# Patient Record
Sex: FEMALE | Race: BLACK OR AFRICAN AMERICAN | Employment: OTHER | URBAN - METROPOLITAN AREA
[De-identification: names, ages, dates, MRNs, and addresses within clinical notes are randomized per-mention and may not be internally consistent; named-entity substitution may affect disease eponyms.]

---

## 2017-01-17 ENCOUNTER — OFFICE VISIT (OUTPATIENT)
Dept: ORTHOPEDIC SURGERY | Age: 72
End: 2017-01-17

## 2017-01-17 VITALS
SYSTOLIC BLOOD PRESSURE: 149 MMHG | WEIGHT: 144.6 LBS | TEMPERATURE: 98.3 F | OXYGEN SATURATION: 99 % | HEART RATE: 75 BPM | BODY MASS INDEX: 26.61 KG/M2 | RESPIRATION RATE: 16 BRPM | DIASTOLIC BLOOD PRESSURE: 65 MMHG | HEIGHT: 62 IN

## 2017-01-17 DIAGNOSIS — M54.41 RIGHT-SIDED LOW BACK PAIN WITH RIGHT-SIDED SCIATICA, UNSPECIFIED CHRONICITY: ICD-10-CM

## 2017-01-17 DIAGNOSIS — M96.1 LUMBAR POST-LAMINECTOMY SYNDROME: ICD-10-CM

## 2017-01-17 DIAGNOSIS — Z98.1 S/P LAMINECTOMY WITH SPINAL FUSION: Primary | ICD-10-CM

## 2017-01-17 DIAGNOSIS — Z98.890 S/P LUMBAR LAMINECTOMY: ICD-10-CM

## 2017-01-17 RX ORDER — MELOXICAM 15 MG/1
TABLET ORAL
Refills: 1 | COMMUNITY
Start: 2016-10-25 | End: 2017-05-26 | Stop reason: ALTCHOICE

## 2017-01-17 RX ORDER — TRAMADOL HYDROCHLORIDE 50 MG/1
50 TABLET ORAL
Qty: 120 TAB | Refills: 3 | Status: SHIPPED | OUTPATIENT
Start: 2017-01-17 | End: 2017-07-24 | Stop reason: SDUPTHER

## 2017-01-17 RX ORDER — GABAPENTIN 300 MG/1
CAPSULE ORAL
Qty: 180 CAP | Refills: 3 | Status: SHIPPED | OUTPATIENT
Start: 2017-01-17 | End: 2017-05-26

## 2017-01-17 NOTE — PROGRESS NOTES
[address]  [address], [address]  [place]  Phone: [phone]  Fax: [phone]  PROGRESS NOTE  Patient: Aubrey Osborn                MRN: [id number]       SSN: [id number]  YOB: 1945        AGE: 70 y.o. SEX: female  Body mass index is 26.45 kg/(m^2). PCP: Graham Toribio MD  01/17/17    Chief Complaint   Patient presents with    Back Pain     block follow up       HISTORY OF PRESENT ILLNESS, RADIOGRAPHS, and PLAN:     HISTORY:  Ms. Odilia Alvarez returns today. She is continuing to have severe pain in her back and leg. Previous injections only had moderate effectiveness. She would like to have them repeated. She is continuing to have pain in her back radiating down primarily her right leg. We discussed the nature of her pain and possible surgical intervention. Injections at this point, we will try a right L4 facet, as well as a right L4 selective. We discussed junctional decompression and fusion of a segment below a previous surgery. At this point, she would like to avoid surgery. We will repeat her injection. I will see her back after her trial of new injection. cc: Alexia Edwards M.D. Past Medical History   Diagnosis Date    [facility]     Cancer ([facility]     Foraminal stenosis of lumbar region     Hematuria     Hypercholesterolemia     Low back pain        Family History   Problem Relation Age of Onset    No Known Problems Mother     No Known Problems Father        Current Outpatient Prescriptions   Medication Sig Dispense Refill    traMADol (ULTRAM) 50 mg tablet Take 1 Tab by mouth every six (6) hours as needed for Pain. Max Daily Amount: 200 mg. 120 Tab 0    MULTIVIT-MINERALS/FOLIC ACID (THERALOGIX  MULTI PO) Take  by mouth daily.       traZODone (DESYREL) 50 mg tablet TK 1 T PO QHS  0    gabapentin (NEURONTIN) 300 mg capsule TAKE 2 CAPSULES BY MOUTH NIGHTLY 180 Cap 3    aspirin delayed-release 81 mg tablet Take  by mouth daily.  multivitamin (ONE A DAY) tablet Take 1 Tab by mouth daily.  pravastatin (PRAVACHOL) 20 mg tablet Take 20 mg by mouth nightly. 5    meloxicam (MOBIC) 15 mg tablet TK 1 T PO D  1       No Known Allergies    Past Surgical History   Procedure Laterality Date    Hx colonoscopy      Hx bunionectomy      Hx orthopaedic  Bunionionectomy R    Hx lumbar laminectomy  6/2/15     with fusion    Hx tubal ligation         Past Medical History   Diagnosis Date    [facility]     [facility]     Foraminal stenosis of lumbar region     Hematuria     Hypercholesterolemia     Low back pain        Social History     Social History    Marital status:      Spouse name: N/A    Number of children: N/A    Years of education: N/A     Occupational History    nurse      Social History Main Topics    Smoking status: Former Smoker     Quit date: 1/1/1980    Smokeless tobacco: Never Used    Alcohol use 0.0 oz/week     0 Standard drinks or equivalent per week      Comment: 1 glass 2x per week    Drug use: No    Sexual activity: Not on file     Other Topics Concern    Not on file     Social History Narrative       REVIEW OF SYSTEMS:   CONSTITUTIONAL SYMPTOMS:  Negative. EYES:  Negative. EARS, NOSE, THROAT AND MOUTH:  Negative. CARDIOVASCULAR:  Negative. RESPIRATORY:  Negative. GENITOURINARY: Negative. GASTROINTESTINAL:  Negative. INTEGUMENTARY (SKIN AND/OR BREAST):  Negative. MUSCULOSKELETAL: Per HPI.   ENDOCRINE/RHEUMATOLOGIC:  Negative. NEUROLOGICAL:  Per HPI. HEMATOLOGIC/LYMPHATIC:  Negative. ALLERGIC/IMMUNOLOGIC:  Negative. PSYCHIATRIC:  Negative.     PHYSICAL EXAMINATION:   Visit Vitals    /65    Pulse 75    Temp 98.3 °F (36.8 °C) (Oral)    Resp 16    Ht 5' 2\" (1.575 m)    Wt 144 lb 9.6 oz (65.6 kg)    SpO2 99%    BMI 26.45 kg/m2    PAIN SCALE: 5/10    CONSTITUTIONAL: The patient is in no apparent distress and is alert and oriented x 3. HEENT: Normocephalic. Hearing grossly intact. NECK: Supple and symmetric. no tenderness, or masses were felt. RESPIRATORY: No labored breathing. CARDIOVASCULAR: The carotid pulses were normal. Peripheral pulses were 2+. CHEST: Normal AP diameter and normal contour without any kyphoscoliosis. LYMPHATIC: No lymphadenopathy was appreciated in the neck, axillae or groin. SKIN: Negative for scars, rashes, lesions, or ulcers on the right upper, right lower, left upper, left lower and trunk. NEUROLOGICAL: Alert and oriented x 3. Ambulation without assistive device. FWB. EXTREMITIES: See musculoskeletal.  MUSCULOSKELETAL:   Head and Neck:  Negative for misalignment, asymmetry, crepitation, defects, tenderness masses or effusions.  Left Upper Extremity: Inspection, percussion and palpation preformed. Yings sign is negative.  Right Upper Extremity: Inspection, percussion and palpation preformed. Yings sign is negative.  Spine, Ribs and Pelvis: Back pain that radiates into RLE. Inspection, percussion and palpation preformed. Negative for misalignment, asymmetry, crepitation, defects, tenderness masses or effusions.  Right Lower Extremity: Pain. Inspection, percussion and palpation preformed. Negative straight leg raise.  Left Lower Extremity: Inspection, percussion and palpation preformed. Negative straight leg raise. SPINE EXAM:     Lumbar spine: No rash, ecchymosis, or gross obliquity. No focal atrophy is noted. ASSESSMENT    ICD-10-CM ICD-9-CM    1. S/P laminectomy with spinal fusion Z98.1 V45.4 SCHEDULE SURGERY      REFERRAL TO PHYSICAL THERAPY   2. Lumbar post-laminectomy syndrome M96.1 722.83 SCHEDULE SURGERY      traMADol (ULTRAM) 50 mg tablet      gabapentin (NEURONTIN) 300 mg capsule      REFERRAL TO PHYSICAL THERAPY   3.  S/P lumbar laminectomy Z98.890 V45.89 SCHEDULE SURGERY      traMADol (ULTRAM) 50 mg tablet      gabapentin (NEURONTIN) 300 mg capsule      REFERRAL TO PHYSICAL THERAPY   4. Right-sided low back pain with right-sided sciatica, unspecified chronicity M54.41 724.3 SCHEDULE SURGERY      REFERRAL TO PHYSICAL THERAPY       Written by Anna Mendez, as dictated by Taiwo Lentz MD.

## 2017-01-17 NOTE — PATIENT INSTRUCTIONS
[facility] Activation    Thank you for requesting access to [facility]. Please follow the instructions below to securely access and download your online medical record. [facility] allows you to send messages to your doctor, view your test results, renew your prescriptions, schedule appointments, and more. How Do I Sign Up? 1. In your internet browser, go to [email or web address]  2. Click on the First Time User? Click Here link in the Sign In box. You will be redirect to the New Member Sign Up page. 3. Enter your [facility] Access Code exactly as it appears below. You will not need to use this code after youve completed the sign-up process. If you do not sign up before the expiration date, you must request a new code. [facility] Access Code: [email or web address]  Expires: 2017 10:05 AM (This is the date your [facility] access code will )    4. Enter the last four digits of your Social Security Number (xxxx) and Date of Birth (mm/dd/yyyy) as indicated and click Submit. You will be taken to the next sign-up page. 5. Create a [facility] ID. This will be your [facility] login ID and cannot be changed, so think of one that is secure and easy to remember. 6. Create a [facility] password. You can change your password at any time. 7. Enter your Password Reset Question and Answer. This can be used at a later time if you forget your password. 8. Enter your e-mail address. You will receive e-mail notification when new information is available in [address] 9. Click Sign Up. You can now view and download portions of your medical record. 10. Click the Download Summary menu link to download a portable copy of your medical information. Additional Information    If you have questions, please visit the Frequently Asked Questions section of the [facility] website at [email or web address] Remember, [facility] is NOT to be used for urgent needs. For medical emergencies, dial 911.

## 2017-02-08 ENCOUNTER — APPOINTMENT (OUTPATIENT)
Dept: PHYSICAL THERAPY | Age: 72
End: 2017-02-08
Payer: MEDICARE

## 2017-02-17 ENCOUNTER — HOSPITAL ENCOUNTER (OUTPATIENT)
Dept: PHYSICAL THERAPY | Age: 72
Discharge: HOME | End: 2017-02-17
Payer: MEDICARE

## 2017-02-17 PROCEDURE — 97110 THERAPEUTIC EXERCISES: CPT

## 2017-02-17 PROCEDURE — 97162 PT EVAL MOD COMPLEX 30 MIN: CPT

## 2017-02-17 PROCEDURE — G8979 MOBILITY GOAL STATUS: HCPCS

## 2017-02-17 PROCEDURE — 97140 MANUAL THERAPY 1/> REGIONS: CPT

## 2017-02-17 PROCEDURE — G8978 MOBILITY CURRENT STATUS: HCPCS

## 2017-02-17 NOTE — PROGRESS NOTES
[facility]              [address]        [place], [address] [place]   [phone] fax    Plan of Care/ Statement of Necessity for Physical Therapy Services    Patient name: Sydni Boss Start of Care: 2017   Referral source: Rosa Soto MD : 1945    Medical Diagnosis: Postlaminectomy syndrome, not elsewhere classified [M96.1]  Lumbago with sciatica, right side [M54.41]   Onset Date:2015    Treatment Diagnosis: Lumbar Radiculopathy    Prior Hospitalization: see medical history Provider#: [id number]   Medications: Verified on Patient summary List    Comorbidities: Arthritis, Back pain, BMI over 30, CA, Osteoporosis    Prior Level of Function: Pt is employed as Clinic Nurse at [employer]; (I) with all ADL's and enjoys gardening. The Plan of Care and following information is based on the information from the initial evaluation. Assessment/ key information: Pt is a 69 y/o female who c/o LBP that radiates down into R buttock along with some numbness in L lateral thigh. Pt reports pain increases when she lays flat or sits too long. Pt reports she has had back issues for last 2 years and had a lumbar fusion in 2015. Pt reports symptoms prior to sx included R side low back pain with radiating pain and numbness/tingling down R LE. Pt reports after sx she felt good for 3-6 months then this new pain started to creep up on her. Pt reports she has had injections which do not help and that the meds she takes helps take the edge off and she has some relief with heat. Pt ambulates with normal gait pattern. Pt is TTP R L/S paraspinals and R piriformis. Pt's L/S AROM: flex WNL slight increase in pain at end range, ext WNL no increase in pain, R SB WNL no increase in pain, L SB limited 10% with increase in pain, B rot WFL no increase in pain.  Pt's B LE MMT: hip flex R 4-/5 L 4/5, hip abd R 3+/5 L 4-/5 both with TFL over activation, hip ext B 4-/5, knee ext/flex B 4+/5, ankle DF B 4+/5. Pt presents with (-) Slump and SLR B, (-) 90/90 B, increase in R buttock symptoms but tolerable with piriformis S. Pt demos bridge through 40% range indicating weak core/glutes. Patient will benefit from skilled PT services to modify and progress therapeutic interventions, address functional mobility deficits, address ROM deficits, address strength deficits, analyze and address soft tissue restrictions, analyze and cue movement patterns, assess and modify postural abnormalities and instruct in home and community integration to attain remaining goals. Evaluation Complexity History MEDIUM  Complexity : 1-2 comorbidities / personal factors will impact the outcome/ POC ; Examination MEDIUM Complexity : 3 Standardized tests and measures addressing body structure, function, activity limitation and / or participation in recreation  ;Presentation MEDIUM Complexity : Evolving with changing characteristics  ; Clinical Decision Making MEDIUM Complexity : FOTO score of 26-74  Overall Complexity Rating: MEDIUM  Problem List: pain affecting function, decrease ROM, decrease strength, impaired gait/ balance, decrease ADL/ functional abilitiies, decrease activity tolerance and decrease flexibility/ joint mobility   Treatment Plan may include any combination of the following: Therapeutic exercise, Therapeutic activities, Neuromuscular re-education, Physical agent/modality, Gait/balance training, Manual therapy and Patient education  Patient / Family readiness to learn indicated by: asking questions, trying to perform skills and interest  Persons(s) to be included in education: patient (P)  Barriers to Learning/Limitations: None  Patient Goal (s): Less pain when lying flat  Patient Self Reported Health Status: good  Rehabilitation Potential: good    Short Term Goals:  To be accomplished in 2 weeks:  1) Pt will report (I) and compliance with HEP for home management of symptoms. Long Term Goals: To be accomplished in 6 weeks:  1) Pt's FOTO score will improve > or = 68 indicating improvements in function. 2) Pt will improve L/S AROM flex and L SB WNL w/o an increase in pain in order to perform ADL's.  3) Pt will improve B hip MMT > or = 4-4+/5 for functional strength during ADL's.  4) Pt will demo bridge through full range w/o an increase in pain indicating improved core/glute strength. 5) Pt will report > or = 60% improvement in symptoms in order to progress rehab. Frequency / Duration: Patient to be seen 2 times per week for 6 weeks. Patient/ Caregiver education and instruction: Diagnosis, prognosis, exercises   [x]  Plan of care has been reviewed with PTA    G-Codes (GP)  Mobility   Current  CJ= 20-39%   Goal  CJ= 20-39%    The severity rating is based on clinical judgment and the FOTO score. Certification Period: 2/17/2017-5/15/2017  Aravind Hook, PT 2/17/2017 10:46 AM  ________________________________________________________________________    I certify that the above Therapy Services are being furnished while the patient is under my care. I agree with the treatment plan and certify that this therapy is necessary.     [de-identified] Signature:____________________  Date:____________Time: _________    Please sign and return to [facility]              [address]        [place], [address] [place]   [phone] fax

## 2017-02-17 NOTE — PROGRESS NOTES
PT DAILY TREATMENT NOTE - [facility]     Patient Name: Joanna Blind  Date:2017  : 1945  [x]  Patient  Verified  Payor: Candy Dear / Plan: [place] & B / Product Type: Medicare /    In time:10:00  Out time:10:42  Total Treatment Time (min): 42  Total Timed Codes (min): 23  1:1 Treatment Time ([facility] only): 42   Visit #: 1 of 12    Treatment Area: Postlaminectomy syndrome, not elsewhere classified [M96.1]  Lumbago with sciatica, right side [M54.41]    SUBJECTIVE  Pain Level (0-10 scale): 2  Any medication changes, allergies to medications, adverse drug reactions, diagnosis change, or new procedure performed?: [x] No    [] Yes (see summary sheet for update)  Subjective functional status/changes:   [] No changes reported  Pt is a 69 y/o female who c/o LBP that radiates down into R buttock along with some numbness in L lateral thigh. Pt reports pain increases when she lays flat or sits too long. Pt reports she has had back issues for last 2 years and had a lumbar fusion in 2015. Pt reports symptoms prior to sx included R side low back pain with radiating pain and numbness/tingling down R LE. Pt reports after sx she felt good for 3-6 months then this new pain started to creep up on her. Pt reports she has had injections which do not help and that the meds she takes helps take the edge off and she has some relief with heat.      OBJECTIVE    Modality rationale:  to improve the patients ability to    Min Type Additional Details    [] Estim:  []Unatt       []IFC  []Premod                        []Other:  []w/ice   []w/heat  Position:  Location:    [] Estim: []Att    []TENS instruct  []NMES                    []Other:  []w/US   []w/ice   []w/heat  Position:  Location:    []  Traction: [] Cervical       []Lumbar                       [] Prone          []Supine                       []Intermittent   []Continuous Lbs:  [] before manual  [] after manual    []  Ultrasound: []Continuous   [] Pulsed []1MHz   []3MHz W/cm2:  Location:    []  Iontophoresis with dexamethasone         Location: [] Take home patch   [] In clinic    []  Ice     []  heat  []  Ice massage  []  Laser   []  Anodyne Position:  Location:    []  Laser with stim  []  Other:  Position:  Location:    []  Vasopneumatic Device Pressure:       [] lo [] med [] hi   Temperature: [] lo [] med [] hi   [] Skin assessment post-treatment:  []intact []redness- no adverse reaction    []redness  adverse reaction:     19 min [x]Eval                  []Re-Eval       13 min Therapeutic Exercise:  [x] See flow sheet : Instructed, demonstrated, and performed HEP   Rationale: increase ROM, increase strength and improve coordination to improve the patients ability to decrease pain and perform ADL's     min Therapeutic Activity:  []  See flow sheet :   Rationale:   to improve the patients ability to       min Neuromuscular Re-education:  []  See flow sheet :   Rationale:   to improve the patients ability to     10 min Manual Therapy:  STM/DTM B L/S paraspinals and QL, L/S unilateral mobs. TPR B piriformis and glutes R>L. Rationale: decrease pain, increase ROM, increase tissue extensibility and decrease trigger points to increase ease with ADL's     min Gait Training:  ___ feet with ___ device on level surfaces with ___ level of assist   Rationale: With   [] TE   [] TA   [] neuro   [] other: Patient Education: [x] Review HEP    [] Progressed/Changed HEP based on:   [] positioning   [] body mechanics   [] transfers   [] heat/ice application    [] other:      Other Objective/Functional Measures: Pt ambulates with normal gait pattern. Pt is TTP R L/S paraspinals and R piriformis. Pt's L/S AROM: flex WNL slight increase in pain at end range, ext WNL no increase in pain, R SB WNL no increase in pain, L SB limited 10% with increase in pain, B rot WFL no increase in pain.  Pt's B LE MMT: hip flex R 4-/5 L 4/5, hip abd R 3+/5 L 4-/5 both with TFL over activation, hip ext B 4-/5, knee ext/flex B 4+/5, ankle DF B 4+/5. Pt presents with (-) Slump and SLR B, (-) 90/90 B, increase in R buttock symptoms but tolerable with piriformis S. Pt demos bridge through 40% range indicating weak core/glutes. Pain Level (0-10 scale) post treatment: 2    ASSESSMENT/Changes in Function: see POC    Patient will continue to benefit from skilled PT services to modify and progress therapeutic interventions, address functional mobility deficits, address ROM deficits, address strength deficits, analyze and address soft tissue restrictions, analyze and cue movement patterns, assess and modify postural abnormalities and instruct in home and community integration to attain remaining goals. [x]  See Plan of Care  []  See progress note/recertification  []  See Discharge Summary         Progress towards goals / Updated goals:  Short term goals: to be accomplished in 2 weeks  1) Pt will report (I) and compliance with HEP for home management of symptoms. At eval: Instructed, demonstrated, and performed HEP  Long term goals: to be accomplished in 6 weeks  1) Pt's FOTO score will improve > or = 68 indicating improvements in function. At eval: FOTO = 63  2) Pt will improve L/S AROM flex and L SB WNL w/o an increase in pain in order to perform ADL's. At eval: flex WNL but slight pain at end range, L SB limited 10% with increase in pain  3) Pt will improve B hip MMT > or = 4-4+/5 for functional strength during ADL's. At eval: hip flex R 4-/5 L 4/5, hip abd R 3+/5 L 4-/5 both with TFL over activation, hip ext B 4-/5  4) Pt will demo bridge through full range w/o an increase in pain indicating improved core/glute strength. At eval: demos bridge through 40% range indicating weak core/glutes  5) Pt will report > or = 60% improvement in symptoms in order to progress rehab.   At eval: 0%    PLAN  []  Upgrade activities as tolerated     []  Continue plan of care  []  Update interventions per flow sheet       []  Discharge due to:_  [x]  Other: 2x/week for 6 weeks      Lisa Hernandez, PT 2/17/2017  10:46 AM    Future Appointments  Date Time Provider Onesimo Wandy   2/27/2017 9:25 AM Negar Rodriguez MD [facility]   2/27/2017 12:00 PM Shanda Brittle, PTA [facility]   3/1/2017 5:00 PM Shanda Brittle, PTA [facility]   3/7/2017 5:30 PM Lisa Hernandez, PT [facility]   3/8/2017 5:30 PM Shanda Brittle, PTA [facility]   3/10/2017 4:00 PM Chelsie Manriquez MD [address] [facility]   3/13/2017 5:30 PM Shanda Brittle, PTA [facility]   3/15/2017 5:30 PM Shanda Brittle, PTA [facility]   3/17/2017 2:30 PM Cuco Bender  [address]   3/21/2017 5:30 PM Lisa Hernandez, PT [facility]   3/22/2017 5:30 PM Lisa Hernandez, PT [facility]   3/27/2017 5:30 PM Shanda Brittle, PTA [facility]   3/29/2017 5:30 PM Lisa Hernandez, PT [facility]

## 2017-02-27 ENCOUNTER — HOSPITAL ENCOUNTER (OUTPATIENT)
Dept: PHYSICAL THERAPY | Age: 72
Discharge: HOME | End: 2017-02-27
Payer: MEDICARE

## 2017-02-27 ENCOUNTER — OFFICE VISIT (OUTPATIENT)
Dept: ORTHOPEDIC SURGERY | Age: 72
End: 2017-02-27

## 2017-02-27 VITALS
HEIGHT: 62 IN | SYSTOLIC BLOOD PRESSURE: 136 MMHG | WEIGHT: 146 LBS | BODY MASS INDEX: 26.87 KG/M2 | HEART RATE: 58 BPM | DIASTOLIC BLOOD PRESSURE: 61 MMHG

## 2017-02-27 DIAGNOSIS — M96.1 POSTLAMINECTOMY SYNDROME, LUMBAR: ICD-10-CM

## 2017-02-27 DIAGNOSIS — M54.2 NECK PAIN: Primary | ICD-10-CM

## 2017-02-27 DIAGNOSIS — M75.41 IMPINGEMENT SYNDROME OF RIGHT SHOULDER: ICD-10-CM

## 2017-02-27 DIAGNOSIS — M50.30 DDD (DEGENERATIVE DISC DISEASE), CERVICAL: ICD-10-CM

## 2017-02-27 DIAGNOSIS — M47.812 CERVICAL SPONDYLOSIS WITHOUT MYELOPATHY: ICD-10-CM

## 2017-02-27 PROCEDURE — 97110 THERAPEUTIC EXERCISES: CPT

## 2017-02-27 PROCEDURE — 97112 NEUROMUSCULAR REEDUCATION: CPT

## 2017-02-27 PROCEDURE — 97140 MANUAL THERAPY 1/> REGIONS: CPT

## 2017-02-27 RX ORDER — METAXALONE 400 MG/1
TABLET ORAL
Refills: 0 | COMMUNITY
Start: 2017-02-13 | End: 2017-09-08

## 2017-02-27 NOTE — PROGRESS NOTES
[facility]  [address], [address]  Phone: [phone]  Fax: [phone]        INITIAL CONSULTATION      HISTORY OF PRESENT ILLNESS:  Haydee Garnett is a 70 y.o. female whom is referred from Dr. Thao Mittal secondary to neck and right shoulder pain extending to the elbow since 12/2016. No specific injury/trauma. She rates pain 4-8/10. Pain is exacerbated with reaching behind and overhead reaching. Note from Dr. Thao Mittal dated 2/3/17 indicating patient was seen with c/o bilateral shoulder pain as well as upper thigh pain. Of note, current episodes began 1 month ago and has been occurring daily. Specifically, patient has been referred to me for right shoulder pain. Pt denies hx of shoulder or cervical spinal surgery. She has been treated with Ultram and OTC medications. Pt takes Aleve with moderate relief. Pt has hx of lumbar spinal surgery performed by Dr. Mallorie Lombardo. Note from Dr. Win Feng dated 1/17/17 low back and leg pain with previous injections offering moderate effectiveness. She has hx of lumbar blocks with Dr. Onel Loving in the past. Therapy notes reviewed. She is currently enrolled in physical therapy or her low back. Right shoulder XR dated 2/3/17 reviewed. Per report, high riding humeral head, finding is associated with ligamentous disruption of the rotator cuff tendons. Mild DJD of the glenohumeral joint and acromioclavicular joint. A nonemergent outpatient MRI of the shoulder is recommended for further characterization of rotator cuff integrity.  reviewed.        Past Medical History:   Diagnosis Date    [facility] [address]     Cancer ([facility] [address]     Foraminal stenosis of lumbar region     Hematuria     Hypercholesterolemia     Low back pain    HX T  Past Surgical History:   Procedure Laterality Date    HX BUNIONECTOMY      HX COLONOSCOPY      HX LUMBAR LAMINECTOMY  6/2/15    with fusion    HX ORTHOPAEDIC  Bunionionectomy R    HX TUBAL LIGATION     UBAL LIGATION        Substance Use Topics    Smoking status: Former Smoker     Quit date: 1/1/1980    Smokeless tobacco: Never Used    Alcohol use 0.0 oz/week     0 Standard drinks or equivalent per week      Comment: 1 glass 2x per week     Work status: The patient is employed. Marital status: The patient is . Current Outpatient Prescriptions   Medication Sig Dispense Refill    traMADol (ULTRAM) 50 mg tablet Take 1 Tab by mouth every six (6) hours as needed for Pain. Max Daily Amount: 200 mg. 120 Tab 3    gabapentin (NEURONTIN) 300 mg capsule TAKE 2 CAPSULES BY MOUTH NIGHTLY 180 Cap 3    MULTIVIT-MINERALS/FOLIC ACID (THERALOGIX  MULTI PO) Take  by mouth daily.  traZODone (DESYREL) 50 mg tablet TK 1 T PO QHS  0    aspirin delayed-release 81 mg tablet Take  by mouth daily.  multivitamin (ONE A DAY) tablet Take 1 Tab by mouth daily.  pravastatin (PRAVACHOL) 20 mg tablet Take 20 mg by mouth nightly. 5    metaxalone (SKELAXIN) 400 mg tablet   0    meloxicam (MOBIC) 15 mg tablet TK 1 T PO D  1       No Known Allergies         Family History   Problem Relation Age of Onset    No Known Problems Mother     No Known Problems Father          REVIEW OF SYSTEMS  Constitutional symptoms: Negative  Eyes: Negative  Ears, Nose, Throat, and Mouth: Negative  Cardiovascular: Negative  Respiratory: Negative  Genitourinary: Negative  Integumentary (Skin and/or breast): Negative  Musculoskeletal: Positive for neck pain, right shoulder pain, chronic low back and LLE pain. Extremities: Negative for edema.   Endocrine/Rheumatologic: Negative  Hematologic/Lymphatic: Negative  Allergic/Immunologic: Negative  Psychiatric: Negative       PHYSICAL EXAMINATION    Visit Vitals    /61    Pulse (!) 58    Ht 5' 2\" (1.575 m)    Wt 146 lb (66.2 kg)    BMI 26.7 kg/m2       CONSTITUTIONAL: NAD, A&O x 3  HEART: Regular rate and rhythm  ABDOMEN: Positive bowel sounds, soft, nontender, and nondistended  LUNGS: Clear to auscultation bilaterally. RANGE OF MOTION: The patient has full passive range of motion in all four extremities. SENSATION: Sensation is intact to light touch throughout. MOTOR:   Straight Leg Raise: Negative, bilateral  Ying: Negative, bilateral  MUSCULOSKELETAL: Positive impingement, right shoulder. Increased tenderness to palpation of right bicipital tendon. Deep tendon reflexes are 0 at the brachioradialis, 2+ at the biceps, and 1+ a t the triceps. Deep tendon reflexes are 2+ at the knees and 0 at the ankles bilaterally. Shoulder AB/Flex Elbow Flex Wrist Ext Elbow Ext Wrist Flex Hand Intrin Tone   Right +4/5 +4/5 +4/5 +4/5 +4/5 +4/5 +4/5   Left +4/5 +4/5 +4/5 +4/5 +4/5 +4/5 +4/5              Hip Flex Knee Ext Knee Flex Ankle DF GTE Ankle PF Tone   Right +4/5 +4/5 +4/5 +4/5 +4/5 +4/5 +4/5   Left +4/5 +4/5 +4/5 +4/5 +4/5 +4/5 +4/5     RADIOGRAPHS  Preliminary reading of cervical plain films:  Disc space narrowing at C3-4, C4-5 and C5-6. Anterior osteophytes noted. No acute pathology identified. These are being sent out for official reading by Dr. Yaneth Lizarraga. ASSESSMENT   Viet Landin was seen today for shoulder pain. Diagnoses and all orders for this visit:    Neck pain  -     [38325] C Spine 2-3V    Impingement syndrome of right shoulder    DDD (degenerative disc disease), cervical    Postlaminectomy syndrome, lumbar    Cervical spondylosis without myelopathy         IMPRESSIONS/RECOMMENDATIONS:  While there were degenerative noted on her cervical spinal radiographs, her symptoms appear to be related mostly to shoulder pathology versus cervical spinal pathology. I suspect a rotator cuff tear and will order a right shoulder MRI. I will refer her to Dr. Pratibha Rodriguez for further evaluation/treatment of her right shoulder pain. I will see the patient back on an as-needed basis.        Written by Naomi Ordonez, as dictated by Amanda Silver MD  I examined the patient, reviewed and agree with the note.

## 2017-03-01 ENCOUNTER — HOSPITAL ENCOUNTER (OUTPATIENT)
Dept: PHYSICAL THERAPY | Age: 72
Discharge: HOME | End: 2017-03-01
Payer: MEDICARE

## 2017-03-01 PROCEDURE — 97110 THERAPEUTIC EXERCISES: CPT

## 2017-03-01 PROCEDURE — 97112 NEUROMUSCULAR REEDUCATION: CPT

## 2017-03-01 PROCEDURE — 97140 MANUAL THERAPY 1/> REGIONS: CPT

## 2017-03-01 NOTE — PROGRESS NOTES
PT DAILY TREATMENT NOTE - [facility]     Patient Name: Leeanna Navarro  Date:3/1/2017  : 1945  [x]  Patient  Verified  Payor: [place] MEDICARE / Plan: [place] MEDICARE PART A & B / Product Type: Medicare /    In time:5:00  Out time:5:47  Total Treatment Time (min): 47  Total Timed Codes (min): 47  1:1 Treatment Time ([facility] only): 45   Visit #: 3 of 12    Treatment Area: Postlaminectomy syndrome, not elsewhere classified [M96.1]  Lumbago with sciatica, right side [M54.41]    SUBJECTIVE  Pain Level (0-10 scale): 3  Any medication changes, allergies to medications, adverse drug reactions, diagnosis change, or new procedure performed?: [x] No    [] Yes (see summary sheet for update)  Subjective functional status/changes:   [] No changes reported  Pt reports after she left on Monday she was feeling well, but that night she woke up with pain and had to do stretches to help with her back pain.      OBJECTIVE        Min Type Additional Details    [] Estim:  []Unatt       []IFC  []Premod                        []Other:  []w/ice   []w/heat  Position:  Location:    [] Estim: []Att    []TENS instruct  []NMES                    []Other:  []w/US   []w/ice   []w/heat  Position:  Location:    []  Traction: [] Cervical       []Lumbar                       [] Prone          []Supine                       []Intermittent   []Continuous Lbs:  [] before manual  [] after manual    []  Ultrasound: []Continuous   [] Pulsed                           []1MHz   []3MHz W/cm2:  Location:    []  Iontophoresis with dexamethasone         Location: [] Take home patch   [] In clinic    []  Ice     []  heat  []  Ice massage  []  Laser   []  Anodyne Position:  Location:    []  Laser with stim  []  Other:  Position:  Location:    []  Vasopneumatic Device Pressure:       [] lo [] med [] hi   Temperature: [] lo [] med [] hi   [] Skin assessment post-treatment:  []intact []redness- no adverse reaction    []redness  adverse reaction:       27 min Therapeutic Exercise:  [x] See flow sheet :   Rationale: increase ROM and increase strength to improve the patients ability to increase standing tolerance. 10  min Neuromuscular Re-education:  [x]  See flow sheet :core act ex's   Rationale: increase ROM and increase strength  to improve the patients ability to increase standing tolerance. 10 min Manual Therapy:  Per flow sheet   Rationale: decrease pain, increase ROM, increase tissue extensibility and decrease trigger points to increase ease with ADLs. With   [] TE   [] TA   [] neuro   [] other: Patient Education: [x] Review HEP    [] Progressed/Changed HEP based on:   [] positioning   [] body mechanics   [] transfers   [] heat/ice application    [] other:      Other Objective/Functional Measures: Pt TTP along R QL. Pain Level (0-10 scale) post treatment: 0    ASSESSMENT/Changes in Function: Continue per POC. Patient will continue to benefit from skilled PT services to modify and progress therapeutic interventions, address functional mobility deficits, address ROM deficits, address strength deficits and analyze and address soft tissue restrictions to attain remaining goals. []  See Plan of Care  []  See progress note/recertification  []  See Discharge Summary         Progress towards goals / Updated goals:  Short term goals: to be accomplished in 2 weeks  1) Pt will report (I) and compliance with HEP for home management of symptoms. At eval: Instructed, demonstrated, and performed HEP  Current: Goal met: Pt reports performing tasks. 2/27/17  Long term goals: to be accomplished in 6 weeks  1) Pt's FOTO score will improve > or = 68 indicating improvements in function. At eval: FOTO = 63  2) Pt will improve L/S AROM flex and L SB WNL w/o an increase in pain in order to perform ADL's.   At eval: flex WNL but slight pain at end range, L SB limited 10% with increase in pain  3) Pt will improve B hip MMT > or = 4-4+/5 for functional strength during ADL's. At eval: hip flex R 4-/5 L 4/5, hip abd R 3+/5 L 4-/5 both with TFL over activation, hip ext B 4-/5  4) Pt will demo bridge through full range w/o an increase in pain indicating improved core/glute strength. At eval: demos bridge through 40% range indicating weak core/glutes  5) Pt will report > or = 60% improvement in symptoms in order to progress rehab.   At eval: 0%    PLAN  []  Upgrade activities as tolerated     [x]  Continue plan of care  []  Update interventions per flow sheet       []  Discharge due to:_  []  Other:_      Norville Cowden, PTA 3/1/2017  5:04 PM    Future Appointments  Date Time Provider Onesimo Saba   3/2/2017 5:30 PM [facility] MRI RM 1 [facility]   3/7/2017 2:00 PM Johnie Andrea MD [facility] [address]   3/7/2017 5:30 PM Madelia Hilt, PT [facility]   3/8/2017 5:30 PM Norville Cowden, PTA [facility]   3/10/2017 4:00 PM Marina Lubin  [address]   3/13/2017 5:30 PM Norville Cowden, PTA [facility]   3/15/2017 5:30 PM Norville Cowden, PTA [facility]   3/17/2017 2:30 PM Latina Essex,  [address]   3/21/2017 5:30 PM Madelia Hilt, PT [facility]   3/22/2017 5:30 PM Madelia Hilt, PT [facility]   3/27/2017 5:30 PM Norville Cowden, PTA [facility]   3/29/2017 5:30 PM Madelia Hilt, PT [facility]

## 2017-03-02 ENCOUNTER — HOSPITAL ENCOUNTER (OUTPATIENT)
Age: 72
Discharge: HOME | End: 2017-03-02
Attending: PHYSICAL MEDICINE & REHABILITATION
Payer: MEDICARE

## 2017-03-02 DIAGNOSIS — M50.30 DDD (DEGENERATIVE DISC DISEASE), CERVICAL: ICD-10-CM

## 2017-03-02 DIAGNOSIS — M96.1 POSTLAMINECTOMY SYNDROME, LUMBAR: ICD-10-CM

## 2017-03-02 DIAGNOSIS — M47.812 CERVICAL SPONDYLOSIS WITHOUT MYELOPATHY: ICD-10-CM

## 2017-03-02 DIAGNOSIS — M54.2 NECK PAIN: ICD-10-CM

## 2017-03-02 DIAGNOSIS — M75.41 IMPINGEMENT SYNDROME OF RIGHT SHOULDER: ICD-10-CM

## 2017-03-02 PROCEDURE — 73221 MRI JOINT UPR EXTREM W/O DYE: CPT

## 2017-03-07 ENCOUNTER — OFFICE VISIT (OUTPATIENT)
Dept: ORTHOPEDIC SURGERY | Age: 72
End: 2017-03-07

## 2017-03-07 ENCOUNTER — APPOINTMENT (OUTPATIENT)
Dept: PHYSICAL THERAPY | Age: 72
End: 2017-03-07
Payer: MEDICARE

## 2017-03-07 VITALS
DIASTOLIC BLOOD PRESSURE: 72 MMHG | TEMPERATURE: 97.8 F | BODY MASS INDEX: 26.31 KG/M2 | WEIGHT: 143 LBS | HEIGHT: 62 IN | SYSTOLIC BLOOD PRESSURE: 140 MMHG | HEART RATE: 82 BPM

## 2017-03-07 DIAGNOSIS — M12.811 ROTATOR CUFF TEAR ARTHROPATHY, RIGHT: Primary | ICD-10-CM

## 2017-03-07 DIAGNOSIS — M75.101 ROTATOR CUFF TEAR ARTHROPATHY, RIGHT: Primary | ICD-10-CM

## 2017-03-07 RX ORDER — TRIAMCINOLONE ACETONIDE 40 MG/ML
40 INJECTION, SUSPENSION INTRA-ARTICULAR; INTRAMUSCULAR ONCE
Qty: 1 ML | Refills: 0
Start: 2017-03-07 | End: 2017-03-07

## 2017-03-07 NOTE — PROGRESS NOTES
Roxana Dan  [birth year]   Chief Complaint   Patient presents with    Shoulder Pain     Right, MRI results        [address] [facility]  Roxana Dan is a 70 y.o. female who presents today for evaluation of right shoulder pain. She rates her pain 6/10 today. Patient has completed an MRI of the right shoulder. Patient was previously seen by Dr. Saskia Agosto who referred her to me for further evaluation of her shoulder pain. She has trouble doing certain daily activities like curling her hair. She states the pain began about 2 months ago and has been gradually increasing. She denies any trauma or injury. No Known Allergies     Past Medical History:   Diagnosis Date    [facility] [address]     Cancer ([facility] [address]     Foraminal stenosis of lumbar region     Hematuria     Hypercholesterolemia     Low back pain       Social History     Social History    Marital status:      Spouse name: N/A    Number of children: N/A    Years of education: N/A     Occupational History    nurse      Social History Main Topics    Smoking status: Former Smoker     Quit date: 1/1/1980    Smokeless tobacco: Never Used    Alcohol use 0.0 oz/week     0 Standard drinks or equivalent per week      Comment: 1 glass 2x per week    Drug use: No    Sexual activity: Not on file     Other Topics Concern    Not on file     Social History Narrative      Past Surgical History:   Procedure Laterality Date    HX BUNIONECTOMY      HX COLONOSCOPY      HX LUMBAR LAMINECTOMY  6/2/15    with fusion    HX ORTHOPAEDIC  Bunionionectomy R    HX TUBAL LIGATION        Family History   Problem Relation Age of Onset    No Known Problems Mother     No Known Problems Father       Current Outpatient Prescriptions   Medication Sig    triamcinolone acetonide (KENALOG) 40 mg/mL injection 1 mL by IntraMUSCular route once for 1 dose.     metaxalone (SKELAXIN) 400 mg tablet     meloxicam (MOBIC) 15 mg tablet TK 1 T PO D    traMADol (ULTRAM) 50 mg tablet Take 1 Tab by mouth every six (6) hours as needed for Pain. Max Daily Amount: 200 mg.    gabapentin (NEURONTIN) 300 mg capsule TAKE 2 CAPSULES BY MOUTH NIGHTLY    MULTIVIT-MINERALS/FOLIC ACID (THERALOGIX  MULTI PO) Take  by mouth daily.  traZODone (DESYREL) 50 mg tablet TK 1 T PO QHS    aspirin delayed-release 81 mg tablet Take  by mouth daily.  multivitamin (ONE A DAY) tablet Take 1 Tab by mouth daily.  pravastatin (PRAVACHOL) 20 mg tablet Take 20 mg by mouth nightly. No current facility-administered medications for this visit. REVIEW OF SYSTEM   Patient denies: Weight loss, Fever/Chills, HA, Visual changes, Fatigue, Chest pain, SOB, Abdominal pain, N/V/D/C, Blood in stool or urine, Edema. Pertinent positive as above in HPI. All others were negative    PHYSICAL EXAM:   Visit Vitals    /72    Pulse 82    Temp 97.8 °F (36.6 °C) (Oral)    Ht 5' 2\" (1.575 m)    Wt 143 lb (64.9 kg)    BMI 26.16 kg/m2     The patient is a well-developed, well-nourished female   in no acute distress. The patient is alert and oriented times three. The patient is alert and oriented times three. Mood and affect are normal.  LYMPHATIC: lymph nodes are not enlarged and are within normal limits  SKIN: normal in color and non tender to palpation. There are no bruises or abrasions noted. NEUROLOGICAL: Motor sensory exam is within normal limits. Reflexes are equal bilaterally.  There is normal sensation to pinprick and light touch  MUSCULOSKELETAL:  Examination Right shoulder   Skin Intact   AC joint tenderness -   Biceps tenderness -   Forward flexion/Elevation    Active abduction    Glenohumeral abduction 90   External rotation ROM 90   Internal rotation ROM 70   Apprehension -   Sarithas Relocation -   Jerk -   Load and Shift -   Obriens -   Speeds -   Impingement sign -   Supraspinatus/Empty Can -, 5/5   External Rotation Strength -, 5/5   Lift Off/Belly Press -, 5/5   Neurovascular Intact          PROCEDURE: After sterile prep, 6 cc of Xylocaine and 1 cc of Kenalog were injected into the right shoulder. [facility]  OFFICE PROCEDURE PROGRESS NOTE        Chart reviewed for the following:  Brian Flores MD, have reviewed the History, Physical and updated the Allergic reactions for [address] performed immediately prior to start of procedure:  Brian Flores MD, have performed the following reviews on [place] Wihte prior to the start of the procedure:            * Patient was identified by name and date of birth   * Agreement on procedure being performed was verified  * Risks and Benefits explained to the patient  * Procedure site verified and marked as necessary  * Patient was positioned for comfort  * Consent was signed and verified     Time: 2:09 PM    Date of procedure: 3/7/2017    Procedure performed by:  Jessica Garrett MD    Provider assisted by: (see medication administration)    How tolerated by patient: tolerated the procedure well with no complications    Comments: none      IMAGING: MRI of the right shoulder dated 3/2/17 was reviewed and read:   IMPRESSION:  1. Chronic full-thickness rupture of supraspinatus and infraspinatus tendons,  with high riding humeral head, severe tendon retraction and muscular atrophy. Subacromial fluid communicating with the [facility] joint effusion. Type II acromion. AC  joint hypertrophy present. 2. Suspect degenerative labral tear in the superior labrum. Biceps tendinosis. 3. Joint effusion containing debris. IMPRESSION:      ICD-10-CM ICD-9-CM    1. Rotator cuff tear arthropathy, right M12.811 716.81 TRIAMCINOLONE ACETONIDE INJ      triamcinolone acetonide (KENALOG) 40 mg/mL injection      DRAIN/INJECT LARGE JOINT/BURSA        PLAN: I discussed the results of the MRI and the treatment options with the patient.  The patient has been hurting for the last 2 months. Due to the patient's age, I do not feel she would receiving lasting relief from surgery. We will start with some conservative treatment. I injected her right shoulder with cortisone today. I will see her back in 3 weeks if the pain continues. Office note will be sent to the referring provider.   Follow-up Disposition: Not on File    Scribed by Crystal Price [facility]) as dictated by MD Stevie Michele M.D.   [facility]

## 2017-03-08 ENCOUNTER — HOSPITAL ENCOUNTER (OUTPATIENT)
Dept: PHYSICAL THERAPY | Age: 72
Discharge: HOME | End: 2017-03-08
Payer: MEDICARE

## 2017-03-08 PROCEDURE — 97140 MANUAL THERAPY 1/> REGIONS: CPT

## 2017-03-08 PROCEDURE — G8980 MOBILITY D/C STATUS: HCPCS

## 2017-03-08 PROCEDURE — 97112 NEUROMUSCULAR REEDUCATION: CPT

## 2017-03-08 PROCEDURE — G8979 MOBILITY GOAL STATUS: HCPCS

## 2017-03-08 NOTE — PROGRESS NOTES
PT DAILY TREATMENT NOTE - [facility]     Patient Name: Qi Lisa  Date:3/8/2017  : 1945  [x]  Patient  Verified  Payor: [place] MEDICARE / Plan: [place] MEDICARE PART A & B / Product Type: Medicare /    In time:5:28  Out time:6:20  Total Treatment Time (min): 52  Total Timed Codes (min): 52  1:1 Treatment Time ([facility] only): 30   Visit #: 4 of 12    Treatment Area: Postlaminectomy syndrome, not elsewhere classified [M96.1]  Lumbago with sciatica, right side [M54.41]    SUBJECTIVE  Pain Level (0-10 scale): 0  Any medication changes, allergies to medications, adverse drug reactions, diagnosis change, or new procedure performed?: [x] No    [] Yes (see summary sheet for update)  Subjective functional status/changes:   [] No changes reported  Pt reports that her shoulder is bothering her today, but her back is doing well.      OBJECTIVE        Min Type Additional Details    [] Estim:  []Unatt       []IFC  []Premod                        []Other:  []w/ice   []w/heat  Position:  Location:    [] Estim: []Att    []TENS instruct  []NMES                    []Other:  []w/US   []w/ice   []w/heat  Position:  Location:    []  Traction: [] Cervical       []Lumbar                       [] Prone          []Supine                       []Intermittent   []Continuous Lbs:  [] before manual  [] after manual    []  Ultrasound: []Continuous   [] Pulsed                           []1MHz   []3MHz W/cm2:  Location:    []  Iontophoresis with dexamethasone         Location: [] Take home patch   [] In clinic    []  Ice     []  heat  []  Ice massage  []  Laser   []  Anodyne Position:  Location:    []  Laser with stim  []  Other:  Position:  Location:    []  Vasopneumatic Device Pressure:       [] lo [] med [] hi   Temperature: [] lo [] med [] hi   [] Skin assessment post-treatment:  []intact []redness- no adverse reaction    []redness  adverse reaction:       32 min Therapeutic Exercise:  [x] See flow sheet :   Rationale: increase ROM and increase strength to improve the patients ability to increase tolerance to activities. 10 min Neuromuscular Re-education:  [x]  See flow sheet :core act ex's   Rationale: increase ROM and increase strength  to improve the patients ability to increase standing tolerance. 10 min Manual Therapy:  Per flow sheet   Rationale: decrease pain, increase ROM, increase tissue extensibility and decrease trigger points to increase ease with ADLs. With   [] TE   [] TA   [] neuro   [] other: Patient Education: [x] Review HEP    [] Progressed/Changed HEP based on:   [] positioning   [] body mechanics   [] transfers   [] heat/ice application    [] other:      Other Objective/Functional Measures: Pt was able to perform 75% AROM through bridges. Pain Level (0-10 scale) post treatment: 3    ASSESSMENT/Changes in Function: Continue per POC. Patient will continue to benefit from skilled PT services to modify and progress therapeutic interventions, address functional mobility deficits, address ROM deficits, address strength deficits and analyze and address soft tissue restrictions to attain remaining goals. []  See Plan of Care  []  See progress note/recertification  []  See Discharge Summary         Progress towards goals / Updated goals:  Short term goals: to be accomplished in 2 weeks  1) Pt will report (I) and compliance with HEP for home management of symptoms. At eval: Instructed, demonstrated, and performed HEP  Current: Goal met: Pt reports performing tasks. 2/27/17  Long term goals: to be accomplished in 6 weeks  1) Pt's FOTO score will improve > or = 68 indicating improvements in function. At eval: FOTO = 63  2) Pt will improve L/S AROM flex and L SB WNL w/o an increase in pain in order to perform ADL's. At eval: flex WNL but slight pain at end range, L SB limited 10% with increase in pain  3) Pt will improve B hip MMT > or = 4-4+/5 for functional strength during ADL's.   At eval: hip flex R 4-/5 L 4/5, hip abd R 3+/5 L 4-/5 both with TFL over activation, hip ext B 4-/5  4) Pt will demo bridge through full range w/o an increase in pain indicating improved core/glute strength. At eval: demos bridge through 40% range indicating weak core/glutes  Current: Progressing: Pt was able to perform 75% AROM through bridges. 3/8/17  5) Pt will report > or = 60% improvement in symptoms in order to progress rehab.   At eval: 0%       PLAN  []  Upgrade activities as tolerated     [x]  Continue plan of care  []  Update interventions per flow sheet       []  Discharge due to:_  []  Other:_      Norville Cowden, PTA 3/8/2017  5:31 PM    Future Appointments  Date Time Provider Onesimo Saba   3/10/2017 4:00 PM Marina Lubin MD [id number] [facility]   3/13/2017 5:30 PM Norville Cowden, PTA [facility]   3/15/2017 5:30 PM Norville Cowden, PTA [facility]   3/17/2017 2:30 PM Latina Essex,  [address]   3/21/2017 5:30 PM Yojana Thomason PT [facility]   3/22/2017 5:30 PM Norville Cowden, PTA [facility]   3/27/2017 5:30 PM Norville Cowden, PTA [facility]   3/29/2017 2:10 PM Johnie Andrea MD [place] [address]   3/29/2017 5:30 PM Yojana Thomason PT [facility]

## 2017-03-13 ENCOUNTER — APPOINTMENT (OUTPATIENT)
Dept: PHYSICAL THERAPY | Age: 72
End: 2017-03-13
Payer: MEDICARE

## 2017-03-15 ENCOUNTER — APPOINTMENT (OUTPATIENT)
Dept: PHYSICAL THERAPY | Age: 72
End: 2017-03-15
Payer: MEDICARE

## 2017-03-17 ENCOUNTER — APPOINTMENT (OUTPATIENT)
Dept: PHYSICAL THERAPY | Age: 72
End: 2017-03-17
Payer: MEDICARE

## 2017-03-21 ENCOUNTER — APPOINTMENT (OUTPATIENT)
Dept: PHYSICAL THERAPY | Age: 72
End: 2017-03-21
Payer: MEDICARE

## 2017-03-22 ENCOUNTER — APPOINTMENT (OUTPATIENT)
Dept: PHYSICAL THERAPY | Age: 72
End: 2017-03-22
Payer: MEDICARE

## 2017-03-27 ENCOUNTER — APPOINTMENT (OUTPATIENT)
Dept: PHYSICAL THERAPY | Age: 72
End: 2017-03-27
Payer: MEDICARE

## 2017-03-29 ENCOUNTER — OFFICE VISIT (OUTPATIENT)
Dept: ORTHOPEDIC SURGERY | Age: 72
End: 2017-03-29

## 2017-03-29 ENCOUNTER — APPOINTMENT (OUTPATIENT)
Dept: PHYSICAL THERAPY | Age: 72
End: 2017-03-29
Payer: MEDICARE

## 2017-03-29 VITALS
DIASTOLIC BLOOD PRESSURE: 64 MMHG | HEART RATE: 70 BPM | BODY MASS INDEX: 26.7 KG/M2 | SYSTOLIC BLOOD PRESSURE: 145 MMHG | WEIGHT: 146 LBS | TEMPERATURE: 98 F

## 2017-03-29 DIAGNOSIS — M50.90 CERVICAL DISC DISEASE: ICD-10-CM

## 2017-03-29 DIAGNOSIS — M12.811 ROTATOR CUFF TEAR ARTHROPATHY, RIGHT: Primary | ICD-10-CM

## 2017-03-29 DIAGNOSIS — M75.101 ROTATOR CUFF TEAR ARTHROPATHY, RIGHT: Primary | ICD-10-CM

## 2017-03-29 RX ORDER — MECLIZINE HCL 12.5 MG 12.5 MG/1
TABLET ORAL
COMMUNITY
End: 2018-07-09

## 2017-03-29 NOTE — PROGRESS NOTES
Ha Jackman  [birth year]   Chief Complaint   Patient presents with    Shoulder Pain     Right        HISTORY OF PRESENT ILLNESS  Ha Jackman is a 70 y.o. female who presents today for reevaluation of right shoulder pain. She rates her pain 3/10 today. Patient reports receiving good relief from the cortisone injection she received at her previous office visit. She is only complaining of some tightness in her neck now. Patient has completed an MRI of the right shoulder. She has trouble doing certain daily activities like curling her hair. She states the pain began about 2 months ago and has been gradually increasing. She denies any trauma or injury. She states she only has trouble sleeping at night sometimes. She does take Tramadol and Aleve as needed. Patient denies any fever, chills, chest pain, shortness of breath or calf pain. There are no new illness or injuries to report since last seen in the office. There are no changes to medications, allergies, family or social history. PHYSICAL EXAM:   Visit Vitals    /64 (BP 1 Location: Left arm, BP Patient Position: Sitting)    Pulse 70    Temp 98 °F (36.7 °C) (Oral)    Wt 146 lb (66.2 kg)    BMI 26.7 kg/m2     The patient is a well-developed, well-nourished female   in no acute distress. The patient is alert and oriented times three. The patient is alert and oriented times three. Mood and affect are normal.  LYMPHATIC: lymph nodes are not enlarged and are within normal limits  SKIN: normal in color and non tender to palpation. There are no bruises or abrasions noted. NEUROLOGICAL: Motor sensory exam is within normal limits. Reflexes are equal bilaterally.  There is normal sensation to pinprick and light touch  MUSCULOSKELETAL:  Examination Right shoulder   Skin Intact   AC joint tenderness -   Biceps tenderness -   Forward flexion/Elevation    Active abduction    Glenohumeral abduction 90   External rotation ROM 90 Internal rotation ROM 70   Apprehension -   Sarithas Relocation -   Jerk -   Load and Shift -   Obriens -   Speeds -   Impingement sign -   Supraspinatus/Empty Can -, 5/5   External Rotation Strength -, 5/5   Lift Off/Belly Press -, 5/5   Neurovascular Intact       IMAGING: Previous MRI of the right shoulder dated 3/2/17 was reviewed and read:   IMPRESSION:  1. Chronic full-thickness rupture of supraspinatus and infraspinatus tendons,  with high riding humeral head, severe tendon retraction and muscular atrophy. Subacromial fluid communicating with the [facility] joint effusion. Type II acromion. AC  joint hypertrophy present. 2. Suspect degenerative labral tear in the superior labrum. Biceps tendinosis. 3. Joint effusion containing debris. IMPRESSION:      ICD-10-CM ICD-9-CM    1. Rotator cuff tear arthropathy, right M12.811 716.81 REFERRAL TO PHYSICAL THERAPY   2. Cervical disc disease M50.90 722.91         PLAN: Patient received some relief from the cortisone injection he received at his last office visit. I feel the tightness in her neck that she is still experiencing can be resolved through some therapy. She will attend a short course of PT. I will see her back in 4 weeks for reevaluation. Follow-up Disposition: Not on File    Scribed by Lamberto Borrego [address]) as dictated by Kaye Greer MD    I, Dr. Kaye Greer, confirm that all documentation is accurate.     Kaye Greer M.D.   [facility]

## 2017-04-04 NOTE — PROGRESS NOTES
[facility]              [address]        [place], [address]  [phone] fax      Discharge Summary  Patient name: Leeanna Navarro Start of Care: 2017   Referral source: Kimber Garcia MD : 1945   Medical/Treatment Diagnosis: Postlaminectomy syndrome, not elsewhere classified [M96.1]  Lumbago with sciatica, right side [M54.41] Onset Date:2015     Prior Hospitalization: see medical history Provider#: [id number]   Medications: Verified on Patient Summary List    Comorbidities: Arthritis, Back pain, BMI over 30, CA, Osteoporosis   Prior Level of Function: 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 Visits from Start of Care: 4    Missed Visits: 4  Reporting Period : 2017 to 3/8/2017      Summary of Care:  Progress towards goals / Updated goals:  Short term goals: to be accomplished in 2 weeks  1) Pt will report (I) and compliance with HEP for home management of symptoms. At eval: Instructed, demonstrated, and performed HEP  Current: Goal met: Pt reports performing tasks. Long term goals: to be accomplished in 6 weeks  1) Pt's FOTO score will improve > or = 68 indicating improvements in function. At eval: FOTO = 63  2) Pt will improve L/S AROM flex and L SB WNL w/o an increase in pain in order to perform ADL's. At eval: flex WNL but slight pain at end range, L SB limited 10% with increase in pain  3) Pt will improve B hip MMT > or = 4-4+/5 for functional strength during ADL's. At eval: hip flex R 4-/5 L 4/5, hip abd R 3+/5 L 4-/5 both with TFL over activation, hip ext B 4-/5  4) Pt will demo bridge through full range w/o an increase in pain indicating improved core/glute strength. At eval: demos bridge through 40% range indicating weak core/glutes  Current: Progressing: Pt was able to perform 75% AROM through bridges.    5) Pt will report > or = 60% improvement in symptoms in order to progress rehab. At eval: 0%    Not all LTG's were reassessed due to pt only being seen 3 visits after I.E. Pt self D/C due to other health issues at this time. G-Codes (GP)  Mobility   [id number] Goal  CJ= 20-39%  D/C  CJ= 20-39%    The severity rating is based on clinical judgment and the FOTO Score score.     ASSESSMENT/RECOMMENDATIONS:  [x]Discontinue therapy: []Patient has reached or is progressing toward set goals      [x]Patient is non-compliant or has abdicated: self D/C due to other health issues      []Due to lack of appreciable progress towards set 1324 Jenaro Ramirez, PT 4/4/2017 2:56 PM

## 2017-04-10 ENCOUNTER — APPOINTMENT (OUTPATIENT)
Dept: PHYSICAL THERAPY | Age: 72
End: 2017-04-10

## 2017-05-02 ENCOUNTER — HOSPITAL ENCOUNTER (OUTPATIENT)
Dept: PHYSICAL THERAPY | Age: 72
Discharge: HOME | End: 2017-05-02
Payer: MEDICARE

## 2017-05-02 PROCEDURE — 97110 THERAPEUTIC EXERCISES: CPT | Performed by: PHYSICAL THERAPIST

## 2017-05-02 PROCEDURE — 97162 PT EVAL MOD COMPLEX 30 MIN: CPT | Performed by: PHYSICAL THERAPIST

## 2017-05-02 PROCEDURE — G8985 CARRY GOAL STATUS: HCPCS | Performed by: PHYSICAL THERAPIST

## 2017-05-02 PROCEDURE — 97140 MANUAL THERAPY 1/> REGIONS: CPT | Performed by: PHYSICAL THERAPIST

## 2017-05-02 PROCEDURE — G8984 CARRY CURRENT STATUS: HCPCS | Performed by: PHYSICAL THERAPIST

## 2017-05-02 NOTE — PROGRESS NOTES
PT DAILY TREATMENT NOTE - [facility]     Patient Name: Marcelino Garcia  Date:2017  : 1945  [x]  Patient  Verified  Payor: Tarsha Alba / Plan: [place] & B / Product Type: Medicare /    In time:400  Out time:440  Total Treatment Time (min): 40   Total Timed Codes (min): 24  1:1 Treatment Time ( [address] only): 40   Visit #: 1 of 18    Treatment Area: Other specific arthropathies, not elsewhere classified, right shoulder [M12.811]    SUBJECTIVE  Pain Level (0-10 scale): 4/10  Any medication changes, allergies to medications, adverse drug reactions, diagnosis change, or new procedure performed?: [x] No    [] Yes (see summary sheet for update)  Subjective functional status/changes:   [] No changes reported  HPI: Pt c/o increased pain in the R shoulder that began about 8 weeks ago w/o known AMOS. Reports increased achyness at night and a discomfort around her neck when she lays down. Reports she has increased pain w/ reaching up or behind her back and that pain decreases w/ sitting still and taking Aleve. Reports no known AMOS for her pain. States she has had an injection ~ 1 month ago w/improvement in motion and an MRI showed that she has a RTC tear. Reports she wants to avoid surgery if at all possible. Denies any previous injury to the R shoulder. 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 Hx of back surgery.      OBJECTIVE    Modality rationale:  to improve the patients ability to    Min Type Additional Details    [] Estim:  []Unatt       []IFC  []Premod                        []Other:  []w/ice   []w/heat  Position:  Location:    [] Estim: []Att    []TENS instruct  []NMES                    []Other:  []w/US   []w/ice   []w/heat  Position:  Location:    []  Traction: [] Cervical       []Lumbar                       [] Prone          []Supine                       []Intermittent   []Continuous Lbs:  [] before manual  [] after manual    []  Ultrasound: []Continuous   [] Pulsed                           []1MHz   []3MHz W/cm2:  Location:    []  Iontophoresis with dexamethasone         Location: [] Take home patch   [] In clinic    []  Ice     []  heat  []  Ice massage  []  Laser   []  Anodyne Position:  Location:    []  Laser with stim  []  Other:  Position:  Location:    []  Vasopneumatic Device Pressure:       [] lo [] med [] hi   Temperature: [] lo [] med [] hi   [] Skin assessment post-treatment:  []intact []redness- no adverse reaction    []redness  adverse reaction:     16 min [x]Eval                  []Re-Eval       14 min Therapeutic Exercise:  [] See flow sheet : instructed in and demo'd HEP, educated on condition, plan for PT, and goals. Rationale: increase ROM, increase strength, improve coordination and increase proprioception to improve the patients ability to decrease pain and improve reaching     min Therapeutic Activity:  []  See flow sheet :   Rationale:   to improve the patients ability to       min Neuromuscular Re-education:  []  See flow sheet :   Rationale:   to improve the patients ability to     10 min Manual Therapy:  STJ mobs, gh jt mobs inf and post grade 1, manual str into all planes R shoulder, MFR/TPR to R UT, LS, scalenes, SCM, pec release, STM to parascapular mm   Rationale: decrease pain, increase ROM, increase tissue extensibility, decrease trigger points and increase postural awareness to improve activity tolerance and mobility     min Gait Training:  ___ feet with ___ device on level surfaces with ___ level of assist   Rationale: With   [] TE   [] TA   [] neuro   [] other: Patient Education: [x] Review HEP    [] Progressed/Changed HEP based on:   [] positioning   [] body mechanics   [] transfers   [] heat/ice application    [] other:      Other Objective/Functional Measures: Pt presents w/ fair sitting posture.  R shoulder flex 124 w/ pain, scap 104 w/ pain, ER to ear lobe w/o substitutions, IR to L5 w/ pain coming out of the position. C/S AROM limited to 55 deg R rot w/ pain, L rot 45 deg w/ pain in the R side, B SB limited to 13 deg w/ pain in the R side. MMT NT 2' pain and limited AROM. PROM limited mainly due to pt having a difficult time relaxing and not resisting. Increased mm tightness and myofascial restrictions noted in the R side of the neck and in the parascapular mm. Pain Level (0-10 scale) post treatment: 0/10    ASSESSMENT/Changes in Function: Focus on improving cervical AROM, decreasing UT compensations, and improving AROM in the R shoulder for ease w/ dressing and to prevent surgery. Patient will continue to benefit from skilled PT services to modify and progress therapeutic interventions, address functional mobility deficits, address ROM deficits, address strength deficits, analyze and address soft tissue restrictions, analyze and cue movement patterns, analyze and modify body mechanics/ergonomics, assess and modify postural abnormalities and instruct in home and community integration to attain remaining goals. [x]  See Plan of Care  []  See progress note/recertification  []  See Discharge Summary         Progress towards goals / Updated goals:  Short Term Goals: To be accomplished in 2 weeks:  1. Pt will be independent and compliant w/ HEP to progress gains in PT. At eval: initiated HEP  2. Pt will improve PROM R shoulder flex and scap to > or = to 140 w/o resistance or increased pain for ease w/ progressing AROM. At eval: PROM limited to <90 deg flex and scap 2' pt resistance  Long Term Goals: To be accomplished in 6 weeks:  1. Pt will improve FOTO to > or = to 62 to demo improved function. At eval: FOTO = 43  2. 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 At eval:  R shoulder flex 124 w/ pain, scap 104 w/ pain  3.  Pt will increase MMT R shoulder to > or = to -4-4/5 grossly for ease w/ dressing. At eval: MMT NT 2' pain and limited AROM  4. Pt will improve cervical AROM into B SB by > or = to 8-10 deg for ease w/ washing her hair.    At eval: cervical AROM B SB limited to 13 deg w/ pain in the R side    PLAN  []  Upgrade activities as tolerated     []  Continue plan of care  []  Update interventions per flow sheet       []  Discharge due to:_  [x]  Other:3x/6 weeks      Bharti Harrison, PT 5/2/2017  4:47 PM    Future Appointments  Date Time Provider Onesimo Saba   5/5/2017 8:30 AM Rocco Callejas  [address]   5/12/2017 4:30 PM [facility], PT [facility]   5/16/2017 4:30 PM Adrinane E Laws, PTA [facility]   5/17/2017 4:30 PM Adrianne E Laws, PTA [facility]   5/19/2017 4:30 PM Adrianne E Laws, PTA [facility]   5/22/2017 6:00 PM Eros Pilar, PTA [facility]   5/24/2017 5:00 PM Eros Pilar, PTA [facility]   5/26/2017 4:30 PM Eros Pilar, PTA [facility]   5/31/2017 5:30 PM Eros Pilar, PTA [facility]   6/1/2017 4:30 PM Eros Pilar, PTA [facility]   6/5/2017 6:00 PM Bharti Harrison, PT [facility]   6/7/2017 5:30 PM Eros Pilar, PTA [facility]   6/8/2017 5:30 PM Eros Pilar, PTA [facility]   6/12/2017 5:30 PM Eros Pilar, PTA [facility]   6/14/2017 5:30 PM Eros Pilar, PTA [facility]   6/15/2017 5:30 PM Eros Pilar, PTA [facility]   7/12/2017 2:45 PM Charity Patel MD [address] [place] [address]   7/19/2017 2:15 PM Charity Patel MD [facility]   7/26/2017 2:45 PM Charity Patel MD [facility]   8/25/2017 2:45 PM Charity Patel MD [address] [place] [address]

## 2017-05-02 NOTE — PROGRESS NOTES
[facility]              [address]        [place], [address]   [phone] fax    Plan of Care/ Statement of Necessity for Physical Therapy Services    Patient name: Iris Ruggiero Start of Care: 2017   Referral source: Марина Moise : 1945    Medical Diagnosis: Other specific arthropathies, not elsewhere classified, right shoulder [M12.811]   Onset Date:8 weeks ago    Treatment Diagnosis: R shoulder pain   Prior Hospitalization: see medical history Provider#: [id number]   Medications: Verified on Patient summary List    Comorbidities: arthritis, back pain, cancer, prior surgery   Prior Level of Function: Independent w/ ADLs and reaching w/ her R UE, no increased pain in the R shoulder, R hand dominant      The Plan of Care and following information is based on the information from the initial evaluation. Assessment/ key information: Pt is a 71 y/o female w/ c/o increased pain in the R shoulder that began about 8 weeks ago w/o known AMOS. Reports increased achyness at night and a discomfort around her neck when she lays down. Reports she has increased pain w/ reaching up or behind her back and that pain decreases w/ sitting still and taking Aleve. Reports no known AMOS for her pain. States she has had an injection ~ 1 month ago w/improvement in motion and an MRI showed that she has a RTC tear. Reports she wants to avoid surgery if at all possible. Denies any previous injury to the R shoulder. 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 Hx of back surgery. Pt presents w/ fair sitting posture. R shoulder flex 124 w/ pain, scap 104 w/ pain, ER to ear lobe w/o substitutions, IR to L5 w/ pain coming out of the position. C/S AROM limited to 55 deg R rot w/ pain, L rot 45 deg w/ pain in the R side, B SB limited to 13 deg w/ pain in the R side. MMT NT 2' pain and limited AROM. PROM limited mainly due to pt having a difficult time relaxing and not resisting. Increased mm tightness and myofascial restrictions noted in the R side of the neck and in the parascapular mm. Pt will benefit from skilled PT to address the deficits and progress with pt goals. Evaluation Complexity History MEDIUM  Complexity : 1-2 comorbidities / personal factors will impact the outcome/ POC ; Examination MEDIUM Complexity : 3 Standardized tests and measures addressing body structure, function, activity limitation and / or participation in recreation  ;Presentation MEDIUM Complexity : Evolving with changing characteristics  ; Clinical Decision Making MEDIUM Complexity : FOTO score of 26-74  Overall Complexity Rating: MEDIUM  Problem List: pain affecting function, decrease ROM, decrease strength, decrease ADL/ functional abilitiies, decrease activity tolerance and decrease flexibility/ joint mobility   Treatment Plan may include any combination of the following: Therapeutic exercise, Therapeutic activities, Neuromuscular re-education, Physical agent/modality, Manual therapy, Patient education and Functional mobility training  Patient / Family readiness to learn indicated by: asking questions, trying to perform skills and interest  Persons(s) to be included in education: patient (P)  Barriers to Learning/Limitations: None  Patient Goal (s): no need for surgery  Patient Self Reported Health Status: good  Rehabilitation Potential: fair    Short Term Goals: To be accomplished in 2 weeks:  1. Pt will be independent and compliant w/ HEP to progress gains in PT.   2. Pt will improve PROM R shoulder flex and scap to > or = to 140 w/o resistance or increased pain for ease w/ progressing AROM. Long Term Goals: To be accomplished in 6 weeks:  1. Pt will improve FOTO to > or = to 62 to demo improved function.    2. 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 3. Pt will increase MMT R shoulder to > or = to -4-4/5 grossly for ease w/ dressing. 4. Pt will improve cervical AROM into B SB by > or = to 8-10 deg for ease w/ washing her hair. Frequency / Duration: Patient to be seen 3 times per week for 6 weeks. Patient/ Caregiver education and instruction: Diagnosis, prognosis, activity modification and exercises   [x]  Plan of care has been reviewed with PTA    G-Codes (GP)  Carry   Current  CK= 40-59%    Goal  CJ= 20-39%    The severity rating is based on clinical judgment and the FOTO score. Certification Period: 5/2/17 - 7/31/17  Samir Valdivia, PT 5/2/2017 4:59 PM  ________________________________________________________________________    I certify that the above Therapy Services are being furnished while the patient is under my care. I agree with the treatment plan and certify that this therapy is necessary.     [de-identified] Signature:____________________  Date:____________Time: _________    Please sign and return to [facility] [address], [facility] [place]   [phone] fax

## 2017-05-05 ENCOUNTER — OFFICE VISIT (OUTPATIENT)
Dept: ORTHOPEDIC SURGERY | Age: 72
End: 2017-05-05

## 2017-05-05 VITALS
OXYGEN SATURATION: 98 % | SYSTOLIC BLOOD PRESSURE: 122 MMHG | DIASTOLIC BLOOD PRESSURE: 46 MMHG | TEMPERATURE: 98.2 F | RESPIRATION RATE: 18 BRPM | BODY MASS INDEX: 26.87 KG/M2 | HEART RATE: 63 BPM | HEIGHT: 62 IN | WEIGHT: 146 LBS

## 2017-05-05 DIAGNOSIS — M96.1 LUMBAR POST-LAMINECTOMY SYNDROME: Primary | ICD-10-CM

## 2017-05-05 DIAGNOSIS — M54.41 RIGHT-SIDED LOW BACK PAIN WITH RIGHT-SIDED SCIATICA, UNSPECIFIED CHRONICITY: ICD-10-CM

## 2017-05-05 DIAGNOSIS — M62.830 MUSCLE SPASM OF BACK: ICD-10-CM

## 2017-05-05 DIAGNOSIS — Z98.890 S/P LUMBAR LAMINECTOMY: ICD-10-CM

## 2017-05-05 RX ORDER — ONDANSETRON 4 MG/1
TABLET, ORALLY DISINTEGRATING ORAL
Refills: 0 | COMMUNITY
Start: 2017-03-17 | End: 2018-07-09

## 2017-05-05 RX ORDER — MECLIZINE HYDROCHLORIDE 25 MG/1
TABLET ORAL
Refills: 0 | COMMUNITY
Start: 2017-03-17 | End: 2017-11-17

## 2017-05-05 NOTE — PATIENT INSTRUCTIONS
[employer] Activation    Thank you for requesting access to [employer]. Please follow the instructions below to securely access and download your online medical record. [employer] allows you to send messages to your doctor, view your test results, renew your prescriptions, schedule appointments, and more. How Do I Sign Up? 1. In your internet browser, go to [email or web address]  2. Click on the First Time User? Click Here link in the Sign In box. You will be redirect to the New Member Sign Up page. 3. Enter your [employer] Access Code exactly as it appears below. You will not need to use this code after youve completed the sign-up process. If you do not sign up before the expiration date, you must request a new code. [employer] Access Code: [email or web address]  Expires: 2017 10:03 AM (This is the date your [employer] access code will )    4. Enter the last four digits of your Social Security Number (xxxx) and Date of Birth (mm/dd/yyyy) as indicated and click Submit. You will be taken to the next sign-up page. 5. Create a [employer] ID. This will be your [employer] login ID and cannot be changed, so think of one that is secure and easy to remember. 6. Create a [employer] password. You can change your password at any time. 7. Enter your Password Reset Question and Answer. This can be used at a later time if you forget your password. 8. Enter your e-mail address. You will receive e-mail notification when new information is available in [address] 9. Click Sign Up. You can now view and download portions of your medical record. 10. Click the Download Summary menu link to download a portable copy of your medical information. Additional Information    If you have questions, please visit the Frequently Asked Questions section of the [employer] website at [email or web address] Digg/[email or web address] Remember, [employer] is NOT to be used for urgent needs. For medical emergencies, dial 911.

## 2017-05-05 NOTE — PROGRESS NOTES
Elissa Corcoranula [facility] [address]  [address], [address]  [facility], [address]  Phone: [phone]  Fax: [phone]  PROGRESS NOTE  Patient: Shant Villarreal                MRN: [id number]       SSN: [id number]  YOB: 1945        AGE: 70 y.o. SEX: female  Body mass index is 26.7 kg/(m^2). PCP: Lois Cobian MD  05/05/17    Chief Complaint   Patient presents with    Back Pain     follow up         [facility], RADIOGRAPHS, and PLAN:         HISTORY:  Ms. Halie Hicks returns today. She is continuing to have back and leg pain. We had her scheduled for a right L4 facet and right L4 selective injection at her last visit in January. When she missed that visit, she had come down with vertigo and had not been able to reschedule her blocks since then. Her pain is about a 3-4/10. It is quite bothersome to her. She otherwise is neurologically intact and functional.  She is being treated by ENT for vertigo and silent migraines. ASSESSMENT/PLAN: At this time, she is otherwise stable. She is managing with her pain. Her back pain is with activity and radiates towards the anterior thigh and intermittently into the foot. She is also being seen by Dr. Seven Bernard for treatment of her shoulder pain where she has a labral tear. I am going to reschedule her for her facet and selective block. She takes her Ultram and Gabapentin. I will see her back again in three months or as needed. cc: Larry Cid. Martin Brumfield M.D.           Past Medical History:   Diagnosis Date    [facility]     Cancer ([facility]     Foraminal stenosis of lumbar region     Hematuria     Hypercholesterolemia     Low back pain        Family History   Problem Relation Age of Onset    No Known Problems Mother     No Known Problems Father        Current Outpatient Prescriptions   Medication Sig Dispense Refill    meclizine (ANTIVERT) 12.5 mg tablet Take  by mouth three (3) times daily as needed.  traMADol (ULTRAM) 50 mg tablet Take 1 Tab by mouth every six (6) hours as needed for Pain. Max Daily Amount: 200 mg. 120 Tab 3    gabapentin (NEURONTIN) 300 mg capsule TAKE 2 CAPSULES BY MOUTH NIGHTLY 180 Cap 3    MULTIVIT-MINERALS/FOLIC ACID (THERALOGIX  MULTI PO) Take  by mouth daily.  traZODone (DESYREL) 50 mg tablet TK 1 T PO QHS  0    aspirin delayed-release 81 mg tablet Take  by mouth daily.  multivitamin (ONE A DAY) tablet Take 1 Tab by mouth daily.  pravastatin (PRAVACHOL) 20 mg tablet Take 20 mg by mouth nightly. 5    meclizine (ANTIVERT) 25 mg tablet TK 1 T PO TID PRN  0    ondansetron (ZOFRAN ODT) 4 mg disintegrating tablet DISSOLVE 1 T ON TONGUE Q 8 H PRN NV  0    metaxalone (SKELAXIN) 400 mg tablet   0    meloxicam (MOBIC) 15 mg tablet TK 1 T PO D  1       No Known Allergies    Past Surgical History:   Procedure Laterality Date    HX BUNIONECTOMY      HX COLONOSCOPY      HX LUMBAR LAMINECTOMY  6/2/15    with fusion    HX ORTHOPAEDIC  Bunionionectomy R    HX TUBAL LIGATION         Past Medical History:   Diagnosis Date    [facility] ([facility]     Cancer ([facility]     Foraminal stenosis of lumbar region     Hematuria     Hypercholesterolemia     Low back pain        Social History     Social History    Marital status:      Spouse name: N/A    Number of children: N/A    Years of education: N/A     Occupational History    nurse      Social History Main Topics    Smoking status: Former Smoker     Quit date: 1/1/1980    Smokeless tobacco: Never Used    Alcohol use 0.0 oz/week     0 Standard drinks or equivalent per week      Comment: 1 glass 2x per week    Drug use: No    Sexual activity: Not on file     Other Topics Concern    Not on file     Social History Narrative       REVIEW OF SYSTEMS:   CONSTITUTIONAL SYMPTOMS:  Negative. EYES:  Negative. EARS, NOSE, THROAT AND MOUTH:  Negative.    CARDIOVASCULAR: Negative. RESPIRATORY:  Negative. GENITOURINARY: Negative. GASTROINTESTINAL:  Negative. INTEGUMENTARY (SKIN AND/OR BREAST):  Negative. MUSCULOSKELETAL: Per HPI.   ENDOCRINE/RHEUMATOLOGIC:  Negative. NEUROLOGICAL:  Per HPI. HEMATOLOGIC/LYMPHATIC:  Negative. ALLERGIC/IMMUNOLOGIC:  Negative. PSYCHIATRIC:  Negative. PHYSICAL EXAMINATION:   Visit Vitals    /46    Pulse 63    Temp 98.2 °F (36.8 °C) (Oral)    Resp 18    Ht 5' 2\" (1.575 m)    Wt 146 lb (66.2 kg)    SpO2 98%    BMI 26.7 kg/m2    PAIN SCALE: 5/10    CONSTITUTIONAL: The patient is in no apparent distress and is alert and oriented x 3. NEUROLOGICAL: Alert and oriented x 3. Ambulation without assistive device. FWB. EXTREMITIES:  See musculoskeletal.  MUSCULOSKELETAL:   Head and Neck:  Negative for misalignment, asymmetry, crepitation, defects, tenderness masses or effusions.  Left Upper Extremity: Inspection, percussion and palpation preformed. Yings sign is negative.  Right Upper Extremity: Inspection, percussion and palpation preformed. Yings sign is negative.  Spine, Ribs and Pelvis: back pain with radiating RLE pain. Inspection, percussion and palpation preformed. Negative for misalignment, asymmetry, crepitation, defects, tenderness masses or effusions.  Left Lower Extremity: Inspection, percussion and palpation preformed. Negative straight leg raise.  Right Lower Extremity: Inspection, percussion and palpation preformed. Negative straight leg raise. SPINE EXAM:     Lumbar spine: No rash, ecchymosis, or gross obliquity. No focal atrophy is noted. ASSESSMENT    ICD-10-CM ICD-9-CM    1. Lumbar post-laminectomy syndrome M96.1 722.83 SCHEDULE SURGERY   2.  S/P lumbar laminectomy Z98.890 V45.89 SCHEDULE SURGERY   3. Right-sided low back pain with right-sided sciatica, unspecified chronicity M54.41 724.3 SCHEDULE SURGERY       Written by Lore Campbell, as dictated by Nick Vargas Cyril Coleman MD.    I, Dr. Elsy Del Angel MD, confirm that all documentation is accurate.

## 2017-05-10 ENCOUNTER — HOSPITAL ENCOUNTER (OUTPATIENT)
Age: 72
Setting detail: OUTPATIENT SURGERY
Discharge: HOME | End: 2017-05-10
Attending: PHYSICAL MEDICINE & REHABILITATION | Admitting: PHYSICAL MEDICINE & REHABILITATION
Payer: MEDICARE

## 2017-05-10 ENCOUNTER — APPOINTMENT (OUTPATIENT)
Dept: GENERAL RADIOLOGY | Age: 72
End: 2017-05-10
Attending: PHYSICAL MEDICINE & REHABILITATION
Payer: MEDICARE

## 2017-05-10 VITALS
RESPIRATION RATE: 18 BRPM | WEIGHT: 146 LBS | HEART RATE: 73 BPM | BODY MASS INDEX: 26.87 KG/M2 | OXYGEN SATURATION: 98 % | TEMPERATURE: 98.2 F | HEIGHT: 62 IN | SYSTOLIC BLOOD PRESSURE: 160 MMHG | DIASTOLIC BLOOD PRESSURE: 80 MMHG

## 2017-05-10 PROCEDURE — 74011000250 HC RX REV CODE- 250

## 2017-05-10 PROCEDURE — 77030003669 HC NDL SPN COOK -B: Performed by: PHYSICAL MEDICINE & REHABILITATION

## 2017-05-10 PROCEDURE — 77030003676 HC NDL SPN MPRI -A: Performed by: PHYSICAL MEDICINE & REHABILITATION

## 2017-05-10 PROCEDURE — 74011636320 HC RX REV CODE- 636/320

## 2017-05-10 PROCEDURE — 76010000009 HC PAIN MGT 0 TO 30 MIN PROC: Performed by: PHYSICAL MEDICINE & REHABILITATION

## 2017-05-10 PROCEDURE — 74011250636 HC RX REV CODE- 250/636

## 2017-05-10 RX ORDER — SODIUM CHLORIDE 0.9 % (FLUSH) 0.9 %
5-10 SYRINGE (ML) INJECTION AS NEEDED
Status: DISCONTINUED | OUTPATIENT
Start: 2017-05-10 | End: 2017-05-10 | Stop reason: HOSPADM

## 2017-05-10 RX ORDER — BETAMETHASONE SODIUM PHOSPHATE AND BETAMETHASONE ACETATE 3; 3 MG/ML; MG/ML
INJECTION, SUSPENSION INTRA-ARTICULAR; INTRALESIONAL; INTRAMUSCULAR; SOFT TISSUE AS NEEDED
Status: DISCONTINUED | OUTPATIENT
Start: 2017-05-10 | End: 2017-05-10 | Stop reason: HOSPADM

## 2017-05-10 RX ORDER — LIDOCAINE HYDROCHLORIDE 10 MG/ML
INJECTION, SOLUTION EPIDURAL; INFILTRATION; INTRACAUDAL; PERINEURAL AS NEEDED
Status: DISCONTINUED | OUTPATIENT
Start: 2017-05-10 | End: 2017-05-10 | Stop reason: HOSPADM

## 2017-05-10 RX ORDER — DIAZEPAM 5 MG/1
5-20 TABLET ORAL ONCE
Status: DISCONTINUED | OUTPATIENT
Start: 2017-05-10 | End: 2017-05-10 | Stop reason: HOSPADM

## 2017-05-10 NOTE — PROCEDURES
Facet Joint Block Procedure Note    Patient Name: Cadence Block    Date of Procedure: May 10, 2017    Preoperative Diagnosis: S/P laminectomy with spinal fusion [Z98.1]  Lumbar post-laminectomy syndrome [M96.1]  S/P lumbar laminectomy [Z98.890]  Right-sided low back pain with right-sided sciatica, unspecified chronicity [M54.41]    Post Operative Diagnosis: Procedure:S/P laminectomy with spinal fusion [Z98.1]  Lumbar post-laminectomy syndrome [M96.1]  S/P lumbar laminectomy [Z98.890]  Right-sided low back pain with right-sided sciatica, unspecified chronicity [M54.41]    right L4-L5 Facet Joint Block      Consent: Informed consent was obtained prior to the procedure. The patient was given the opportunity to ask questions regarding the procedure and its associated risks. In addition to the potential risks associated with the procedure itself, the patient was informed both verbally and in writing of potential side effects of the use of glucocorticoids. The patient appeared to comprehend the informed consent and desired to have the procedure perfored. Procedure: The patient was placed in the prone position on the flouroscopy table and the back was prepped and draped in the usual sterile manner. At each blocked level, the exact location of the facet joint was identified with flouroscopy, and after local Lidocaine 1% injection, a #22 gauge spinal needle was then advanced toward the joint. A total of 15 mg of preservative free Dexamethasone and 2.5 cc of Lidocaine was injected around and equally divided among all of the sites. The patient tolerated the procedure well. The injection area was cleaned and bandaids applied. No excessive bleeding was noted. Patient dressed and was discharged to home with instructions.     Discussion: tolerated the procedure well with no complications    Bernard Griffith MD  May 10, 2017   PROCEDURE NOTE      Patient Name: Cadence Block    Date of Procedure: May 10, 2017    Preoperative Diagnosis:  S/P laminectomy with spinal fusion [Z98.1]  Lumbar post-laminectomy syndrome [M96.1]  S/P lumbar laminectomy [Z98.890]  Right-sided low back pain with right-sided sciatica, unspecified chronicity [M54.41]    Post Operative Diagnosis: S/P laminectomy with spinal fusion [Z98.1]  Lumbar post-laminectomy syndrome [M96.1]  S/P lumbar laminectomy [Z98.890]  Right-sided low back pain with right-sided sciatica, unspecified chronicity [M54.41]   Procedure :    right  L4 Selective Nerve Root Block  right  L5 Selective Nerve Root Block    Consent:  Informed consent was obtained prior to the procedure. The patient was given the opportunity to ask questions regarding the procedure and its associated risks. In addition to the potential risks associated with the procedure itself, the patient was informed both verbally and in writing of the potential side effects of the use of glucocorticoid. The patient appeared to comprehend the informed consent and desired to have the procedure performed. Procedure: The patient was placed in the prone position on the fluoroscopy table and the back was prepped and draped in the usual sterile manner. The exact spinal level was  identified using fluoroscopy, and Lidocaine 1 % was injected locally, a # 22 gauge spinal needle was passed to the transverse process. The depth was noted and the needle redirected to pass inferior and approximately one cm anterior to the transverse process. YES    1 cc of Isovue M-200 was used to verify positioning in the epidural and paravertebral space and outlined the course of the spinal nerve into the epidural space. The same procedure was repeated at each spinal level indicated above. A total of 15 mg of preservative free Dexamethasone and 2.5 cc of Lidocaine was slowly injected. The patient tolerated the procedure well. The injection area was cleaned and bandaids applied. Not excessive bleeding was noted. Patient dressed and discharged to home with instructions. Discussion: The patient tolerated the procedure well.                                               Italo Douglass MD  May 10, 2017

## 2017-05-10 NOTE — H&P
Date of Surgery Update:  Aubrey Carlisle was seen and examined. History and physical has been reviewed. The patient has been examined. There have been no significant clinical changes since the completion of the last office visit.       Signed By: Myra Restrepo MD     May 10, 2017 1:04 PM

## 2017-05-10 NOTE — DISCHARGE INSTRUCTIONS
[facility]   ([place])  Dr. Kassi Rojo, Dr. Betsy Collazo, Dr. Dejan Fernando not drive a car, operate heavy machinery or dangerous equipment for 24 hours. * Activity as tolerated; rest for the remainder of the day. * Resume pre-block medications including those for your family doctor. * Do not drink alcoholic beverages for 24 hours. Alcohol and the medications you have received may interact and cause an adverse reaction. * You may feel better this evening and worse tomorrow, as the numbing medications wears off and the steroid has yet to begin to work. After 48 hrs the steroid should begin to release bringing you relief. * You may shower this evening and remove any bandages. * Avoid hot tubs and heating pads for 24 hours. You may use cold packs on the procedure site as tolerated for the first 24 hours. * If a headache develops, drink plenty of fluids and rest.  Take over the counter medications for headache if needed. If the headache continues longer than 24 hours, call MD at the [address] [place] [address]. [phone]    * Continue taking pain medications as needed. * You may resume your regular diet if tolerated. Otherwise, start with sips of water and advance slowly. * If Diabetic: check your blood sugar three times a day for the next 3 days. If your sugar is greater than 300 call your family doctor. If your sugar is greater than 400, have someone transport you to the nearest Emergency Room. * If you experience any of the following problems, Please Call the [address] [place] [address] at [phone].         * Shortness of Breath    * Fever of 101 or higher    * Nausea / Vomiting    * Severe Headache    * Weakness or numbness in arms or legs that is not      resolving    * Prolonged increase in pain greater than 4 days      DISCHARGE SUMMARY from Nurse      PATIENT INSTRUCTIONS:    After oral sedation, for 24 hours or while taking prescription Narcotics:  · Limit your activities  · Do not drive and operate hazardous machinery  · Do not make important personal or business decisions  · Do  not drink alcoholic beverages  · If you have not urinated within 8 hours after discharge, please contact your surgeon on call. Report the following to your surgeon:  · Excessive pain, swelling, redness or odor of or around the surgical area  · Temperature over 101  · Nausea and vomiting lasting longer than 4 hours or if unable to take medications  · Any signs of decreased circulation or nerve impairment to extremity: change in color, persistent  numbness, tingling, coldness or increase pain  · Any questions            What to do at Home:  Recommended activity: Activity as tolerated, NO DRIVING FOR 12 Hours post injection          *  Please give a list of your current medications to your Primary Care Provider. *  Please update this list whenever your medications are discontinued, doses are      changed, or new medications (including over-the-counter products) are added. *  Please carry medication information at all times in case of emergency situations. These are general instructions for a healthy lifestyle:    No smoking/ No tobacco products/ Avoid exposure to second hand smoke    Surgeon General's Warning:  Quitting smoking now greatly reduces serious risk to your health. Obesity, smoking, and sedentary lifestyle greatly increases your risk for illness    A healthy diet, regular physical exercise & weight monitoring are important for maintaining a healthy lifestyle    You may be retaining fluid if you have a history of heart failure or if you experience any of the following symptoms:  Weight gain of 3 pounds or more overnight or 5 pounds in a week, increased swelling in our hands or feet or shortness of breath while lying flat in bed.   Please call your doctor as soon as you notice any of these symptoms; do not wait until your next office visit. Recognize signs and symptoms of STROKE:    F-face looks uneven    A-arms unable to move or move unevenly    S-speech slurred or non-existent    T-time-call 911 as soon as signs and symptoms begin-DO NOT go       Back to bed or wait to see if you get better-TIME IS BRAIN. MyChart Activation    Thank you for requesting access to [employer]. Please follow the instructions below to securely access and download your online medical record. [employer] allows you to send messages to your doctor, view your test results, renew your prescriptions, schedule appointments, and more. How Do I Sign Up? 1. In your internet browser, go to [email or web address]  2. Click on the First Time User? Click Here link in the Sign In box. You will be redirect to the New Member Sign Up page. 3. Enter your [employer] Access Code exactly as it appears below. You will not need to use this code after youve completed the sign-up process. If you do not sign up before the expiration date, you must request a new code. [employer] Access Code: [email or web address]  Expires: 2017 10:03 AM (This is the date your [employer] access code will )    4. Enter the last four digits of your Social Security Number (xxxx) and Date of Birth (mm/dd/yyyy) as indicated and click Submit. You will be taken to the next sign-up page. 5. Create a [employer] ID. This will be your [employer] login ID and cannot be changed, so think of one that is secure and easy to remember. 6. Create a [employer] password. You can change your password at any time. 7. Enter your Password Reset Question and Answer. This can be used at a later time if you forget your password. 8. Enter your e-mail address. You will receive e-mail notification when new information is available in [address] 9. Click Sign Up. You can now view and download portions of your medical record.   10. Click the Download Summary menu link to download a portable copy of your medical information. Additional Information    If you have questions, please visit the Frequently Asked Questions section of the [employer] website at [email or web address] Remember, [employer] is NOT to be used for urgent needs. For medical emergencies, dial 911.

## 2017-05-12 ENCOUNTER — HOSPITAL ENCOUNTER (OUTPATIENT)
Dept: PHYSICAL THERAPY | Age: 72
Discharge: HOME | End: 2017-05-12
Payer: MEDICARE

## 2017-05-12 PROCEDURE — 97110 THERAPEUTIC EXERCISES: CPT

## 2017-05-12 PROCEDURE — 97140 MANUAL THERAPY 1/> REGIONS: CPT

## 2017-05-12 NOTE — PROGRESS NOTES
PT DAILY TREATMENT NOTE - [facility]     Patient Name: Luci Quiver  Date:2017  : 1945  [x]  Patient  Verified  Payor: Romana Estimable / Plan: [place] & B / Product Type: Medicare /    In time:4:37  Out time:5:15  Total Treatment Time (min): 38  Total Timed Codes (min): 38  1:1 Treatment Time ( [address] only): 38   Visit #: 2 of 18    Treatment Area:  Other specific arthropathies, not elsewhere classified, right shoulder [M12.811]    SUBJECTIVE  Pain Level (0-10 scale): 5  Any medication changes, allergies to medications, adverse drug reactions, diagnosis change, or new procedure performed?: [x] No    [] Yes (see summary sheet for update)  Subjective functional status/changes:   [] No changes reported  Pt reports compliance with HEP    OBJECTIVE    Modality rationale:  to improve the patients ability to    Min Type Additional Details    [] Estim:  []Unatt       []IFC  []Premod                        []Other:  []w/ice   []w/heat  Position:  Location:    [] Estim: []Att    []TENS instruct  []NMES                    []Other:  []w/US   []w/ice   []w/heat  Position:  Location:    []  Traction: [] Cervical       []Lumbar                       [] Prone          []Supine                       []Intermittent   []Continuous Lbs:  [] before manual  [] after manual    []  Ultrasound: []Continuous   [] Pulsed                           []1MHz   []3MHz W/cm2:  Location:    []  Iontophoresis with dexamethasone         Location: [] Take home patch   [] In clinic    []  Ice     []  heat  []  Ice massage  []  Laser   []  Anodyne Position:  Location:    []  Laser with stim  []  Other:  Position:  Location:    []  Vasopneumatic Device Pressure:       [] lo [] med [] hi   Temperature: [] lo [] med [] hi   [] Skin assessment post-treatment:  []intact []redness- no adverse reaction    []redness  adverse reaction:      min []Eval                  []Re-Eval       28 min Therapeutic Exercise:  [x] See flow sheet : Rationale: increase ROM, increase strength and improve coordination to improve the patients ability to decrease pain and perform ADL's     min Therapeutic Activity:  []  See flow sheet :   Rationale:   to improve the patients ability to       min Neuromuscular Re-education:  []  See flow sheet :   Rationale:   to improve the patients ability to     10 min Manual Therapy:  Per flow sheet   Rationale: decrease pain, increase ROM, increase tissue extensibility and decrease trigger points to increase ease with ADL's     min Gait Training:  ___ feet with ___ device on level surfaces with ___ level of assist   Rationale: With   [] TE   [] TA   [] neuro   [] other: Patient Education: [x] Review HEP    [] Progressed/Changed HEP based on:   [] positioning   [] body mechanics   [] transfers   [] heat/ice application    [] other:      Other Objective/Functional Measures:      Pain Level (0-10 scale) post treatment: 5    ASSESSMENT/Changes in Function: cont per POC    Patient will continue to benefit from skilled PT services to modify and progress therapeutic interventions, address functional mobility deficits, address ROM deficits, address strength deficits, analyze and address soft tissue restrictions, analyze and cue movement patterns, assess and modify postural abnormalities and instruct in home and community integration to attain remaining goals. []  See Plan of Care  []  See progress note/recertification  []  See Discharge Summary         Progress towards goals / Updated goals:  Short Term Goals: To be accomplished in 2 weeks:  1. Pt will be independent and compliant w/ HEP to progress gains in PT. At eval: initiated HEP  Current: Goal met per pt report (5/12/17)  2. Pt will improve PROM R shoulder flex and scap to > or = to 140 w/o resistance or increased pain for ease w/ progressing AROM. At eval: PROM limited to <90 deg flex and scap 2' pt resistance  Long Term Goals:  To be accomplished in 6 weeks: 1. Pt will improve FOTO to > or = to 62 to demo improved function. At eval: FOTO = 43  2. 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 At eval:  R shoulder flex 124 w/ pain, scap 104 w/ pain  3. Pt will increase MMT R shoulder to > or = to -4-4/5 grossly for ease w/ dressing. At eval: MMT NT 2' pain and limited AROM  4. Pt will improve cervical AROM into B SB by > or = to 8-10 deg for ease w/ washing her hair.    At eval: cervical AROM B SB limited to 13 deg w/ pain in the R side    PLAN  [x]  Upgrade activities as tolerated     [x]  Continue plan of care  []  Update interventions per flow sheet       []  Discharge due to:_  []  Other:_      Aspen Andre, PT 5/12/2017  4:42 PM    Future Appointments  Date Time Provider Onesimo Saba   5/16/2017 4:30 PM Adrianne Stevenson, PTA [facility]   5/17/2017 4:30 PM Adriannegrace Stevenson, PTA [facility]   5/19/2017 4:30 PM Adrianne Stevenson, PTA [facility]   5/22/2017 6:00 PM Chilango Beavers, PTA [facility]   5/24/2017 5:00 PM Chilango Beavers, PTA [facility]   5/26/2017 4:30 PM Chilango Beavers, PTA [facility]   5/31/2017 5:30 PM Chilango Beavers, PTA [facility]   6/1/2017 4:30 PM Chilango Beavers, PTA [facility]   6/5/2017 6:00 PM Hortensia Kanner, PT [facility]   6/7/2017 5:30 PM Chilango Beavers, PTA [facility]   6/8/2017 5:30 PM Chilango Beavers, PTA [facility]   6/12/2017 5:30 PM Chilango Beavers, PTA [facility]   6/14/2017 5:30 PM Chilango Beavers, PTA [facility]   6/15/2017 5:30 PM Chilango Beavers, PTA [facility]   7/12/2017 2:45 PM Margarita Headley MD [id number]   7/19/2017 2:15 PM Margarita Headley MD [address]   7/26/2017 2:45 PM Margarita Headley MD [address]   8/16/2017 8:30 AM Zuri Moreno  [address] 8/25/2017 2:45 PM Margarita Headley MD [address]

## 2017-05-16 ENCOUNTER — HOSPITAL ENCOUNTER (OUTPATIENT)
Dept: PHYSICAL THERAPY | Age: 72
Discharge: HOME | End: 2017-05-16
Payer: MEDICARE

## 2017-05-16 PROCEDURE — 97110 THERAPEUTIC EXERCISES: CPT

## 2017-05-16 PROCEDURE — 97140 MANUAL THERAPY 1/> REGIONS: CPT

## 2017-05-16 NOTE — PROGRESS NOTES
PT DAILY TREATMENT NOTE - [facility]     Patient Name: Terri Reddy  Date:2017  : 1945  [x]  Patient  Verified  Payor: Titi Love / Plan: [place] MEDICARE PART A & B / Product Type: Medicare /    In time: 4:30  Out time: 5:23  Total Treatment Time (min): 53  Total Timed Codes (min): 53  1:1 Treatment Time ( [address] only): 53   Visit #: 3 of 18    Treatment Area: Other specific arthropathies, not elsewhere classified, right shoulder [M12.811]    SUBJECTIVE  Pain Level (0-10 scale): 3/10  Any medication changes, allergies to medications, adverse drug reactions, diagnosis change, or new procedure performed?: [x] No    [] Yes (see summary sheet for update)  Subjective functional status/changes:   [] No changes reported  Pt reports her biggest issue is sleeping at night.      OBJECTIVE    Modality rationale:    Min Type Additional Details    [] Estim:  []Unatt       []IFC  []Premod                        []Other:  []w/ice   []w/heat  Position:  Location:    [] Estim: []Att    []TENS instruct  []NMES                    []Other:  []w/US   []w/ice   []w/heat  Position:  Location:    []  Traction: [] Cervical       []Lumbar                       [] Prone          []Supine                       []Intermittent   []Continuous Lbs:  [] before manual  [] after manual    []  Ultrasound: []Continuous   [] Pulsed                           []1MHz   []3MHz W/cm2:  Location:    []  Iontophoresis with dexamethasone         Location: [] Take home patch   [] In clinic    []  Ice     []  heat  []  Ice massage  []  Laser   []  Anodyne Position:  Location:    []  Laser with stim  []  Other:  Position:  Location:    []  Vasopneumatic Device Pressure:       [] lo [] med [] hi   Temperature: [] lo [] med [] hi   [] Skin assessment post-treatment:  []intact []redness- no adverse reaction    []redness  adverse reaction:     38 min Therapeutic Exercise:  [x] See flow sheet :   Rationale: increase ROM and increase strength to improve the patients ability to perform ADLs. 15 min Manual Therapy:  Per flow sheet   Rationale: decrease pain, increase ROM and increase tissue extensibility to increase ease of ADLs. With   [] TE   [] TA   [] neuro   [] other: Patient Education: [x] Review HEP    [] Progressed/Changed HEP based on:   [] positioning   [] body mechanics   [] transfers   [] heat/ice application    [] other:      Other Objective/Functional Measures: PROM flex and scap WNL. Pain Level (0-10 scale) post treatment: 4/10    ASSESSMENT/Changes in Function: Cont per POC. Patient will continue to benefit from skilled PT services to modify and progress therapeutic interventions, address functional mobility deficits, address ROM deficits, address strength deficits, analyze and address soft tissue restrictions and analyze and cue movement patterns to attain remaining goals. []  See Plan of Care  []  See progress note/recertification  []  See Discharge Summary         Progress towards goals / Updated goals:  Short Term Goals: To be accomplished in 2 weeks:  1. Pt will be independent and compliant w/ HEP to progress gains in PT. At eval: initiated HEP  Current: Goal met per pt report (5/12/17)  2. Pt will improve PROM R shoulder flex and scap to > or = to 140 w/o resistance or increased pain for ease w/ progressing AROM. At eval: PROM limited to <90 deg flex and scap 2' pt resistance  Current: Progressing, PROM WNL with slight increase in pain. 5/16/17  Long Term Goals: To be accomplished in 6 weeks:  1. Pt will improve FOTO to > or = to 62 to demo improved function. At eval: FOTO = 43  2. 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 At eval: R shoulder flex 124 w/ pain, scap 104 w/ pain  3. Pt will increase MMT R shoulder to > or = to -4-4/5 grossly for ease w/ dressing. At eval: MMT NT 2' pain and limited AROM  4.  Pt will improve cervical AROM into B SB by > or = to 8-10 deg for ease w/ washing her hair.    At eval: cervical AROM B SB limited to 13 deg w/ pain in the R side    PLAN  []  Upgrade activities as tolerated     [x]  Continue plan of care  []  Update interventions per flow sheet       []  Discharge due to:_  []  Other:_      Adrianne BEVERLY Laws, PTA 5/16/2017  4:30 PM    Future Appointments  Date Time Provider Onesimo Saba   5/17/2017 4:30 PM Adrianne BEVERLY Laws, PTA [facility]   5/19/2017 4:30 PM Adrianne BEVERLY Laws, PTA [facility]   5/22/2017 6:00 PM Meka Neve, PTA [facility]   5/24/2017 5:00 PM Meka Neve, PTA [facility]   5/26/2017 4:30 PM Meka Neve, PTA [facility]   5/31/2017 5:30 PM Meka Neve, PTA [facility]   6/1/2017 4:30 PM Meka Neve, PTA [facility]   6/5/2017 6:00 PM Rose Mora, PT [facility]   6/7/2017 5:30 PM Meka Neve, PTA [facility]   6/8/2017 5:30 PM Meka Neve, PTA [facility]   6/12/2017 5:30 PM Meka Neve, PTA [facility]   6/14/2017 5:30 PM Meka Neve, PTA [facility]   6/15/2017 5:30 PM Meka Neve, PTA [facility]   7/12/2017 2:45 PM Myesha Ventura MD [facility]   7/19/2017 2:15 PM Myesha Ventura MD [facility]   7/26/2017 2:45 PM Myesha Ventura MD [facility]   8/16/2017 8:30 AM Liliana Fisher  [address]    8/25/2017 2:45 PM Myesha Ventura MD [facility]

## 2017-05-17 ENCOUNTER — HOSPITAL ENCOUNTER (OUTPATIENT)
Dept: PHYSICAL THERAPY | Age: 72
Discharge: HOME | End: 2017-05-17
Payer: MEDICARE

## 2017-05-17 PROCEDURE — 97140 MANUAL THERAPY 1/> REGIONS: CPT

## 2017-05-17 PROCEDURE — 97110 THERAPEUTIC EXERCISES: CPT

## 2017-05-17 NOTE — PROGRESS NOTES
PT DAILY TREATMENT NOTE - [facility] 3-16    Patient Name: Renata Oseguera  Date:2017  : 1945  [x]  Patient  Verified  Payor: Jere Funes / Plan: [place] MEDICARE PART A & B / Product Type: Medicare /    In time: 4:36  Out time:5:29  Total Treatment Time (min): 53  Total Timed Codes (min): 53  1:1 Treatment Time ( [address] only): 53   Visit #: 4 of 18    Treatment Area: Other specific arthropathies, not elsewhere classified, right shoulder [M12.811]    SUBJECTIVE  Pain Level (0-10 scale): 2.5/10  Any medication changes, allergies to medications, adverse drug reactions, diagnosis change, or new procedure performed?: [x] No    [] Yes (see summary sheet for update)  Subjective functional status/changes:   [] No changes reported  Pt states since the cortisone shot her shoulder has been much better, it is her neck that bothers her the most now.      OBJECTIVE    Modality rationale:    Min Type Additional Details    [] Estim:  []Unatt       []IFC  []Premod                        []Other:  []w/ice   []w/heat  Position:  Location:    [] Estim: []Att    []TENS instruct  []NMES                    []Other:  []w/US   []w/ice   []w/heat  Position:  Location:    []  Traction: [] Cervical       []Lumbar                       [] Prone          []Supine                       []Intermittent   []Continuous Lbs:  [] before manual  [] after manual    []  Ultrasound: []Continuous   [] Pulsed                           []1MHz   []3MHz Location:  W/cm2:    []  Iontophoresis with dexamethasone         Location: [] Take home patch   [] In clinic    []  Ice     []  heat  []  Ice massage  []  Laser   []  Anodyne Position:  Location:    []  Laser with stim  []  Other: Position:  Location:    []  Vasopneumatic Device Pressure:       [] lo [] med [] hi   Temperature: [] lo [] med [] hi   [] Skin assessment post-treatment:  []intact []redness- no adverse reaction    []redness  adverse reaction:     38 min Therapeutic Exercise:  [x] See flow sheet : Rationale: increase ROM and increase strength to improve the patients ability to perform ADLs. 15 min Manual Therapy:  Per flow sheet   Rationale: decrease pain, increase ROM and increase tissue extensibility to increase ease of ADLs. With   [] TE   [] TA   [] neuro   [] other: Patient Education: [x] Review HEP    [] Progressed/Changed HEP based on:   [] positioning   [] body mechanics   [] transfers   [] heat/ice application    [] other:      Other Objective/Functional Measures: Pt very TTP of R UT and scalenes. Pain Level (0-10 scale) post treatment:  4.5/10    ASSESSMENT/Changes in Function:  Cont per POC. Patient will continue to benefit from skilled PT services to modify and progress therapeutic interventions, address functional mobility deficits, address ROM deficits and address strength deficits to attain remaining goals. []  See Plan of Care  []  See progress note/recertification  []  See Discharge Summary         Progress towards goals / Updated goals:  Short Term Goals: To be accomplished in 2 weeks:  1. Pt will be independent and compliant w/ HEP to progress gains in PT. At eval: initiated HEP  Current: Goal met per pt report (5/12/17)  2. Pt will improve PROM R shoulder flex and scap to > or = to 140 w/o resistance or increased pain for ease w/ progressing AROM. At eval: PROM limited to <90 deg flex and scap 2' pt resistance  Current: Progressing, PROM WNL with slight increase in pain. 5/16/17  Long Term Goals: To be accomplished in 6 weeks:  1. Pt will improve FOTO to > or = to 62 to demo improved function. At eval: FOTO = 43  2. 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 At eval: R shoulder flex 124 w/ pain, scap 104 w/ pain  3. Pt will increase MMT R shoulder to > or = to -4-4/5 grossly for ease w/ dressing. At eval: MMT NT 2' pain and limited AROM  4.  Pt will improve cervical AROM into B SB by > or = to 8-10 deg for ease w/ washing her hair.    At eval: cervical AROM B SB limited to 13 deg w/ pain in the R side    PLAN  []  Upgrade activities as tolerated     [x]  Continue plan of care  []  Update interventions per flow sheet       []  Discharge due to:_  []  Other:_      Adrianne Stevenson, PTA 5/17/2017  4:34 PM

## 2017-05-22 ENCOUNTER — HOSPITAL ENCOUNTER (OUTPATIENT)
Dept: PHYSICAL THERAPY | Age: 72
Discharge: HOME | End: 2017-05-22
Payer: MEDICARE

## 2017-05-22 PROCEDURE — 97140 MANUAL THERAPY 1/> REGIONS: CPT

## 2017-05-22 PROCEDURE — 97110 THERAPEUTIC EXERCISES: CPT

## 2017-05-22 NOTE — PROGRESS NOTES
PT DAILY TREATMENT NOTE - [facility]     Patient Name: Lily Hoover  Date:2017  : 1945  [x]  Patient  Verified  Payor: Tc Carrasco / Plan: [place] MEDICARE PART A & B / Product Type: Medicare /    In time:5:42  Out time:6:27  Total Treatment Time (min): 45  Total Timed Codes (min): 45  1:1 Treatment Time ( [address] only): 40   Visit #: 5 of 18    Treatment Area: Other specific arthropathies, not elsewhere classified, right shoulder [M12.811]    SUBJECTIVE  Pain Level (0-10 scale): 2.5  Any medication changes, allergies to medications, adverse drug reactions, diagnosis change, or new procedure performed?: [x] No    [] Yes (see summary sheet for update)  Subjective functional status/changes:   [] No changes reported  Pt reports she is having a lot of pain at night when she is trying to sleep.      OBJECTIVE        Min Type Additional Details    [] Estim:  []Unatt       []IFC  []Premod                        []Other:  []w/ice   []w/heat  Position:  Location:    [] Estim: []Att    []TENS instruct  []NMES                    []Other:  []w/US   []w/ice   []w/heat  Position:  Location:    []  Traction: [] Cervical       []Lumbar                       [] Prone          []Supine                       []Intermittent   []Continuous Lbs:  [] before manual  [] after manual    []  Ultrasound: []Continuous   [] Pulsed                           []1MHz   []3MHz W/cm2:  Location:    []  Iontophoresis with dexamethasone         Location: [] Take home patch   [] In clinic    []  Ice     []  heat  []  Ice massage  []  Laser   []  Anodyne Position:  Location:    []  Laser with stim  []  Other:  Position:  Location:    []  Vasopneumatic Device Pressure:       [] lo [] med [] hi   Temperature: [] lo [] med [] hi   [] Skin assessment post-treatment:  []intact []redness- no adverse reaction    []redness  adverse reaction:     35 min Therapeutic Exercise:  [x] See flow sheet :   Rationale: increase ROM and increase strength to improve the patients ability to increase tolerance to activities. 10 min Manual Therapy:  Per flow sheet   Rationale: decrease pain, increase ROM, increase tissue extensibility and decrease trigger points to increase ease with ADLs. With   [] TE   [] TA   [] neuro   [] other: Patient Education: [x] Review HEP    [] Progressed/Changed HEP based on:   [] positioning   [] body mechanics   [] transfers   [] heat/ice application    [] other:      Other Objective/Functional Measures: R shoulder flex 142 deg, scap 135 deg. PROM WNL with slight increase in pain with scap       Pain Level (0-10 scale) post treatment: 4    ASSESSMENT/Changes in Function: Pt was educated on proper sleeping positions and pillows for sleeping in supine and in sidelying to help decrease neck pain. Patient will continue to benefit from skilled PT services to modify and progress therapeutic interventions, address functional mobility deficits, address ROM deficits, address strength deficits and analyze and address soft tissue restrictions to attain remaining goals. []  See Plan of Care  []  See progress note/recertification  []  See Discharge Summary         Progress towards goals / Updated goals:  Short Term Goals: To be accomplished in 2 weeks:  1. Pt will be independent and compliant w/ HEP to progress gains in PT. At eval: initiated HEP  Current: Goal met per pt report (5/12/17)  2. Pt will improve PROM R shoulder flex and scap to > or = to 140 w/o resistance or increased pain for ease w/ progressing AROM. At eval: PROM limited to <90 deg flex and scap 2' pt resistance  Current: Progressing, PROM WNL with slight increase in pain with scap . 5/22/17  Long Term Goals: To be accomplished in 6 weeks:  1. Pt will improve FOTO to > or = to 62 to demo improved function. At eval: FOTO = 43  2. 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    At eval: R shoulder flex 124 w/ pain, scap 104 w/ pain  Current: Progressing:  R shoulder flex 142 deg, scap 135 deg 5/22/17  3. Pt will increase MMT R shoulder to > or = to -4-4/5 grossly for ease w/ dressing. At eval: MMT NT 2' pain and limited AROM  4. Pt will improve cervical AROM into B SB by > or = to 8-10 deg for ease w/ washing her hair.    At eval: cervical AROM B SB limited to 13 deg w/ pain in the R side    PLAN  []  Upgrade activities as tolerated     [x]  Continue plan of care  []  Update interventions per flow sheet       []  Discharge due to:_  []  Other:_      Chilango Beavers [facility] 5/22/2017  5:39 PM    Future Appointments  Date Time Provider Onesimo Saba   5/22/2017 6:00 PM Chilango Beavers [place] [facility]   7/12/2017 2:45 PM Margarita Headley MD [address]   7/19/2017 2:15 PM Margarita Headley MD [address]   7/26/2017 2:45 PM Margarita Headley MD [address]   8/16/2017 8:30 AM Zuri Moreno  [address]   8/25/2017 2:45 PM aMrgarita Headlye MD [address]

## 2017-05-24 ENCOUNTER — OFFICE VISIT (OUTPATIENT)
Dept: ORTHOPEDIC SURGERY | Age: 72
End: 2017-05-24

## 2017-05-24 ENCOUNTER — APPOINTMENT (OUTPATIENT)
Dept: PHYSICAL THERAPY | Age: 72
End: 2017-05-24
Payer: MEDICARE

## 2017-05-26 ENCOUNTER — OFFICE VISIT (OUTPATIENT)
Dept: ORTHOPEDIC SURGERY | Age: 72
End: 2017-05-26

## 2017-05-26 ENCOUNTER — APPOINTMENT (OUTPATIENT)
Dept: PHYSICAL THERAPY | Age: 72
End: 2017-05-26
Payer: MEDICARE

## 2017-05-26 ENCOUNTER — HOSPITAL ENCOUNTER (OUTPATIENT)
Dept: PHYSICAL THERAPY | Age: 72
Discharge: HOME | End: 2017-05-26
Payer: MEDICARE

## 2017-05-26 VITALS
WEIGHT: 146 LBS | BODY MASS INDEX: 26.87 KG/M2 | HEIGHT: 62 IN | DIASTOLIC BLOOD PRESSURE: 58 MMHG | RESPIRATION RATE: 16 BRPM | HEART RATE: 67 BPM | TEMPERATURE: 97.8 F | SYSTOLIC BLOOD PRESSURE: 135 MMHG

## 2017-05-26 DIAGNOSIS — M54.41 RIGHT-SIDED LOW BACK PAIN WITH RIGHT-SIDED SCIATICA, UNSPECIFIED CHRONICITY: ICD-10-CM

## 2017-05-26 DIAGNOSIS — Z98.1 S/P LAMINECTOMY WITH SPINAL FUSION: Primary | ICD-10-CM

## 2017-05-26 PROCEDURE — 97140 MANUAL THERAPY 1/> REGIONS: CPT

## 2017-05-26 PROCEDURE — 97110 THERAPEUTIC EXERCISES: CPT

## 2017-05-26 RX ORDER — MELOXICAM 15 MG/1
15 TABLET ORAL DAILY
Qty: 90 TAB | Refills: 0 | Status: SHIPPED | OUTPATIENT
Start: 2017-05-26 | End: 2017-08-16 | Stop reason: SDUPTHER

## 2017-05-26 RX ORDER — DULOXETIN HYDROCHLORIDE 30 MG/1
CAPSULE, DELAYED RELEASE ORAL
Qty: 30 CAP | Refills: 2 | Status: SHIPPED | OUTPATIENT
Start: 2017-05-26 | End: 2017-09-08

## 2017-05-26 NOTE — PROGRESS NOTES
Elissa Vasquez [facility] 2.  [address], [address]  [place], [address] [place] [address] Street  Phone: [phone]  Fax: [phone]  PROGRESS NOTE  Patient: Víctor Brumfield                MRN: [id number]       SSN: [id number]  YOB: 1945        AGE: 67 y.o. SEX: female  Body mass index is 26.7 kg/(m^2). PCP: Oksana Encarnacoin MD  05/26/17    Chief Complaint   Patient presents with    Back Pain     follow up       HISTORY OF PRESENT ILLNESS:  Víctor Brumfield is a 67 y.o.  female with history of low back, BLE pain, and previous lumbar fusion. Pain is worse with sitting and at night-time. Her RLE pain is greater than her LLE. Her pain has an L4/5 radiculopathy distribution with numbness/throbbing/shooting pain from her Right buttock, to anterior and posterior thigh and all the way to the toes. Her LLE goes from her buttock to her thigh. Pain is better with laying down and rest.  Pain has flared up for the last 4 months. Denies any injury or falls. Denies bladder/bowel dysfunction, saddle paresthesia, weakness, gait disturbance, or other neurological deficit. States she has been using Neurontin for neuropathic pain w/out benefit and Tramadol very PRN w/ benefit. She had a Right L4/5 SNRB on 5/10/17 w/out benefit. She has had PT in the past. She states that she has failed Topamax in the past, we will try her on Cymbalta (low dose) due to her using Trazadone PRN for sleep and serotonin interaction. . Pt at this time desires to continue with current care/proceed with medication evaluation. She would like to remain w/ conservative treatment for now and try to avoid another surgery. Work Pre-op Nurse    Smoking Status Non-smoker  For now  Shazia Bunch 55 was seen today for back pain.     Diagnoses and all orders for this visit:    S/P laminectomy with spinal fusion    Right-sided low back pain with right-sided sciatica, unspecified chronicity    Other orders  -     meloxicam (MOBIC) 15 mg tablet; Take 1 Tab by mouth daily. Indications: OSTEOARTHRITIS  -     DULoxetine (CYMBALTA) 30 mg capsule; tAKE 1 CAPSULE qhs  Indications: NEUROPATHIC PAIN         IMPRESSION AND PLAN:.     1) Pt was given information on back exercises. 2) Taper off Neurontin over next 1-2 weeks. 3) Trial of Cymbalta 30mg  4) Mobic 15mg Daily for arthritic pain. 5) Ms. Jonh Maher has a reminder for a \"due or due soon\" health maintenance. I have asked that she contact her primary care provider, Obie Garcia MD, for follow-up on this health maintenance. 6)  demonstrated consistency with prescribing. 7) Pt will follow-up on her next scheduled appointment 8/16 or sooner if needed. SUBJECTIVE  Pain Scale: 5/10    Pain Assessment  5/26/2017   Location of Pain Back   Location Modifiers -   Severity of Pain 5   Quality of Pain Aching   Quality of Pain Comment numbness and tingling in charlene thighs to toes > on right toes   Duration of Pain -   Frequency of Pain Constant   Aggravating Factors -   Aggravating Factors Comment -   Limiting Behavior -   Relieving Factors -   Relieving Factors Comment -           REVIEW OF SYSTEMS  Constitutional: Negative for fever, chills, or weight change. Respiratory: Negative for cough or shortness of breath. Cardiovascular: Negative for chest pain or palpitations. Gastrointestinal: Negative for incontinence, acid reflux, change in bowel habits, or constipation. Genitourinary: Negative for incontinence, dysuria and flank pain. Musculoskeletal: Positive for low back pain. See HPI. Skin: Negative for rash. Neurological:BLE neuropathy . See HPI. Endo/Heme/Allergies: Negative. Psychiatric/Behavioral: Negative. PHYSICAL EXAMINATION  Visit Vitals    /58    Pulse 67    Temp 97.8 °F (36.6 °C) (Oral)    Resp 16    Ht 5' 2\" (1.575 m)    Wt 146 lb (66.2 kg)    BMI 26.7 kg/m2         Accompanied by self.       Constitutional:  Well developed, well nourished, in no acute distress. Psychiatric: Affect and mood are appropriate. Integumentary: No rashes or abrasions noted on exposed areas. Cardiovascular/Peripheral Vascular: +2 radial & pedal pulses. No peripheral edema is noted. Lymphatic:  No evidence of lymphedema. No cervical lymphadenopathy. SPINE/MUSCULOSKELETAL EXAM    Lumbar spine:  No rash, ecchymosis, or gross obliquity. No fasciculations. No focal atrophy is noted. Range of motion is decreased w/ extension. Tenderness to palpation mid-low back. Tenderness to palpation at bilateral sciatic notch. SI joints non-tender. Trochanters non tender. Sensation grossly intact to light touch. MOTOR:     Hip Flex Quads Hamstrings Ankle DF EHL Ankle PF   Right 5/5 5/5 5/5 5/5 5/5 5/5   Left 5/5 5/5 5/5 5/5 5/5 5/5         Straight Leg raise positive on Right, Negative on Left. No difficulty with tandem gait. Ambulation without assistive device. FWB. PAST MEDICAL HISTORY   Past Medical History:   Diagnosis Date    Bladder cancer [facility] [address]     Cancer ([facility] [address]     Foraminal stenosis of lumbar region     Hematuria     Hypercholesterolemia     Low back pain        Past Surgical History:   Procedure Laterality Date    HX BUNIONECTOMY      HX COLONOSCOPY      HX LUMBAR LAMINECTOMY  6/2/15    with fusion    HX ORTHOPAEDIC  Bunionionectomy R    HX TUBAL LIGATION     . MEDICATIONS    Current Outpatient Prescriptions   Medication Sig Dispense Refill    meloxicam (MOBIC) 15 mg tablet Take 1 Tab by mouth daily. Indications: OSTEOARTHRITIS 90 Tab 0    DULoxetine (CYMBALTA) 30 mg capsule tAKE 1 CAPSULE qhs  Indications: NEUROPATHIC PAIN 30 Cap 2    ondansetron (ZOFRAN ODT) 4 mg disintegrating tablet DISSOLVE 1 T ON TONGUE Q 8 H PRN NV  0    meclizine (ANTIVERT) 12.5 mg tablet Take  by mouth three (3) times daily as needed.  traMADol (ULTRAM) 50 mg tablet Take 1 Tab by mouth every six (6) hours as needed for Pain.  Max Daily Amount: 200 mg. 120 Tab 3    MULTIVIT-MINERALS/FOLIC ACID (THERALOGIX  MULTI PO) Take  by mouth daily.  traZODone (DESYREL) 50 mg tablet TK 1 T PO QHS  0    aspirin delayed-release 81 mg tablet Take  by mouth daily.  multivitamin (ONE A DAY) tablet Take 1 Tab by mouth daily.  pravastatin (PRAVACHOL) 20 mg tablet Take 20 mg by mouth nightly.   5    meclizine (ANTIVERT) 25 mg tablet TK 1 T PO TID PRN  0    metaxalone (SKELAXIN) 400 mg tablet   0        ALLERGIES  No Known Allergies       SOCIAL HISTORY    Social History     Social History    Marital status:      Spouse name: N/A    Number of children: N/A    Years of education: N/A     Occupational History    nurse      Social History Main Topics    Smoking status: Former Smoker     Quit date: 1/1/1980    Smokeless tobacco: Never Used    Alcohol use 0.0 oz/week     0 Standard drinks or equivalent per week      Comment: 1 glass 2x per week    Drug use: No    Sexual activity: Not on file     Other Topics Concern    Not on file     Social History Narrative       FAMILY HISTORY  Family History   Problem Relation Age of Onset    No Known Problems Mother     No Known Problems Father            Elbert Kumar, CHERIE

## 2017-05-26 NOTE — PATIENT INSTRUCTIONS
[facility] Activation    Thank you for requesting access to [facility]. Please follow the instructions below to securely access and download your online medical record. [facility] allows you to send messages to your doctor, view your test results, renew your prescriptions, schedule appointments, and more. How Do I Sign Up? 1. In your internet browser, go to [email or web address]  2. Click on the First Time User? Click Here link in the Sign In box. You will be redirect to the New Member Sign Up page. 3. Enter your [facility] [employer] Access Code exactly as it appears below. You will not need to use this code after youve completed the sign-up process. If you do not sign up before the expiration date, you must request a new code. [facility] Access Code: [email or web address]  Expires: 2017 10:03 AM (This is the date your [facility] access code will )    4. Enter the last four digits of your Social Security Number (xxxx) and Date of Birth (mm/dd/yyyy) as indicated and click Submit. You will be taken to the next sign-up page. 5. Create a [facility] ID. This will be your [facility] login ID and cannot be changed, so think of one that is secure and easy to remember. 6. Create a [facility] password. You can change your password at any time. 7. Enter your Password Reset Question and Answer. This can be used at a later time if you forget your password. 8. Enter your e-mail address. You will receive e-mail notification when new information is available in [address]. 9. Click Sign Up. You can now view and download portions of your medical record. 10. Click the Download Summary menu link to download a portable copy of your medical information. Additional Information    If you have questions, please visit the Frequently Asked Questions section of the [employer] website at [email or web address] "[employer]/Chloe [employer]/. Remember, [employer] is NOT to be used for urgent needs. For medical emergencies, dial 911.     Meloxicam (By mouth)   Meloxicam (ref-DR-w-lois)  Treats symptoms of osteoarthritis (OA) and rheumatoid arthritis (RA). This medicine is an NSAID. Brand Name(s): Comfort Pac with Meloxicam, Mobic, Vivlodex   There may be other brand names for this medicine. When This Medicine Should Not Be Used: This medicine is not right for everyone. Do not use it if you had an allergic reaction to meloxicam or another NSAID drug, including aspirin. How to Use This Medicine:   Capsule, Liquid, Tablet  · Take your medicine as directed. Your dose may need to be changed several times to find what works best for you. · You may take this medicine with or without food. If this medicine upsets your stomach, take it with food or milk. · Capsule or tablet: Swallow whole. Do not crush, break, or chew it. · Oral liquid: Shake the bottle gently before use. Measure the oral liquid medicine with a marked measuring spoon, oral syringe, or medicine cup. · This medicine should come with a Medication Guide. Ask your pharmacist for a copy if you do not have one. · Missed dose: Take a dose as soon as you remember. If it is almost time for your next dose, wait until then and take a regular dose. Do not take extra medicine to make up for a missed dose. · Store the medicine in a closed container at room temperature, away from heat, moisture, and direct light. Drugs and Foods to Avoid:   Ask your doctor or pharmacist before using any other medicine, including over-the-counter medicines, vitamins, and herbal products. · Do not use aspirin or any other NSAID medicine (including diflunisal, salsalate) unless your doctor says it is okay. · Do not take the oral liquid together with sodium polystyrene sulfonate. Meloxicam oral liquid contains sorbitol, which may cause a serious bowel problem if taken with sodium polystyrene sulfonate. · Some medicines can affect how meloxicam works.  Tell your doctor if you are using any of the following:  ¨ Cholestyramine, cyclosporine, digoxin, lithium, methotrexate, pemetrexed  ¨ Blood pressure medicine  ¨ Blood thinner (including warfarin)  ¨ Diuretic (water pill)  ¨ Medicine to treat depression  ¨ Steroid medicine (including hydrocortisone, methylprednisolone, prednisolone, prednisone)  Warnings While Using This Medicine:   · Tell your doctor if you are pregnant or breastfeeding. Do not use this medicine during the later part of a pregnancy unless your doctor tells you to. · Tell your doctor if you have kidney disease, liver disease, asthma, blood circulation problems, heart disease, high blood pressure, heart failure, or a history of ulcers or other stomach problems. Tell your doctor if you smoke or drink alcohol regularly. · This medicine may cause the following problems:  ¨ High risk of blood clots, heart attack, stroke, or heart failure  ¨ High risk of stomach or bowel bleeding  ¨ High blood pressure  ¨ Liver or kidney problems  ¨ High potassium levels in the blood  ¨ Serious skin reactions  · Tell your doctor if you are trying to get pregnant. Some women who take this medicine have trouble getting pregnant. · Your doctor will do lab tests at regular visits to check on the effects of this medicine. Keep all appointments. · Keep all medicine out of the reach of children. Never share your medicine with anyone.   Possible Side Effects While Using This Medicine:   Call your doctor right away if you notice any of these side effects:  · Allergic reaction: Itching or hives, swelling in your face or hands, swelling or tingling in your mouth or throat, chest tightness, trouble breathing  · Blistering, peeling, red skin rash  · Change in how much or how often you urinate, painful urination  · Chest pain, trouble breathing, coughing up blood  · Dark urine or pale stools, nausea, vomiting, loss of appetite, stomach pain, yellow skin or eyes  · Numbness or weakness on one side of your body, sudden or severe headache, problems with vision, speech, or walking  · Rapid weight gain, swelling in your hands, ankles, or feet  · Severe stomach pain, vomiting blood or material that looks like coffee grounds, bloody or black, tarry stools  · Unusual bleeding, bruising, or weakness  If you notice these less serious side effects, talk with your doctor:   · Mild nausea, diarrhea, heartburn  If you notice other side effects that you think are caused by this medicine, tell your doctor. Call your doctor for medical advice about side effects. You may report side effects to FDA at 5-604-FDA-7183  © 2017 [address] Information is for End User's use only and may not be sold, redistributed or otherwise used for commercial purposes. The above information is an  only. It is not intended as medical advice for individual conditions or treatments. Talk to your doctor, nurse or pharmacist before following any medical regimen to see if it is safe and effective for you. Duloxetine (By mouth)   Duloxetine (doo-LOX-e-teen)  Treats depression, anxiety, diabetic peripheral neuropathy, fibromyalgia, and chronic muscle or bone pain. This medicine is an SSNRI. Brand Name(s): Cymbalta, DermacinRx DPN Andrew, Irenka   There may be other brand names for this medicine. When This Medicine Should Not Be Used: This medicine is not right for everyone. Do not use it if you had an allergic reaction to duloxetine. How to Use This Medicine:   Capsule, Delayed Release Capsule  · Take your medicine as directed. Your dose may need to be changed several times to find what works best for you. · Delayed-release capsule: Swallow the capsule whole. Do not crush, chew, break, or open it. · This medicine should come with a Medication Guide. Ask your pharmacist for a copy if you do not have one. · Missed dose: Take a dose as soon as you remember. If it is almost time for your next dose, wait until then and take a regular dose.  Do not take extra medicine to make up for a missed dose.  · Store the medicine in a closed container at room temperature, away from heat, moisture, and direct light. Drugs and Foods to Avoid:   Ask your doctor or pharmacist before using any other medicine, including over-the-counter medicines, vitamins, and herbal products. · Do not take duloxetine if you have used an MAO inhibitor (MAOI) within the past 14 days. Do not start taking an MAO inhibitor within 5 days of stopping duloxetine. · Some medicines can affect how duloxetine works. Tell your doctor if you are using any of the following:  ¨ Buspirone, cimetidine, ciprofloxacin, enoxacin, fentanyl, lithium, Ambar's wort, theophylline, tramadol, tryptophan, or warfarin  ¨ Amphetamines  ¨ Blood pressure medicine  ¨ Diuretic (water pill)  ¨ Medicine for heart rhythm problems (including flecainide, propafenone, quinidine)  ¨ Medicine to treat migraine headaches (including triptans)  ¨ NSAID pain or arthritis medicine (including aspirin, celecoxib, diclofenac, ibuprofen, naproxen)  ¨ Other medicine to treat depression or mood disorders (including amitriptyline, desipramine, fluoxetine, imipramine, nortriptyline, paroxetine)  ¨ Phenothiazine medicine (including thioridazine)  · Tell your doctor if you use anything else that makes you sleepy. Some examples are allergy medicine, narcotic pain medicine, and alcohol. · Do not drink alcohol while you are using this medicine. Warnings While Using This Medicine:   · Tell your doctor if you are pregnant or breastfeeding, or if you have kidney disease, liver disease, diabetes, digestion problems, glaucoma, heart disease, high or low blood pressure, or problems with urination. Tell your doctor if you smoke or you have a history of seizures, or drug or alcohol addiction.   · This medicine may cause the following problems:   ¨ Serious liver problems  ¨ Serotonin syndrome (more likely when used with certain other medicines)  ¨ Increased risk of bleeding problems  ¨ Serious skin reactions  ¨ Low sodium levels in the blood  · This medicine can increase thoughts of suicide. Tell your doctor right away if you start to feel depressed and have thoughts about hurting yourself. · This medicine can cause changes in your blood pressure. This may make you dizzy or drowsy. Do not drive or do anything that could be dangerous until you know how this medicine affects you. Stand up slowly to avoid falls. · Do not stop using this medicine suddenly. Your doctor will need to slowly decrease your dose before you stop it completely. · Your doctor will check your progress and the effects of this medicine at regular visits. Keep all appointments. · Keep all medicine out of the reach of children. Never share your medicine with anyone. Possible Side Effects While Using This Medicine:   Call your doctor right away if you notice any of these side effects:  · Allergic reaction: Itching or hives, swelling in your face or hands, swelling or tingling in your mouth or throat, chest tightness, trouble breathing  · Anxiety, restlessness, fever, fast heartbeat, sweating, muscle spasms, diarrhea, seeing or hearing things that are not there  · Blistering, peeling, red skin rash  · Confusion, weakness, muscle twitching  · Dark urine or pale stools, nausea, vomiting, loss of appetite, stomach pain, yellow skin or eyes  · Decrease in how much or how often you urinate  · Eye pain, vision changes, seeing halos around lights  · Feeling more energetic than usual  · Lightheadedness, dizziness, or fainting  · Unusual moods or behaviors, worsening depression, thoughts about hurting yourself, trouble sleeping  · Unusual bleeding or bruising  If you notice these less serious side effects, talk with your doctor:   · Decrease in appetite or weight  · Dry mouth, constipation, mild nausea  · Unusual drowsiness, sleepiness, or tiredness  If you notice other side effects that you think are caused by this medicine, tell your doctor. Call your doctor for medical advice about side effects. You may report side effects to FDA at 2-121-FDA-6461  © 2017 [address] Information is for End User's use only and may not be sold, redistributed or otherwise used for commercial purposes. The above information is an  only. It is not intended as medical advice for individual conditions or treatments. Talk to your doctor, nurse or pharmacist before following any medical regimen to see if it is safe and effective for you. Low Back Pain: Exercises  Your Care Instructions  Here are some examples of typical rehabilitation exercises for your condition. Start each exercise slowly. Ease off the exercise if you start to have pain. Your doctor or physical therapist will tell you when you can start these exercises and which ones will work best for you. How to do the exercises  Press-up    1. Lie on your stomach, supporting your body with your forearms. 2. Press your elbows down into the floor to raise your upper back. As you do this, relax your stomach muscles and allow your back to arch without using your back muscles. As your press up, do not let your hips or pelvis come off the floor. 3. Hold for 15 to 30 seconds, then relax. 4. Repeat 2 to 4 times. Alternate arm and leg (bird dog) exercise    Note: Do this exercise slowly. Try to keep your body straight at all times, and do not let one hip drop lower than the other. 1. Start on the floor, on your hands and knees. 2. Tighten your belly muscles. 3. Raise one leg off the floor, and hold it straight out behind you. Be careful not to let your hip drop down, because that will twist your trunk. 4. Hold for about 6 seconds, then lower your leg and switch to the other leg. 5. Repeat 8 to 12 times on each leg. 6. Over time, work up to holding for 10 to 30 seconds each time.   7. If you feel stable and secure with your leg raised, try raising the opposite arm straight out in front of you at the same time. Knee-to-chest exercise    1. Lie on your back with your knees bent and your feet flat on the floor. 2. Bring one knee to your chest, keeping the other foot flat on the floor (or keeping the other leg straight, whichever feels better on your lower back). 3. Keep your lower back pressed to the floor. Hold for at least 15 to 30 seconds. 4. Relax, and lower the knee to the starting position. 5. Repeat with the other leg. Repeat 2 to 4 times with each leg. 6. To get more stretch, put your other leg flat on the floor while pulling your knee to your chest.  Curl-ups    1. Lie on the floor on your back with your knees bent at a 90-degree angle. Your feet should be flat on the floor, about 12 inches from your buttocks. 2. Cross your arms over your chest. If this bothers your neck, try putting your hands behind your neck (not your head), with your elbows spread apart. 3. Slowly tighten your belly muscles and raise your shoulder blades off the floor. 4. Keep your head in line with your body, and do not press your chin to your chest.  5. Hold this position for 1 or 2 seconds, then slowly lower yourself back down to the floor. 6. Repeat 8 to 12 times. Pelvic tilt exercise    1. Lie on your back with your knees bent. 2. \"Brace\" your stomach. This means to tighten your muscles by pulling in and imagining your belly button moving toward your spine. You should feel like your back is pressing to the floor and your hips and pelvis are rocking back. 3. Hold for about 6 seconds while you breathe smoothly. 4. Repeat 8 to 12 times. Heel dig bridging    1. Lie on your back with both knees bent and your ankles bent so that only your heels are digging into the floor. Your knees should be bent about 90 degrees. 2. Then push your heels into the floor, squeeze your buttocks, and lift your hips off the floor until your shoulders, hips, and knees are all in a straight line.   3. Hold for about 6 seconds as you continue to breathe normally, and then slowly lower your hips back down to the floor and rest for up to 10 seconds. 4. Do 8 to 12 repetitions. Hamstring stretch in doorway    1. Lie on your back in a doorway, with one leg through the open door. 2. Slide your leg up the wall to straighten your knee. You should feel a gentle stretch down the back of your leg. 3. Hold the stretch for at least 15 to 30 seconds. Do not arch your back, point your toes, or bend either knee. Keep one heel touching the floor and the other heel touching the wall. 4. Repeat with your other leg. 5. Do 2 to 4 times for each leg. Hip flexor stretch    1. Kneel on the floor with one knee bent and one leg behind you. Place your forward knee over your foot. Keep your other knee touching the floor. 2. Slowly push your hips forward until you feel a stretch in the upper thigh of your rear leg. 3. Hold the stretch for at least 15 to 30 seconds. Repeat with your other leg. 4. Do 2 to 4 times on each side. Wall sit    1. Stand with your back 10 to 12 inches away from a wall. 2. Lean into the wall until your back is flat against it. 3. Slowly slide down until your knees are slightly bent, pressing your lower back into the wall. 4. Hold for about 6 seconds, then slide back up the wall. 5. Repeat 8 to 12 times. Follow-up care is a key part of your treatment and safety. Be sure to make and go to all appointments, and call your doctor if you are having problems. It's also a good idea to know your test results and keep a list of the medicines you take. Where can you learn more? Go to http://horace-marco.info/. Enter H338 in the search box to learn more about \"Low Back Pain: Exercises. \"  Current as of: May 23, 2016  Content Version: 11.2  © 1531-2405 [employer]. Care instructions adapted under license by [employer] (which disclaims liability or warranty for this information).  If you have questions about a medical condition or this instruction, always ask your healthcare professional. [place] [address] any warranty or liability for your use of this information.

## 2017-05-26 NOTE — PROGRESS NOTES
PT DAILY TREATMENT NOTE - [facility]     Patient Name: Wesley Limon  Date:2017  : 1945  [x]  Patient  Verified  [place]or: [place] MEDIC[place] / Plan: [place] MEDICARE PART A & B / Product Type: Medicare /    In time:4:30  Out time:5:34  Total Treatment Time (min): 64  Total Timed Codes (min): 64  1:1 Treatment Time ( [address] only): 35   Visit #: 6 of 18    Treatment Area: Right shoulder pain [M25.511]    SUBJECTIVE  Pain Level (0-10 scale): 3.5  Any medication changes, allergies to medications, adverse drug reactions, diagnosis change, or new procedure performed?: [x] No    [] Yes (see summary sheet for update)  Subjective functional status/changes:   [] No changes reported  \"I can use my arm fine, its the scalenes\" pt reports neck pain more problematic than shoulder    OBJECTIVE    54 min Therapeutic Exercise:  [] See flow sheet :   Rationale: increase ROM and increase strength to improve the patients ability to perform daily tasks and ADLs with increased ease    10 min Manual Therapy:  SOR, 1st rib depression mobs grade I-II, t/s PAs, c/s mobs   Rationale: decrease pain, increase ROM and increase tissue extensibility to improve functional mobility and ADL ease          With   [] TE   [] TA   [] neuro   [] other: Patient Education: [x] Review HEP    [] Progressed/Changed HEP based on:   [] positioning   [] body mechanics   [] transfers   [] heat/ice application    [] other:      Other Objective/Functional Measures: pt very TTP R medial clavicle into scalenes, added some 1st rib depressions to attempt improvement in pain free mobility     Pain Level (0-10 scale) post treatment: 3    ASSESSMENT/Changes in Function: [facility] she reports good mobility of shoulder at this time but limited by neck pain. Unsure whether soft tissue inflamed from compensatory shoulder movements or possible 1st rib elevation. She reports some mild pain improvement post session.  Continue to progress mobility as able. Patient will continue to benefit from skilled PT services to modify and progress therapeutic interventions, address functional mobility deficits, address ROM deficits, address strength deficits, analyze and address soft tissue restrictions, analyze and cue movement patterns, analyze and modify body mechanics/ergonomics and assess and modify postural abnormalities to attain remaining goals. []  See Plan of Care  []  See progress note/recertification  []  See Discharge Summary         Progress towards goals / Updated goals:  Short Term Goals: To be accomplished in 2 weeks:  1. Pt will be independent and compliant w/ HEP to progress gains in PT. At eval: initiated HEP  Current: Goal met per pt report (5/12/17)  2. Pt will improve PROM R shoulder flex and scap to > or = to 140 w/o resistance or increased pain for ease w/ progressing AROM. At eval: PROM limited to <90 deg flex and scap 2' pt resistance  Current: Progressing, PROM WNL with slight increase in pain with scap . 5/22/17  Long Term Goals: To be accomplished in 6 weeks:  1. Pt will improve FOTO to > or = to 62 to demo improved function. At eval: FOTO = 43  2. 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 At eval: R shoulder flex 124 w/ pain, scap 104 w/ pain  Current: Progressing: R shoulder flex 142 deg, scap 135 deg 5/22/17  3. Pt will increase MMT R shoulder to > or = to -4-4/5 grossly for ease w/ dressing. At eval: MMT NT 2' pain and limited AROM  4. Pt will improve cervical AROM into B SB by > or = to 8-10 deg for ease w/ washing her hair.    At eval: cervical AROM B SB limited to 13 deg w/ pain in the R side    PLAN  []  Upgrade activities as tolerated     []  Continue plan of care  []  Update interventions per flow sheet       []  Discharge due to:_  []  Other:_      Abrahan Gunn DPT 5/26/2017  4:36 PM    Future Appointments  Date Time Provider Department Center   7/12/2017 2:45 PM Sarai Magana  [address]   7/19/2017 2:15 PM Sarai Magana  [address]   7/26/2017 2:45 PM Sarai Magana  [address]   8/16/2017 8:30 AM Alyce Wray  [address]   8/25/2017 2:45 PM Sarai Magana  [address]

## 2017-05-31 ENCOUNTER — APPOINTMENT (OUTPATIENT)
Dept: PHYSICAL THERAPY | Age: 72
End: 2017-05-31
Payer: MEDICARE

## 2017-06-01 ENCOUNTER — APPOINTMENT (OUTPATIENT)
Dept: PHYSICAL THERAPY | Age: 72
End: 2017-06-01
Payer: MEDICARE

## 2017-06-05 ENCOUNTER — APPOINTMENT (OUTPATIENT)
Dept: PHYSICAL THERAPY | Age: 72
End: 2017-06-05
Payer: MEDICARE

## 2017-06-06 ENCOUNTER — HOSPITAL ENCOUNTER (OUTPATIENT)
Dept: PHYSICAL THERAPY | Age: 72
Discharge: HOME | End: 2017-06-06
Payer: MEDICARE

## 2017-06-06 PROCEDURE — G8985 CARRY GOAL STATUS: HCPCS

## 2017-06-06 PROCEDURE — G8984 CARRY CURRENT STATUS: HCPCS

## 2017-06-06 PROCEDURE — 97112 NEUROMUSCULAR REEDUCATION: CPT

## 2017-06-06 PROCEDURE — 97140 MANUAL THERAPY 1/> REGIONS: CPT

## 2017-06-06 NOTE — PROGRESS NOTES
PT DAILY TREATMENT NOTE - [facility] 3-16    Patient Name: Eden Martini  Date:2017  : 1945  [x]  Patient  Verified  Payor: [place] MEDICARE / Plan: [place] MEDICARE PART A & B / Product Type: Medicare /    In time:  Out time:170  Total Treatment Time (min): 35  Total Timed Codes (min): 35  1:1 Treatment Time ([facility] only): 23   Visit #: 7 of 18    Treatment Area: Right shoulder pain [M25.511]    SUBJECTIVE  Pain Level (0-10 scale): 2  Any medication changes, allergies to medications, adverse drug reactions, diagnosis change, or new procedure performed?: [x] No    [] Yes (see summary sheet for update)  Subjective functional status/changes:   [] No changes reported  Pt reports it is more her neck that is hurting more than the shld. She does believe the neck has improved. OBJECTIVE     min []Eval                  []Re-Eval     8 min Therapeutic Exercise:  [x] See flow sheet :   Rationale: increase ROM and increase strength to improve the patients ability to perform ADLs    19 min Neuromuscular Re-education:  [x]  See flow sheet :   Rationale: increase strength, improve coordination and increase proprioception  to improve the patients ability to improve stability     8 min Manual Therapy:  SOR, O/A MET, gentle MFR along R SCM   Rationale: decrease pain, increase ROM and increase tissue extensibility to improve ADLs          With   [] TE   [] TA   [] neuro   [] other: Patient Education: [x] Review HEP    [] Progressed/Changed HEP based on:   [] positioning   [] body mechanics   [] transfers   [] heat/ice application    [] other:      Other Objective/Functional Measures: very tender along R SCM     Pain Level (0-10 scale) post treatment: 3.5    ASSESSMENT/Changes in Function:   Pt reports her R shld is not as bad, however her neck is still bothering her. She was very tender along her R SCM to gentle touch, though improved with gentle MFR pressures, and added SCM stretch to HEP.   Will continue with PT to address remaining limitations, to include the cervical spine/SCM and improve functional task completion. Patient will continue to benefit from skilled PT services to modify and progress therapeutic interventions, address functional mobility deficits, address ROM deficits, address strength deficits, analyze and address soft tissue restrictions, analyze and cue movement patterns, analyze and modify body mechanics/ergonomics, assess and modify postural abnormalities and instruct in home and community integration to attain remaining goals. []  See Plan of Care  [x]  See progress note/recertification  []  See Discharge Summary         Progress towards goals / Updated goals:  Short Term Goals: To be accomplished in 2 weeks:  1. Pt will be independent and compliant w/ HEP to progress gains in PT. At eval: initiated HEP  Current: Goal met per pt report (5/12/17)  2. Pt will improve PROM R shoulder flex and scap to > or = to 140 w/o resistance or increased pain for ease w/ progressing AROM. At eval: PROM limited to <90 deg flex and scap 2' pt resistance  Current: Progressing, PROM WNL with slight increase in pain with scap . 5/22/17  Long Term Goals: To be accomplished in 6 weeks:  1. Pt will improve FOTO to > or = to 62 to demo improved function. At eval: FOTO = 43  2. 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 At eval: R shoulder flex 124 w/ pain, scap 104 w/ pain  Current: Progressing: R shoulder flex 142 deg, scap 135 deg 5/22/17  3. Pt will increase MMT R shoulder to > or = to -4-4/5 grossly for ease w/ dressing. At eval: MMT NT 2' pain and limited AROM  4. Pt will improve cervical AROM into B SB by > or = to 8-10 deg for ease w/ washing her hair.    At eval: cervical AROM B SB limited to 13 deg w/ pain in the R side    PLAN  [x]  Upgrade activities as tolerated     [x]  Continue plan of care  [x]  Update interventions per flow sheet []  Discharge due to:_  []  Other:_      Kelli Vasiliy, PT 6/6/2017  4:51 PM    Future Appointments  Date Time Provider Onesimo Saba   6/8/2017 4:30 PM [id number] [facility]   6/12/2017 4:30 PM Roldan Lara, PTA [facility]   6/14/2017 4:30 PM Eli Nelson PTA [facility]   6/20/2017 4:30 PM Eli Nelson PTA [facility]   6/22/2017 4:30 PM Sukhi Bowers [facility]   7/12/2017 2:45 PM Alfonso Loera  [address]   7/19/2017 2:15 PM Alfonso Loera  [address]   7/26/2017 2:45 PM Alfonso Loera  [address]   8/16/2017 8:30 AM Foster García  [address]   8/25/2017 2:45 PM Alfonso Loera  [address]

## 2017-06-06 NOTE — PROGRESS NOTES
[facility]  [address]  [facility], [address]  [phone] fax    Medicare Progress Report    Patient name: Cadence Block Start of Care: 2017   Referral source: Denita Sandoval : 1945    Medical Diagnosis: Other specific arthropathies, not elsewhere classified, right shoulder [M12.811] Onset Date:8 weeks ago    Treatment Diagnosis: R shoulder pain   Prior Hospitalization: see medical history Provider#: [id number]   Medications: Verified on Patient summary List   Comorbidities: arthritis, back pain, cancer, prior surgery  Prior Level of Function: Independent w/ ADLs and reaching w/ her R UE, no increased pain in the R shoulder, R hand dominant    Visits from Start of Care: 7    Missed Visits: 1    Reporting Period: 2017 to 2017    Subjective Reports: Pt reports her neck bothers her more than her shld, but she does feel it is getting better. Goals:  Short Term Goals: To be accomplished in 2 weeks:  1. Pt will be independent and compliant w/ HEP to progress gains in PT. At eval: initiated HEP  Current: Goal met per pt report (17)  2. Pt will improve PROM R shoulder flex and scap to > or = to 140 w/o resistance or increased pain for ease w/ progressing AROM. At eval: PROM limited to <90 deg flex and scap 2' pt resistance  Current: Progressing, PROM WNL with slight increase in pain with scap . 17  Long Term Goals: To be accomplished in 6 weeks:  1. Pt will improve FOTO to > or = to 62 to demo improved function. At eval: FOTO = 43  2. 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 At eval: R shoulder flex 124 w/ pain, scap 104 w/ pain  Current: Progressing: R shoulder flex 142 deg, scap 135 deg 17  3. Pt will increase MMT R shoulder to > or = to -4-4/5 grossly for ease w/ dressing. At eval: MMT NT 2' pain and limited AROM  4.  Pt will improve cervical AROM into B SB by > or = to 8-10 deg for ease w/ washing her hair. At eval: cervical AROM B SB limited to 13 deg w/ pain in the R side    Key functional changes:   HEP compliant  R shld PROM WNL with slight increase in pain with scaption  FOTO 43  R shld flex AROM 142 degrees  R shld scap 135 degrees       Problems/ barriers to goal attainment: None     Assessment / Recommendations:Pt reports her R shld is not as bad, however her neck is still bothering her. She was very tender along her R SCM to gentle touch, though improved with gentle MFR pressures, and added SCM stretch to HEP. Will continue with PT to address remaining limitations, to include the cervical spine/SCM and improve functional task completion. Problem List: pain affecting function, decrease ROM, decrease strength, decrease ADL/ functional abilitiies, decrease activity tolerance, decrease flexibility/ joint mobility and decrease transfer abilities   Treatment Plan: Therapeutic exercise, Therapeutic activities, Neuromuscular re-education, Physical agent/modality, Manual therapy, Patient education, Self Care training and Functional mobility training    Patient Goal (s) has been updated and includes: Pt wants to address neck pain. Updated Goals to be accomplished in 3 weeks:  1. Pt will demonstrate R shld AROM flex and scaption to 150 degrees or greater to improve ease of ADLs. 2.  Pt will demonstrate R shld MMT to 4/5 or greater to improve lifting objects. 3.  Pt will demonstrate B cervical SB to 15 degrees or greater to improve turning for driving. 4.  Pt will improved FOTO to 62 or greater to indicate improved functional task completion. Frequency / Duration: Patient to be seen 2 times per week for 3 weeks:    G-Codes (GP)  Carry   Current  CK= 40-59%    Goal  CJ= 20-39%      The severity rating is based on clinical judgment and the FOTO score.       Papo Jalloh, PT 6/6/2017 4:53 PM

## 2017-06-07 ENCOUNTER — APPOINTMENT (OUTPATIENT)
Dept: PHYSICAL THERAPY | Age: 72
End: 2017-06-07
Payer: MEDICARE

## 2017-06-08 ENCOUNTER — APPOINTMENT (OUTPATIENT)
Dept: PHYSICAL THERAPY | Age: 72
End: 2017-06-08
Payer: MEDICARE

## 2017-06-08 ENCOUNTER — HOSPITAL ENCOUNTER (OUTPATIENT)
Dept: PHYSICAL THERAPY | Age: 72
Discharge: HOME | End: 2017-06-08
Payer: MEDICARE

## 2017-06-08 PROCEDURE — 97140 MANUAL THERAPY 1/> REGIONS: CPT

## 2017-06-08 PROCEDURE — 97110 THERAPEUTIC EXERCISES: CPT

## 2017-06-08 NOTE — PROGRESS NOTES
PT DAILY TREATMENT NOTE - [facility]     Patient Name: Matilda Torrez  Date:2017  : 1945  [x]  Patient  Verified  Payor: VA MEDICARE / Plan: VA MEDICARE PART A & B / Product Type: Medicare /    In time:4:30  Out time:5:08  Total Treatment Time (min): 38  Total Timed Codes (min): 38  1:1 Treatment Time ( [address] only): 32   Visit #: 1 of 6    Treatment Area: Right shoulder pain [M25.511]    SUBJECTIVE  Pain Level (0-10 scale): 0  Any medication changes, allergies to medications, adverse drug reactions, diagnosis change, or new procedure performed?: [x] No    [] Yes (see summary sheet for update)  Subjective functional status/changes:   [] No changes reported  \"Its just that neck\"    OBJECTIVE      30 min Therapeutic Exercise:  [] See flow sheet :   Rationale: increase ROM, increase strength and improve coordination to improve the patients ability to perform daily tasks with improved mechanics and efficiency    8 min Manual Therapy:  STM/MFR R SCM, con-relax SB, PROM rotation, SOR   Rationale: decrease pain, increase ROM and increase tissue extensibility to improve functional mobility and ADL ease          With   [] TE   [] TA   [] neuro   [] other: Patient Education: [x] Review HEP    [] Progressed/Changed HEP based on:   [] positioning   [] body mechanics   [] transfers   [] heat/ice application    [] other:      Other Objective/Functional Measures: pain reported at R clavicle with wall walks  deg decreased pain after this point     Pain Level (0-10 scale) post treatment: 4    ASSESSMENT/Changes in Function: Pt continues to be very TTP along R SCM and clavicle. Today reporting difficulty elevating R shoulder between ~ deg due to pain, once clearing this range she reports reduction in pain. Pt reports increased pain post session following STM. Continue to progress functional mobility.     Patient will continue to benefit from skilled PT services to modify and progress therapeutic interventions, address functional mobility deficits, address ROM deficits, address strength deficits, analyze and address soft tissue restrictions, analyze and cue movement patterns, analyze and modify body mechanics/ergonomics and assess and modify postural abnormalities to attain remaining goals. []  See Plan of Care  []  See progress note/recertification  []  See Discharge Summary         Progress towards goals / Updated goals:  Updated Goals to be accomplished in 3 weeks:  1. Pt will demonstrate R shld AROM flex and scaption to 150 degrees or greater to improve ease of ADLs. 2. Pt will demonstrate R shld MMT to 4/5 or greater to improve lifting objects. 3. Pt will demonstrate B cervical SB to 15 degrees or greater to improve turning for driving. 4. Pt will improved FOTO to 62 or greater to indicate improved functional task completion.     PLAN  []  Upgrade activities as tolerated     [x]  Continue plan of care  []  Update interventions per flow sheet       []  Discharge due to:_  []  Other:_      Braden Diaz DPT 6/8/2017  4:37 PM    Future Appointments  Date Time Provider Onesimo Dashi   6/13/2017 4:00 PM Lisa Renee PTA [facility]   6/15/2017 5:00 PM [id number] [facility]   6/20/2017 4:30 PM [facility]   6/22/2017 4:30 PM Braden Diaz [facility]   7/12/2017 2:45 PM Dudley Lea MD [address] [place] [address]   7/19/2017 2:15 PM Dudley Lea MD [address] [place] [address]   7/26/2017 2:45 PM Dudley Lea MD [address] [place] [address]   8/16/2017 8:30 AM CHERIE Zurita   8/25/2017 2:45 PM Dudley Lea MD [address] [place] [address]

## 2017-06-12 ENCOUNTER — APPOINTMENT (OUTPATIENT)
Dept: PHYSICAL THERAPY | Age: 72
End: 2017-06-12
Payer: MEDICARE

## 2017-06-13 ENCOUNTER — APPOINTMENT (OUTPATIENT)
Dept: PHYSICAL THERAPY | Age: 72
End: 2017-06-13
Payer: MEDICARE

## 2017-06-14 ENCOUNTER — APPOINTMENT (OUTPATIENT)
Dept: PHYSICAL THERAPY | Age: 72
End: 2017-06-14
Payer: MEDICARE

## 2017-06-15 ENCOUNTER — HOSPITAL ENCOUNTER (OUTPATIENT)
Dept: PHYSICAL THERAPY | Age: 72
Discharge: HOME | End: 2017-06-15
Payer: MEDICARE

## 2017-06-15 ENCOUNTER — APPOINTMENT (OUTPATIENT)
Dept: PHYSICAL THERAPY | Age: 72
End: 2017-06-15
Payer: MEDICARE

## 2017-06-15 PROCEDURE — 97110 THERAPEUTIC EXERCISES: CPT

## 2017-06-15 PROCEDURE — 97140 MANUAL THERAPY 1/> REGIONS: CPT

## 2017-06-15 NOTE — PROGRESS NOTES
PT DAILY TREATMENT NOTE - [facility]     Patient Name: Lily Hoover  Date:6/15/2017  : 1945  [x]  Patient  Verified  Payor: [place] MEDICARE / Plan: VA MEDICARE PART A & B / Product Type: Medicare /    In time:5:00  Out time:5:40  Total Treatment Time (min): 40  Total Timed Codes (min): 40  1:1 Treatment Time ( [address] only): 35   Visit #: 2 of 6    Treatment Area: Right shoulder pain [M25.511]    SUBJECTIVE  Pain Level (0-10 scale): 3  Any medication changes, allergies to medications, adverse drug reactions, diagnosis change, or new procedure performed?: [x] No    [] Yes (see summary sheet for update)  Subjective functional status/changes:   [] No changes reported  \"Its doing better\" pt reports still feeling some discomfort with elevation just past 90 deg    OBJECTIVE    30 min Therapeutic Exercise:  [] See flow sheet :   Rationale: increase ROM, increase strength and improve coordination to improve the patients ability to maintain adequate posture with daily tasks and normalize cervical and GH mechanics    10 min Manual Therapy:  L transverse glides C4-5, gentle MFR R SCM/scalenes   Rationale: decrease pain, increase ROM and increase tissue extensibility to improve functional mobility and ADL ease          With   [] TE   [] TA   [] neuro   [] other: Patient Education: [x] Review HEP    [] Progressed/Changed HEP based on:   [] positioning   [] body mechanics   [] transfers   [] heat/ice application    [] other:      Other Objective/Functional Measures:   C/s SB AROM  R= 12 deg  L= 10 deg     Pain Level (0-10 scale) post treatment: 5     ASSESSMENT/Changes in Function: Pt reports feeling that her neck is improving, although it is sore and uncomfortable during sessions. She reports having some posterior HA following sessions, this seems appropriate due to her posterior musculature restrictions. Added some gentle transverse glides during manual today as pt very TTP C4-5 region with slight R rotation.  Shoulder mobility doing well at this time aside from some pain at 100-120 deg arc    Patient will continue to benefit from skilled PT services to modify and progress therapeutic interventions, address functional mobility deficits, address ROM deficits, address strength deficits, analyze and address soft tissue restrictions, analyze and cue movement patterns, analyze and modify body mechanics/ergonomics and assess and modify postural abnormalities to attain remaining goals. []  See Plan of Care  []  See progress note/recertification  []  See Discharge Summary         Progress towards goals / Updated goals:  Updated Goals to be accomplished in 3 weeks:  1. Pt will demonstrate R shld AROM flex and scaption to 150 degrees or greater to improve ease of ADLs. 2. Pt will demonstrate R shld MMT to 4/5 or greater to improve lifting objects. 3. Pt will demonstrate B cervical SB to 15 degrees or greater to improve turning for driving. Not met- 10 deg L 6/15/17  4. Pt will improved FOTO to 62 or greater to indicate improved functional task completion.     PLAN  [x]  Upgrade activities as tolerated     []  Continue plan of care  []  Update interventions per flow sheet       []  Discharge due to:_  []  Other:_      Zuri Box DPT 6/15/2017  5:06 PM    Future Appointments  Date Time Provider Onesimo Saba   6[facility]16[facility]2017 4:30 [facility]   6/20/2017 4:30 PM Nohemy Antoine PTA [facility]   6/22/2017 4:30 PM Zuri Box [facility]   7/12/2017 2:45 PM Funmi Garnica  [address]   7/19/2017 2:15 PM Funmi Garnica  [address]   7/26/2017 2:45 PM Funmi Garnica  [address]   8/16/2017 8:30 AM Chavez Gonzales, CHERIE [address]   8/25/2017 2:45 PM Funmi Garnica  [address]

## 2017-06-16 ENCOUNTER — HOSPITAL ENCOUNTER (OUTPATIENT)
Dept: PHYSICAL THERAPY | Age: 72
Discharge: HOME | End: 2017-06-16
Payer: MEDICARE

## 2017-06-16 PROCEDURE — 97110 THERAPEUTIC EXERCISES: CPT

## 2017-06-16 PROCEDURE — 97140 MANUAL THERAPY 1/> REGIONS: CPT

## 2017-06-16 NOTE — PROGRESS NOTES
PT DAILY TREATMENT NOTE - [facility] 3-16    Patient Name: Rigoberto Lunch  Date:2017  : 1945  [x]  Patient  Verified  Payor: [address] [place] [address] / Plan: [place] MEDICARE PART A & B / Product Type: Medicare /    In time:4:31  Out time:5:15  Total Treatment Time (min): 44  Total Timed Codes (min): 44  1:1 Treatment Time ( [address] only): 45   Visit #: 3 of 6    Treatment Area: Right shoulder pain [M25.511]    SUBJECTIVE  Pain Level (0-10 scale): 3/10  Any medication changes, allergies to medications, adverse drug reactions, diagnosis change, or new procedure performed?: [x] No    [] Yes (see summary sheet for update)  Subjective functional status/changes:   [] No changes reported  The patient states that she feels the shoulder is feeling a little better overall. OBJECTIVE  36 min Therapeutic Exercise:  [x] See flow sheet :   Rationale: increase ROM, increase strength and improve coordination to improve the patients ability to improve ADL. 8 min Manual Therapy: R  Levator scapulae/UT TPR, 1st rib mobs, scalene TPR   Rationale: decrease pain, increase ROM and increase tissue extensibility to improve ADL ease. With   [] TE   [] TA   [] neuro   [] other: Patient Education: [x] Review HEP    [] Progressed/Changed HEP based on:   [] positioning   [] body mechanics   [] transfers   [] heat/ice application    [] other:      Other Objective/Functional Measures: The patient experienced increased pain with elevation. However, she did have improvement following cues for scap retraction. Pain Level (0-10 scale) post treatment: 4/10    ASSESSMENT/Changes in Function: Fair progressing with R shoulder elevation noted. She continues to exhibit tone through her right scalene with point tenderness into her SCM as well potentially elevating her 1st rib.      Patient will continue to benefit from skilled PT services to modify and progress therapeutic interventions, address functional mobility deficits, address ROM deficits, address strength deficits, analyze and address soft tissue restrictions, analyze and cue movement patterns, analyze and modify body mechanics/ergonomics, assess and modify postural abnormalities and instruct in home and community integration to attain remaining goals. []  See Plan of Care  []  See progress note/recertification  []  See Discharge Summary         Progress towards goals / Updated goals:  Updated Goals to be accomplished in 3 weeks:  1. Pt will demonstrate R shld AROM flex and scaption to 150 degrees or greater to improve ease of ADLs. 2. Pt will demonstrate R shld MMT to 4/5 or greater to improve lifting objects. 3. Pt will demonstrate B cervical SB to 15 degrees or greater to improve turning for driving. Not met- 10 deg L 6/15/17  4.  Pt will improved FOTO to 62 or greater to indicate improved functional task completion.     PLAN  []  Upgrade activities as tolerated     [x]  Continue plan of care  []  Update interventions per flow sheet       []  Discharge due to:_  []  Other:_      Arnold Dela Cruz, PT 6/16/2017  5:25 PM

## 2017-06-20 ENCOUNTER — APPOINTMENT (OUTPATIENT)
Dept: PHYSICAL THERAPY | Age: 72
End: 2017-06-20
Payer: MEDICARE

## 2017-06-22 ENCOUNTER — HOSPITAL ENCOUNTER (OUTPATIENT)
Dept: PHYSICAL THERAPY | Age: 72
Discharge: HOME | End: 2017-06-22
Payer: MEDICARE

## 2017-06-22 PROCEDURE — 97110 THERAPEUTIC EXERCISES: CPT

## 2017-06-22 PROCEDURE — 97140 MANUAL THERAPY 1/> REGIONS: CPT

## 2017-06-22 NOTE — PROGRESS NOTES
PT DAILY TREATMENT NOTE - [facility]     Patient Name: James Salazar  Date:2017  : 1945  [x]  Patient  Verified  Payor: VA MEDICARE / Plan: VA MEDICARE PART A & B / Product Type: Medicare /    In time:4:31  Out time:5:18  Total Treatment Time (min): 47  Total Timed Codes (min): 47  1:1 Treatment Time ( [address] only): 35   Visit #: 4 of 6    Treatment Area: Right shoulder pain [M25.511]    SUBJECTIVE  Pain Level (0-10 scale): 3  Any medication changes, allergies to medications, adverse drug reactions, diagnosis change, or new procedure performed?: [x] No    [] Yes (see summary sheet for update)  Subjective functional status/changes:   [] No changes reported  Pt reports some mild progress with neck pain, \"not like I want though\"    OBJECTIVE    35 min Therapeutic Exercise:  [] See flow sheet :   Rationale: increase ROM, increase strength and improve coordination to improve the patients ability to perform daily tasks and ADLs with increased efficiency      8 min Manual Therapy:  1st rib mobs, STM R scalenes & SCM, c/s PROM SB+rot, grade II-III L C4 transverse glides   Rationale: increase ROM and increase tissue extensibility to improve functional mobility with decreased cervical pain         With   [] TE   [] TA   [] neuro   [] other: Patient Education: [x] Review HEP    [] Progressed/Changed HEP based on:   [] positioning   [] body mechanics   [] transfers   [] heat/ice application    [] other:      Other Objective/Functional Measures:   115 deg AROM R shoulder elevation in standing before pain     Pain Level (0-10 scale) post treatment: 4    ASSESSMENT/Changes in Function: Pt reports feeling some slow improvement in neck pain but not where she would like it to be. Still very TTP along R SCM and scalenes limiting tolerance to manual therapy. Pt feels that her cortisone injection is wearing off and activities involving the R shoulder are starting to become uncomfortable again.  Plan to D/C in upcoming visits with HEP if progress remains as is. Patient will continue to benefit from skilled PT services to modify and progress therapeutic interventions, address functional mobility deficits, address ROM deficits, address strength deficits, analyze and address soft tissue restrictions, analyze and cue movement patterns, analyze and modify body mechanics/ergonomics and assess and modify postural abnormalities to attain remaining goals. []  See Plan of Care  []  See progress note/recertification  []  See Discharge Summary         Progress towards goals / Updated goals:  Updated Goals to be accomplished in 3 weeks:  1. Pt will demonstrate R shld AROM flex and scaption to 150 degrees or greater to improve ease of ADLs. Not met- 115 deg in standing 6/22/17  2. Pt will demonstrate R shld MMT to 4/5 or greater to improve lifting objects. 3. Pt will demonstrate B cervical SB to 15 degrees or greater to improve turning for driving. Not met- 10 deg L 6/15/17  4. Pt will improved FOTO to 62 or greater to indicate improved functional task completion.  Progressed to 48 6/22/17    PLAN  []  Upgrade activities as tolerated     [x]  Continue plan of care  []  Update interventions per flow sheet       []  Discharge due to:_  []  Other:_      Henrik Kelsey DPT 6/22/2017  4:33 PM    Future Appointments  Date Time Provider Onesimo Dashi   6/23/2017 8:00 AM Rebecca Bills PTA [facility]   7/12/2017 8:45 AM Moni Hammond  [address]   7/19/2017 9:00 AM Moni Hammond  [address]   7/26/2017 9:00 AM Moni Hammond  [address]   8/16/2017 8:30 AM Chris Olson  [address]   8/25/2017 2:45 PM Moni Hammond  [address]

## 2017-06-23 ENCOUNTER — HOSPITAL ENCOUNTER (OUTPATIENT)
Dept: PHYSICAL THERAPY | Age: 72
Discharge: HOME | End: 2017-06-23
Payer: MEDICARE

## 2017-06-23 PROCEDURE — 97110 THERAPEUTIC EXERCISES: CPT

## 2017-06-23 PROCEDURE — 97140 MANUAL THERAPY 1/> REGIONS: CPT

## 2017-06-23 NOTE — PROGRESS NOTES
PT DAILY TREATMENT NOTE - [facility]     Patient Name: Luci Quiver  Date:2017  : 1945  [x]  Patient  Verified   Payor: [place] MED[place]ARE / Plan: [place] MEDICARE PART A & B / Product Type: Medicare /    In time:8:02  Out time:8:52  Total Treatment Time (min): 50  Total Timed Codes (min): 50  1:1 Treatment Time (Omer Pluck only): 50   Visit #: 5 of 6    Treatment Area: Right shoulder pain [M25.511]    SUBJECTIVE  Pain Level (0-10 scale): 3/10  Any medication changes, allergies to medications, adverse drug reactions, diagnosis change, or new procedure performed?: [x] No    [] Yes (see summary sheet for update)  Subjective functional status/changes:   [] No changes reported  \"Sore from being here yesterday. \"    OBJECTIVE    42 min Therapeutic Exercise:  [x] See flow sheet :   Rationale: increase ROM, increase strength and increase proprioception to improve the patients ability to perform functional activities. 8 min Manual Therapy:  Graston technique, light pressure to (R) SCM and scalenes, moderate pressure to (R) UT, lev scap and C/S paraspinals. Rationale: decrease pain, increase ROM, increase tissue extensibility and decrease trigger points to improve ease of ADL's and improve activity tolerance. With   [x] TE   [] TA   [] neuro   [] other: Patient Education: [x] Review HEP    [] Progressed/Changed HEP based on:   [] positioning   [] body mechanics   [] transfers   [] heat/ice application    [] other:      Other Objective/Functional Measures: Hypersensitive (R) neck musculature. Trial Graston technique to decrease mm tone and tension. Pain Level (0-10 scale) post treatment: 2/10    ASSESSMENT/Changes in Function: Pt noted a decrease in tension and pain after receiving Graston.     Patient will continue to benefit from skilled PT services to modify and progress therapeutic interventions, address functional mobility deficits, address ROM deficits, address strength deficits, analyze and address soft tissue restrictions and analyze and modify body mechanics/ergonomics to attain remaining goals. [x]  See Plan of Care  []  See progress note/recertification  []  See Discharge Summary         Progress towards goals / Updated goals:  Updated Goals to be accomplished in 3 weeks:  1. Pt will demonstrate R shld AROM flex and scaption to 150 degrees or greater to improve ease of ADLs. Not met- 115 deg in standing 6/22/17  2. Pt will demonstrate R shld MMT to 4/5 or greater to improve lifting objects. 3. Pt will demonstrate B cervical SB to 15 degrees or greater to improve turning for driving. Not met- 10 deg L 6/15/17  4. Pt will improved FOTO to 62 or greater to indicate improved functional task completion.  Progressed to 48 6/22/17    PLAN  []  Upgrade activities as tolerated     [x]  Continue plan of care  []  Update interventions per flow sheet       []  Discharge due to:_  []  Other:_      Abhinav Done, PTA 6/23/2017  8:10 AM    Future Appointments  Date Time Provider Onesimo Saba   7/12/2017 8:45 AM Jason Levy  [address]   7/19/2017 9:00 AM Jason Levy  [address]   7/26/2017 9:00 AM Jason Levy  [address]   8/16/2017 8:30 AM Willow Saab  [address]   8/25/2017 2:45 PM Jason Levy  [address]

## 2017-07-19 ENCOUNTER — APPOINTMENT (OUTPATIENT)
Dept: PHYSICAL THERAPY | Age: 72
End: 2017-07-19
Payer: MEDICARE

## 2017-07-20 ENCOUNTER — HOSPITAL ENCOUNTER (OUTPATIENT)
Dept: PHYSICAL THERAPY | Age: 72
Discharge: HOME | End: 2017-07-20
Payer: MEDICARE

## 2017-07-20 PROCEDURE — 97112 NEUROMUSCULAR REEDUCATION: CPT

## 2017-07-20 PROCEDURE — 97110 THERAPEUTIC EXERCISES: CPT

## 2017-07-20 PROCEDURE — G8986 CARRY D/C STATUS: HCPCS

## 2017-07-20 PROCEDURE — G8985 CARRY GOAL STATUS: HCPCS

## 2017-07-20 NOTE — PROGRESS NOTES
PT DAILY TREATMENT NOTE - [facility]     Patient Name: Heriberto Larry  Date:2017  : 1945  [x]  Patient  Verified  Payor: [place] MEDICARE / Plan: [place] MEDICARE PART A & B / Product Type: Medicare /    In time:5:30  Out time:6:30  Total Treatment Time (min): 60  Total Timed Codes (min): 60  1:1 Treatment Time ([facility] only): 48   Visit #: 6 of 6    Treatment Area: Right shoulder pain [M25.511]    SUBJECTIVE  Pain Level (0-10 scale): 7/10  Any medication changes, allergies to medications, adverse drug reactions, diagnosis change, or new procedure performed?: [x] No    [] Yes (see summary sheet for update)  Subjective functional status/changes:   [] No changes reported  Pt reports she continues to have increased (R) shoulder and neck pain. OBJECTIVE    50 min Therapeutic Exercise:  [x] See flow sheet :   Rationale: increase ROM and increase strength to improve the patients ability to tolerate ADLs. 10 min Neuromuscular Re-education:  [x]  See flow sheet :   Rationale: increase strength, improve coordination and increase proprioception  to improve the patients ability to perform functional activities. With   [] TE   [] TA   [] neuro   [] other: Patient Education: [x] Review HEP    [] Progressed/Changed HEP based on:   [] positioning   [] body mechanics   [] transfers   [] heat/ice application    [] other:      Other Objective/Functional Measures: Manually assessed by PT (R) shoulder MMT flexion 3+/5; abd 3/5; ER 3+/5; IR 4-/5. (R) shoulder flexion AROM 88 degrees     Pain Level (0-10 scale) post treatment: 7/10    ASSESSMENT/Changes in Function: Pt demonstrates little to no progress toward functional goals. Patient continues to have significant pain in (R) shoulder and (R) SCM. Issued and reviewed DC instructions and HEP.      []  See Plan of Care  []  See progress note/recertification  [x]  See Discharge Summary         Progress towards goals / Updated goals:  Updated Goals to be accomplished in 3 weeks: 1. Pt will demonstrate R shld AROM flex and scaption to 150 degrees or greater to improve ease of ADLs. Not met- 115 deg in standing 6/22/17; regressed to 88 degrees. 07/20/17  2. Pt will demonstrate R shld MMT to 4/5 or greater to improve lifting objects. - not met:  flexion 3+/5; abd 3/5; ER 3+/5; IR 4-/5. 07/20/17  3. Pt will demonstrate B cervical SB to 15 degrees or greater to improve turning for driving. Not met- 10 deg L 6/15/17 ; no change C/S SB (L) 10 degrees (R) 13 degrees. 07/20/17  4. Pt will improved FOTO to 62 or greater to indicate improved functional task completion. Progressed to 48 6/22/17; regressed to 43. 07/20/17    PLAN  []  Upgrade activities as tolerated     [x]  Continue plan of care  []  Update interventions per flow sheet       [x]  Discharge due to:lack of progress and continued pain.   []  Other:_      Karla Newman PTA 7/20/2017  6:46 PM    Future Appointments  Date Time Provider Onesimo Saba   7/21/2017 4:00 PM [facility] WB NURSE [facility] CRIS SCHED   7/28/2017 3:20 PM [facility] WB NURSE [facility] CRIS SCHED   7/31/2017 4:40 PM Sharon Jimenez MD [facility] 69   8/16/2017 8:30 AM Jennifer Nunez  [address]   8/25/2017 2:45 PM Valorie Martin MD [address]

## 2017-07-21 NOTE — PROGRESS NOTES
[facility]  [address]  [place], [address]  [phone] fax    Physical Therapy Discharge Summary    Patient name: Dena Dockery Start of Care: 2017   Referral source: Pankaj Parsons : 1945    Medical Diagnosis: Other specific arthropathies, not elsewhere classified, right shoulder [M12.811] Onset Date:8 weeks ago    Treatment Diagnosis: R shoulder pain   Prior Hospitalization: see medical history Provider#: [id number]   Medications: Verified on Patient summary List   Comorbidities: arthritis, back pain, cancer, prior surgery  Prior Level of Function: Independent w/ ADLs and reaching w/ her R UE, no increased pain in the R shoulder, R hand dominant  Visits from Start of Care: 13    Missed Visits: 3  Reporting Period : 2017 to 2017    Summary of Care:  Updated Goals to be accomplished in 3 weeks:  1. Pt will demonstrate R shld AROM flex and scaption to 150 degrees or greater to improve ease of ADLs. Not met- 115 deg in standing 17; regressed to 88 degrees. 17  2. Pt will demonstrate R shld MMT to 4/5 or greater to improve lifting objects. - not met:  flexion 3+/5; abd 3/5; ER 3+/5; IR 4-/5. 17  3. Pt will demonstrate B cervical SB to 15 degrees or greater to improve turning for driving. Not met- 10 deg L 6/15/17 ; no change C/S SB (L) 10 degrees (R) 13 degrees. 17  4. Pt will improved FOTO to 62 or greater to indicate improved functional task completion. Progressed to 48 17; regressed to 43. 17    G-Codes (GP)  Carry    Goal  CK= 40-59%   D/C  CK= 40-59%    The severity rating is based on clinical judgment and the FOTO score. ASSESSMENT/RECOMMENDATIONS:  Pt demonstrates little to no progress toward functional goals. Patient continues to have significant pain in (R) shoulder and (R) SCM. Issued and reviewed DC instructions and HEP.      [x]Discontinue therapy: []Patient has reached or is progressing toward set goals      []Patient is non-compliant or has abdicated      [x]Due to lack of appreciable progress towards set [address], PT 7/21/2017 7:23 AM

## 2017-07-24 DIAGNOSIS — M96.1 LUMBAR POST-LAMINECTOMY SYNDROME: ICD-10-CM

## 2017-07-24 DIAGNOSIS — Z98.890 S/P LUMBAR LAMINECTOMY: ICD-10-CM

## 2017-07-25 ENCOUNTER — TELEPHONE (OUTPATIENT)
Dept: ORTHOPEDIC SURGERY | Age: 72
End: 2017-07-25

## 2017-07-25 RX ORDER — TRAMADOL HYDROCHLORIDE 50 MG/1
TABLET ORAL
Qty: 120 TAB | Refills: 0 | Status: SHIPPED | OUTPATIENT
Start: 2017-07-25 | End: 2017-11-20 | Stop reason: SDUPTHER

## 2017-07-25 NOTE — TELEPHONE ENCOUNTER
Called [facility] and pharmacy associate states Cymbalta is ready for  and the patient received a 90 day supply of Mobic on May 26th. This should be enough medication to get the patient through the next office visit. Called and spoke with patient and advised her of this information. No further action required.

## 2017-07-25 NOTE — TELEPHONE ENCOUNTER
Pt was retuning call to the office. Pt stated that she did not need a refill on Tramadol but did need a refill on the Cymbalta and Mobic. Pt can be reached at [phone].

## 2017-07-25 NOTE — TELEPHONE ENCOUNTER
Called pt. No answer. Left message to return call to office. Pt to be informed Tramadol RX was phoned to local pharmacy on [facility] on [address]

## 2017-07-31 ENCOUNTER — OFFICE VISIT (OUTPATIENT)
Dept: ORTHOPEDIC SURGERY | Age: 72
End: 2017-07-31

## 2017-07-31 VITALS
HEIGHT: 62 IN | TEMPERATURE: 98.1 F | WEIGHT: 142 LBS | BODY MASS INDEX: 26.13 KG/M2 | OXYGEN SATURATION: 99 % | RESPIRATION RATE: 18 BRPM | DIASTOLIC BLOOD PRESSURE: 58 MMHG | SYSTOLIC BLOOD PRESSURE: 127 MMHG | HEART RATE: 67 BPM

## 2017-07-31 DIAGNOSIS — M12.811 ROTATOR CUFF TEAR ARTHROPATHY, RIGHT: Primary | ICD-10-CM

## 2017-07-31 DIAGNOSIS — M75.101 ROTATOR CUFF TEAR ARTHROPATHY, RIGHT: Primary | ICD-10-CM

## 2017-07-31 DIAGNOSIS — M50.90 CERVICAL DISC DISEASE: ICD-10-CM

## 2017-07-31 RX ORDER — TRIAMCINOLONE ACETONIDE 40 MG/ML
40 INJECTION, SUSPENSION INTRA-ARTICULAR; INTRAMUSCULAR ONCE
Qty: 1 ML | Refills: 0
Start: 2017-07-31 | End: 2017-07-31

## 2017-07-31 RX ORDER — CHOLECALCIFEROL (VITAMIN D3) 125 MCG
CAPSULE ORAL AS NEEDED
COMMUNITY
End: 2018-04-27 | Stop reason: CLARIF

## 2017-07-31 NOTE — PROGRESS NOTES
Dorinda Jolly  [birth year]   Chief Complaint   Patient presents with    Shoulder Pain     right        HISTORY OF PRESENT ILLNESS  Dorinda Jolly is a 67 y.o. female who presents today for reevaluation of right shoulder pain. At last office visit in March, patient was referred for course of PT. She rates her pain 7/10 today. She reports having little improvement through PT. Patient is still complaining of a constant tightness in the neck and right shoulder. She reports receiving good relief from previous cortisone injection. She states taking Aleve helps make the pain more tolerable. Patient has completed an MRI of the right shoulder. She has trouble doing certain daily activities like curling her hair. She states the pain began about 7 months ago and has been gradually increasing. She denies any trauma or injury. She states she only has trouble sleeping at night sometimes. Patient denies any fever, chills, chest pain, shortness of breath or calf pain. There are no new illness or injuries to report since last seen in the office. There are no changes to medications, allergies, family or social history. PHYSICAL EXAM:   Visit Vitals    /58    Pulse 67    Temp 98.1 °F (36.7 °C) (Oral)    Resp 18    Ht 5' 2\" (1.575 m)    Wt 142 lb (64.4 kg)    SpO2 99%    BMI 25.97 kg/m2     The patient is a well-developed, well-nourished female   in no acute distress. The patient is alert and oriented times three. The patient is alert and oriented times three. Mood and affect are normal.  LYMPHATIC: lymph nodes are not enlarged and are within normal limits  SKIN: normal in color and non tender to palpation. There are no bruises or abrasions noted. NEUROLOGICAL: Motor sensory exam is within normal limits. Reflexes are equal bilaterally.  There is normal sensation to pinprick and light touch  MUSCULOSKELETAL:  Examination Right shoulder   Skin Intact   AC joint tenderness -   Biceps tenderness - Forward flexion/Elevation    Active abduction    Glenohumeral abduction 70   External rotation ROM 30   Internal rotation ROM 0   Apprehension -   Sarithas Relocation -   Jerk -   Load and Shift -   Obriens -   Speeds -   Impingement sign +   Supraspinatus/Empty Can +   External Rotation Strength -, 5/5   Lift Off/Belly Press -, 5/5   Neurovascular Intact     PROCEDURE: After sterile prep, 6 cc of Xylocaine and 1 cc of Kenalog were injected into the right shoulder. [facility]  OFFICE PROCEDURE PROGRESS NOTE        Chart reviewed for the following:  Fremont Schwab, MD, have reviewed the History, Physical and updated the Allergic reactions for [address] performed immediately prior to start of procedure:  Fremont Schwab, MD, have performed the following reviews on Dorinda Jolly prior to the start of the procedure:            * Patient was identified by name and date of birth   * Agreement on procedure being performed was verified  * Risks and Benefits explained to the patient  * Procedure site verified and marked as necessary  * Patient was positioned for comfort  * Consent was signed and verified     Time: 5:02 PM    Date of procedure: 8/1/2017    Procedure performed by:  Janusz Almonte MD    Provider assisted by: (see medication administration)    How tolerated by patient: tolerated the procedure well with no complications    Comments: none      IMAGING: Previous MRI of the right shoulder dated 3/2/17 was reviewed and read:   IMPRESSION:  1. Chronic full-thickness rupture of supraspinatus and infraspinatus tendons,  with high riding humeral head, severe tendon retraction and muscular atrophy. Subacromial fluid communicating with the [facility] joint effusion. Type II acromion. AC  joint hypertrophy present. 2. Suspect degenerative labral tear in the superior labrum. Biceps tendinosis.   3. Joint effusion containing debris. IMPRESSION:      ICD-10-CM ICD-9-CM    1. Rotator cuff tear arthropathy, right M12.811 716.81 TRIAMCINOLONE ACETONIDE INJ      triamcinolone acetonide (KENALOG) 40 mg/mL injection      DRAIN/INJECT LARGE JOINT/BURSA   2. Cervical disc disease M50.90 722.91         PLAN:   1. Patient's right shoulder pain worsening. I discussed with her that if the pain continues, she may want to consider a shoulder replacement in the future. I instructed her to follow up with the spine center regarding her neck pain. Risk factors include: none  2. Yes cortisone injection indicated today: RIGHT SHOULDER  3. No Physical/Occupational Therapy indicated today  4. No diagnostic test indicated today  5. No durable medical equipment indicated today  6. No referral indicated today  7. No medications indicated today  8. No Narcotic indicated today. RTC - 3 weeks if pain continues    Follow-up Disposition: Not on File    Scribed by Joaquin Zamora [facility] [address]) as dictated by Cyndie Adler MD    I, Dr. Cyndie Adler, confirm that all documentation is accurate.     Cyndie Adler M.D.   [facility]

## 2017-07-31 NOTE — PATIENT INSTRUCTIONS
Rotator Cuff Injury: Care Instructions  Your Care Instructions  The rotator cuff is a group of tendons and muscles around the shoulder that keeps the upper arm bone in place. It keeps the shoulder joint stable and allows you to raise and rotate your arm. Damage to the rotator cuff can be caused by overuse, a fall, or a direct blow to the shoulder area, which can tear the tendons. Over time, everyday wear can damage the tendons and make injury more likely. Treatment can depend upon the amount of damage to the tendons. In a younger person, surgery may be the first choice. But if you are older than 25, you likely have some wear on your rotator cuff. Surgery may not be the most effective treatment. Your doctor may have you try physical therapy and exercise first.  Follow-up care is a key part of your treatment and safety. Be sure to make and go to all appointments, and call your doctor if you are having problems. It's also a good idea to know your test results and keep a list of the medicines you take. How can you care for yourself at home? · Rest your shoulder as much as you can. If your doctor put your arm in a sling or shoulder immobilizer, wear it as directed. Do not take it off before your doctor tells you to. If it is too tight, loosen it. · Be safe with medicines. Read and follow all instructions on the label. ¨ If the doctor gave you a prescription medicine for pain, take it as prescribed. ¨ If you are not taking a prescription pain medicine, ask your doctor if you can take an over-the-counter medicine. · Put ice or a cold pack on your shoulder for 10 to 20 minutes at a time. Try to do this every 1 to 2 hours for the next 3 days (when you are awake). Put a thin cloth between the ice pack and your skin. · After 3 days, put a warm, wet towel on your shoulder. This is to relax the muscles and help blood flow.   · While holding a warm, wet towel on your shoulder, lean forward so your arm hangs freely, and gently swing your arm back and forth like a pendulum. You also can do this standing under a warm shower. · Do not do anything that makes your pain worse. · Follow your doctor's advice about whether you need physical therapy. When should you call for help? Call your doctor now or seek immediate medical care if:  · You have severe pain. · You cannot move your shoulder or arm. · You have tingling or numbness in your arm or hand. · Your arm or hand is cool or pale. Watch closely for changes in your health, and be sure to contact your doctor if:  · Your pain gets worse. · You have new or worse swelling in your arm or hand. · You do not get better as expected. Where can you learn more? Go to http://horace-macro.info/. Enter [phone] in the search box to learn more about \"Rotator Cuff Injury: Care Instructions. \"  Current as of: March 21, 2017  Content Version: 11.3  © 7615-6520 [employer]. Care instructions adapted under license by [employer] (which disclaims liability or warranty for this information). If you have questions about a medical condition or this instruction, always ask your healthcare professional. [place] [address] any warranty or liability for your use of this information.

## 2017-08-16 ENCOUNTER — OFFICE VISIT (OUTPATIENT)
Dept: ORTHOPEDIC SURGERY | Age: 72
End: 2017-08-16

## 2017-08-16 VITALS
DIASTOLIC BLOOD PRESSURE: 66 MMHG | RESPIRATION RATE: 16 BRPM | BODY MASS INDEX: 26.3 KG/M2 | HEART RATE: 66 BPM | WEIGHT: 143.8 LBS | SYSTOLIC BLOOD PRESSURE: 137 MMHG | TEMPERATURE: 97.8 F

## 2017-08-16 DIAGNOSIS — M43.16 SPONDYLOLISTHESIS OF LUMBAR REGION: ICD-10-CM

## 2017-08-16 DIAGNOSIS — M96.1 LUMBAR POST-LAMINECTOMY SYNDROME: Primary | ICD-10-CM

## 2017-08-16 RX ORDER — MELOXICAM 15 MG/1
15 TABLET ORAL DAILY
Qty: 90 TAB | Refills: 1 | Status: SHIPPED | OUTPATIENT
Start: 2017-08-16 | End: 2017-09-08

## 2017-08-16 NOTE — PATIENT INSTRUCTIONS
Yogi[facility] Activation    Thank you for requesting access to [employer]. Please follow the instructions below to securely access and download your online medical record. [employer] allows you to send messages to your doctor, view your test results, renew your prescriptions, schedule appointments, and more. How Do I Sign Up? 1. In your internet browser, go to [email or web address] website  2. Click on the First Time User? Click Here link in the Sign In box. You will be redirect to the New Member Sign Up page. 3. Enter your YogiPlay Access Code exactly as it appears below. You will not need to use this code after youve completed the sign-up process. If you do not sign up before the expiration date, you must request a new code. YogiPlay Access Code: [email or web address]  Expires: 9/3/2017  2:30 PM (This is the date your YogiPlay access code will )    4. Enter the last four digits of your Social Security Number (xxxx) and Date of Birth (mm/dd/yyyy) as indicated and click Submit. You will be taken to the next sign-up page. 5. Create a YogiPlay ID. This will be your YogiPlay login ID and cannot be changed, so think of one that is secure and easy to remember. 6. Create a YogiPlay password. You can change your password at any time. 7. Enter your Password Reset Question and Answer. This can be used at a later time if you forget your password. 8. Enter your e-mail address. You will receive e-mail notification when new information is available in [address]. 9. Click Sign Up. You can now view and download portions of your medical record. 10. Click the Download Summary menu link to download a portable copy of your medical information. Additional Information    If you have questions, please visit the Frequently Asked Questions section of the YogiPlay website at [email or web address] Remember, YogiPlay is NOT to be used for urgent needs. For medical emergencies, dial 911.          Learning About How to Have a Healthy Back  What causes back pain? Back pain is often caused by overuse, strain, or injury. For example, people often hurt their backs playing sports or working in the yard, being jolted in a car accident, or lifting something too heavy. Aging plays a part too. Your bones and muscles tend to lose strength as you age, which makes injury more likely. The spongy discs between the bones of the spine (vertebrae) may suffer from wear and tear and no longer provide enough cushion between the bones. A disc that bulges or breaks open (herniated disc) can press on nerves, causing back pain. In some people, back pain is the result of arthritis, broken vertebrae caused by bone loss (osteoporosis), illness, or a spine problem. Although most people have back pain at one time or another, there are steps you can take to make it less likely. How can you have a healthy back? Reduce stress on your back through good posture  Slumping or slouching alone may not cause low back pain. But after the back has been strained or injured, bad posture can make pain worse. · Sleep in a position that maintains your back's normal curves and on a mattress that feels comfortable. Sleep on your side with a pillow between your knees, or sleep on your back with a pillow under your knees. These positions can reduce strain on your back. · Stand and sit up straight. \"Good posture\" generally means your ears, shoulders, and hips are in a straight line. · If you must stand for a long time, put one foot on a stool, ledge, or box. Switch feet every now and then. · Sit in a chair that is low enough to let you place both feet flat on the floor with both knees nearly level with your hips. If your chair or desk is too high, use a footrest to raise your knees. Place a small pillow, a rolled-up towel, or a lumbar roll in the curve of your back if you need extra support. · Try a kneeling chair, which helps tilt your hips forward.  This takes pressure off your lower back. · Try sitting on an exercise ball. It can rock from side to side, which helps keep your back loose. · When driving, keep your knees nearly level with your hips. Sit straight, and drive with both hands on the steering wheel. Your arms should be in a slightly bent position. Reduce stress on your back through careful lifting  · Squat down, bending at the hips and knees only. If you need to, put one knee to the floor and extend your other knee in front of you, bent at a right angle (half kneeling). · Press your chest straight forward. This helps keep your upper back straight while keeping a slight arch in your low back. · Hold the load as close to your body as possible, at the level of your belly button (navel). · Use your feet to change direction, taking small steps. · Lead with your hips as you change direction. Keep your shoulders in line with your hips as you move. · Set down your load carefully, squatting with your knees and hips only. Exercise and stretch your back  · Do some exercise on most days of the week, if your doctor says it is okay. You can walk, run, swim, or cycle. · Stretch your back muscles. Here are a few exercises to try:  P[place] on your back, and gently pull one bent knee to your chest. Put that foot back on the floor, and then pull the other knee to your chest.  ¨ Do pelvic tilts. Lie on your back with your knees bent. Tighten your stomach muscles. Pull your belly button (navel) in and up toward your ribs. You should feel like your back is pressing to the floor and your hips and pelvis are slightly lifting off the floor. Hold for 6 seconds while breathing smoothly. ¨ Sit with your back flat against a wall. · Keep your core muscles strong. The muscles of your back, belly (abdomen), and buttocks support your spine. ¨ Pull in your belly and imagine pulling your navel toward your spine. Hold this for 6 seconds, then relax.  Remember to keep breathing normally as you tense your muscles. ¨ Do curl-ups. Always do them with your knees bent. Keep your low back on the floor, and curl your shoulders toward your knees using a smooth, slow motion. Keep your arms folded across your chest. If this bothers your neck, try putting your hands behind your neck (not your head), with your elbows spread apart. ¨ Lie on your back with your knees bent and your feet flat on the floor. Tighten your belly muscles, and then push with your feet and raise your buttocks up a few inches. Hold this position 6 seconds as you continue to breathe normally, then lower yourself slowly to the floor. Repeat 8 to 12 times. ¨ If you like group exercise, try Pilates or yoga. These classes have poses that strengthen the core muscles. Lead a healthy lifestyle  · Stay at a healthy weight to avoid strain on your back. · Do not smoke. Smoking increases the risk of osteoporosis, which weakens the spine. If you need help quitting, talk to your doctor about stop-smoking programs and medicines. These can increase your chances of quitting for good. Where can you learn more? Go to http://horace-marco.info/. Enter L315 in the search box to learn more about \"Learning About How to Have a Healthy Back. \"  Current as of: March 21, 2017  Content Version: 11.3  © 1144-8843 [employer]. Care instructions adapted under license by [employer] (which disclaims liability or warranty for this information). If you have questions about a medical condition or this instruction, always ask your healthcare professional. [place] [address] any warranty or liability for your use of this information.

## 2017-08-16 NOTE — PROGRESS NOTES
Chief complaint/History of Present Illness:  Chief Complaint   Patient presents with    Back Pain     HPI  Jd Ragland is a  67 y.o.  female      HISTORY OF PRESENT ILLNESS:  The patient comes in today for followup of her chronic low back pain. She has had a previous fusion in the past at L3-4 by Dr. Neda Charles years ago. She has a known spondylolisthesis at L4-5 that gives her chronic back pain. She states she is only getting pain into her right lower extremity about down to her knee. It encompasses the whole leg, but overall, she is rating her pain level as a 2.3/10. She was on Neurontin 300 mg two capsules at nighttime. She was changed to Cymbalta 30 mg last visit, but she states she is weaning herself off of that because since she has been on it, she has felt fatigued and constipated, so she is taking herself off. She would like to go back on the Neurontin that she was on previously. It seems to do okay with her. She is also on Mobic 15 mg daily, which does help with her back pain. She takes occasional Tramadol, she states, one to three times a week if her back pain increases. She denies fever or bowel or bladder dysfunction. 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 She is a nonsmoker. PHYSICAL EXAM:  Ms. Gerhardt Delaware is a 70-year-old female. She is alert and oriented. She has a full weight bearing, slow but non-antalgic gait. She has a little bit of pain with hyperextension of the lumbar spine, 4/5 strength of the bilateral lower extremities, and negative straight leg raise. ASSESSENT/PLAN:  This is a patient with post lumbar laminectomy syndrome, spondylolisthesis with chronic back and leg pain. She is going to taper off her Cymbalta. She wishes to go back on the Neurontin. She has plenty at home, so she will taper herself up on that. I did refill her Mobic. She knows to take this with food. It does not give her any side effects or GI symptoms.   She does not need any Tramadol. We will see her back in six months with Dr. Vidal May for M.D. followup. Review of systems:    Past Medical History:   Diagnosis Date    [facility] ([facility] [address]     Cancer ([facility] [address]     Foraminal stenosis of lumbar region     Hematuria     Hypercholesterolemia     Low back pain      Past Surgical History:   Procedure Laterality Date    HX BUNIONECTOMY      HX COLONOSCOPY      HX LUMBAR LAMINECTOMY  6/2/15    with fusion    HX ORTHOPAEDIC  Bunionionectomy R    HX TUBAL LIGATION       Social History     Social History    Marital status:      Spouse name: N/A    Number of children: N/A    Years of education: N/A     Occupational History    nurse      Social History Main Topics    Smoking status: Former Smoker     Quit date: 1/1/1980    Smokeless tobacco: Never Used    Alcohol use 0.0 oz/week     0 Standard drinks or equivalent per week      Comment: 1 glass 2x per week    Drug use: No    Sexual activity: Not on file     Other Topics Concern    Not on file     Social History Narrative     Family History   Problem Relation Age of Onset    No Known Problems Mother     No Known Problems Father        Physical Exam:  Visit Vitals    /66 (BP 1 Location: Left arm, BP Patient Position: Sitting)    Pulse 66    Temp 97.8 °F (36.6 °C) (Oral)    Resp 16    Wt 143 lb 12.8 oz (65.2 kg)    BMI 26.3 kg/m2     Pain Scale: 4/10     has been . reviewed and is appropriate        Diagnoses and all orders for this visit:    1. Lumbar post-laminectomy syndrome    2. Spondylolisthesis of lumbar region    Other orders  -     meloxicam (MOBIC) 15 mg tablet; Take 1 Tab by mouth daily. Indications: OSTEOARTHRITIS            Follow-up Disposition:  Return in about 6 months (around 2/16/2018) for with Dr. Vidal May.         We have informed Phong Eduardo to notify us for immediate appointment if she has any worsening neurogical symptoms or if an emergency situation presents, then call 911

## 2017-08-16 NOTE — MR AVS SNAPSHOT
Visit Information Date & Time Provider Department Dept. Phone Encounter #  
 8/16/2017  8:30 AM Ivana Martinez NP [facility] [phone] [id number] Follow-up Instructions Return in about 6 months (around 2/16/2018) for with Dr. Obadiah Severe. Follow-up and Disposition History Your Appointments 8/21/2017  4:40 PM  
Follow Up with Arvis Spatz, MD  
[address] [facility], [address] and [facility] - \[facility] [place] [facility]\"" ([facility]) Appt Note: rt shoulder 3wk fu  
 [address] [facility]  
[phone] [address] [facility], [facility] [address]  
  
    
 9/8/2017  2:30 PM  
PROCEDURE with Naomi Frederick MD  
[facility] ([facility]) Appt Note: Cysto 4 wks after last BCG; Mailed new appt; Pt aware of new appt Darlene Garcia [address] [place] [address]  
[address] [facility] [address] [place] [address] Upcoming Health Maintenance Date Due Hepatitis C Screening 1945 DTaP/Tdap/Td series (1 - Tdap) 5/16/1966 BREAST CANCER SCRN MAMMOGRAM 5/16/1995 FOBT Q 1 YEAR AGE 50-75 5/16/1995 ZOSTER VACCINE AGE 60> 3/16/2005 GLAUCOMA SCREENING Q2Y 5/16/2010 OSTEOPOROSIS SCREENING (DEXA) 5/16/2010 MEDICARE YEARLY EXAM 5/16/2010 INFLUENZA AGE 9 TO ADULT 8/1/2017 Allergies as of 8/16/2017  Review Complete On: 8/16/2017 By: Conrad Lundy Severity Noted Reaction Type Reactions Simvastatin Medium 12/30/2013    Unknown (comments) Current Immunizations  Never Reviewed No immunizations on file. Not reviewed this visit You Were Diagnosed With   
  
 Codes Comments Lumbar post-laminectomy syndrome    -  Primary ICD-10-CM: M96.1 ICD-9-CM: 722.83 Spondylolisthesis of lumbar region     ICD-10-CM: M43.16 
ICD-9-CM: 738.4 Vitals BP Pulse Temp Resp Weight(growth percentile) BMI 137/66 (BP 1 Location: Left arm, BP Patient Position: Sitting) 66 97.8 °F (36.6 °C) (Oral) 16 143 lb 12.8 oz (65.2 kg) 26.3 kg/m2 OB Status Smoking Status Postmenopausal Former Smoker BMI and BSA Data Body Mass Index Body Surface Area  
 26.3 kg/m 2 1.69 m 2 Preferred Pharmacy Pharmacy Name Phone [facility] [address] [place] [address] AT [place] [phone] Your Updated Medication List  
  
   
This list is accurate as of: 8/16/17  9:10 AM.  Always use your most recent med list.  
  
  
  
  
 ALEVE 220 mg Cap Generic drug:  naproxen sodium Take  by mouth as needed. aspirin delayed-release 81 mg tablet Take  by mouth daily. DULoxetine 30 mg capsule Commonly known as:  CYMBALTA  
tAKE 1 CAPSULE qhs  Indications: NEUROPATHIC PAIN  
  
 * meclizine 12.5 mg tablet Commonly known as:  ANTIVERT Take  by mouth three (3) times daily as needed. * meclizine 25 mg tablet Commonly known as:  ANTIVERT TK 1 T PO TID PRN  
  
 meloxicam 15 mg tablet Commonly known as:  MOBIC Take 1 Tab by mouth daily. Indications: OSTEOARTHRITIS  
  
 metaxalone 400 mg tablet Commonly known as:  SKELAXIN  
  
 multivitamin tablet Commonly known as:  ONE A DAY Take 1 Tab by mouth daily. ondansetron 4 mg disintegrating tablet Commonly known as:  ZOFRAN ODT  
DISSOLVE 1 T ON TONGUE Q 8 H PRN NV  
  
 pravastatin 20 mg tablet Commonly known as:  PRAVACHOL Take 20 mg by mouth nightly. THERALOGIX  MULTI PO Take  by mouth daily. traMADol 50 mg tablet Commonly known as:  ULTRAM  
TAKE 1 TABLET BY MOUTH EVERY 6 HOURS AS NEEDED FOR PAIN. MAX DAILY AMOUNT: 200MG  
  
 traZODone 50 mg tablet Commonly known as:  DESYREL  
TK 1 T PO QHS * Notice:   This list has 2 medication(s) that are the same as other medications prescribed for you. Read the directions carefully, and ask your doctor or other care provider to review them with you. Prescriptions Sent to Pharmacy Refills  
 meloxicam (MOBIC) 15 mg tablet 1 Sig: Take 1 Tab by mouth daily. Indications: OSTEOARTHRITIS Class: Normal  
 Pharmacy: [facility] [address] [place] [address] [address] Ph #: [phone] Route: Oral  
  
Follow-up Instructions Return in about 6 months (around 2018) for with Dr. Cindy Nix. Patient Instructions MyChart Activation Thank you for requesting access to [employer]. Please follow the instructions below to securely access and download your online medical record. [employer] allows you to send messages to your doctor, view your test results, renew your prescriptions, schedule appointments, and more. How Do I Sign Up? 1. In your internet browser, go to [email or web address] 
2. Click on the First Time User? Click Here link in the Sign In box. You will be redirect to the New Member Sign Up page. 3. Enter your [employer] Access Code exactly as it appears below. You will not need to use this code after youve completed the sign-up process. If you do not sign up before the expiration date, you must request a new code. [employer] Access Code: [email or web address] Expires: 9/3/2017  2:30 PM (This is the date your [employer] access code will ) 4. Enter the last four digits of your Social Security Number (xxxx) and Date of Birth (mm/dd/yyyy) as indicated and click Submit. You will be taken to the next sign-up page. 5. Create a [employer] ID. This will be your [employer] login ID and cannot be changed, so think of one that is secure and easy to remember. 6. Create a [employer] password. You can change your password at any time. 7. Enter your Password Reset Question and Answer. This can be used at a later time if you forget your password. 8. Enter your e-mail address. You will receive e-mail notification when new information is available in [address] 9. Click Sign Up. You can now view and download portions of your medical record. 10. Click the Download Summary menu link to download a portable copy of your medical information. Additional Information If you have questions, please visit the Frequently Asked Questions section of the [employer] website at [email or web address] Remember, [employer] is NOT to be used for urgent needs. For medical emergencies, dial 911. Learning About How to Have a Healthy Back What causes back pain? Back pain is often caused by overuse, strain, or injury. For example, people often hurt their backs playing sports or working in the yard, being jolted in a car accident, or lifting something too heavy. Aging plays a part too. Your bones and muscles tend to lose strength as you age, which makes injury more likely. The spongy discs between the bones of the spine (vertebrae) may suffer from wear and tear and no longer provide enough cushion between the bones. A disc that bulges or breaks open (herniated disc) can press on nerves, causing back pain. In some people, back pain is the result of arthritis, broken vertebrae caused by bone loss (osteoporosis), illness, or a spine problem. Although most people have back pain at one time or another, there are steps you can take to make it less likely. How can you have a healthy back? Reduce stress on your back through good posture Slumping or slouching alone may not cause low back pain. But after the back has been strained or injured, bad posture can make pain worse. · Sleep in a position that maintains your back's normal curves and on a mattress that feels comfortable. Sleep on your side with a pillow between your knees, or sleep on your back with a pillow under your knees. These positions can reduce strain on your back. · Stand and sit up straight. \"Good posture\" generally means your ears, shoulders, and hips are in a straight line. · If you must stand for a long time, put one foot on a stool, ledge, or box. Switch feet every now and then. · Sit in a chair that is low enough to let you place both feet flat on the floor with both knees nearly level with your hips. If your chair or desk is too high, use a footrest to raise your knees. Place a small pillow, a rolled-up towel, or a lumbar roll in the curve of your back if you need extra support. · Try a kneeling chair, which helps tilt your hips forward. This takes pressure off your lower back. · Try sitting on an exercise ball. It can rock from side to side, which helps keep your back loose. · When driving, keep your knees nearly level with your hips. Sit straight, and drive with both hands on the steering wheel. Your arms should be in a slightly bent position. Reduce stress on your back through careful lifting · Squat down, bending at the hips and knees only. If you need to, put one knee to the floor and extend your other knee in front of you, bent at a right angle (half kneeling). · Press your chest straight forward. This helps keep your upper back straight while keeping a slight arch in your low back. · Hold the load as close to your body as possible, at the level of your belly button (navel). · Use your feet to change direction, taking small steps. · Lead with your hips as you change direction. Keep your shoulders in line with your hips as you move. · Set down your load carefully, squatting with your knees and hips only. Exercise and stretch your back · Do some exercise on most days of the week, if your doctor says it is okay. You can walk, run, swim, or cycle. · Stretch your back muscles. Here are a few exercises to try: ¨ Lie on your back, and gently pull one bent knee to your chest. Put that foot back on the floor, and then pull the other knee to your chest. 
 ¨ Do pelvic tilts. Lie on your back with your knees bent. Tighten your stomach muscles. Pull your belly button (navel) in and up toward your ribs. You should feel like your back is pressing to the floor and your hips and pelvis are slightly lifting off the floor. Hold for 6 seconds while breathing smoothly. ¨ Sit with your back flat against a wall. · Keep your core muscles strong. The muscles of your back, belly (abdomen), and buttocks support your spine. ¨ Pull in your belly and imagine pulling your navel toward your spine. Hold this for 6 seconds, then relax. Remember to keep breathing normally as you tense your muscles. ¨ Do curl-ups. Always do them with your knees bent. Keep your low back on the floor, and curl your shoulders toward your knees using a smooth, slow motion. Keep your arms folded across your chest. If this bothers your neck, try putting your hands behind your neck (not your head), with your elbows spread apart. ¨ Lie on your back with your knees bent and your feet flat on the floor. Tighten your belly muscles, and then push with your feet and raise your buttocks up a few inches. Hold this position 6 seconds as you continue to breathe normally, then lower yourself slowly to the floor. Repeat 8 to 12 times. ¨ If you like group exercise, try Pilates or yoga. These classes have poses that strengthen the core muscles. Lead a healthy lifestyle · Stay at a healthy weight to avoid strain on your back. · Do not smoke. Smoking increases the risk of osteoporosis, which weakens the spine. If you need help quitting, talk to your doctor about stop-smoking programs and medicines. These can increase your chances of quitting for good. Where can you learn more? Go to http://horace-marco.info/. Enter L315 in the search box to learn more about \"Learning About How to Have a Healthy Back. \" Current as of: March 21, 2017 Content Version: 11.3 © 8886-5669 Healthwise, Incorporated. Care instructions adapted under license by [employer] (which disclaims liability or warranty for this information). If you have questions about a medical condition or this instruction, always ask your healthcare professional. [address]  any warranty or liability for your use of this information. Patient Instructions History Introducing [facility]! [facility] introduces [employer] patient portal. Now you can access parts of your medical record, email your doctor's office, and request medication refills online. 1. In your internet browser, go to [email or web address] 2. Click on the First Time User? Click Here link in the Sign In box. You will see the New Member Sign Up page. 3. Enter your [employer] Access Code exactly as it appears below. You will not need to use this code after youve completed the sign-up process. If you do not sign up before the expiration date, you must request a new code. · [employer] Access Code: [email or web address] Expires: 9/3/2017  2:30 PM 
 
4. Enter the last four digits of your Social Security Number (xxxx) and Date of Birth (mm/dd/yyyy) as indicated and click Submit. You will be taken to the next sign-up page. 5. Create a [employer] ID. This will be your [employer] login ID and cannot be changed, so think of one that is secure and easy to remember. 6. Create a [employer] password. You can change your password at any time. 7. Enter your Password Reset Question and Answer. This can be used at a later time if you forget your password. 8. Enter your e-mail address. You will receive e-mail notification when new information is available in [address] 9. Click Sign Up. You can now view and download portions of your medical record. 10. Click the Download Summary menu link to download a portable copy of your medical information. If you have questions, please visit the Frequently Asked Questions section of the [employer] website. Remember, [employer] is NOT to be used for urgent needs. For medical emergencies, dial 911. Now available from your iPhone and Android! Please provide this summary of care documentation to your next provider. Your primary care clinician is listed as Kelli Pedraza. If you have any questions after today's visit, please call [phone].

## 2017-08-21 ENCOUNTER — OFFICE VISIT (OUTPATIENT)
Dept: ORTHOPEDIC SURGERY | Age: 72
End: 2017-08-21

## 2017-08-21 VITALS
TEMPERATURE: 98.1 F | OXYGEN SATURATION: 99 % | DIASTOLIC BLOOD PRESSURE: 67 MMHG | HEIGHT: 62 IN | BODY MASS INDEX: 25.95 KG/M2 | SYSTOLIC BLOOD PRESSURE: 144 MMHG | HEART RATE: 68 BPM | WEIGHT: 141 LBS

## 2017-08-21 DIAGNOSIS — M12.811 ROTATOR CUFF TEAR ARTHROPATHY, RIGHT: Primary | ICD-10-CM

## 2017-08-21 DIAGNOSIS — M75.101 ROTATOR CUFF TEAR ARTHROPATHY, RIGHT: Primary | ICD-10-CM

## 2017-08-21 NOTE — PROGRESS NOTES
Rajeev Box  [birth year]   Chief Complaint   Patient presents with    Shoulder Pain     right        HISTORY OF PRESENT ILLNESS  Rajeev Box is a 67 y.o. female who presents today for reevaluation of right shoulder pain. At last office visit, patient received a cortisone injection in the right shoulder. She rates her pain 4/10 today. Patient did not receive relief form previous cortisone injection. She is ready to discuss surgical intervention today. Patient has completed an MRI of the right shoulder. She has trouble doing certain daily activities like curling her hair. She states the pain began about 8 months ago and has been gradually increasing. She denies any trauma or injury. She states she only has trouble sleeping at night sometimes. Patient denies any fever, chills, chest pain, shortness of breath or calf pain. There are no new illness or injuries to report since last seen in the office. There are no changes to medications, allergies, family or social history. PHYSICAL EXAM:   Visit Vitals    /67    Pulse 68    Temp 98.1 °F (36.7 °C) (Oral)    Ht 5' 2\" (1.575 m)    Wt 141 lb (64 kg)    SpO2 99%    BMI 25.79 kg/m2     The patient is a well-developed, well-nourished female   in no acute distress. The patient is alert and oriented times three. The patient is alert and oriented times three. Mood and affect are normal.  LYMPHATIC: lymph nodes are not enlarged and are within normal limits  SKIN: normal in color and non tender to palpation. There are no bruises or abrasions noted. NEUROLOGICAL: Motor sensory exam is within normal limits. Reflexes are equal bilaterally.  There is normal sensation to pinprick and light touch  MUSCULOSKELETAL:  Examination Right shoulder   Skin Intact   AC joint tenderness -   Biceps tenderness -   Forward flexion/Elevation    Active abduction    Glenohumeral abduction 70   External rotation ROM 30   Internal rotation ROM 0 Apprehension -   Sarithas Relocation -   Jerk -   Load and Shift -   Obriens -   Speeds -   Impingement sign +   Supraspinatus/Empty Can +   External Rotation Strength -, 5/5   Lift Off/Belly Press -, 5/5   Neurovascular Intact       IMAGING: Previous MRI of the right shoulder dated 3/2/17 was reviewed and read:   IMPRESSION:  1. Chronic full-thickness rupture of supraspinatus and infraspinatus tendons,  with high riding humeral head, severe tendon retraction and muscular atrophy. Subacromial fluid communicating with the [facility] joint effusion. Type II acromion. AC  joint hypertrophy present. 2. Suspect degenerative labral tear in the superior labrum. Biceps tendinosis. 3. Joint effusion containing debris. IMPRESSION:      ICD-10-CM ICD-9-CM    1. Rotator cuff tear arthropathy, right M12.811 716.81         PLAN:   1. Patient did not respond well to the cortisone injection in the right shoulder last OV. I discussed the risks and benefits and potential adverse outcomes of both operative vs non operative treatment of right shoulder rotator cuff arthropathy with the patient. Patient wishes to proceed with right reverse total shoulder replacment. Risks of operative intervention include but not limited to bleeding, infection, deep vein thrombosis, pulmonary embolism, death, limb length discrepancy, reflexive sympathetic dystrophy, fat embolism syndrome,damage to blood vessels and nerves, malunion, non-union, delayed union, failure of hardware, post traumatic arthritis, stroke, heart attack, and death. Patient understands that infection may arise and may require numerous surgeries. History and physical exam scheduled for a later date. Risk factors include: none  2. No cortisone injection indicated today   3. No Physical/Occupational Therapy indicated today  4. No diagnostic test indicated today  5. No durable medical equipment indicated today  6. No referral indicated today   7.  No medications indicated today  8. No Narcotic indicated today. RTC H&P    Follow-up Disposition: Not on File    Scribed by Demetria Hernandez [address]) as dictated by Aleksandr Rich MD    I, Dr. Aleksandr Rich, confirm that all documentation is accurate.     Aleksandr Rich M.D.   [facility]

## 2017-09-12 ENCOUNTER — TELEPHONE (OUTPATIENT)
Dept: ORTHOPEDIC SURGERY | Age: 72
End: 2017-09-12

## 2017-09-12 NOTE — TELEPHONE ENCOUNTER
Patient can  her apt up earlier with Dr. Warner Garcia or NP. She would need updated imaging prior to seeing Dr. Lacey January.

## 2017-09-12 NOTE — TELEPHONE ENCOUNTER
Patient returned call, informed patient of below, Patient verbalized agreement/understanding. Patient transferred to [facility] in [facility] to schedule a follow up. No further action required at this time.

## 2017-09-12 NOTE — TELEPHONE ENCOUNTER
Patient would like to be referred back to Dr. Connor Vilchis as the block has not helped with any pain relief. Also pain meds are not helping either. Would like to discuss surgery.  Please call patient back before 4 pm daily at [phone] or after 5 pm [phone]

## 2017-09-14 ENCOUNTER — HOSPITAL ENCOUNTER (OUTPATIENT)
Age: 72
Discharge: HOME | End: 2017-09-14
Attending: NURSE PRACTITIONER
Payer: MEDICARE

## 2017-09-14 ENCOUNTER — OFFICE VISIT (OUTPATIENT)
Dept: ORTHOPEDIC SURGERY | Age: 72
End: 2017-09-14

## 2017-09-14 VITALS
OXYGEN SATURATION: 99 % | SYSTOLIC BLOOD PRESSURE: 144 MMHG | HEIGHT: 62 IN | TEMPERATURE: 97.9 F | RESPIRATION RATE: 18 BRPM | BODY MASS INDEX: 25.95 KG/M2 | WEIGHT: 141 LBS | DIASTOLIC BLOOD PRESSURE: 60 MMHG | HEART RATE: 84 BPM

## 2017-09-14 DIAGNOSIS — Z79.891 ENCOUNTER FOR LONG-TERM OPIATE ANALGESIC USE: Primary | ICD-10-CM

## 2017-09-14 DIAGNOSIS — M96.1 POSTLAMINECTOMY SYNDROME, LUMBAR: ICD-10-CM

## 2017-09-14 DIAGNOSIS — M43.16 SPONDYLOLISTHESIS OF LUMBAR REGION: ICD-10-CM

## 2017-09-14 DIAGNOSIS — Z79.891 ENCOUNTER FOR LONG-TERM OPIATE ANALGESIC USE: ICD-10-CM

## 2017-09-14 DIAGNOSIS — M54.16 LUMBAR RADICULOPATHY, CHRONIC: ICD-10-CM

## 2017-09-14 LAB — CREAT UR-MCNC: 0.6 MG/DL (ref 0.6–1.3)

## 2017-09-14 PROCEDURE — A9585 GADOBUTROL INJECTION: HCPCS | Performed by: NURSE PRACTITIONER

## 2017-09-14 PROCEDURE — 82565 ASSAY OF CREATININE: CPT

## 2017-09-14 PROCEDURE — 74011250636 HC RX REV CODE- 250/636: Performed by: NURSE PRACTITIONER

## 2017-09-14 PROCEDURE — 72158 MRI LUMBAR SPINE W/O & W/DYE: CPT

## 2017-09-14 RX ADMIN — GADOBUTROL 6.5 ML: 604.72 INJECTION INTRAVENOUS at 18:00

## 2017-09-14 NOTE — PROGRESS NOTES
[address]  [address], [address]  [place]  Phone: [phone]  Fax: [phone]  PROGRESS NOTE  Patient: Omer Officer                MRN: [id number]       SSN: [id number]  YOB: 1945        AGE: 67 y.o. SEX: female  Body mass index is 25.79 kg/(m^2). PCP: Aster Harris MD  09/14/17    Chief Complaint   Patient presents with    Back Pain     follow up       HISTORY OF PRESENT ILLNESS:  Omer Officer is a 67 y.o.  female with history of chronic low-back pain for several years and radiation of pain to RLE in the lateral L4/5 distribution from buttock to calf. This is chronic sciatica, however; it has increased and become unbearable for the last month. It has been unresponsive to medications. She has had PT and injections in the past.  Prior history of back problems: recurrent self limited episodes of low back pain in the past, previous osteoarthritis of lumbar spine, previous herniated disc and previous spinal surgery - Fusion at L3/4 by Dr. Christine Lo. She has a known Grade 1 anterior listhesis and diffuse disc bulge at L4-L5. Pain is aching, numbing, tingling and throbbing. Pain is worse at night and affects sleep and recreational activities. Pain is better with pain medication. She does not want to try anything else, she wants to see Dr. Winston David for surgical evaluation. States she has been using Gabapentin 600mg QHS, Tramadol 50mg 2-3Tabs daily PRN and Aleve OTC PRN with minimal to moderate, relief. She tried and failed Cymbalta. Reports that pain would be a 6/10 w/out medication and that it goes down to a 3/10 after Tramadol medication. This enables the pt to be functional, achieve ADLs and engage in social activities. Denies bladder/bowel dysfunction, saddle paresthesia, weakness, gait disturbance, or other neurological deficits.  Denies chills, fever,night sweats, unexplained weight loss/weight gain, chest pain, sob or anxiety. Reports no new medical issues or hospitalizations since the last visit. Pt at this time desires to  continue with current care/proceed with medication evaluation/proceed with evaluation of low-back and RLE pain. Opioid Assessment    Least pain over the last week has been 3/10. Worst pain over the last week has been 5/10. Opioid Risk Tool Reviewed: YES  Family Hx: Alcohol abuse YES                    Illegal Drugs NO                    Rx Drugs  NO    Personal Hx: Alcohol abuse NO                       Illegal Drugs  NO                       Rx Drugs NO    Age between 17-45 yrs: NO    Hx of preadolescent sexual abuse: NO    Psychological Hx. ADD, OCD, Bipolar, Schizophrenia: NO           Depression   NO    Total: 1, low-risk      Aberrant behaviors: None. Urine Drug Screen: due - order placed. Controlled substance agreement on file: YES.  reviewed:yes  Pill count is consistent with her prescription: n/a  Concomitant use of a benzodiazepine: no  MME 30-40  Narcan not warranted   Also,  abstinence syndrome was reviewed and discussed with her today N/A    Risks and benefits of ongoing opiate therapy have been reviewed with the patient. No pain behaviors. Denies thoughts of harming self or others. Pt has a good risk to benefit ratio which allows the pt to function in a home environment without side effects      ASSESSMENT   Diagnoses and all orders for this visit:    1. Encounter for long-term opiate analgesic use    2. Postlaminectomy syndrome, lumbar         IMPRESSION AND PLAN:  This is a chronic problem that is worsened. Per review of available records and patients , there are not sign of overuse, misuse, diversion, or concerning side effects.  Today we reviewed: the risk of overdose, addiction, and dependency proper storage and disposal of medications the goals of treatment (improve functionality, quality of life, and pain) alternative treatment options including non-narcotic modalities the risks and benefits of continuing with a narcotic based pain regimen  The following changes were made to the patients current treatment plan: may refill Tramadol pending UDS results, she does not need refill. We are going to get an updated MRI and refer her to Dr. Hanna Velasquez. 1) Pt was given information on back pain   2) May refill Tramadol pending UDS results, doesn't need refill today  3) PA  4) Update MRI  5) Ms. Shakir Curtis has a reminder for a \"due or due soon\" health maintenance. I have asked that she contact her primary care provider, Marcos Maldonado MD, for follow-up on this health maintenance. 6) We have informed patient to notify us for immediate appointment if he has any worsening neurogical symptoms or if an emergency situation presents, then call 911  7) Pt will follow-up in 3-4 weeks or next available. Subjective    Work Nurse    Smoking Status Non-smoker    Pain Scale: 5/10    Pain Assessment  9/14/2017   Location of Pain Back   Pain Location Comment -   Location Modifiers -   Severity of Pain 5   Quality of Pain Aching   Quality of Pain Comment -   Duration of Pain -   Frequency of Pain Constant   Date Pain First Started -   Aggravating Factors (No Data)   Aggravating Factors Comment lying down   Limiting Behavior -   Relieving Factors (No Data)   Relieving Factors Comment walking   Result of Injury -         REVIEW OF SYSTEMS  Constitutional: Negative for fever, chills, or weight change. Respiratory: Negative for cough or shortness of breath. Cardiovascular: Negative for chest pain or palpitations. Gastrointestinal: Negative for incontinence, acid reflux, change in bowel habits, or constipation. Genitourinary: Negative for incontinence, dysuria and flank pain. Musculoskeletal: Positive for low-back and RLE pain. See HPI. Skin: Negative for rash. Neurological:RLE L4/5  radiculopathy. See HPI. Endo/Heme/Allergies: Negative.    Psychiatric/Behavioral: Negative. PHYSICAL EXAMINATION  Visit Vitals    /60    Pulse 84    Temp 97.9 °F (36.6 °C) (Oral)    Resp 18    Ht 5' 2\" (1.575 m)    Wt 141 lb (64 kg)    SpO2 99%    BMI 25.79 kg/m2         Accompanied by Self. Constitutional:  Well developed, well nourished, in no acute distress. Psychiatric: Affect and mood are appropriate. Integumentary: No rashes or abrasions noted on exposed areas. Cardiovascular/Peripheral Vascular: +2 radial & pedal pulses. No peripheral edema is noted. Lymphatic:  No evidence of lymphedema. No cervical lymphadenopathy. SPINE/MUSCULOSKELETAL EXAM     Lumbar spine:  No rash, ecchymosis, or gross obliquity. No fasciculations. No focal atrophy is noted. Range of motion is decreased and pain with extension, turning right. Tenderness to palpation to right low-back and sciatic notch. SI joints non-tender. Trochanters non tender. Straight leg raise negative      Sensation grossly intact to light touch. MOTOR:     Hip Flex Quads Hamstrings Ankle DF EHL Ankle PF   Right 5/5 5/5 5/5 5/5 5/5 5/5   Left 5/5 5/5 5/5 5/5 5/5 5/5       Ambulation without assistive device. FWB.    normal gait and station        PAST MEDICAL HISTORY   Past Medical History:   Diagnosis Date    [facility]adder [facility] ([facility] [address]     Cancer ([facility] [address]     Foraminal stenosis of lumbar region     Hematuria     Hypercholesterolemia     Low back pain        Past Surgical History:   Procedure Laterality Date    HX BUNIONECTOMY      HX COLONOSCOPY      HX LUMBAR LAMINECTOMY  6/2/15    with fusion    HX ORTHOPAEDIC  Bunionionectomy R    HX TUBAL LIGATION     . MEDICATIONS    Current Outpatient Prescriptions   Medication Sig Dispense Refill    naproxen sodium (ALEVE) 220 mg cap Take  by mouth as needed.       meclizine (ANTIVERT) 25 mg tablet TK 1 T PO TID PRN  0    ondansetron (ZOFRAN ODT) 4 mg disintegrating tablet DISSOLVE 1 T ON TONGUE Q 8 H PRN NV  0    meclizine (ANTIVERT) 12.5 mg tablet Take  by mouth three (3) times daily as needed.  traZODone (DESYREL) 50 mg tablet TK 1 T PO QHS  0    aspirin delayed-release 81 mg tablet Take  by mouth daily.  multivitamin (ONE A DAY) tablet Take 1 Tab by mouth daily.  pravastatin (PRAVACHOL) 20 mg tablet Take 20 mg by mouth nightly. 5    traMADol (ULTRAM) 50 mg tablet TAKE 1 TABLET BY MOUTH EVERY 6 HOURS AS NEEDED FOR PAIN. MAX DAILY AMOUNT: 200MG 120 Tab 0    MULTIVIT-MINERALS/FOLIC ACID (THERALOGIX  MULTI PO) Take  by mouth daily.           ALLERGIES  Allergies   Allergen Reactions    Simvastatin Unknown (comments)          SOCIAL HISTORY    Social History     Social History    Marital status:      Spouse name: N/A    Number of children: N/A    Years of education: N/A     Occupational History    nurse      Social History Main Topics    Smoking status: Former Smoker     Quit date: 1/1/1980    Smokeless tobacco: Never Used    Alcohol use 0.0 oz/week     0 Standard drinks or equivalent per week      Comment: 1 glass 2x per week    Drug use: No    Sexual activity: Not on file     Other Topics Concern    Not on file     Social History Narrative       FAMILY HISTORY  Family History   Problem Relation Age of Onset    No Known Problems Mother     No Known Problems Father          Cricket Denver, NP

## 2017-09-14 NOTE — PATIENT INSTRUCTIONS
[employer] Activation    Thank you for requesting access to [employer] Please follow the instructions below to securely access and download your online medical record. [employer] allows you to send messages to your doctor, view your test results, renew your prescriptions, schedule appointments, and more. How Do I Sign Up? 1. In your internet browser, go to [email or web address]  2. Click on the First Time User? Click Here link in the Sign In box. You will be redirect to the New Member Sign Up page. 3. Enter your [employer] Access Code exactly as it appears below. You will not need to use this code after youve completed the sign-up process. If you do not sign up before the expiration date, you must request a new code. [employer] Access Code: Martín Chelle  Expires: 2017  2:27 PM (This is the date your [employer] access code will )    4. Enter the last four digits of your Social Security Number (xxxx) and Date of Birth (mm/dd/yyyy) as indicated and click Submit. You will be taken to the next sign-up page. 5. Create a [employer] ID. This will be your [employer] login ID and cannot be changed, so think of one that is secure and easy to remember. 6. Create a [employer] password. You can change your password at any time. 7. Enter your Password Reset Question and Answer. This can be used at a later time if you forget your password. 8. Enter your e-mail address. You will receive e-mail notification when new information is available in [address] 9. Click Sign Up. You can now view and download portions of your medical record. 10. Click the Download Summary menu link to download a portable copy of your medical information. Additional Information    If you have questions, please visit the Frequently Asked Questions section of the [employer] website at [email or web address] Remember, [employer] is NOT to be used for urgent needs. For medical emergencies, dial 911.          Back Pain: Care Instructions  Your Care Instructions    Back pain has many possible causes. It is often related to problems with muscles and ligaments of the back. It may also be related to problems with the nerves, discs, or bones of the back. Moving, lifting, standing, sitting, or sleeping in an awkward way can strain the back. Sometimes you don't notice the injury until later. Arthritis is another common cause of back pain. Although it may hurt a lot, back pain usually improves on its own within several weeks. Most people recover in 12 weeks or less. Using good home treatment and being careful not to stress your back can help you feel better sooner. Follow-up care is a key part of your treatment and safety. Be sure to make and go to all appointments, and call your doctor if you are having problems. Its also a good idea to know your test results and keep a list of the medicines you take. How can you care for yourself at home? · Sit or lie in positions that are most comfortable and reduce your pain. Try one of these positions when you lie down:  ¨ Lie on your back with your knees bent and supported by large pillows. ¨ Lie on the floor with your legs on the seat of a sofa or chair. James Ridges on your side with your knees and hips bent and a pillow between your legs. ¨ Lie on your stomach if it does not make pain worse. · Do not sit up in bed, and avoid soft couches and twisted positions. Bed rest can help relieve pain at first, but it delays healing. Avoid bed rest after the first day of back pain. · Change positions every 30 minutes. If you must sit for long periods of time, take breaks from sitting. Get up and walk around, or lie in a comfortable position. · Try using a heating pad on a low or medium setting for 15 to 20 minutes every 2 or 3 hours. Try a warm shower in place of one session with the heating pad. · You can also try an ice pack for 10 to 15 minutes every 2 to 3 hours.  Put a thin cloth between the ice pack and your skin. · Take pain medicines exactly as directed. ¨ If the doctor gave you a prescription medicine for pain, take it as prescribed. ¨ If you are not taking a prescription pain medicine, ask your doctor if you can take an over-the-counter medicine. · Take short walks several times a day. You can start with 5 to 10 minutes, 3 or 4 times a day, and work up to longer walks. Walk on level surfaces and avoid hills and stairs until your back is better. · Return to work and other activities as soon as you can. Continued rest without activity is usually not good for your back. · To prevent future back pain, do exercises to stretch and strengthen your back and stomach. Learn how to use good posture, safe lifting techniques, and proper body mechanics. When should you call for help? Call your doctor now or seek immediate medical care if:  · You have new or worsening numbness in your legs. · You have new or worsening weakness in your legs. (This could make it hard to stand up.)  · You lose control of your bladder or bowels. Watch closely for changes in your health, and be sure to contact your doctor if:  · Your pain gets worse. · You are not getting better after 2 weeks. Where can you learn more? Go to http://horace-marco.info/. Enter K166 in the search box to learn more about \"Back Pain: Care Instructions. \"  Current as of: March 21, 2017  Content Version: 11.3  © 0885-9809 [employer]. Care instructions adapted under license by [employer] (which disclaims liability or warranty for this information). If you have questions about a medical condition or this instruction, always ask your healthcare professional. [address] any warranty or liability for your use of this information.

## 2017-09-14 NOTE — MR AVS SNAPSHOT
Visit Information Date & Time Provider Department Dept. Phone Encounter #  
 9/14/2017  2:30 PM Armando Owens NP [address] [facility] [phone] [id number] Follow-up Instructions Return in about 4 weeks (around 10/12/2017). Your Appointments 9/19/2017  1:40 PM  
New Patient with Yeni Gonzáles MD  
4 [facility], [address] and [facility] - [facility] 1 ([address] [place] [address]) Appt Note: bunyon left foot nx medicare/bcbs fep [address], [address] 200 [facility]  
[phone] [address] [facility]  
  
    
 1/10/2018 11:30 AM  
INJECTION with [facility] ([address] [place] [address]) Appt Note: Bcg 1/3  
 [address] 
[address] [place] [address]  
[address] [place] [address] 1/17/2018 11:30 AM  
INJECTION with [facility] ([address] [place] [address]) Appt Note: bcg 2/3  
 [address] 
[address] [place] [address]  
[phone]  
  
    
 1/24/2018 11:30 AM  
INJECTION with [facility] ([address] [place] [address]) Appt Note: BCG 3/3  
 [address] 
[address] [place] [address]  
[phone]  
  
    
 2/14/2018  8:00 AM  
Follow Up with Dixie Carson MD  
[facility] ONE [address] [place] [address]) Appt Note: 6 MO F/U  
 [address] [place] [address]  
[phone]  
  
   
 [address]  
  
    
 2/23/2018  4:00 PM  
PROCEDURE with Jennifer Carranza MD  
[facility] ([address] [place] [address]) Appt Note: [address] [place] [address]  
[address] [place] [address] Upcoming Health Maintenance Date Due Hepatitis C Screening 1945 DTaP/Tdap/Td series (1 - Tdap) 5/16/1966 BREAST CANCER SCRN MAMMOGRAM 5/16/1995 FOBT Q 1 YEAR AGE 50-75 5/16/1995 ZOSTER VACCINE AGE 60> 3/16/2005 GLAUCOMA SCREENING Q2Y 5/16/2010 OSTEOPOROSIS SCREENING (DEXA) 5/16/2010 MEDICARE YEARLY EXAM 5/16/2010 INFLUENZA AGE 9 TO ADULT 8/1/2017 Allergies as of 9/14/2017  Review Complete On: 9/14/2017 By: Jonathan Newton LPN Severity Noted Reaction Type Reactions Simvastatin Medium 12/30/2013    Unknown (comments) Current Immunizations  Never Reviewed No immunizations on file. Not reviewed this visit You Were Diagnosed With   
  
 Codes Comments Encounter for long-term opiate analgesic use    -  Primary ICD-10-CM: N23.862 ICD-9-CM: V58.69 Postlaminectomy syndrome, lumbar     ICD-10-CM: M96.1 ICD-9-CM: 722.83 Spondylolisthesis of lumbar region     ICD-10-CM: M43.16 
ICD-9-CM: 738.4 Lumbar radiculopathy, chronic     ICD-10-CM: M54.16 
ICD-9-CM: 724.4 Vitals BP Pulse Temp Resp Height(growth percentile) Weight(growth percentile) 144/60 84 97.9 °F (36.6 °C) (Oral) 18 5' 2\" (1.575 m) 141 lb (64 kg) SpO2 BMI OB Status Smoking Status 99% 25.79 kg/m2 Postmenopausal Former Smoker BMI and BSA Data Body Mass Index Body Surface Area 25.79 kg/m 2 1.67 m 2 Preferred Pharmacy Pharmacy Name Phone [facility] [address] [place] [address] AT [facility] [phone] Your Updated Medication List  
  
   
This list is accurate as of: 9/14/17  3:28 PM.  Always use your most recent med list.  
  
  
  
  
 ALEVE 220 mg Cap Generic drug:  naproxen sodium Take  by mouth as needed. aspirin delayed-release 81 mg tablet Take  by mouth daily. * meclizine 12.5 mg tablet Commonly known as:  ANTIVERT Take  by mouth three (3) times daily as needed. * meclizine 25 mg tablet Commonly known as:  ANTIVERT  
 TK 1 T PO TID PRN  
  
 multivitamin tablet Commonly known as:  ONE A DAY Take 1 Tab by mouth daily. ondansetron 4 mg disintegrating tablet Commonly known as:  ZOFRAN ODT  
DISSOLVE 1 T ON TONGUE Q 8 H PRN NV  
  
 pravastatin 20 mg tablet Commonly known as:  PRAVACHOL Take 20 mg by mouth nightly. THERALOGIX  MULTI PO Take  by mouth daily. traMADol 50 mg tablet Commonly known as:  ULTRAM  
TAKE 1 TABLET BY MOUTH EVERY 6 HOURS AS NEEDED FOR PAIN. MAX DAILY AMOUNT: 200MG  
  
 traZODone 50 mg tablet Commonly known as:  DESYREL  
TK 1 T PO QHS * Notice: This list has 2 medication(s) that are the same as other medications prescribed for you. Read the directions carefully, and ask your doctor or other care provider to review them with you. Follow-up Instructions Return in about 4 weeks (around 10/12/2017). To-Do List   
 09/14/2017 Lab:  DRUG SCREEN UR - W/ CONFIRM   
  
 09/14/2017 5:00 PM  
  Appointment with [facility] RM 2 at [address] [place] [address] ([phone]) GENERAL INSTRUCTIONS  Bring information (ID card) if you have any medically implanted devices. You will be required to lie still while the procedure is being performed. Remove any jewelry (including body piercing, hairpins) prior to MRI. If you have had a creatinine level drawn within the past 30 days, please bring most recent results to your appt. Bring any films, CD's, and reports related to your study with you on the day of your exam.  This only includes studies done outside of [address] [place] [address], [facility], [place], and [place]. Bring a complete list of all medications you are currently taking to include prescriptions, over-the-counter meds, herbals, vitamins & any dietary supplements. If you were given medications for claustrophobia or anxiety, please arrange to have someone drive you to your appointment.   QUESTIONS Notify the MRI Department if you have any questions concerning your study. Shakeel Roth - [address] [address]  
  
 2017 Imaging:  MRI LUMB SPINE W WO CONT Patient Instructions MyChart Activation Thank you for requesting access to Mobile Action. Please follow the instructions below to securely access and download your online medical record. Mobile Action allows you to send messages to your doctor, view your test results, renew your prescriptions, schedule appointments, and more. How Do I Sign Up? 1. In your internet browser, go to [email or web address] 
2. Click on the First Time User? Click Here link in the Sign In box. You will be redirect to the New Member Sign Up page. 3. Enter your Mobile Action Access Code exactly as it appears below. You will not need to use this code after youve completed the sign-up process. If you do not sign up before the expiration date, you must request a new code. Mobile Action Access Code: [email or web address] Expires: 2017  2:27 PM (This is the date your Mobile Action access code will ) 4. Enter the last four digits of your Social Security Number (xxxx) and Date of Birth (mm/dd/yyyy) as indicated and click Submit. You will be taken to the next sign-up page. 5. Create a Mobile Action ID. This will be your Mobile Action login ID and cannot be changed, so think of one that is secure and easy to remember. 6. Create a Mobile Action password. You can change your password at any time. 7. Enter your Password Reset Question and Answer. This can be used at a later time if you forget your password. 8. Enter your e-mail address. You will receive e-mail notification when new information is available in [address] 9. Click Sign Up. You can now view and download portions of your medical record. 10. Click the Download Summary menu link to download a portable copy of your medical information. Additional Information If you have questions, please visit the Frequently Asked Questions section of the StudyRoom website at [email or web address] Remember, CircleBack Lendingt is NOT to be used for urgent needs. For medical emergencies, dial 911. Back Pain: Care Instructions Your Care Instructions Back pain has many possible causes. It is often related to problems with muscles and ligaments of the back. It may also be related to problems with the nerves, discs, or bones of the back. Moving, lifting, standing, sitting, or sleeping in an awkward way can strain the back. Sometimes you don't notice the injury until later. Arthritis is another common cause of back pain. Although it may hurt a lot, back pain usually improves on its own within several weeks. Most people recover in 12 weeks or less. Using good home treatment and being careful not to stress your back can help you feel better sooner. Follow-up care is a key part of your treatment and safety. Be sure to make and go to all appointments, and call your doctor if you are having problems. Its also a good idea to know your test results and keep a list of the medicines you take. How can you care for yourself at home? · Sit or lie in positions that are most comfortable and reduce your pain. Try one of these positions when you lie down: ¨ Lie on your back with your knees bent and supported by large pillows. ¨ Lie on the floor with your legs on the seat of a sofa or chair. James Ridges on your side with your knees and hips bent and a pillow between your legs. ¨ Lie on your stomach if it does not make pain worse. · Do not sit up in bed, and avoid soft couches and twisted positions. Bed rest can help relieve pain at first, but it delays healing. Avoid bed rest after the first day of back pain. · Change positions every 30 minutes. If you must sit for long periods of time, take breaks from sitting. Get up and walk around, or lie in a comfortable position. · Try using a heating pad on a low or medium setting for 15 to 20 minutes every 2 or 3 hours. Try a warm shower in place of one session with the heating pad. · You can also try an ice pack for 10 to 15 minutes every 2 to 3 hours. Put a thin cloth between the ice pack and your skin. · Take pain medicines exactly as directed. ¨ If the doctor gave you a prescription medicine for pain, take it as prescribed. ¨ If you are not taking a prescription pain medicine, ask your doctor if you can take an over-the-counter medicine. · Take short walks several times a day. You can start with 5 to 10 minutes, 3 or 4 times a day, and work up to longer walks. Walk on level surfaces and avoid hills and stairs until your back is better. · Return to work and other activities as soon as you can. Continued rest without activity is usually not good for your back. · To prevent future back pain, do exercises to stretch and strengthen your back and stomach. Learn how to use good posture, safe lifting techniques, and proper body mechanics. When should you call for help? Call your doctor now or seek immediate medical care if: 
· You have new or worsening numbness in your legs. · You have new or worsening weakness in your legs. (This could make it hard to stand up.) · You lose control of your bladder or bowels. Watch closely for changes in your health, and be sure to contact your doctor if: 
· Your pain gets worse. · You are not getting better after 2 weeks. Where can you learn more? Go to http://horace-marco.info/. Enter Q257 in the search box to learn more about \"Back Pain: Care Instructions. \" Current as of: March 21, 2017 Content Version: 11.3 © 3744-7027 [employer]. Care instructions adapted under license by [employer] (which disclaims liability or warranty for this information).  If you have questions about a medical condition or this instruction, always ask your healthcare professional. [place] [address] any warranty or liability for your use of this information. Introducing [facility]! Prasad John introduces Propel IT patient portal. Now you can access parts of your medical record, email your doctor's office, and request medication refills online. 1. In your internet browser, go to [email or web address] 2. Click on the First Time User? Click Here link in the Sign In box. You will see the New Member Sign Up page. 3. Enter your Propel IT Access Code exactly as it appears below. You will not need to use this code after youve completed the sign-up process. If you do not sign up before the expiration date, you must request a new code. · [employer]el IT Access Code: [email or web address] Expires: 12/13/2017  2:27 PM 
 
4. Enter the last four digits of your Social Security Number (xxxx) and Date of Birth (mm/dd/yyyy) as indicated and click Submit. You will be taken to the next sign-up page. 5. Create a Propel IT ID. This will be your Propel IT login ID and cannot be changed, so think of one that is secure and easy to remember. 6. Create a Propel IT password. You can change your password at any time. 7. Enter your Password Reset Question and Answer. This can be used at a later time if you forget your password. 8. Enter your e-mail address. You will receive e-mail notification when new information is available in [address] 9. Click Sign Up. You can now view and download portions of your medical record. 10. Click the Download Summary menu link to download a portable copy of your medical information. If you have questions, please visit the Frequently Asked Questions section of the Propel IT website. Remember, Propel IT is NOT to be used for urgent needs. For medical emergencies, dial 911. Now available from your iPhone and Android! Please provide this summary of care documentation to your next provider. Your primary care clinician is listed as Jone Collins. If you have any questions after today's visit, please call [phone].

## 2017-09-19 ENCOUNTER — OFFICE VISIT (OUTPATIENT)
Dept: ORTHOPEDIC SURGERY | Age: 72
End: 2017-09-19

## 2017-09-19 VITALS
SYSTOLIC BLOOD PRESSURE: 142 MMHG | RESPIRATION RATE: 18 BRPM | OXYGEN SATURATION: 96 % | WEIGHT: 146.8 LBS | DIASTOLIC BLOOD PRESSURE: 61 MMHG | BODY MASS INDEX: 27.02 KG/M2 | TEMPERATURE: 96.9 F | HEART RATE: 72 BPM | HEIGHT: 62 IN

## 2017-09-19 DIAGNOSIS — M21.612 BUNION OF LEFT FOOT: Primary | ICD-10-CM

## 2017-09-19 NOTE — PATIENT INSTRUCTIONS
Please follow up in 2 weeks or with Luna Hua for Pre-Op Scheduling. You are advised to contact us if your condition worsens. Purchase Dr. Vadim Wei, or visit [facility] for consultation regarding best pair available for you. Bunions: Care Instructions  Your Care Instructions    A bunion is a bump on the outside of the joint at the bottom of your big toe. It can cause pain and swelling in the toe. A bunion forms when bone or tissue around the joint becomes swollen from too much pressure. You also can have a bunionette, or tailor's bunion, which forms on the joint of the little toe. Sometimes, a bunion on the big toe turns the toe in toward the second toe. This is called displacement. It can lead to problems with the other toes. You can get a bunion from having an unusual walking style, having flatfeet, or wearing tight-fitting shoes. You can treat most bunions at home with a few simple steps. If you have a lot of pain, your doctor may inject medicine into the bunion to reduce swelling for a while. If you still have pain, you may need to have surgery. Follow-up care is a key part of your treatment and safety. Be sure to make and go to all appointments, and call your doctor if you are having problems. It's also a good idea to know your test results and keep a list of the medicines you take. How can you care for yourself at home? · Ask your doctor if you can take an over-the-counter pain medicine, such as acetaminophen (Tylenol), ibuprofen (Advil, Motrin), or naproxen (Aleve). Be safe with medicines. Read and follow all instructions on the label. · Wear shoes that have a wide and deep space for the toes. Also, wear shoes that have low or flat heels and good arch supports. Do not wear tight, narrow, or high-heeled shoes. · Try bunion pads, arch supports, toe spacers, or shoe inserts. They can help shift your weight when you walk to take pressure off your big toe.   · Put moleskin or another type of cushion on or around the bunion to keep it from rubbing against your shoe. · Put ice or a cold pack on the area for 10 to 20 minutes at a time as needed. Put a thin cloth between the ice and your skin. · Prop up your foot on a pillow when you ice your toe or anytime you sit or lie down. Try to keep it above the level of your heart. This will help reduce swelling. When should you call for help? Call your doctor now or seek immediate medical care if:  · You have severe pain in your big toe that keeps you from walking or doing other activities. · You have diabetes or peripheral arterial disease and the skin over a bunion is red, broken, or swollen. These diseases can reduce blood flow and feeling in your feet. This could make it easier for you to get an infection. Watch closely for changes in your health, and be sure to contact your doctor if:  · Your big toe starts to overlap or cross under your second toe. · Pain in your big toe does not get better after 2 to 3 weeks of care at home. Where can you learn more? Go to http://horace-marco.info/. Enter H210 in the search box to learn more about \"Bunions: Care Instructions. \"  Current as of: October 19, 2016  Content Version: 11.3  © 5872-8966 Healthwise, Incorporated. Care instructions adapted under license by [employer] (which disclaims liability or warranty for this information). If you have questions about a medical condition or this instruction, always ask your healthcare professional. [place] [address] any warranty or liability for your use of this information.

## 2017-09-19 NOTE — PROGRESS NOTES
AMBULATORY PROGRESS NOTE      Patient: Kathi Moseley             MRN: [id number]     SSN: [id number] Body mass index is 26.85 kg/(m^2). YOB: 1945     AGE: 67 y.o. EX: female    PCP: Mila Barthel, MD    IMPRESSION/DIAGNOSIS AND TREATMENT PLAN     DIAGNOSES  1. Bunion of left foot        Orders Placed This Encounter    [50961] Foot Min 3V      Kathi Moseley understands her diagnoses and the proposed plan. Plan:    1) Use Dr. Mikel Dandy - 2 weeks - Follow up with Sai Galindo for Pre-Op Scheduling    HPI [address] [facility] [place] A 67 y.o. female who presents to my outpatient office complaining of left foot pain. The patient states that she does not experience much pain from the bunion unless she is walking on it for prolonged periods of time. She notes that she has to put a band-aid over the bunion to wear shoes more comfortably, and prevent irritation. She reports that after 15 minutes of walking elicits pain on the left foot bunion. The patient notes that otherwise from walking the left foot bunion does not bother her much. Various surgical interventions were discussed with the patient. At this time, she understand the information provided, and is content with the treatment options given. Kathi Moseley is alert/oriented (name, location, time) and follows commands well. she  is in no acute distress and her affect and mood are appropriate. Left ANKLE and FOOT       Gait: slow  Tenderness: no tenderness   Cutaneous: No rashes, skin patches, wounds, or abrasions to the lower legs           Warm and Normal color. No regions of expressible drainage. Medial Border of Tibia Region: absent           Skin color, texture, turgor normal. Normal.           Severe Bunion  Joint Motion: ROM Ankle:Normal , Hindfoot: (ST,TN,CC Normal}, Forefoot toes:Normal  Neurologic Exam: Neuro:  Motor: normal 5/5 strength in all tested muscle groups and Sensory : no sensory deficits noted. Contractures: Gastrocnemius or Achilles Contractures absent  Joint / Tendon Stability: No Ankle or Subtalar instability or joint laxity. No peroneal sublux ability or dislocation  Alignment: Pes Planus, with Valgus alignment and  Flexible. Large bunion  Vascular: Normal Pulses/ NL Capillary refill, No evidence of DVT seen on physical exam.   No calf swelling, no tenderness to calf muscles. Lymphatic:  No Evidence of Lymphedema. CHART REVIEW     Past Medical History:   Diagnosis Date    [facility] ([facility] [facility]     Cancer ([facility]     Foraminal stenosis of lumbar region     Hematuria     Hypercholesterolemia     Low back pain      Current Outpatient Prescriptions   Medication Sig    naproxen sodium (ALEVE) 220 mg cap Take  by mouth as needed.  traMADol (ULTRAM) 50 mg tablet TAKE 1 TABLET BY MOUTH EVERY 6 HOURS AS NEEDED FOR PAIN. MAX DAILY AMOUNT: 200MG    ondansetron (ZOFRAN ODT) 4 mg disintegrating tablet DISSOLVE 1 T ON TONGUE Q 8 H PRN NV    meclizine (ANTIVERT) 12.5 mg tablet Take  by mouth three (3) times daily as needed.  MULTIVIT-MINERALS/FOLIC ACID (THERALOGIX  MULTI PO) Take  by mouth daily.  traZODone (DESYREL) 50 mg tablet TK 1 T PO QHS    aspirin delayed-release 81 mg tablet Take  by mouth daily.  multivitamin (ONE A DAY) tablet Take 1 Tab by mouth daily.  pravastatin (PRAVACHOL) 20 mg tablet Take 20 mg by mouth nightly.  meclizine (ANTIVERT) 25 mg tablet TK 1 T PO TID PRN     No current facility-administered medications for this visit.       Allergies   Allergen Reactions    Simvastatin Unknown (comments)     Past Surgical History:   Procedure Laterality Date    HX BUNIONECTOMY      HX COLONOSCOPY      HX LUMBAR LAMINECTOMY  6/2/15    with fusion    HX ORTHOPAEDIC  Bunionionectomy R    HX TUBAL LIGATION       Social History     Occupational History    nurse      Social History Main Topics    Smoking status: Former Smoker     Quit date: 1/1/1980    Smokeless tobacco: Never Used    Alcohol use 0.0 oz/week     0 Standard drinks or equivalent per week      Comment: 1 glass 2x per week    Drug use: No    Sexual activity: Not on file     Family History   Problem Relation Age of Onset    No Known Problems Mother     No Known Problems Father        REVIEW OF SYSTEMS : 9/19/2017  ALL BELOW ARE Negative except : SEE HPI       Constitutional: Negative for fever, chills and weight loss. Neg Weigh Loss  Cardiovascular: Negative for chest pain, claudication and leg swelling. SOB, MCKOY   Gastrointestinal: Negative for  pain, N/V/D/C, Blood in stool or urine,dysuria, hematuria,        Incontinence, pelvic pain  Musculoskeletal: see HPI. Neurological: Negative for dizziness and weakness. Negative for headaches,Visual Changes, Confusion, Seizures,   Psychiatric/Behavioral: Negative for depression, memory loss and substance abuse. Extremities:  Negative for  hair changes, rash or skin lesion changes. Hematologic: Negative for Bleeding problems, bruising, pallor or swollen lymph nodes. Peripheral Vascular: No calf pain, vascular vein tenderness to calf pain              No calf throbbing, posterior knee throbbing pain    DIAGNOSTIC IMAGING      Dictation on: 09/19/2017  2:05 PM by: Cedric Lehman [[id number]]         Written by Jewell Collins, as dictated by Jessica Tomas MD. IDr., Jessica Tomas MD, confirm that all documentation is accurate.

## 2017-09-19 NOTE — MR AVS SNAPSHOT
Visit Information Date & Time Provider Department Dept. Phone Encounter #  
 9/19/2017  1:40 PM Modesto Bender MD [facility] [phone] [id number] Your Appointments 10/27/2017  1:15 PM  
DIAG TEST F/U with Casimiro Rai MD  
[facility] [address] [facility]) Appt Note: MRI F/U BRING DISC/ref Maryellen Mobley NP  
 [address] [place] [address]  
[address]  
  
    
 1/10/2018 11:30 AM  
[facility]JECTION with [facility] ([facility]) Appt Note: Bcg 1/3  
 [address] 
[address] [place] [address]  
[address] [place] [address] 1/17/2018 11:30 AM  
INJECTION with [facility] ([facility]) Appt Note: bcg 2/3  
 [address] 
[address] [place] [address]  
[phone]  
  
    
 1/24/2018 11:30 AM  
INJECTION with [facility] ([facility]) Appt Note: BCG 3/3  
 [address] 
[address] [place] [address]  
[phone]  
  
    
 2/14/2018  8:00 AM  
Follow Up with Dejan Lopez MD  
[facility]) Appt Note: 6 MO F/U  
 [address] [place] [address]  
[phone]  
  
   
 [address]  
  
    
 2/23/2018  4:00 PM  
PROCEDURE with Antonio Santos MD  
[facility] ([facility]) Appt Note: [address] [place] [address]  
[address] [place] [address] Upcoming Health Maintenance Date Due Hepatitis C Screening 1945 DTaP/Tdap/Td series (1 - Tdap) 5/16/1966 BREAST CANCER SCRN MAMMOGRAM 5/16/1995 FOBT Q 1 YEAR AGE 50-75 5/16/1995 ZOSTER VACCINE AGE 60> 3/16/2005 GLAUCOMA SCREENING Q2Y 5/16/2010 OSTEOPOROSIS SCREENING (DEXA) 5/16/2010 MEDICARE YEARLY EXAM 5/16/2010 INFLUENZA AGE 9 TO ADULT 8/1/2017 Allergies as of 9/19/2017  Review Complete On: 9/19/2017 By: Janna Blount Severity Noted Reaction Type Reactions Simvastatin Medium 12/30/2013    Unknown (comments) Current Immunizations  Never Reviewed No immunizations on file. Not reviewed this visit You Were Diagnosed With   
  
 Codes Comments Bunion of left foot    -  Primary ICD-10-CM: U12.469 ICD-9-CM: 727.1 Vitals BP Pulse Temp Resp Height(growth percentile) Weight(growth percentile) 142/61 72 96.9 °F (36.1 °C) (Oral) 18 5' 2\" (1.575 m) 146 lb 12.8 oz (66.6 kg) SpO2 BMI OB Status Smoking Status 96% 26.85 kg/m2 Postmenopausal Former Smoker BMI and BSA Data Body Mass Index Body Surface Area  
 26.85 kg/m 2 1.71 m 2 Preferred Pharmacy Pharmacy Name Phone [facility] [address] [place] [address] AT [facility] [phone] Your Updated Medication List  
  
   
This list is accurate as of: 9/19/17  2:21 PM.  Always use your most recent med list.  
  
  
  
  
 ALEVE 220 mg Cap Generic drug:  naproxen sodium Take  by mouth as needed. aspirin delayed-release 81 mg tablet Take  by mouth daily. * meclizine 12.5 mg tablet Commonly known as:  ANTIVERT Take  by mouth three (3) times daily as needed. * meclizine 25 mg tablet Commonly known as:  ANTIVERT TK 1 T PO TID PRN  
  
 multivitamin tablet Commonly known as:  ONE A DAY Take 1 Tab by mouth daily. ondansetron 4 mg disintegrating tablet Commonly known as:  ZOFRAN ODT  
DISSOLVE 1 T ON TONGUE Q 8 H PRN NV  
  
 pravastatin 20 mg tablet Commonly known as:  PRAVACHOL  
 Take 20 mg by mouth nightly. THERALOGIX  MULTI PO Take  by mouth daily. traMADol 50 mg tablet Commonly known as:  ULTRAM  
TAKE 1 TABLET BY MOUTH EVERY 6 HOURS AS NEEDED FOR PAIN. MAX DAILY AMOUNT: 200MG  
  
 traZODone 50 mg tablet Commonly known as:  DESYREL  
TK 1 T PO QHS * Notice: This list has 2 medication(s) that are the same as other medications prescribed for you. Read the directions carefully, and ask your doctor or other care provider to review them with you. We Performed the Following AMB POC XRAY, FOOT; COMPLETE, 3+ VIEW [14562 CPT(R)] Patient Instructions Please follow up in 4 weeks or with Antonio Conde for Pre-Op Scheduling. You are advised to contact us if your condition worsens. Purchase Dr. Sharron Diaz, or visit [facility] for consultation regarding best pair available for you. Bunions: Care Instructions Your Care Instructions A bunion is a bump on the outside of the joint at the bottom of your big toe. It can cause pain and swelling in the toe. A bunion forms when bone or tissue around the joint becomes swollen from too much pressure. You also can have a bunionette, or tailor's bunion, which forms on the joint of the little toe. Sometimes, a bunion on the big toe turns the toe in toward the second toe. This is called displacement. It can lead to problems with the other toes. You can get a bunion from having an unusual walking style, having flatfeet, or wearing tight-fitting shoes. You can treat most bunions at home with a few simple steps. If you have a lot of pain, your doctor may inject medicine into the bunion to reduce swelling for a while. If you still have pain, you may need to have surgery. Follow-up care is a key part of your treatment and safety. Be sure to make and go to all appointments, and call your doctor if you are having problems.  It's also a good idea to know your test results and keep a list of the medicines you take. How can you care for yourself at home? · Ask your doctor if you can take an over-the-counter pain medicine, such as acetaminophen (Tylenol), ibuprofen (Advil, Motrin), or naproxen (Aleve). Be safe with medicines. Read and follow all instructions on the label. · Wear shoes that have a wide and deep space for the toes. Also, wear shoes that have low or flat heels and good arch supports. Do not wear tight, narrow, or high-heeled shoes. · Try bunion pads, arch supports, toe spacers, or shoe inserts. They can help shift your weight when you walk to take pressure off your big toe. · Put moleskin or another type of cushion on or around the bunion to keep it from rubbing against your shoe. · Put ice or a cold pack on the area for 10 to 20 minutes at a time as needed. Put a thin cloth between the ice and your skin. · Prop up your foot on a pillow when you ice your toe or anytime you sit or lie down. Try to keep it above the level of your heart. This will help reduce swelling. When should you call for help? Call your doctor now or seek immediate medical care if: 
· You have severe pain in your big toe that keeps you from walking or doing other activities. · You have diabetes or peripheral arterial disease and the skin over a bunion is red, broken, or swollen. These diseases can reduce blood flow and feeling in your feet. This could make it easier for you to get an infection. Watch closely for changes in your health, and be sure to contact your doctor if: 
· Your big toe starts to overlap or cross under your second toe. · Pain in your big toe does not get better after 2 to 3 weeks of care at home. Where can you learn more? Go to http://horace-marco.info/. Enter H210 in the search box to learn more about \"Bunions: Care Instructions. \" Current as of: October 19, 2016 Content Version: 11.3 © 7778-4057 [facility], [employer]-Box - Blogo.it.  Care instructions adapted under license by [address] (which disclaims liability or warranty for this information). If you have questions about a medical condition or this instruction, always ask your healthcare professional. [address] any warranty or liability for your use of this information. Introducing \[facility]\"" & HEALTH SERVICES! [place] introduces [employer] patient portal. Now you can access parts of your medical record, email your doctor's office, and request medication refills online. 1. In your internet browser, go to [email or web address] 2. Click on the First Time User? Click Here link in the Sign In box. You will see the New Member Sign Up page. 3. Enter your [employer] Access Code exactly as it appears below. You will not need to use this code after youve completed the sign-up process. If you do not sign up before the expiration date, you must request a new code. · [employer] Access Code: [email or web address] Expires: 12/13/2017  2:27 PM 
 
4. Enter the last four digits of your Social Security Number (xxxx) and Date of Birth (mm/dd/yyyy) as indicated and click Submit. You will be taken to the next sign-up page. 5. Create a [employer] ID. This will be your [employer] login ID and cannot be changed, so think of one that is secure and easy to remember. 6. Create a [employer] password. You can change your password at any time. 7. Enter your Password Reset Question and Answer. This can be used at a later time if you forget your password. 8. Enter your e-mail address. You will receive e-mail notification when new information is available in [address] 9. Click Sign Up. You can now view and download portions of your medical record. 10. Click the Download Summary menu link to download a portable copy of your medical information. If you have questions, please visit the Frequently Asked Questions section of the [employer] website.  Remember, [employer] is NOT to be used for urgent needs. For medical emergencies, dial 911. Now available from your iPhone and Android! Please provide this summary of care documentation to your next provider. Your primary care clinician is listed as Haider Borrero. If you have any questions after today's visit, please call [phone].

## 2017-09-21 ENCOUNTER — DOCUMENTATION ONLY (OUTPATIENT)
Dept: ORTHOPEDIC SURGERY | Age: 72
End: 2017-09-21

## 2017-09-21 DIAGNOSIS — M96.1 POSTLAMINECTOMY SYNDROME, LUMBAR: ICD-10-CM

## 2017-09-21 DIAGNOSIS — Z79.891 ENCOUNTER FOR LONG-TERM OPIATE ANALGESIC USE: Primary | ICD-10-CM

## 2017-09-21 NOTE — PROGRESS NOTES
Attempted to call pt regarding inconsistent UDS results, received unidentified voicemail. UDS had + [place], no Rx issued per  SINCE 08/08/2016 #30 by another provider. This Rx should have been completed by now. This is a violation of our pain agreement, she should not mix narcotics. Pt did not disclose this to me on our visit on 09/14/17 but told the nurse collecting the UDS. Pt needs to come in and give us another UDS, results need to be consistent before we will continue her Tramadol. I placed order for another UDS.  Just keep trying to contact pt and inform her that she should not be mixing these medications, this is a violation of our pain agreement and that if it's from an old Rx from 08/08/2016 she needs to properly discard the rest of the [address] [facility].

## 2017-09-22 NOTE — PROGRESS NOTES
Left voice message stating I am calling from [facility], Dr. Jazzy Solis office, with our number. Advising patient to call our office.

## 2017-09-26 NOTE — PROGRESS NOTES
-Patient came in and provided a sample for urinalysis @1630. The patient went over the medication list on the form twice and dates were written next to each drug taken. A copy of the UDS form and [employer] patient contact form was given to her.

## 2017-09-26 NOTE — PROGRESS NOTES
Spoke with patient, informed that she is needed to come in for a repeat UDS, due to inconsistency. Patient stated understanding and that she would be in soon. Patient informed that refills would not be given until the repeat UDS is done.

## 2017-09-28 DIAGNOSIS — Z79.891 ENCOUNTER FOR LONG-TERM OPIATE ANALGESIC USE: ICD-10-CM

## 2017-09-28 DIAGNOSIS — M96.1 POSTLAMINECTOMY SYNDROME, LUMBAR: ICD-10-CM

## 2017-10-27 ENCOUNTER — OFFICE VISIT (OUTPATIENT)
Dept: ORTHOPEDIC SURGERY | Age: 72
End: 2017-10-27

## 2017-10-27 VITALS
SYSTOLIC BLOOD PRESSURE: 146 MMHG | TEMPERATURE: 98.3 F | HEIGHT: 62 IN | HEART RATE: 88 BPM | BODY MASS INDEX: 26.91 KG/M2 | WEIGHT: 146.2 LBS | RESPIRATION RATE: 17 BRPM | DIASTOLIC BLOOD PRESSURE: 59 MMHG | OXYGEN SATURATION: 98 %

## 2017-10-27 DIAGNOSIS — M43.16 SPONDYLOLISTHESIS OF LUMBAR REGION: ICD-10-CM

## 2017-10-27 DIAGNOSIS — M75.41 IMPINGEMENT SYNDROME OF RIGHT SHOULDER: ICD-10-CM

## 2017-10-27 DIAGNOSIS — M54.16 LUMBAR RADICULOPATHY, CHRONIC: Primary | ICD-10-CM

## 2017-10-27 DIAGNOSIS — M51.36 ANNULAR TEAR OF LUMBAR DISC: ICD-10-CM

## 2017-10-27 RX ORDER — GABAPENTIN 300 MG/1
CAPSULE ORAL
Refills: 3 | COMMUNITY
Start: 2017-10-05 | End: 2018-07-24 | Stop reason: SDUPTHER

## 2017-10-27 NOTE — PROGRESS NOTES
[address]  [place]  Phone: [phone]  Fax: [phone]  PROGRESS NOTE  Patient: Roxana Dan                MRN: [id number]       SSN: [id number]  YOB: 1945        AGE: 67 y.o. SEX: female  Body mass index is 26.74 kg/(m^2). PCP: Nicholas Shaw MD  10/27/17    Chief Complaint   Patient presents with    Back Pain     MRI FU       HISTORY OF PRESENT ILLNESS, RADIOGRAPHS, and PLAN:     HISTORY:  Ms. Kristine Rodriguez returns today. She is 72. She is referred by Dr. Aracelis Espinoza. She had a lumbar fusion in 2014 by Dr. Lazarus Pho, a L3-4 fusion, which helped her for three months and then she developed back and radiating right lower extremity pain. Shortly after her lumbar fusion, she had developed a degenerative spondylolisthesis at L4-5 with right-sided foraminal stenosis. With that, she has had this ongoing, right-sided radicular pain syndrome, but she rates it at 3-4/10 and has been somewhat stable with that. She is under care and treatment through conservative treatments, and medications. Her current problem is actually not this pain, but is a severely painful right shoulder, for which she is scheduled to undergo a shoulder replacement come December. She says the worst pain she gets in her leg is at night. It is almost a neuropathic pain syndrome. She gets less pain when she is up and ambulating. She denies bowel or bladder dysfunction, fevers, chills, or night sweats, weight loss or weight gain. PHYSICAL EXAMINATION:  On physical exam, she has no objective neurology. There is some tenderness in the right side of her back. She has had some blocks that helped her for a couple of days.       RADIOGRAPHS:  Again, an MRI demonstrates the right sided foraminal stenosis at L4-5 with x-rays that demonstrate degenerative listhesis, grade I, at L4-5 with an L5-S1 fusion with an interbody cage that places a PLIF at L3-4. ASSESSMENT/PLAN: I discussed the matter at length with her. I believe her symptoms are probably primarily due to the degenerative spondylolisthesis at L4-5, junctional below an L3-4 fusion that was done. The L3-4 fusion only gave her a couple of months of symptoms. I have some concern that her symptoms in her right leg may be partially neuropathic and PLIF neuropathy. That comes from the night pain portion of the symptoms. In fact, they get better with ambulation in that, I think the symptoms may be partially related to the PLIF cage technique utilized. In my experience with patients with a similar cage and technique that had been done. In any case, I explained to the patient that I would recommend she refrain from considerations of reconstructive surgery at this point. With her pain syndrome sitting at a 3-4/10 pain, and it not being her worse pain, and she functions well with it, and she can ambulate, and it is really only a night pain type syndrome, I would recommend trying to avoid considerations of further surgery. I simply do not believe the magnitude of her pain warrants the magnitude of the reconstructive surgical endeavor. I would go ahead with her shoulder surgery as scheduled. I would treat her pain symptomatically. If she had further pain, I would even consider something as a spinal cord stimulator before I would tend to consider a reconstructive surgery on her lumbar spine, which would require a decompression and fusion of L4-5. She was pleased with this answer. She currently gets by on Tramadol, Aleve, and Gabapentin, and I think that is all reasonable. Certainly, I would reevaluate her if needed. The surgical option to me would be an L4-5 decompression with extension of her fusion from L3-4 to L4-5 or consideration of a spinal cord stimulator.   At this point, I would just continue treating with pain management technique utilizing her Tramadol, Gabapentin, and Aleve and get her shoulder treated and leave her back alone. cc: Melissa Abraham M.D. Past Medical History:   Diagnosis Date    [facility] ([facility]     Cancer ([facility]     Foraminal stenosis of lumbar region     Hematuria     Hypercholesterolemia     Low back pain        Family History   Problem Relation Age of Onset    No Known Problems Mother     No Known Problems Father        Current Outpatient Prescriptions   Medication Sig Dispense Refill    gabapentin (NEURONTIN) 300 mg capsule TK 2 CS PO Q NIGHT  3    naproxen sodium (ALEVE) 220 mg cap Take  by mouth as needed.  traMADol (ULTRAM) 50 mg tablet TAKE 1 TABLET BY MOUTH EVERY 6 HOURS AS NEEDED FOR PAIN. MAX DAILY AMOUNT: 200MG 120 Tab 0    meclizine (ANTIVERT) 25 mg tablet TK 1 T PO TID PRN  0    ondansetron (ZOFRAN ODT) 4 mg disintegrating tablet DISSOLVE 1 T ON TONGUE Q 8 H PRN NV  0    meclizine (ANTIVERT) 12.5 mg tablet Take  by mouth three (3) times daily as needed.  MULTIVIT-MINERALS/FOLIC ACID (THERALOGIX  MULTI PO) Take  by mouth daily.  traZODone (DESYREL) 50 mg tablet TK 1 T PO QHS  0    aspirin delayed-release 81 mg tablet Take  by mouth daily.  multivitamin (ONE A DAY) tablet Take 1 Tab by mouth daily.  pravastatin (PRAVACHOL) 20 mg tablet Take 20 mg by mouth nightly.   5       Allergies   Allergen Reactions    Simvastatin Unknown (comments)       Past Surgical History:   Procedure Laterality Date    HX BUNIONECTOMY      HX COLONOSCOPY      HX LUMBAR LAMINECTOMY  6/2/15    with fusion    HX ORTHOPAEDIC  Bunionionectomy R    HX TUBAL LIGATION         Past Medical History:   Diagnosis Date    Bladder cancer ([facility]     Cancer ([facility]     Foraminal stenosis of lumbar region     Hematuria     Hypercholesterolemia     Low back pain        Social History     Social History    Marital status:      Spouse name: N/A    Number of children: N/A    Years of education: N/A     Occupational History    nurse      Social History Main Topics    Smoking status: Former Smoker     Quit date: 1/1/1980    Smokeless tobacco: Never Used    Alcohol use 0.0 oz/week     0 Standard drinks or equivalent per week      Comment: 1 glass 2x per week    Drug use: No    Sexual activity: Not on file     Other Topics Concern    Not on file     Social History Narrative         REVIEW OF SYSTEMS:   CONSTITUTIONAL SYMPTOMS:  Negative. EYES:  Negative. EARS, NOSE, THROAT AND MOUTH:  Negative. CARDIOVASCULAR:  Negative. RESPIRATORY:  Negative. GENITOURINARY: Negative. GASTROINTESTINAL:  Negative. INTEGUMENTARY (SKIN AND/OR BREAST):  Negative. MUSCULOSKELETAL: Per HPI.   ENDOCRINE/RHEUMATOLOGIC:  Negative. NEUROLOGICAL:  Per HPI. HEMATOLOGIC/LYMPHATIC:  Negative. ALLERGIC/IMMUNOLOGIC:  Negative. PSYCHIATRIC:  Negative. PHYSICAL EXAMINATION:   Visit Vitals    /59    Pulse 88    Temp 98.3 °F (36.8 °C) (Oral)    Resp 17    Ht 5' 2\" (1.575 m)    Wt 146 lb 3.2 oz (66.3 kg)    SpO2 98%    BMI 26.74 kg/m2    PAIN SCALE: 4/10    CONSTITUTIONAL: The patient is in no apparent distress and is alert and oriented x 3. HEENT: Normocephalic. Hearing grossly intact. NECK: Supple and symmetric. no tenderness, or masses were felt. RESPIRATORY: No labored breathing. CARDIOVASCULAR: The carotid pulses were normal. Peripheral pulses were 2+. CHEST: Normal AP diameter and normal contour without any kyphoscoliosis. LYMPHATIC: No lymphadenopathy was appreciated in the neck, axillae or groin. SKIN:  Negative for scars, rashes, lesions, or ulcers on the right upper, right lower, left upper, left lower and trunk. NEUROLOGICAL: Alert and oriented x 3. Ambulation without assistive device. FWB. EXTREMITIES: See musculoskeletal.  MUSCULOSKELETAL:   Head and Neck:  Negative for misalignment, asymmetry, crepitation, defects, tenderness masses or effusions.    Left Upper Extremity: Inspection, percussion and palpation preformed. Yings sign is negative.  Right Upper Extremity: Shoulder pain. nspection, percussion and palpation preformed. Yings sign is negative.  Spine, Ribs and Pelvis: Back pain with radiating RLE pain. Inspection, percussion and palpation preformed. Negative for misalignment, asymmetry, crepitation, defects, tenderness masses or effusions.  Left Lower Extremity: Inspection, percussion and palpation preformed. Negative straight leg raise.  Right Lower Extremity: Radiating pain. Inspection, percussion and palpation preformed. Negative straight leg raise. SPINE EXAM:     Lumbar spine: No rash, ecchymosis, or gross obliquity. No focal atrophy is noted. ASSESSMENT    ICD-10-CM ICD-9-CM    1. Lumbar radiculopathy, chronic M54.16 724.4    2. Spondylolisthesis of lumbar region M43.16 738.4    3. Annular tear of lumbar disc M51.36 722.52    4. Impingement syndrome of right shoulder M75.41 726.2        Written by Chavez Maravilla, as dictated by Lelo Madrigal MD.    I, Dr. Lelo Madrigal MD, confirm that all documentation is accurate.

## 2017-11-10 DIAGNOSIS — Z01.818 PREOP EXAMINATION: ICD-10-CM

## 2017-11-10 DIAGNOSIS — M12.811 ROTATOR CUFF ARTHROPATHY, RIGHT: Primary | ICD-10-CM

## 2017-11-17 ENCOUNTER — HOSPITAL ENCOUNTER (OUTPATIENT)
Dept: PREADMISSION TESTING | Age: 72
Discharge: HOME | End: 2017-11-17
Payer: MEDICARE

## 2017-11-17 ENCOUNTER — HOSPITAL ENCOUNTER (OUTPATIENT)
Dept: GENERAL RADIOLOGY | Age: 72
Discharge: HOME | End: 2017-11-17
Payer: MEDICARE

## 2017-11-17 DIAGNOSIS — Z01.818 PREOP EXAMINATION: ICD-10-CM

## 2017-11-17 LAB
ABO + RH BLD: NORMAL
ALBUMIN SERPL-MCNC: 4.1 G/DL (ref 3.4–5)
ALBUMIN/GLOB SERPL: 1 {RATIO} (ref 0.8–1.7)
ALP SERPL-CCNC: 80 U/L (ref 45–117)
ALT SERPL-CCNC: 21 U/L (ref 13–56)
ANION GAP SERPL CALC-SCNC: 6 MMOL/L (ref 3–18)
APPEARANCE UR: CLEAR
APTT PPP: 31.6 SEC (ref 23–36.4)
AST SERPL-CCNC: 19 U/L (ref 15–37)
BACTERIA URNS QL MICRO: ABNORMAL /HPF
BASOPHILS # BLD: 0 K/UL (ref 0–0.06)
BASOPHILS NFR BLD: 1 % (ref 0–2)
BILIRUB SERPL-MCNC: 0.5 MG/DL (ref 0.2–1)
BILIRUB UR QL: NEGATIVE
BLOOD GROUP ANTIBODIES SERPL: NORMAL
BUN SERPL-MCNC: 16 MG/DL (ref 7–18)
BUN/CREAT SERPL: 27 (ref 12–20)
CALCIUM SERPL-MCNC: 9.2 MG/DL (ref 8.5–10.1)
CHLORIDE SERPL-SCNC: 104 MMOL/L (ref 100–108)
CO2 SERPL-SCNC: 29 MMOL/L (ref 21–32)
COLOR UR: YELLOW
CREAT SERPL-MCNC: 0.6 MG/DL (ref 0.6–1.3)
DIFFERENTIAL METHOD BLD: NORMAL
EOSINOPHIL # BLD: 0.2 K/UL (ref 0–0.4)
EOSINOPHIL NFR BLD: 4 % (ref 0–5)
EPITH CASTS URNS QL MICRO: ABNORMAL /LPF (ref 0–5)
ERYTHROCYTE [DISTWIDTH] IN BLOOD BY AUTOMATED COUNT: 13.8 % (ref 11.6–14.5)
GLOBULIN SER CALC-MCNC: 4.1 G/DL (ref 2–4)
GLUCOSE SERPL-MCNC: 81 MG/DL (ref 74–99)
GLUCOSE UR STRIP.AUTO-MCNC: NEGATIVE MG/DL
HCT VFR BLD AUTO: 40 % (ref 35–45)
HGB BLD-MCNC: 13.3 G/DL (ref 12–16)
HGB UR QL STRIP: NEGATIVE
INR PPP: 1 (ref 0.8–1.2)
KETONES UR QL STRIP.AUTO: ABNORMAL MG/DL
LEUKOCYTE ESTERASE UR QL STRIP.AUTO: ABNORMAL
LYMPHOCYTES # BLD: 2.5 K/UL (ref 0.9–3.6)
LYMPHOCYTES NFR BLD: 45 % (ref 21–52)
MCH RBC QN AUTO: 29.7 PG (ref 24–34)
MCHC RBC AUTO-ENTMCNC: 33.3 G/DL (ref 31–37)
MCV RBC AUTO: 89.3 FL (ref 74–97)
MONOCYTES # BLD: 0.4 K/UL (ref 0.05–1.2)
MONOCYTES NFR BLD: 7 % (ref 3–10)
MUCOUS THREADS URNS QL MICRO: ABNORMAL /LPF
NEUTS SEG # BLD: 2.4 K/UL (ref 1.8–8)
NEUTS SEG NFR BLD: 43 % (ref 40–73)
NITRITE UR QL STRIP.AUTO: NEGATIVE
PH UR STRIP: 5.5 [PH] (ref 5–8)
PLATELET # BLD AUTO: 304 K/UL (ref 135–420)
PMV BLD AUTO: 10.9 FL (ref 9.2–11.8)
POTASSIUM SERPL-SCNC: 3.9 MMOL/L (ref 3.5–5.5)
PROT SERPL-MCNC: 8.2 G/DL (ref 6.4–8.2)
PROT UR STRIP-MCNC: NEGATIVE MG/DL
PROTHROMBIN TIME: 13.1 SEC (ref 11.5–15.2)
RBC # BLD AUTO: 4.48 M/UL (ref 4.2–5.3)
RBC #/AREA URNS HPF: NEGATIVE /HPF (ref 0–5)
SODIUM SERPL-SCNC: 139 MMOL/L (ref 136–145)
SP GR UR REFRACTOMETRY: 1.03 (ref 1–1.03)
SPECIMEN EXP DATE BLD: NORMAL
UROBILINOGEN UR QL STRIP.AUTO: 2 EU/DL (ref 0.2–1)
WBC # BLD AUTO: 5.5 K/UL (ref 4.6–13.2)
WBC URNS QL MICRO: ABNORMAL /HPF (ref 0–4)

## 2017-11-17 PROCEDURE — 93005 ELECTROCARDIOGRAM TRACING: CPT

## 2017-11-17 PROCEDURE — 85730 THROMBOPLASTIN TIME PARTIAL: CPT | Performed by: ORTHOPAEDIC SURGERY

## 2017-11-17 PROCEDURE — 85610 PROTHROMBIN TIME: CPT | Performed by: ORTHOPAEDIC SURGERY

## 2017-11-17 PROCEDURE — 86900 BLOOD TYPING SEROLOGIC ABO: CPT | Performed by: ORTHOPAEDIC SURGERY

## 2017-11-17 PROCEDURE — 81001 URINALYSIS AUTO W/SCOPE: CPT | Performed by: ORTHOPAEDIC SURGERY

## 2017-11-17 PROCEDURE — 80053 COMPREHEN METABOLIC PANEL: CPT | Performed by: ORTHOPAEDIC SURGERY

## 2017-11-17 PROCEDURE — 85025 COMPLETE CBC W/AUTO DIFF WBC: CPT | Performed by: ORTHOPAEDIC SURGERY

## 2017-11-17 PROCEDURE — 71020 XR CHEST PA LAT: CPT

## 2017-11-17 PROCEDURE — 36415 COLL VENOUS BLD VENIPUNCTURE: CPT | Performed by: ORTHOPAEDIC SURGERY

## 2017-11-18 LAB
ATRIAL RATE: 76 BPM
CALCULATED P AXIS, ECG09: 25 DEGREES
CALCULATED R AXIS, ECG10: 35 DEGREES
CALCULATED T AXIS, ECG11: 19 DEGREES
DIAGNOSIS, 93000: NORMAL
P-R INTERVAL, ECG05: 156 MS
Q-T INTERVAL, ECG07: 350 MS
QRS DURATION, ECG06: 80 MS
QTC CALCULATION (BEZET), ECG08: 393 MS
VENTRICULAR RATE, ECG03: 76 BPM

## 2017-11-20 DIAGNOSIS — Z98.890 S/P LUMBAR LAMINECTOMY: ICD-10-CM

## 2017-11-20 DIAGNOSIS — M96.1 LUMBAR POST-LAMINECTOMY SYNDROME: ICD-10-CM

## 2017-11-20 RX ORDER — CIPROFLOXACIN 500 MG/1
500 TABLET ORAL 2 TIMES DAILY
Qty: 14 TAB | Refills: 0 | Status: SHIPPED | OUTPATIENT
Start: 2017-11-20 | End: 2017-11-27

## 2017-11-20 NOTE — TELEPHONE ENCOUNTER
PHONE IN RX    Last Visit: 10/27/2017 with MD Jay Jay Crocker    Next Appointment: 02/14/2018 with MD Mimi Chowdhury   Previous Refill Encounters: 07/25/2017 per MD Jay Jay Crocker #[id number]     Requested Prescriptions     Pending Prescriptions Disp Refills    traMADol (ULTRAM) 50 mg tablet 120 Tab 0     Sig: Take 1 Tab by mouth every six (6) hours as needed for Pain. Max Daily Amount: 200 mg.

## 2017-11-21 RX ORDER — TRAMADOL HYDROCHLORIDE 50 MG/1
50 TABLET ORAL
Qty: 120 TAB | Refills: 0 | OUTPATIENT
Start: 2017-11-21 | End: 2018-02-13

## 2017-11-21 NOTE — TELEPHONE ENCOUNTER
-called in prescription to pharmacy via Rx voicemail  -called patient and verified name and . Patient was informed that prescription was called into pharmacy.  The patient verbalized understanding

## 2017-11-27 ENCOUNTER — DOCUMENTATION ONLY (OUTPATIENT)
Dept: ORTHOPEDIC SURGERY | Age: 72
End: 2017-11-27

## 2017-11-27 NOTE — PROGRESS NOTES
Patient has had a family emergency. She is canceling her upcoming total shoulder replacement. Will call back to rescheudle.

## 2018-01-22 DIAGNOSIS — Z98.890 S/P LUMBAR LAMINECTOMY: ICD-10-CM

## 2018-01-22 DIAGNOSIS — M96.1 LUMBAR POST-LAMINECTOMY SYNDROME: ICD-10-CM

## 2018-01-22 RX ORDER — TRAMADOL HYDROCHLORIDE 50 MG/1
50 TABLET ORAL
Qty: 120 TAB | Refills: 0 | OUTPATIENT
Start: 2018-01-22

## 2018-01-22 NOTE — TELEPHONE ENCOUNTER
PHONE IN RX    Last Visit: 10/27/2017 with MD Ardath Saint    Next Appointment: 02/14/2018 with MD Nadira Hays   Previous Refill Encounters: 11/21/2017 per NP Ramin Carson #[id number]     Requested Prescriptions     Pending Prescriptions Disp Refills    traMADol (ULTRAM) 50 mg tablet 120 Tab 0     Sig: Take 1 Tab by mouth every six (6) hours as needed for Pain. Max Daily Amount: 200 mg.

## 2018-01-22 NOTE — TELEPHONE ENCOUNTER
Please contact the patient to schedule an earlier appointment per refusal reason below.      Refused by: Cadence Polanco NP  Refusal reason: Appt required, please call patient (must be seen prior to RF)

## 2018-01-24 DIAGNOSIS — Z01.818 PREOP EXAMINATION: Primary | ICD-10-CM

## 2018-01-26 ENCOUNTER — HOSPITAL ENCOUNTER (OUTPATIENT)
Dept: PREADMISSION TESTING | Age: 73
Discharge: HOME | End: 2018-01-26
Payer: MEDICARE

## 2018-01-26 DIAGNOSIS — Z01.818 PREOP EXAMINATION: ICD-10-CM

## 2018-01-26 LAB
ALBUMIN SERPL-MCNC: 4.5 G/DL (ref 3.4–5)
ALBUMIN/GLOB SERPL: 1.4 {RATIO} (ref 0.8–1.7)
ALP SERPL-CCNC: 69 U/L (ref 45–117)
ALT SERPL-CCNC: 22 U/L (ref 13–56)
ANION GAP SERPL CALC-SCNC: 7 MMOL/L (ref 3–18)
APPEARANCE UR: CLEAR
AST SERPL-CCNC: 19 U/L (ref 15–37)
BACTERIA URNS QL MICRO: NEGATIVE /HPF
BASOPHILS # BLD: 0 K/UL (ref 0–0.06)
BASOPHILS NFR BLD: 0 % (ref 0–2)
BILIRUB SERPL-MCNC: 0.4 MG/DL (ref 0.2–1)
BILIRUB UR QL: NEGATIVE
BUN SERPL-MCNC: 21 MG/DL (ref 7–18)
BUN/CREAT SERPL: 33 (ref 12–20)
CALCIUM SERPL-MCNC: 9.2 MG/DL (ref 8.5–10.1)
CHLORIDE SERPL-SCNC: 106 MMOL/L (ref 100–108)
CO2 SERPL-SCNC: 27 MMOL/L (ref 21–32)
COLOR UR: YELLOW
CREAT SERPL-MCNC: 0.63 MG/DL (ref 0.6–1.3)
DIFFERENTIAL METHOD BLD: NORMAL
EOSINOPHIL # BLD: 0.2 K/UL (ref 0–0.4)
EOSINOPHIL NFR BLD: 3 % (ref 0–5)
EPITH CASTS URNS QL MICRO: ABNORMAL /LPF (ref 0–5)
ERYTHROCYTE [DISTWIDTH] IN BLOOD BY AUTOMATED COUNT: 14.5 % (ref 11.6–14.5)
GLOBULIN SER CALC-MCNC: 3.3 G/DL (ref 2–4)
GLUCOSE SERPL-MCNC: 79 MG/DL (ref 74–99)
GLUCOSE UR STRIP.AUTO-MCNC: NEGATIVE MG/DL
HCT VFR BLD AUTO: 37.9 % (ref 35–45)
HGB BLD-MCNC: 12.8 G/DL (ref 12–16)
HGB UR QL STRIP: NEGATIVE
INR PPP: 1 (ref 0.8–1.2)
KETONES UR QL STRIP.AUTO: 15 MG/DL
LEUKOCYTE ESTERASE UR QL STRIP.AUTO: ABNORMAL
LYMPHOCYTES # BLD: 2.6 K/UL (ref 0.9–3.6)
LYMPHOCYTES NFR BLD: 49 % (ref 21–52)
MCH RBC QN AUTO: 29.9 PG (ref 24–34)
MCHC RBC AUTO-ENTMCNC: 33.8 G/DL (ref 31–37)
MCV RBC AUTO: 88.6 FL (ref 74–97)
MONOCYTES # BLD: 0.3 K/UL (ref 0.05–1.2)
MONOCYTES NFR BLD: 5 % (ref 3–10)
MUCOUS THREADS URNS QL MICRO: ABNORMAL /LPF
NEUTS SEG # BLD: 2.3 K/UL (ref 1.8–8)
NEUTS SEG NFR BLD: 43 % (ref 40–73)
NITRITE UR QL STRIP.AUTO: NEGATIVE
PH UR STRIP: 6 [PH] (ref 5–8)
PLATELET # BLD AUTO: 305 K/UL (ref 135–420)
PMV BLD AUTO: 11.1 FL (ref 9.2–11.8)
POTASSIUM SERPL-SCNC: 3.7 MMOL/L (ref 3.5–5.5)
PROT SERPL-MCNC: 7.8 G/DL (ref 6.4–8.2)
PROT UR STRIP-MCNC: NEGATIVE MG/DL
PROTHROMBIN TIME: 13 SEC (ref 11.5–15.2)
RBC # BLD AUTO: 4.28 M/UL (ref 4.2–5.3)
RBC #/AREA URNS HPF: ABNORMAL /HPF (ref 0–5)
SODIUM SERPL-SCNC: 140 MMOL/L (ref 136–145)
SP GR UR REFRACTOMETRY: 1.02 (ref 1–1.03)
UROBILINOGEN UR QL STRIP.AUTO: 1 EU/DL (ref 0.2–1)
WBC # BLD AUTO: 5.4 K/UL (ref 4.6–13.2)
WBC URNS QL MICRO: ABNORMAL /HPF (ref 0–4)

## 2018-01-26 PROCEDURE — 87086 URINE CULTURE/COLONY COUNT: CPT | Performed by: ORTHOPAEDIC SURGERY

## 2018-01-26 PROCEDURE — 85610 PROTHROMBIN TIME: CPT | Performed by: ORTHOPAEDIC SURGERY

## 2018-01-26 PROCEDURE — 81001 URINALYSIS AUTO W/SCOPE: CPT | Performed by: ORTHOPAEDIC SURGERY

## 2018-01-26 PROCEDURE — 85025 COMPLETE CBC W/AUTO DIFF WBC: CPT | Performed by: ORTHOPAEDIC SURGERY

## 2018-01-26 PROCEDURE — 36415 COLL VENOUS BLD VENIPUNCTURE: CPT | Performed by: ORTHOPAEDIC SURGERY

## 2018-01-26 PROCEDURE — 80053 COMPREHEN METABOLIC PANEL: CPT | Performed by: ORTHOPAEDIC SURGERY

## 2018-01-28 LAB
BACTERIA SPEC CULT: NORMAL
SERVICE CMNT-IMP: NORMAL

## 2018-01-29 ENCOUNTER — OFFICE VISIT (OUTPATIENT)
Dept: ORTHOPEDIC SURGERY | Age: 73
End: 2018-01-29

## 2018-01-29 VITALS
WEIGHT: 146 LBS | SYSTOLIC BLOOD PRESSURE: 139 MMHG | DIASTOLIC BLOOD PRESSURE: 57 MMHG | RESPIRATION RATE: 16 BRPM | BODY MASS INDEX: 26.87 KG/M2 | HEIGHT: 62 IN | HEART RATE: 68 BPM | OXYGEN SATURATION: 100 %

## 2018-01-29 DIAGNOSIS — Z01.818 PRE-OPERATIVE EXAMINATION: ICD-10-CM

## 2018-01-29 DIAGNOSIS — M21.612 BUNION OF LEFT FOOT: Primary | ICD-10-CM

## 2018-01-29 DIAGNOSIS — N39.0 URINARY TRACT INFECTION WITHOUT HEMATURIA, SITE UNSPECIFIED: ICD-10-CM

## 2018-01-29 DIAGNOSIS — M76.821 POSTERIOR TIBIAL TENDONITIS, RIGHT: ICD-10-CM

## 2018-01-29 RX ORDER — POLYETHYLENE GLYCOL 3350 17 G/17G
17 POWDER, FOR SOLUTION ORAL DAILY
Qty: 10 PACKET | Refills: 1 | Status: SHIPPED | OUTPATIENT
Start: 2018-01-29 | End: 2018-05-01

## 2018-01-29 RX ORDER — PROMETHAZINE HYDROCHLORIDE 25 MG/1
25 TABLET ORAL
Qty: 30 TAB | Refills: 0 | Status: SHIPPED | OUTPATIENT
Start: 2018-01-29 | End: 2018-05-01

## 2018-01-29 RX ORDER — HYDROCODONE BITARTRATE AND ACETAMINOPHEN 7.5; 325 MG/1; MG/1
TABLET ORAL
Qty: 50 TAB | Refills: 0 | Status: SHIPPED | OUTPATIENT
Start: 2018-01-29 | End: 2018-02-13 | Stop reason: SDUPTHER

## 2018-01-29 RX ORDER — SULFAMETHOXAZOLE AND TRIMETHOPRIM 800; 160 MG/1; MG/1
1 TABLET ORAL 2 TIMES DAILY
Qty: 14 TAB | Refills: 0 | Status: SHIPPED | OUTPATIENT
Start: 2018-01-29 | End: 2018-02-05

## 2018-01-29 NOTE — PATIENT INSTRUCTIONS
Dr. Dede Mares Pre-operative Instructions:      Patient: Kelsey Goodman   :  1945     I understand I am to stop taking oral birth control pills, hormonal replacement therapy and all Aspirin, Aspirin containing medications, Non-Steroidal Anti-Inflammatory medications (such as Advil, Aleve, Motrin, Ibuprofen) and or Blood thinner medication such as Coumadin, Plavix, Heparin or others 5 days prior to surgery. I understand I am to STOP taking these Medications 5 days prior to surgery:  I am to get instructions from my prescribing physician. 1. __As listed above_______________________  2. _____________________________________  3. _____________________________________  4. _____________________________________    I understand that if I am taking daily medications for high blood pressure, I can take them the morning of surgery with a small sip of water. I will consult my prescribing physician or call [facility] with specific questions. I also understand that:     I am to report important observations or changes that may occur prior to surgery. If I have any changes in my physical condition, such as a rash, a fever, sore throat, abscess, ulcers, nausea, vomiting, or diarrhea. I am to call the office and I am to consult my primary care physician to assess and treat the problem.  I am not to eat or drink anything after midnight the night before my surgery.  I am not to drink alcoholic beverages 24 hours prior to surgery.  I am not to do any illegal drugs prior to surgery.  I am not to smoke at least 24 hours prior to surgery.  I am able and will shower or bathe before surgery. I will use the Hibiclens solution on my surgical site only. The hibiclens directions are one packet a day starting two days before surgery.  I will remove any nail polish, make-up or jewelry prior to arriving for my surgery.       If I wear glasses, contact lenses or dentures they must be removed prior to going to the operating room.  All body piercing and artifical eye-lashes must be removed prior to surgery     I will not wear any aerosol sprays, perfumes or skin creams.  I am to make arrangements for a family member or friend to accompany me to surgery and take me home after my surgery as I will not be allowed to leave the hospital alone. A cab or bus will not be acceptable. Please make arrange for someone to stay with you for 24 hours after surgery.  Patient has expressed understanding of the diagnosis, treatment and planned surgery        Surgery: What to Expect at [address] [place] [address]  This care sheet gives you a general idea about how long it will take for you to recover from your surgery. But each person recovers at a different pace. How can you care for yourself at home? Activity  · Allow your body to heal. Don't move quickly or lift anything heavy until you are feeling better. · Rest when you feel tired. · Your doctor may give you specific instructions on when you can do your normal activities again, such as driving and going back to work. · Be active. Walking is a good choice. Diet  · You can eat your normal diet when you feel well. If your stomach is upset, try bland, low-fat foods like plain rice, broiled chicken, toast, and yogurt. · If your bowel movements are not regular right after surgery, try to avoid constipation and straining. Drink plenty of water. Your doctor may suggest fiber, a stool softener, or a mild laxative. Medicines  · Your doctor will tell you if and when you can restart your medicines. He or she will also give you instructions about taking any new medicines. · If you take blood thinners, such as warfarin (Coumadin), clopidogrel (Plavix), or aspirin, be sure to talk to your doctor. He or she will tell you if and when to start taking those medicines again. Make sure that you understand exactly what your doctor wants you to do. · Be safe with medicines. Read and follow all instructions on the label. ¨ If the doctor gave you a prescription medicine for pain, take it as prescribed. ¨ If you are not taking a prescription pain medicine, ask your doctor if you can take an over-the-counter medicine. Incision care  · You will have a dressing over the cut (incision). A dressing helps the incision heal and protects it. Your doctor will tell you how to take care of this. · If you have strips of tape on the cut the doctor made, leave the tape on for a week or until it falls off. · If you had stitches, your doctor will tell you when to come back to have them removed. · If you have skin adhesive on the cut, leave it on until it falls off. Skin adhesive is also called liquid stitches. · Change the bandage every day. · Wash the area daily with warm, soapy water, and pat it dry. Don't use hydrogen peroxide or alcohol. They can slow healing. · You may cover the area with a gauze bandage if it oozes fluid or rubs against clothing. · You may shower 24 to 48 hours after surgery. Pat the incision dry. Don't swim or take a bath for the first 2 weeks, or until your doctor tells you it is okay. Follow-up care is a key part of your treatment and safety. Be sure to make and go to all appointments, and call your doctor if you are having problems. It's also a good idea to know your test results and keep a list of the medicines you take. When should you call for help? Call 911 anytime you think you may need emergency care. For example, call if:  · You passed out (lost consciousness). · You have severe trouble breathing. · You have sudden chest pain and shortness of breath, or you cough up blood. Call your doctor now or seek immediate medical care if:  · You have pain that does not get better after you take pain medicine. · You have loose stitches, or your incision comes open. · You are bleeding through your dressing.  A small amount of blood is normal.  · You have signs of infection, such as:  ¨ Increased pain, swelling, warmth, or redness. ¨ Red streaks leading from the incision. ¨ Pus draining from the incision. ¨ A fever. · You have symptoms of a blood clot in your arm or leg (called a deep vein thrombosis). These may include:  ¨ Pain in your calf, back of the knee, thigh, or groin. ¨ Redness and swelling in the arm, leg, or groin. Watch closely for any changes in your health, and be sure to contact your doctor if:  · You do not have a bowel movement after taking a laxative. Where can you learn more? Go to http://horace-marco.info/  Enter J438 in the search box to learn more about \"Surgery: What to Expect at Home. \"  © 6957-3497 [employer]. Care instructions adapted under license by [employer] (which disclaims liability or warranty for this information). This care instruction is for use with your licensed healthcare professional. If you have questions about a medical condition or this instruction, always ask your healthcare professional. [address] any warranty or liability for your use of this information. Content Version: 60.2.756799; Current as of: November 20, 2015          Acute Pain After Surgery: Care Instructions  Your Care Instructions      It's common to have some pain after surgery. Pain doesn't mean that something is wrong or that the surgery didn't go well. But when the pain is severe, it's important to work with your doctor to manage it. It's also important to be aware of a few facts about pain and pain medicine. · You are the only person who knows what your pain feels like. So be sure to tell your doctor when you are in pain or when the pain changes. Then he or she will know how to adjust your medicines. · Pain is often easier to control right after it starts. So it may be better to take regular doses of pain medicine and not wait until the pain gets bad. · Medicine can help control pain. But this doesn't mean you'll have no pain. Medicine works to keep the pain at a level you can live with. With time, you will feel better. Follow-up care is a key part of your treatment and safety. Be sure to make and go to all appointments, and call your doctor if you are having problems. It's also a good idea to know your test results and keep a list of the medicines you take. How can you care for yourself at home? · Be safe with medicines. Read and follow all instructions on the label. ¨ If the doctor gave you a prescription medicine for pain, take it as prescribed. ¨ If you are not taking a prescription pain medicine, ask your doctor if you can take an over-the-counter medicine. · If you take an over-the-counter pain medicine, such as acetaminophen (Tylenol), ibuprofen (Advil, Motrin), or naproxen (Aleve), read and follow all instructions on the label. · Do not take two or more pain medicines at the same time unless the doctor told you to. · Do not drink alcohol while you are taking pain medicines. · Try to walk each day if your doctor recommends it. Start by walking a little more than you did the day before. Bit by bit, increase the amount you walk. Walking increases blood flow. It also helps prevent pneumonia and constipation. · To prevent constipation from opioid pain medicines:  ¨ Talk to your doctor about a laxative. ¨ Include fruits, vegetables, beans, and whole grains in your diet each day. These foods are high in fiber. ¨ Drink plenty of fluids, enough so that your urine is light yellow or clear like water. Drink water, fruit juice, or other drinks that do not contain caffeine or alcohol. If you have kidney, heart, or liver disease and have to limit fluids, talk with your doctor before you increase the amount of fluids you drink. ¨ Take a fiber supplement, such as Citrucel or Metamucil, every day if needed. Read and follow all instructions on the label.  If you take pain medicine for more than a few days, talk to your doctor before you take fiber. When should you call for help? Call your doctor now or seek immediate medical care if:  ? · Your pain gets worse. ? · Your pain is not controlled by medicine. ? Watch closely for changes in your health, and be sure to contact your doctor if you have any problems. Where can you learn more? Go to http://horace-marco.info/. Enter [phone] [phone] in the search box to learn more about \"Acute Pain After Surgery: Care Instructions. \"  Current as of: March 20, 2017  Content Version: 11.4  © 1195-5031 [employer]. Care instructions adapted under license by [employer] (which disclaims liability or warranty for this information). If you have questions about a medical condition or this instruction, always ask your healthcare professional. [address] any warranty or liability for your use of this information.

## 2018-01-29 NOTE — PROGRESS NOTES
FOOT AND ANKLE HISTORY AND PHYSICAL      Patient: Arian Batres                   MRN: [id number]         SSN: [id number]  YOB: 1945                       AGE: 67 y.o. SEX: female    Patient scheduled for:  Left Lapidus bunionectomy procedure, possible fusion  Date of surgery: 2/1/18  Location of Surgery: [address] [place] [address] at [facility]  Surgeon: Sonya Rm. MD Ember  ANESTHESIA TYPE:  General, Popliteal block          PRESCRIPTIONS AND/OR ORDERS PROVIDED DURING H&P:    Orders Placed This Encounter    AMB SUPPLY ORDER    AMB SUPPLY ORDER    Generic Supply Order    Generic Supply Order    trimethoprim-sulfamethoxazole (BACTRIM DS, SEPTRA DS) 160-800 mg per tablet    HYDROcodone-acetaminophen (NORCO) 7.5-325 mg per tablet    promethazine (PHENERGAN) 25 mg tablet    polyethylene glycol (MIRALAX) 17 gram packet              HISTORY:     The patient was seen in the office today for a preoperative history and physical for an upcoming above listed surgery. The patient is a pleasant 67 y.o. female who has a history of left foot pain. The patient states that she does not experience much pain from the bunion unless she is walking on it for prolonged periods of time. She notes that she has to put a band-aid over the bunion to wear shoes more comfortably, and prevent irritation. She reports that after 15 minutes of walking elicits pain on the left foot bunion. The patient notes that otherwise from walking the left foot bunion does not bother her much. Various surgical interventions were discussed with the patient and she has decided to proceed with a Lapidus bunionectomy procedure, possible fusion. She reports that she has continued to have right foot pain medially. She states that the insert we previously provided does not help. On examination, patient has planovalgus alignment and mild TTP along the posterior tibial tendon.  Order has been provided for a custom SMO brace, right. Due to the current findings, affected activity of daily living and continued pain and discomfort, surgical intervention is indicated. The alternatives, risks, and complications, including but not limited to infection, blood loss, need for blood transfusion, neurovascular damage, gris-incisional numbness, subcutaneous hematoma, bone fracture, anesthetic complications, DVT, PE, death, RSD, postoperative stiffness and pain, possible surgical scar, delayed healing and nonhealing, reflexive sympathetic dystrophy, damage to blood vessels and nerves, need for more surgery, MI, and stroke have been discussed. The patient understands and wishes to proceed with surgery. Patient reports a history of bladder cancer and had a bladder tumor removed in 2015. She states that she had a BCG (bladder culture) last week (which may explain abnormal UA). UA reveals a few mucus and small Leukocyte Esterase. Culture revealed mixed gram positive hans, probable skin/genitual contamination. Bactrim DS has been prescribed. PAST MEDICAL HISTORY:     Past Medical History:   Diagnosis Date    Bladder cancer ([facility] [address] 2015 (approx)    Foraminal stenosis of lumbar region     Hematuria     Hypercholesterolemia     Low back pain        CURRENT MEDICATIONS:     Current Outpatient Prescriptions   Medication Sig Dispense Refill    trimethoprim-sulfamethoxazole (BACTRIM DS, SEPTRA DS) 160-800 mg per tablet Take 1 Tab by mouth two (2) times a day for 7 days. 14 Tab 0    HYDROcodone-acetaminophen (NORCO) 7.5-325 mg per tablet TAKE ONE TO TWO TABLETS BY MOUTH Q 4 - 6 HRS PRN PAIN **AFTER SURGERY**DO NOT TAKE BEFORE SURGERY  Indications: Pain 50 Tab 0    promethazine (PHENERGAN) 25 mg tablet Take 1 Tab by mouth every six (6) hours as needed for Nausea. 30 Tab 0    polyethylene glycol (MIRALAX) 17 gram packet Take 1 Packet by mouth daily.  10 Packet 1    traMADol (ULTRAM) 50 mg tablet Take 1 Tab by mouth every six (6) hours as needed for Pain. Max Daily Amount: 200 mg. 120 Tab 0    MULTIVIT-MINERALS/FOLIC ACID (THERALOGIX  PO) Take 2 Tabs by mouth daily.  gabapentin (NEURONTIN) 300 mg capsule TK 2 CS PO Q NIGHT  3    naproxen sodium (ALEVE) 220 mg cap Take  by mouth as needed.  ondansetron (ZOFRAN ODT) 4 mg disintegrating tablet DISSOLVE 1 T ON TONGUE Q 8 H PRN NV  0    meclizine (ANTIVERT) 12.5 mg tablet Take  by mouth three (3) times daily as needed.  traZODone (DESYREL) 50 mg tablet TK 1 T PO QHS  0    aspirin delayed-release 81 mg tablet Take  by mouth daily.  multivitamin (ONE A DAY) tablet Take 1 Tab by mouth daily.  pravastatin (PRAVACHOL) 20 mg tablet Take 20 mg by mouth nightly.   5       ALLERGIES:     Allergies   Allergen Reactions    Simvastatin Unknown (comments)    Lipitor [Atorvastatin] Myalgia         SURGICAL HISTORY:     Past Surgical History:   Procedure Laterality Date    HX BUNIONECTOMY      HX COLONOSCOPY      HX LUMBAR LAMINECTOMY  6/2/15    with fusion    HX ORTHOPAEDIC  Bunionionectomy R    HX TUBAL LIGATION      HX UROLOGICAL      treatment BCG for Bladder Cancer       SOCIAL HISTORY:     Social History     Social History    Marital status:      Spouse name: N/A    Number of children: N/A    Years of education: N/A     Occupational History    nurse      Social History Main Topics    Smoking status: Former Smoker     Quit date: 1/1/1980    Smokeless tobacco: Never Used    Alcohol use 0.0 oz/week     0 Standard drinks or equivalent per week      Comment: 1 glass of wine 3-4 times per month    Drug use: No    Sexual activity: Not Asked     Other Topics Concern    None     Social History Narrative       FAMILY HISTORY:     Family History   Problem Relation Age of Onset    No Known Problems Mother     No Known Problems Father     Diabetes Sister        REVIEW OF SYSTEMS:     Negative for fevers, chills, chest pain, shortness of breath, weight loss, recent illness. General: Negative for fever and chills. No unexpected change in weight. Denies fatigue. No change in appetite. Skin: Negative for rash or itching. HEENT: Negative for congestion, sore throat, neck pain and neck stiffness. No change in vision or hearing. Hasn't noted any enlarged lymph nodes in the neck. Cardiovascular:  Negative for chest pain and palpitations. Has not noted pedal edema. Respiratory: Negative for cough, colds, sinus, hemoptysis, shortness of breath and wheezing. Gastrointestinal: Negative for nausea and vomiting, rectal bleeding, coffee ground emesis, abdominal pain, diarrhea and constipation. Genitourinary: Negative for dysuria, frequency urgency, or burning on micturition. No flank pain, no foul smelling urine, no difficulty with initiating urination. Hematological: Negative for bleeding or easy bruising. Musculoskeletal: Negative  for arthralgias, back pain or neck pain. Neurological: Negative for dizziness, seizures or syncopal episodes. Denies headaches. Endocrine: Denies excessive thirst.  No heat/cold intolerance. Psychiatric: Negative for depression or insomnia. PHYSICAL EXAMINATION:     VITALS:   Visit Vitals    /57 (BP 1 Location: Left arm, BP Patient Position: Sitting)    Pulse 68    Resp 16    Ht 5' 2\" (1.575 m)    Wt 146 lb (66.2 kg)    SpO2 100%    BMI 26.7 kg/m2       GEN:  Well developed, well nourished 67 y.o. female in no acute distress. PSYCH: Alert an oriented to person, place and time. Mood, memory, affect, behavior and judgment normal  HEENT: Normocephalic and atraumatic. Eyes: Conjunctivae and EOM are normal.Pupils are equal, round, and reactive to light. External ear normal appearance, external nose normal appearing. Mouth/Throat: Oropharynx is clear and moist, able to handle oral secretions w/out difficulty, airway patent  NECK: Supple. Normal ROM, No lymphadenopathy. Trachea is midline.  No bruising, swelling or deformity  RESP: Clear to auscultation bilaterally. No wheezes, rales, rhonchi. Normal effort and breath sounds. No respiratory distress  CARDIO: Normal rate, regular rhythm and normal heart sounds. No MGR. ABDOMEN: Soft, non-tender, non-distended, normoactive bowel sounds in all four quadrants. There is no tenderness. There is no rebound and no guarding. BACK: No CVA or spinal tenderness  BREAST:  Deferred  PELVIC:    Deferred   RECTAL:  Deferred   :           Deferred        Left ANKLE and FOOT       Gait: slow  Tenderness: no tenderness   Cutaneous: No rashes, skin patches, wounds, or abrasions to the lower legs           Warm and Normal color. No regions of expressible drainage. Medial Border of Tibia Region: absent           Skin color, texture, turgor normal. Normal.           Severe Bunion  Joint Motion: ROM Ankle:Normal , Hindfoot: (ST,TN,CC Normal}, Forefoot toes:Normal  Neurologic Exam: Neuro: Motor: normal 5/5 strength in all tested muscle groups and Sensory : no sensory deficits noted. Contractures: Gastrocnemius or Achilles Contractures absent  Joint / Tendon Stability: No Ankle or Subtalar instability or joint laxity. No peroneal sublux ability or dislocation  Alignment: Pes Planus, with Valgus alignment and  Flexible. Large bunion  Vascular: Normal Pulses/ NL Capillary refill, No evidence of DVT seen on physical exam.   No calf swelling, no tenderness to calf muscles. Lymphatic:  No Evidence of Lymphedema. RIGHT FOOT and ANKLE  ALIGNMENT: Planovalgus foot deformity  SKIN: no edema , no erythema, no  ecchymosis    TENDERNESS:  mild tenderness to palpation along posterior tibial tendon  NEUROVASCULAR: Sensation intact to light touch and strength grossly intact and symmetrical. No nystagmus. Positive distal pulses and capillary refill. DVT ASSESSMENT:  There is not  calf tenderness.  No evidence of DVT seen on physical exam.  ROM: within normal limits  MOTOR: In tact      RADIOGRAPHS & DIAGNOSTIC STUDIES:     9/20/2017 11:25 AM    X-rays of the right foot, three views, AP, lateral, and oblique:    These films show postop changes from a remote bunionectomy   procedure.  She still has some hallucis valgus alignment in this   right great toe, mild, with some abduction at the talonavicular   region primarily.  The midfoot joints appear to be well   maintained on this right foot.      AP, lateral, and oblique of the left foot: Ulysses Marvel is a healed   second metatarsal shaft fracture. Ulysses Marvel is a severe bunion   deformity with a non-concentric first MTP joint, in which the   joint is at last almost 50% subluxed.  There is planovalgus   alignment in this lateral radiographic weightbearing film on this   left side. LABS:     @  CBC:   Lab Results   Component Value Date/Time    WBC 5.4 01/26/2018 04:31 PM    RBC 4.28 01/26/2018 04:31 PM    HGB 12.8 01/26/2018 04:31 PM    HCT 37.9 01/26/2018 04:31 PM    PLATELET 125 27/74/3811 04:31 PM   , CMP:   Lab Results   Component Value Date/Time    Glucose 79 01/26/2018 04:31 PM    Sodium 140 01/26/2018 04:31 PM    Potassium 3.7 01/26/2018 04:31 PM    Chloride 106 01/26/2018 04:31 PM    CO2 27 01/26/2018 04:31 PM    BUN 21 01/26/2018 04:31 PM    Creatinine 0.63 01/26/2018 04:31 PM    Calcium 9.2 01/26/2018 04:31 PM    Anion gap 7 01/26/2018 04:31 PM    BUN/Creatinine ratio 33 01/26/2018 04:31 PM    Alk.  phosphatase 69 01/26/2018 04:31 PM    Protein, total 7.8 01/26/2018 04:31 PM    Albumin 4.5 01/26/2018 04:31 PM    Globulin 3.3 01/26/2018 04:31 PM    A-G Ratio 1.4 01/26/2018 04:31 PM    and Coagulation:   Lab Results   Component Value Date/Time    Prothrombin time 13.0 01/26/2018 04:31 PM    INR 1.0 01/26/2018 04:31 PM    aPTT 31.6 11/17/2017 03:46 PM   @    Preoperative labs were reviewed and are substantially within normal limits except as follows:  Patient reports a history of bladder cancer and had a bladder tumor removed in 2015. She states that she had a BCG (bladder culture) last week. UA reveals a few mucus and small Leukocyte Esterase. Culture revealed mixed gram positive hans, probable skin/genitual contamination. Bactrim DS has been prescribed. 19098   COLONIES/mL   MIXED GRAM POSITIVE HANS, PROBABLE SKIN/GENITAL CONTAMINATION. Chest X-ray revealed no acute cardiopulmonary process   EKG:   Normal sinus rhythm   Possible Left atrial enlargement   Nonspecific T wave abnormality   Abnormal ECG   When compared with ECG of 03-AUG-2016 15:17,   No significant change was found   Confirmed by Mars Hughes MD, Holly Schwab ([id number]) on 11/18/2017 8:57:29 AM     ASSESSMENT:       Encounter Diagnoses   Name Primary?  Bunion of left foot Yes    Pre-operative examination     Posterior tibial tendonitis, right     Urinary tract infection without hematuria, site unspecified        PLAN:     Again, the alternatives, risks, and complications, as well as expected outcome were discussed. The patient understands and agrees to proceed with the above listed surgery pending EKG review by anesthesia. Patient has a history of bladder cancer and reports that she had a bladder culture last week which could explain her abnormal UA which is slightly abnormal. Will treat with Bactrim. Patient has been given Hibiclens wash with instructions and prescriptions and or orders listed above.     Marc Charles PA-C  1/29/2018  3:51 PM

## 2018-01-30 ENCOUNTER — DOCUMENTATION ONLY (OUTPATIENT)
Dept: ORTHOPEDIC SURGERY | Age: 73
End: 2018-01-30

## 2018-01-30 NOTE — H&P
FOOT AND ANKLE HISTORY AND PHYSICAL      Patient: Lacey Easley                   MRN: [id number]         SSN: [id number]  YOB: 1945                       AGE: 67 y.o. SEX: female    Patient scheduled for:  Left Lapidus bunionectomy procedure, possible fusion  Date of surgery: 2/1/18  Location of Surgery: [address] [place] [address] at [facility]  Surgeon: Selene Pa MD  ANESTHESIA TYPE:  General, Popliteal block          PRESCRIPTIONS AND/OR ORDERS PROVIDED DURING H&P:    Orders Placed This Encounter    AMB SUPPLY ORDER    AMB SUPPLY ORDER    Generic Supply Order    Generic Supply Order    trimethoprim-sulfamethoxazole (BACTRIM DS, SEPTRA DS) 160-800 mg per tablet    HYDROcodone-acetaminophen (NORCO) 7.5-325 mg per tablet    promethazine (PHENERGAN) 25 mg tablet    polyethylene glycol (MIRALAX) 17 gram packet              HISTORY:     The patient was seen in the office today for a preoperative history and physical for an upcoming above listed surgery. The patient is a pleasant 67 y.o. female who has a history of left foot pain. The patient states that she does not experience much pain from the bunion unless she is walking on it for prolonged periods of time. She notes that she has to put a band-aid over the bunion to wear shoes more comfortably, and prevent irritation. She reports that after 15 minutes of walking elicits pain on the left foot bunion. The patient notes that otherwise from walking the left foot bunion does not bother her much. Various surgical interventions were discussed with the patient and she has decided to proceed with a Lapidus bunionectomy procedure, possible fusion. She reports that she has continued to have right foot pain medially. She states that the insert we previously provided does not help. On examination, patient has planovalgus alignment and mild TTP along the posterior tibial tendon.  Order has been provided for a custom SMO brace, right. Due to the current findings, affected activity of daily living and continued pain and discomfort, surgical intervention is indicated. The alternatives, risks, and complications, including but not limited to infection, blood loss, need for blood transfusion, neurovascular damage, gris-incisional numbness, subcutaneous hematoma, bone fracture, anesthetic complications, DVT, PE, death, RSD, postoperative stiffness and pain, possible surgical scar, delayed healing and nonhealing, reflexive sympathetic dystrophy, damage to blood vessels and nerves, need for more surgery, MI, and stroke have been discussed. The patient understands and wishes to proceed with surgery. Patient reports a history of bladder cancer and had a bladder tumor removed in 2015. She states that she had a BCG (bladder culture) last week (which may explain abnormal UA). UA reveals a few mucus and small Leukocyte Esterase. Culture revealed mixed gram positive hans, probable skin/genitual contamination. Bactrim DS has been prescribed. PAST MEDICAL HISTORY:     Past Medical History:   Diagnosis Date    Bladder cancer ([facility] [address] 2015 (approx)    Foraminal stenosis of lumbar region     Hematuria     Hypercholesterolemia     Low back pain        CURRENT MEDICATIONS:     Current Outpatient Prescriptions   Medication Sig Dispense Refill    trimethoprim-sulfamethoxazole (BACTRIM DS, SEPTRA DS) 160-800 mg per tablet Take 1 Tab by mouth two (2) times a day for 7 days. 14 Tab 0    HYDROcodone-acetaminophen (NORCO) 7.5-325 mg per tablet TAKE ONE TO TWO TABLETS BY MOUTH Q 4 - 6 HRS PRN PAIN **AFTER SURGERY**DO NOT TAKE BEFORE SURGERY  Indications: Pain 50 Tab 0    promethazine (PHENERGAN) 25 mg tablet Take 1 Tab by mouth every six (6) hours as needed for Nausea. 30 Tab 0    polyethylene glycol (MIRALAX) 17 gram packet Take 1 Packet by mouth daily.  10 Packet 1    traMADol (ULTRAM) 50 mg tablet Take 1 Tab by mouth every six (6) hours as needed for Pain. Max Daily Amount: 200 mg. 120 Tab 0    MULTIVIT-MINERALS/FOLIC ACID (THERALOGIX  PO) Take 2 Tabs by mouth daily.  gabapentin (NEURONTIN) 300 mg capsule TK 2 CS PO Q NIGHT  3    naproxen sodium (ALEVE) 220 mg cap Take  by mouth as needed.  ondansetron (ZOFRAN ODT) 4 mg disintegrating tablet DISSOLVE 1 T ON TONGUE Q 8 H PRN NV  0    meclizine (ANTIVERT) 12.5 mg tablet Take  by mouth three (3) times daily as needed.  traZODone (DESYREL) 50 mg tablet TK 1 T PO QHS  0    aspirin delayed-release 81 mg tablet Take  by mouth daily.  multivitamin (ONE A DAY) tablet Take 1 Tab by mouth daily.  pravastatin (PRAVACHOL) 20 mg tablet Take 20 mg by mouth nightly.   5       ALLERGIES:     Allergies   Allergen Reactions    Simvastatin Unknown (comments)    Lipitor [Atorvastatin] Myalgia         SURGICAL HISTORY:     Past Surgical History:   Procedure Laterality Date    HX BUNIONECTOMY      HX COLONOSCOPY      HX LUMBAR LAMINECTOMY  6/2/15    with fusion    HX ORTHOPAEDIC  Bunionionectomy R    HX TUBAL LIGATION      HX UROLOGICAL      treatment BCG for Bladder Cancer       SOCIAL HISTORY:     Social History     Social History    Marital status:      Spouse name: N/A    Number of children: N/A    Years of education: N/A     Occupational History    nurse      Social History Main Topics    Smoking status: Former Smoker     Quit date: 1/1/1980    Smokeless tobacco: Never Used    Alcohol use 0.0 oz/week     0 Standard drinks or equivalent per week      Comment: 1 glass of wine 3-4 times per month    Drug use: No    Sexual activity: Not Asked     Other Topics Concern    None     Social History Narrative       FAMILY HISTORY:     Family History   Problem Relation Age of Onset    No Known Problems Mother     No Known Problems Father     Diabetes Sister        REVIEW OF SYSTEMS:     Negative for fevers, chills, chest pain, shortness of breath, weight loss, recent illness. General: Negative for fever and chills. No unexpected change in weight. Denies fatigue. No change in appetite. Skin: Negative for rash or itching. HEENT: Negative for congestion, sore throat, neck pain and neck stiffness. No change in vision or hearing. Hasn't noted any enlarged lymph nodes in the neck. Cardiovascular:  Negative for chest pain and palpitations. Has not noted pedal edema. Respiratory: Negative for cough, colds, sinus, hemoptysis, shortness of breath and wheezing. Gastrointestinal: Negative for nausea and vomiting, rectal bleeding, coffee ground emesis, abdominal pain, diarrhea and constipation. Genitourinary: Negative for dysuria, frequency urgency, or burning on micturition. No flank pain, no foul smelling urine, no difficulty with initiating urination. Hematological: Negative for bleeding or easy bruising. Musculoskeletal: Negative  for arthralgias, back pain or neck pain. Neurological: Negative for dizziness, seizures or syncopal episodes. Denies headaches. Endocrine: Denies excessive thirst.  No heat/cold intolerance. Psychiatric: Negative for depression or insomnia. PHYSICAL EXAMINATION:     VITALS:   Visit Vitals    /57 (BP 1 Location: Left arm, BP Patient Position: Sitting)    Pulse 68    Resp 16    Ht 5' 2\" (1.575 m)    Wt 146 lb (66.2 kg)    SpO2 100%    BMI 26.7 kg/m2       GEN:  Well developed, well nourished 67 y.o. female in no acute distress. PSYCH: Alert an oriented to person, place and time. Mood, memory, affect, behavior and judgment normal  HEENT: Normocephalic and atraumatic. Eyes: Conjunctivae and EOM are normal.Pupils are equal, round, and reactive to light. External ear normal appearance, external nose normal appearing. Mouth/Throat: Oropharynx is clear and moist, able to handle oral secretions w/out difficulty, airway patent  NECK: Supple. Normal ROM, No lymphadenopathy. Trachea is midline.  No bruising, swelling or deformity  RESP: Clear to auscultation bilaterally. No wheezes, rales, rhonchi. Normal effort and breath sounds. No respiratory distress  CARDIO: Normal rate, regular rhythm and normal heart sounds. No MGR. ABDOMEN: Soft, non-tender, non-distended, normoactive bowel sounds in all four quadrants. There is no tenderness. There is no rebound and no guarding. BACK: No CVA or spinal tenderness  BREAST:  Deferred  PELVIC:    Deferred   RECTAL:  Deferred   :           Deferred        Left ANKLE and FOOT       Gait: slow  Tenderness: no tenderness   Cutaneous: No rashes, skin patches, wounds, or abrasions to the lower legs           Warm and Normal color. No regions of expressible drainage. Medial Border of Tibia Region: absent           Skin color, texture, turgor normal. Normal.           Severe Bunion  Joint Motion: ROM Ankle:Normal , Hindfoot: (ST,TN,CC Normal}, Forefoot toes:Normal  Neurologic Exam: Neuro: Motor: normal 5/5 strength in all tested muscle groups and Sensory : no sensory deficits noted. Contractures: Gastrocnemius or Achilles Contractures absent  Joint / Tendon Stability: No Ankle or Subtalar instability or joint laxity. No peroneal sublux ability or dislocation  Alignment: Pes Planus, with Valgus alignment and  Flexible. Large bunion  Vascular: Normal Pulses/ NL Capillary refill, No evidence of DVT seen on physical exam.   No calf swelling, no tenderness to calf muscles. Lymphatic:  No Evidence of Lymphedema. RIGHT FOOT and ANKLE  ALIGNMENT: Planovalgus foot deformity  SKIN: no edema , no erythema, no  ecchymosis    TENDERNESS:  mild tenderness to palpation along posterior tibial tendon  NEUROVASCULAR: Sensation intact to light touch and strength grossly intact and symmetrical. No nystagmus. Positive distal pulses and capillary refill. DVT ASSESSMENT:  There is not  calf tenderness.  No evidence of DVT seen on physical exam.  ROM: within normal limits  MOTOR: In tact      RADIOGRAPHS & DIAGNOSTIC STUDIES:     9/20/2017 11:25 AM    X-rays of the right foot, three views, AP, lateral, and oblique:    These films show postop changes from a remote bunionectomy   procedure.  She still has some hallucis valgus alignment in this   right great toe, mild, with some abduction at the talonavicular   region primarily.  The midfoot joints appear to be well   maintained on this right foot.      AP, lateral, and oblique of the left foot: Ulysses Marvel is a healed   second metatarsal shaft fracture. Ulysses Marvel is a severe bunion   deformity with a non-concentric first MTP joint, in which the   joint is at last almost 50% subluxed.  There is planovalgus   alignment in this lateral radiographic weightbearing film on this   left side. LABS:     @  CBC:   Lab Results   Component Value Date/Time    WBC 5.4 01/26/2018 04:31 PM    RBC 4.28 01/26/2018 04:31 PM    HGB 12.8 01/26/2018 04:31 PM    HCT 37.9 01/26/2018 04:31 PM    PLATELET 357 88/21/1808 04:31 PM   , CMP:   Lab Results   Component Value Date/Time    Glucose 79 01/26/2018 04:31 PM    Sodium 140 01/26/2018 04:31 PM    Potassium 3.7 01/26/2018 04:31 PM    Chloride 106 01/26/2018 04:31 PM    CO2 27 01/26/2018 04:31 PM    BUN 21 01/26/2018 04:31 PM    Creatinine 0.63 01/26/2018 04:31 PM    Calcium 9.2 01/26/2018 04:31 PM    Anion gap 7 01/26/2018 04:31 PM    BUN/Creatinine ratio 33 01/26/2018 04:31 PM    Alk.  phosphatase 69 01/26/2018 04:31 PM    Protein, total 7.8 01/26/2018 04:31 PM    Albumin 4.5 01/26/2018 04:31 PM    Globulin 3.3 01/26/2018 04:31 PM    A-G Ratio 1.4 01/26/2018 04:31 PM    and Coagulation:   Lab Results   Component Value Date/Time    Prothrombin time 13.0 01/26/2018 04:31 PM    INR 1.0 01/26/2018 04:31 PM    aPTT 31.6 11/17/2017 03:46 PM   @    Preoperative labs were reviewed and are substantially within normal limits except as follows:  Patient reports a history of bladder cancer and had a bladder tumor removed in 2015. She states that she had a BCG (bladder culture) last week. UA reveals a few mucus and small Leukocyte Esterase. Culture revealed mixed gram positive hans, probable skin/genitual contamination. Bactrim DS has been prescribed. 40252   COLONIES/mL   MIXED GRAM POSITIVE HANS, PROBABLE SKIN/GENITAL CONTAMINATION. Chest X-ray revealed no acute cardiopulmonary process   EKG:   Normal sinus rhythm   Possible Left atrial enlargement   Nonspecific T wave abnormality   Abnormal ECG   When compared with ECG of 03-AUG-2016 15:17,   No significant change was found   Confirmed by Kyler Ni MD, [address] ([id number]) on 11/18/2017 8:57:29 AM     ASSESSMENT:       Encounter Diagnoses   Name Primary?  Bunion of left foot Yes    Pre-operative examination     Posterior tibial tendonitis, right     Urinary tract infection without hematuria, site unspecified        PLAN:     Again, the alternatives, risks, and complications, as well as expected outcome were discussed. The patient understands and agrees to proceed with the above listed surgery pending EKG review by anesthesia. Patient had bladder culture completed last week which could explain slightly abnormal UA. In any event, will treat with Bactrim and rx has been provided. Patient has been given Hibiclens wash with instructions and prescriptions and or orders listed above.     Nury Canseco PA-C  1/29/2018  3:51 PM

## 2018-01-30 NOTE — PROGRESS NOTES
FERN Mitchell has asked that I send EKG to Anesthesia to make sure that Luis [facility] [place] is ok for surgery. I called Laura Raphael with PAT at [facility] and asked her to ask anesthesia to look at EKG  Dr. Mirta Meyer said that the EKG was fine for surgery.

## 2018-02-02 ENCOUNTER — TELEPHONE (OUTPATIENT)
Dept: ORTHOPEDIC SURGERY | Age: 73
End: 2018-02-02

## 2018-02-02 DIAGNOSIS — M21.612 BUNION, LEFT: Primary | ICD-10-CM

## 2018-02-02 NOTE — TELEPHONE ENCOUNTER
POST OP PATIENT CALLED FOR DR. TIAN. PATIENT SAID SHE HAD SX TO HAVE HER LEFT BUNION REMOVED ON 02/01/2018 BY Tiffanie Alcala. PATIENT SAID THAT SHE HIT HER FOOT AND NOW SHE HAD SOME DRAINAGE. PATIENT SAID SHE IS OKAY , BUT WOULD LIKE TO SPEAK TO SOMEONE. PATIENT TEL. [phone].

## 2018-02-02 NOTE — TELEPHONE ENCOUNTER
Please advise patient to reinforce her dressings as needed with sterile 4x4 gauze, Kerlix and Ace bandage. Please have the patient come into the office on Monday for dressing change.      Elena Mcgregor PA-C  2/2/2018   3:00 PM

## 2018-02-02 NOTE — TELEPHONE ENCOUNTER
Spoke with patient, she states that the drainage has stopped. She denies any pain. She was instructed and understood that she may add additional dressing as needed.

## 2018-02-06 ENCOUNTER — OFFICE VISIT (OUTPATIENT)
Dept: ORTHOPEDIC SURGERY | Age: 73
End: 2018-02-06

## 2018-02-06 ENCOUNTER — DOCUMENTATION ONLY (OUTPATIENT)
Dept: ORTHOPEDIC SURGERY | Age: 73
End: 2018-02-06

## 2018-02-06 VITALS
SYSTOLIC BLOOD PRESSURE: 126 MMHG | OXYGEN SATURATION: 96 % | BODY MASS INDEX: 26.87 KG/M2 | HEIGHT: 62 IN | TEMPERATURE: 97.9 F | HEART RATE: 68 BPM | WEIGHT: 146 LBS | DIASTOLIC BLOOD PRESSURE: 74 MMHG | RESPIRATION RATE: 18 BRPM

## 2018-02-06 DIAGNOSIS — M79.672 LEFT FOOT PAIN: Primary | ICD-10-CM

## 2018-02-06 DIAGNOSIS — M21.612 BUNION OF LEFT FOOT: ICD-10-CM

## 2018-02-06 NOTE — PROCEDURES
X-rays, 3 views of the left foot reveals post op changes s/p eft Lapidus bunionectomy procedure. Overall alignment looks good. Hardware in good position.

## 2018-02-06 NOTE — PATIENT INSTRUCTIONS
· Dressing: changed in the office today. Patient placed in post op shoe    · Keep the current dressings on and in place. You need to keep this incision clean and dry. If you have a splint or cast on please keep this clean and dry as well. · You should expect swelling and bruising in the area over the next several days. You may elevate the left extremity on 1 pillow. Place the pillow horizontal so that no pressure is on the back of your heel. You may apply an icepack on top of the dressing to help minimize the swelling. · Keep all pets away from  any wound present in order to prevent infection. · Continue Activity modification    · Weight bearing status:  non weight bearing to the left lower extremity (heel walking only)    · No lifting, twisting, squatting, deep bending, driving or strenuous activity. PLEASE SEEK IMMEDIATE ASSESSMENT BY ER PHYSICIAN IF ANY OF THE FOLLOWING EXIST:     Excessive pain, swelling, redness or odor of or around the surgical area   Temperature over 100.5   Nausea and vomiting lasting longer than 4 hours or if unable to take medications   Any signs of decreased circulation or nerve impairment to extremity: change in color, persistent numbness, tingling, coldness or increase pain   If any calf pain, calf tightness, shortness of breath, chest pain   Any difficulty breathing at rest or with ambulation, any chest tightness/soreness  Severe intractable pain, persistent swelling or drainage, development of a wound, incisional redness, finger/toe swelling or color changes, or CALF PAIN    · Perform ankle pumps with non-surgical/non-injured extremity to help reduce the risk of blood clots    · Please follow up in 2 weeks     · DO NOT DRIVE WHILE TAKING NARCOTIC PAIN MEDICINE         Bunionectomy: What to Expect at [facility] had bunion surgery to remove a lump of bone (bunion) from the joint where your big toe joins your foot, and to straighten your big toe.  You will have pain and swelling that slowly improves in the 6 weeks after surgery. You may have some minor pain and swelling that lasts as long as 6 months to a year. After surgery, you will need to wear a cast or a special type of shoe to protect your toe and to keep it in the right position for at least 3 to 6 weeks. After some types of surgeries, a cast or special shoe is used for a few months. Your doctor will remove your stitches or sutures about 2 weeks after the surgery. If you have removable pins holding your toe in place, they are usually removed in about 4 to 6 weeks. This care sheet gives you a general idea about how long it will take for you to recover. But each person recovers at a different pace. Follow the steps below to get better as quickly as possible. How can you care for yourself at home? Activity  ? · Rest when you feel tired. Getting enough sleep will help you recover. ? · Ask your doctor when you can drive again. ? · You may shower, unless your doctor tells you not to. Keep the bandage dry. If the bandage has been removed, you can wash the area with warm water and soap. Pat the area dry. ? · You will probably need to take several weeks off from work. How much time you need to take off depends on the type of work you do and the extent of your surgery. ? · You may need to avoid heavy lifting for 3 to 8 weeks or longer, depending on the type of surgery you had.   ? · You may need to do regular rehabilitation (rehab) exercises to strengthen your foot and improve movement. Start out slowly, and follow your doctor's instructions. Diet  ? · You can eat your normal diet. If your stomach is upset, try bland, low-fat foods like plain rice, broiled chicken, toast, and yogurt. ? · You may notice that your bowel movements are not regular right after your surgery. This is common. Try to avoid constipation and straining with bowel movements. You may want to take a fiber supplement every day.  If you have not had a bowel movement after a couple of days, ask your doctor about taking a mild laxative. Medicines  ? · Your doctor will tell you if and when you can restart your medicines. He or she will also give you instructionsabout taking any new medicines. ? · If you take blood thinners, such as warfarin (Coumadin), clopidogrel (Plavix), or aspirin, be sure to talk to yourdoctor. He or she will tell you if and when to start taking those medicines again. Make sure that you understandexactly what your doctor wants you to do. ? · Be safe with medicines. Take pain medicines exactly as directed. ¨ If the doctor gave you a prescription medicine for pain, take it as prescribed. ¨ If you are not taking a prescription pain medicine, ask your doctor if you can take an over-the-counter medicine. ? · If your doctor prescribed antibiotics, take them as directed. Do not stop taking them just because you feel better. You need to take the full course of antibiotics. ? · If you think your pain medicine is making you sick to your stomach:  ¨ Take your medicine after meals (unless your doctor has told you not to). ¨ Ask your doctor for a different pain medicine. Incision care  ? · You will leave the hospital with bandages holding your toe in the correct position. Your doctor will probably remove the bandages after several days. Or your doctor may have you remove your bandages at home. Do not touch the surgery area. Keep it dry. ? · Do not soak your foot until your doctor says it is okay. Ice and elevation  ? · For pain and swelling, put ice or a cold pack on your foot for 10 to 20 minutes each hour. Put a thin cloth between the ice and your skin. ? · Prop up your foot and leg on a pillow when you ice it or anytime you sit or lie down during the next 3 days. Try to keep it above the level of your heart. This will help reduce swelling. Follow-up care is a key part of your treatment and safety.  Be sure to make and go to all appointments, and call your doctor if you are having problems. It's also a good idea to know your test results and keep a list of the medicines you take. When should you call for help? Call 911 anytime you think you may need emergency care. For example, call if:  ? · You passed out (lost consciousness). ? · You have sudden chest pain and shortness of breath, or you cough up blood. ? · You have severe trouble breathing. ?Call your doctor now or seek immediate medical care if:  ? · Your foot or toe is cool or pale or changes color. ? · You have numbness, tingling, or less feeling in your foot or toes. ? · You have pain that does not get better after you take pain medicine. ? · You have loose stitches, or your incision comes open. ? · Bright red blood has soaked through the bandage over your incision. ? · You have signs of infection, such as:  ¨ Increased pain, swelling, warmth, or redness. ¨ Red streaks leading from the incision. ¨ Pus draining from the incision. ¨ Swollen lymph nodes in your neck, armpits, or groin. ¨ A fever. ? Watch closely for any changes in your health, and be sure to contact your doctor if:  ? · You do not have a bowel movement after taking a laxative. Where can you learn more? Go to http://horace-marco.info/. Enter 0699 472 39 89 in the search box to learn more about \"Bunionectomy: What to Expect at Home. \"  Current as of: March 21, 2017  Content Version: 11.4  © 8430-4789 [employer]. Care instructions adapted under license by [employer] (which disclaims liability or warranty for this information). If you have questions about a medical condition or this instruction, always ask your healthcare professional. [address] any warranty or liability for your use of this information.

## 2018-02-06 NOTE — MR AVS SNAPSHOT
[address], [address] 645 [facility] 
[phone] Patient: Robi Lopez MRN: [id number] EOT:[id number] Visit Information Date & Time Provider Department Dept. Phone Encounter #  
 2/6/2018  1:00  Nilesh Hosea Maryellen S [facility]esar [facility] [phone] Follow-up Instructions Return in about 2 weeks (around 2/20/2018) for follow up evaluation, suture removal.  
  
Your Appointments 2/14/2018  8:00 AM  
Follow Up with Lucinda Pascual MD  
[facility]) Appt Note: 6 MO F/U  
 [address] [place] [address]  
[phone]  
  
   
 [address]  
  
    
 2/23/2018  4:00 PM  
PROCEDURE with Coco Munoz MD  
[facility] Appt Note: [address] [place] [address]  
[address] [facility] [address] [place] [address] Upcoming Health Maintenance Date Due Hepatitis C Screening 1945 DTaP/Tdap/Td series (1 - Tdap) 5/16/1966 BREAST CANCER SCRN MAMMOGRAM 5/16/1995 FOBT Q 1 YEAR AGE 50-75 5/16/1995 ZOSTER VACCINE AGE 60> 3/16/2005 GLAUCOMA SCREENING Q2Y 5/16/2010 OSTEOPOROSIS SCREENING (DEXA) 5/16/2010 MEDICARE YEARLY EXAM 5/16/2010 Influenza Age 5 to Adult 8/1/2017 Allergies as of 2/6/2018  Review Complete On: 2/6/2018 By: Tamela Waddell Severity Noted Reaction Type Reactions Simvastatin Medium 12/30/2013    Unknown (comments) Lipitor [Atorvastatin]  01/29/2018    Myalgia Current Immunizations  Never Reviewed No immunizations on file. Not reviewed this visit You Were Diagnosed With   
  
 Codes Comments Left foot pain    -  Primary ICD-10-CM: R75.374 ICD-9-CM: 729.5  Bunion of left foot     ICD-10-CM: M21.612 
 ICD-9-CM: 727.1 Vitals BP Pulse Temp Resp Height(growth percentile) Weight(growth percentile) 126/74 68 97.9 °F (36.6 °C) (Oral) 18 5' 2\" (1.575 m) 146 lb (66.2 kg) SpO2 BMI OB Status Smoking Status 96% 26.7 kg/m2 Postmenopausal Former Smoker BMI and BSA Data Body Mass Index Body Surface Area  
 26.7 kg/m 2 1.7 m 2 Preferred Pharmacy Pharmacy Name Phone [facility] [address] [place] [address] AT [facility] [phone] Your Updated Medication List  
  
   
This list is accurate as of: 2/6/18  1:51 PM.  Always use your most recent med list.  
  
  
  
  
 ALEVE 220 mg Cap Generic drug:  naproxen sodium Take  by mouth as needed. aspirin delayed-release 81 mg tablet Take  by mouth daily. gabapentin 300 mg capsule Commonly known as:  NEURONTIN  
TK 2 CS PO Q NIGHT HYDROcodone-acetaminophen 7.5-325 mg per tablet Commonly known as:  NORCO  
TAKE ONE TO TWO TABLETS BY MOUTH Q 4 - 6 HRS PRN PAIN **AFTER SURGERY**DO NOT TAKE BEFORE SURGERY  Indications: Pain  
  
 meclizine 12.5 mg tablet Commonly known as:  ANTIVERT Take  by mouth three (3) times daily as needed. multivitamin tablet Commonly known as:  ONE A DAY Take 1 Tab by mouth daily. ondansetron 4 mg disintegrating tablet Commonly known as:  ZOFRAN ODT  
DISSOLVE 1 T ON TONGUE Q 8 H PRN NV  
  
 polyethylene glycol 17 gram packet Commonly known as:  [place] Goodpasture Take 1 Packet by mouth daily. pravastatin 20 mg tablet Commonly known as:  PRAVACHOL Take 20 mg by mouth nightly. promethazine 25 mg tablet Commonly known as:  PHENERGAN Take 1 Tab by mouth every six (6) hours as needed for Nausea. THERALOGIX  PO Take 2 Tabs by mouth daily. traMADol 50 mg tablet Commonly known as:  ULTRAM  
Take 1 Tab by mouth every six (6) hours as needed for Pain. Max Daily Amount: 200 mg. traZODone 50 mg tablet Commonly known as:  DESYREL  
TK 1 T PO QHS We Performed the Following AMB POC XRAY, FOOT; COMPLETE, 3+ VIEW [62430 CPT(R)] Follow-up Instructions Return in about 2 weeks (around 2/20/2018) for follow up evaluation, suture removal.  
  
  
Patient Instructions · Dressing: changed in the office today. Patient placed in post op shoe · Keep the current dressings on and in place. You need to keep this incision clean and dry. If you have a splint or cast on please keep this clean and dry as well. · You should expect swelling and bruising in the area over the next several days. You may elevate the left extremity on 1 pillow. Place the pillow horizontal so that no pressure is on the back of your heel. You may apply an icepack on top of the dressing to help minimize the swelling. · Keep all pets away from  any wound present in order to prevent infection. · Continue Activity modification · Weight bearing status:  non weight bearing to the left lower extremity (heel walking only) · No lifting, twisting, squatting, deep bending, driving or strenuous activity. PLEASE SEEK IMMEDIATE ASSESSMENT BY ER PHYSICIAN IF ANY OF THE FOLLOWING EXIST:  
 
Excessive pain, swelling, redness or odor of or around the surgical area Temperature over 100.5 Nausea and vomiting lasting longer than 4 hours or if unable to take medications Any signs of decreased circulation or nerve impairment to extremity: change in color, persistent numbness, tingling, coldness or increase pain If any calf pain, calf tightness, shortness of breath, chest pain Any difficulty breathing at rest or with ambulation, any chest tightness/soreness Severe intractable pain, persistent swelling or drainage, development of a wound, incisional redness, finger/toe swelling or color changes, or CALF PAIN 
 
· Perform ankle pumps with non-surgical/non-injured extremity to help reduce the risk of blood clots · Please follow up in 2 weeks · DO NOT DRIVE WHILE TAKING NARCOTIC PAIN MEDICINE Bunionectomy: What to Expect at [facility] Your Recovery You had bunion surgery to remove a lump of bone (bunion) from the joint where your big toe joins your foot, and to straighten your big toe. You will have pain and swelling that slowly improves in the 6 weeks after surgery. You may have some minor pain and swelling that lasts as long as 6 months to a year. After surgery, you will need to wear a cast or a special type of shoe to protect your toe and to keep it in the right position for at least 3 to 6 weeks. After some types of surgeries, a cast or special shoe is used for a few months. Your doctor will remove your stitches or sutures about 2 weeks after the surgery. If you have removable pins holding your toe in place, they are usually removed in about 4 to 6 weeks. This care sheet gives you a general idea about how long it will take for you to recover. But each person recovers at a different pace. Follow the steps below to get better as quickly as possible. How can you care for yourself at home? Activity ? · Rest when you feel tired. Getting enough sleep will help you recover. ? · Ask your doctor when you can drive again. ? · You may shower, unless your doctor tells you not to. Keep the bandage dry. If the bandage has been removed, you can wash the area with warm water and soap. Pat the area dry. ? · You will probably need to take several weeks off from work. How much time you need to take off depends on the type of work you do and the extent of your surgery. ? · You may need to avoid heavy lifting for 3 to 8 weeks or longer, depending on the type of surgery you had.  
? · You may need to do regular rehabilitation (rehab) exercises to strengthen your foot and improve movement. Start out slowly, and follow your doctor's instructions. Diet ? · You can eat your normal diet. If your stomach is upset, try bland, low-fat foods like plain rice, broiled chicken, toast, and yogurt. ? · You may notice that your bowel movements are not regular right after your surgery. This is common. Try to avoid constipation and straining with bowel movements. You may want to take a fiber supplement every day. If you have not had a bowel movement after a couple of days, ask your doctor about taking a mild laxative. Medicines ? · Your doctor will tell you if and when you can restart your medicines. He or she will also give you instructionsabout taking any new medicines. ? · If you take blood thinners, such as warfarin (Coumadin), clopidogrel (Plavix), or aspirin, be sure to talk to yourdoctor. He or she will tell you if and when to start taking those medicines again. Make sure that you understandexactly what your doctor wants you to do. ? · Be safe with medicines. Take pain medicines exactly as directed. ¨ If the doctor gave you a prescription medicine for pain, take it as prescribed. ¨ If you are not taking a prescription pain medicine, ask your doctor if you can take an over-the-counter medicine. ? · If your doctor prescribed antibiotics, take them as directed. Do not stop taking them just because you feel better. You need to take the full course of antibiotics. ? · If you think your pain medicine is making you sick to your stomach: 
¨ Take your medicine after meals (unless your doctor has told you not to). ¨ Ask your doctor for a different pain medicine. Incision care ? · You will leave the hospital with bandages holding your toe in the correct position. Your doctor will probably remove the bandages after several days. Or your doctor may have you remove your bandages at home. Do not touch the surgery area. Keep it dry. ? · Do not soak your foot until your doctor says it is okay. Ice and elevation ? · For pain and swelling, put ice or a cold pack on your foot for 10 to 20 minutes each hour. Put a thin cloth between the ice and your skin. ? · Prop up your foot and leg on a pillow when you ice it or anytime you sit or lie down during the next 3 days. Try to keep it above the level of your heart. This will help reduce swelling. Follow-up care is a key part of your treatment and safety. Be sure to make and go to all appointments, and call your doctor if you are having problems. It's also a good idea to know your test results and keep a list of the medicines you take. When should you call for help? Call 911 anytime you think you may need emergency care. For example, call if: 
? · You passed out (lost consciousness). ? · You have sudden chest pain and shortness of breath, or you cough up blood. ? · You have severe trouble breathing. ?Call your doctor now or seek immediate medical care if: 
? · Your foot or toe is cool or pale or changes color. ? · You have numbness, tingling, or less feeling in your foot or toes. ? · You have pain that does not get better after you take pain medicine. ? · You have loose stitches, or your incision comes open. ? · Bright red blood has soaked through the bandage over your incision. ? · You have signs of infection, such as: 
¨ Increased pain, swelling, warmth, or redness. ¨ Red streaks leading from the incision. ¨ Pus draining from the incision. ¨ Swollen lymph nodes in your neck, armpits, or groin. ¨ A fever. ? Watch closely for any changes in your health, and be sure to contact your doctor if: 
? · You do not have a bowel movement after taking a laxative. Where can you learn more? Go to http://horace-marco.info/. Enter [phone] in the search box to learn more about \"Bunionectomy: What to Expect at Home. \" Current as of: March 21, 2017 Content Version: 11.4 © 0310-5634 Healthwise, [employer].  Care instructions adapted under license by [address] (which disclaims liability or warranty for this information). If you have questions about a medical condition or this instruction, always ask your healthcare professional. [address] any warranty or liability for your use of this information. Introducing [facility] [place] [facility]! Dear Ginny Kendall: Thank you for requesting a [employer] account. Our records indicate that you already have an active [employer] account. You can access your account anytime at [email or web address] Did you know that you can access your hospital and ER discharge instructions at any time in [employer]? You can also review all of your test results from your hospital stay or ER visit. Additional Information If you have questions, please visit the Frequently Asked Questions section of the [employer] website at [email or web address] Remember, [employer] is NOT to be used for urgent needs. For medical emergencies, dial 911. Now available from your iPhone and Android! Please provide this summary of care documentation to your next provider. Your primary care clinician is listed as Ck Choi. If you have any questions after today's visit, please call [phone].

## 2018-02-06 NOTE — PROGRESS NOTES
Patient: Juan C Crawford                MRN: [id number]       SSN: [id number]  YOB: 1945                  AGE: 67 y.o. SEX: female    Raymund Spatz, MD    POST OP OFFICE NOTE  DOS: 2/1/18    Chief Complaint:   Chief Complaint   Patient presents with    Foot Pain     Left, Bunionectomy 2-1-18       HPI:     The patient is a 67 y.o. female who presents today for follow up 5 days s/p Left Lapidus bunionectomy procedure. Patient has been NWB to the left lower extremity. Patient reports doing well other than post op pain. Patient denies any fever, chills, chest pain, shortness of breath or calf pain. Patient reports that she struck her foot on the car door when she arrived home after surgery. She had some slight drainage onto her dressing that resolved. Patient is on ASA  for DVT prophylaxis. PHYSICAL EXAM:     Visit Vitals    /74    Pulse 68    Temp 97.9 °F (36.6 °C) (Oral)    Resp 18    Ht 5' 2\" (1.575 m)    Wt 146 lb (66.2 kg)    SpO2 96%    BMI 26.7 kg/m2         Pain Scale: 4/10    GEN:  Alert, well developed, well nourished, well appearing 67 y.o. female in no acute distress. PSYCH:  Normal affect, mood, and conduct. alert, oriented x 3 alert, oriented x 3, no dementia  M/S EXAMINATION OF: left foot/ankle  DRESSINGS: CDI other than dry soilage on dressing as expected 5 days s/p surgery  DRAINAGE: none  INCISION: Incision looks good, skin well approximated, no dehiscence, nylon sutures in place without disruption. SKIN: moderate edema , no erythema, no  ecchymosis, no warmth    TENDERNESS:  mild tenderness to palpation (as expected after surgery)  NEUROVASCULAR:  grossly intact. Positive distal pulses and capillary refill. DVT ASSESSMENT:  The calf is not tender to palpation.  No evidence of DVT seen on physical exam.  ROM: not tested       RADIOGRAPHS & DIAGNOSTIC STUDIES     Results for orders placed or performed in visit on 02/06/18   AMB POC XRAY, FOOT; COMPLETE, 3+ VIEW    Narrative    Doc AJIT Gillespie     2/6/2018  1:56 PM  X-rays, 3 views of the left foot reveals post op changes s/p eft   Lapidus bunionectomy procedure. Overall alignment looks good. Hardware in good position. IMPRESSION:     Encounter Diagnoses     ICD-10-CM ICD-9-CM   1. Left foot pain M79.672 729.5   2. Bunion of left foot M21.612 727.1       PLAN:         Orders Placed This Encounter    [[id number]] Foot Min 3V                  · Dressing: changed in the office today. Patient placed in post op shoe    · Keep the current dressings on and in place. You need to keep this incision clean and dry. If you have a splint or cast on please keep this clean and dry as well. · You should expect swelling and bruising in the area over the next several days. You may elevate the left extremity on 1 pillow. Place the pillow horizontal so that no pressure is on the back of your heel. You may apply an icepack on top of the dressing to help minimize the swelling. · Keep all pets away from  any wound present in order to prevent infection. · Continue Activity modification    · Weight bearing status:  non weight bearing to the left lower extremity (heel walking only)    · No lifting, twisting, squatting, deep bending, driving or strenuous activity.     PLEASE SEEK IMMEDIATE ASSESSMENT BY ER PHYSICIAN IF ANY OF THE FOLLOWING EXIST:     Excessive pain, swelling, redness or odor of or around the surgical area   Temperature over 100.5   Nausea and vomiting lasting longer than 4 hours or if unable to take medications   Any signs of decreased circulation or nerve impairment to extremity: change in color, persistent numbness, tingling, coldness or increase pain   If any calf pain, calf tightness, shortness of breath, chest pain   Any difficulty breathing at rest or with ambulation, any chest tightness/soreness  Severe intractable pain, persistent swelling or drainage, development of a wound, incisional redness, finger/toe swelling or color changes, or CALF PAIN    · Perform ankle pumps with non-surgical/non-injured extremity to help reduce the risk of blood clots    · Please follow up in 1 week     · DO NOT DRIVE WHILE TAKING NARCOTIC PAIN MEDICINE              Patient has been discussed with Dr. Miah King during this visit and he agrees with the assessment and plan    Patient expresses understanding of the plan. Patient education provided on post surgical care. REVIEW OF SYSTEMS:     Otherwise as noted in HPI      PAST MEDICAL HISTORY:     Past Medical History:   Diagnosis Date    Bladder cancer ([facility] [address] 2015 (approx)    Foraminal stenosis of lumbar region     Hematuria     Hypercholesterolemia     Low back pain        MEDICATIONS:     Current Outpatient Prescriptions   Medication Sig    HYDROcodone-acetaminophen (NORCO) 7.5-325 mg per tablet TAKE ONE TO TWO TABLETS BY MOUTH Q 4 - 6 HRS PRN PAIN **AFTER SURGERY**DO NOT TAKE BEFORE SURGERY  Indications: Pain    promethazine (PHENERGAN) 25 mg tablet Take 1 Tab by mouth every six (6) hours as needed for Nausea.  polyethylene glycol (MIRALAX) 17 gram packet Take 1 Packet by mouth daily.  traMADol (ULTRAM) 50 mg tablet Take 1 Tab by mouth every six (6) hours as needed for Pain. Max Daily Amount: 200 mg.    MULTIVIT-MINERALS/FOLIC ACID (THERALOGIX  PO) Take 2 Tabs by mouth daily.  gabapentin (NEURONTIN) 300 mg capsule TK 2 CS PO Q NIGHT    naproxen sodium (ALEVE) 220 mg cap Take  by mouth as needed.  ondansetron (ZOFRAN ODT) 4 mg disintegrating tablet DISSOLVE 1 T ON TONGUE Q 8 H PRN NV    meclizine (ANTIVERT) 12.5 mg tablet Take  by mouth three (3) times daily as needed.  traZODone (DESYREL) 50 mg tablet TK 1 T PO QHS    aspirin delayed-release 81 mg tablet Take  by mouth daily.  multivitamin (ONE A DAY) tablet Take 1 Tab by mouth daily.  pravastatin (PRAVACHOL) 20 mg tablet Take 20 mg by mouth nightly.      No current facility-administered medications for this visit.         ALLERGIES:     Allergies   Allergen Reactions    Simvastatin Unknown (comments)    Lipitor [Atorvastatin] Myalgia         PAST SURGICAL HISTORY:     Past Surgical History:   Procedure Laterality Date    FOOT/TOES SURGERY PROC UNLISTED      HX BUNIONECTOMY      HX COLONOSCOPY      HX LUMBAR LAMINECTOMY  6/2/15    with fusion    HX ORTHOPAEDIC  Bunionionectomy R    HX TUBAL LIGATION      HX UROLOGICAL      treatment BCG for Bladder Cancer       SOCIAL HISTORY:     Social History     Social History    Marital status:      Spouse name: N/A    Number of children: N/A    Years of education: N/A     Occupational History    nurse      Social History Main Topics    Smoking status: Former Smoker     Quit date: 1/1/1980    Smokeless tobacco: Never Used    Alcohol use 0.0 oz/week     0 Standard drinks or equivalent per week      Comment: 1 glass of wine 3-4 times per month    Drug use: No    Sexual activity: Not on file     Other Topics Concern    Not on file     Social History Narrative       FAMILY HISTORY:     Family History   Problem Relation Age of Onset    No Known Problems Mother     No Known Problems Father     Diabetes Sister            Marc Charles PA-C  2/6/2018

## 2018-02-13 ENCOUNTER — OFFICE VISIT (OUTPATIENT)
Dept: ORTHOPEDIC SURGERY | Age: 73
End: 2018-02-13

## 2018-02-13 VITALS
HEART RATE: 67 BPM | BODY MASS INDEX: 26.87 KG/M2 | OXYGEN SATURATION: 100 % | SYSTOLIC BLOOD PRESSURE: 121 MMHG | DIASTOLIC BLOOD PRESSURE: 46 MMHG | HEIGHT: 62 IN | WEIGHT: 146 LBS

## 2018-02-13 DIAGNOSIS — M76.821 POSTERIOR TIBIAL TENDONITIS, RIGHT: ICD-10-CM

## 2018-02-13 DIAGNOSIS — M21.612 BUNION OF LEFT FOOT: ICD-10-CM

## 2018-02-13 DIAGNOSIS — Z98.890 POST-OPERATIVE STATE: ICD-10-CM

## 2018-02-13 DIAGNOSIS — M79.672 LEFT FOOT PAIN: Primary | ICD-10-CM

## 2018-02-13 RX ORDER — HYDROCODONE BITARTRATE AND ACETAMINOPHEN 7.5; 325 MG/1; MG/1
TABLET ORAL
Qty: 30 TAB | Refills: 0 | Status: SHIPPED | OUTPATIENT
Start: 2018-02-13 | End: 2018-03-14 | Stop reason: SDUPTHER

## 2018-02-13 NOTE — PROGRESS NOTES
Patient: Kenan Rendon                MRN: [id number]       SSN: [id number]  YOB: 1945                  AGE: 67 y.o. SEX: female    Mag Clark MD    POST OP OFFICE NOTE  DOS: 2/1/18    Chief Complaint:   Chief Complaint   Patient presents with    Foot Pain     left       HPI:     The patient is a 67 y.o. female who presents today for follow up 12 days s/p Left Lapidus bunionectomy procedure. Patient has been NWB to the left lower extremity. Patient reports doing well other than post op pain. Patient denies any fever, chills, chest pain, shortness of breath or calf pain. Patient reports that she struck her foot on the car door when she arrived home after surgery. She had some slight drainage onto her dressing that resolved. Patient is on ASA  for DVT prophylaxis. PHYSICAL EXAM:     Visit Vitals    /46 (BP 1 Location: Left arm)    Pulse 67    Ht 5' 2\" (1.575 m)    Wt 146 lb (66.2 kg)    SpO2 100%    BMI 26.7 kg/m2         Pain Scale: /10    GEN:  Alert, well developed, well nourished, well appearing 67 y.o. female in no acute distress. PSYCH:  Normal affect, mood, and conduct. alert, oriented x 3 alert, oriented x 3, no dementia  M/S EXAMINATION OF: left foot/ankle  DRESSINGS: CDI   DRAINAGE: none  INCISION: Incision looks good, skin well approximated, no dehiscence, nylon sutures in place without disruption. SKIN: moderate edema , no erythema, no  ecchymosis, no warmth    TENDERNESS:  mild tenderness to palpation  NEUROVASCULAR:  Patient reports some random sharp shooting pains on occasion, otherwise grossly intact. Positive distal pulses and capillary refill. DVT ASSESSMENT:  The calf is not tender to palpation. No evidence of DVT seen on physical exam.  ROM: not tested       RADIOGRAPHS & DIAGNOSTIC STUDIES     No results found for any visits on 02/13/18. IMPRESSION:     Encounter Diagnoses     ICD-10-CM ICD-9-CM   1.  Left foot pain M79.672 729.5   2. Bunion of left foot M21.612 727.1   3. Post-operative state Z98.890 V45.89   4. Posterior tibial tendonitis, right M76.821 726.72       PLAN:         Orders Placed This Encounter    HYDROcodone-acetaminophen (NORCO) 7.5-325 mg per tablet                  · Dressing: changed in the office today. Patient placed in post op shoe    · Keep the current dressings on and in place. You need to keep this incision clean and dry. If you have a splint or cast on please keep this clean and dry as well. · You should expect swelling and bruising in the area over the next several days. You may elevate the left extremity on 1 pillow. Place the pillow horizontal so that no pressure is on the back of your heel. You may apply an icepack on top of the dressing to help minimize the swelling. · Keep all pets away from  any wound present in order to prevent infection. · Continue Activity modification    · Weight bearing status:  non weight bearing to the left lower extremity (heel walking only)    · No lifting, twisting, squatting, deep bending, driving or strenuous activity.     PLEASE SEEK IMMEDIATE ASSESSMENT BY ER PHYSICIAN IF ANY OF THE FOLLOWING EXIST:     Excessive pain, swelling, redness or odor of or around the surgical area   Temperature over 100.5   Nausea and vomiting lasting longer than 4 hours or if unable to take medications   Any signs of decreased circulation or nerve impairment to extremity: change in color, persistent numbness, tingling, coldness or increase pain   If any calf pain, calf tightness, shortness of breath, chest pain   Any difficulty breathing at rest or with ambulation, any chest tightness/soreness  Severe intractable pain, persistent swelling or drainage, development of a wound, incisional redness, finger/toe swelling or color changes, or CALF PAIN    · Perform ankle pumps with non-surgical/non-injured extremity to help reduce the risk of blood clots    · Please follow up in 1 week for suture removal    · DO NOT DRIVE WHILE TAKING NARCOTIC PAIN MEDICINE              Patient has been discussed with Dr. Dave Segundo during this visit and he agrees with the assessment and plan    Patient expresses understanding of the plan. Patient education provided on post surgical care. REVIEW OF SYSTEMS:     Otherwise as noted in HPI      PAST MEDICAL HISTORY:     Past Medical History:   Diagnosis Date    Bladder cancer ([address] 2015 (approx)    Foraminal stenosis of lumbar region     Hematuria     Hypercholesterolemia     Low back pain        MEDICATIONS:     Current Outpatient Prescriptions   Medication Sig    HYDROcodone-acetaminophen (NORCO) 7.5-325 mg per tablet TAKE ONE  TABLET BY MOUTH Q 6-8 HRS PRN PAIN  Indications: Pain    promethazine (PHENERGAN) 25 mg tablet Take 1 Tab by mouth every six (6) hours as needed for Nausea.  polyethylene glycol (MIRALAX) 17 gram packet Take 1 Packet by mouth daily.  MULTIVIT-MINERALS/FOLIC ACID (THERALOGIX  PO) Take 2 Tabs by mouth daily.  gabapentin (NEURONTIN) 300 mg capsule TK 2 CS PO Q NIGHT    traZODone (DESYREL) 50 mg tablet TK 1 T PO QHS    aspirin delayed-release 81 mg tablet Take  by mouth daily.  multivitamin (ONE A DAY) tablet Take 1 Tab by mouth daily.  pravastatin (PRAVACHOL) 20 mg tablet Take 20 mg by mouth nightly.  naproxen sodium (ALEVE) 220 mg cap Take  by mouth as needed.  ondansetron (ZOFRAN ODT) 4 mg disintegrating tablet DISSOLVE 1 T ON TONGUE Q 8 H PRN NV    meclizine (ANTIVERT) 12.5 mg tablet Take  by mouth three (3) times daily as needed. No current facility-administered medications for this visit.         ALLERGIES:     Allergies   Allergen Reactions    Simvastatin Unknown (comments)    Lipitor [Atorvastatin] Myalgia         PAST SURGICAL HISTORY:     Past Surgical History:   Procedure Laterality Date    FOOT/TOES SURGERY PROC UNLISTED Left 02/01/2018    bunionectomy    HX BUNIONECTOMY      HX COLONOSCOPY      HX LUMBAR LAMINECTOMY  6/2/15    with fusion    HX ORTHOPAEDIC  Bunionionectomy R    HX TUBAL LIGATION      HX UROLOGICAL      treatment BCG for Bladder Cancer       SOCIAL HISTORY:     Social History     Social History    Marital status:      Spouse name: N/A    Number of children: N/A    Years of education: N/A     Occupational History    nurse      Social History Main Topics    Smoking status: Former Smoker     Quit date: 1/1/1980    Smokeless tobacco: Never Used    Alcohol use 0.0 oz/week     0 Standard drinks or equivalent per week      Comment: 1 glass of wine 3-4 times per month    Drug use: No    Sexual activity: Not on file     Other Topics Concern    Not on file     Social History Narrative       FAMILY HISTORY:     Family History   Problem Relation Age of Onset    No Known Problems Mother     No Known Problems Father     Diabetes Sister            Sissy Thomas  2/13/2018

## 2018-02-13 NOTE — PATIENT INSTRUCTIONS
· Dressing: changed in the office today. Patient placed in post op shoe    · Keep the current dressings on and in place. You need to keep this incision clean and dry. If you have a splint or cast on please keep this clean and dry as well. · You should expect swelling and bruising in the area over the next several days. You may elevate the left extremity on 1 pillow. Place the pillow horizontal so that no pressure is on the back of your heel. You may apply an icepack on top of the dressing to help minimize the swelling. · Keep all pets away from  any wound present in order to prevent infection. · Continue Activity modification    · Weight bearing status:  non weight bearing to the left lower extremity (heel walking only)    · No lifting, twisting, squatting, deep bending, driving or strenuous activity. PLEASE SEEK IMMEDIATE ASSESSMENT BY ER PHYSICIAN IF ANY OF THE FOLLOWING EXIST:     Excessive pain, swelling, redness or odor of or around the surgical area   Temperature over 100.5   Nausea and vomiting lasting longer than 4 hours or if unable to take medications   Any signs of decreased circulation or nerve impairment to extremity: change in color, persistent numbness, tingling, coldness or increase pain   If any calf pain, calf tightness, shortness of breath, chest pain   Any difficulty breathing at rest or with ambulation, any chest tightness/soreness  Severe intractable pain, persistent swelling or drainage, development of a wound, incisional redness, finger/toe swelling or color changes, or CALF PAIN    · Perform ankle pumps with non-surgical/non-injured extremity to help reduce the risk of blood clots    · Please follow up in 1 week    · DO NOT DRIVE WHILE TAKING NARCOTIC PAIN MEDICINE         Bunionectomy: What to Expect at [address] [place] [address] had bunion surgery to remove a lump of bone (bunion) from the joint where your big toe joins your foot, and to straighten your big toe.  You will have pain and swelling that slowly improves in the 6 weeks after surgery. You may have some minor pain and swelling that lasts as long as 6 months to a year. After surgery, you will need to wear a cast or a special type of shoe to protect your toe and to keep it in the right position for at least 3 to 6 weeks. After some types of surgeries, a cast or special shoe is used for a few months. Your doctor will remove your stitches or sutures about 2 weeks after the surgery. If you have removable pins holding your toe in place, they are usually removed in about 4 to 6 weeks. This care sheet gives you a general idea about how long it will take for you to recover. But each person recovers at a different pace. Follow the steps below to get better as quickly as possible. How can you care for yourself at home? Activity  ? · Rest when you feel tired. Getting enough sleep will help you recover. ? · Ask your doctor when you can drive again. ? · You may shower, unless your doctor tells you not to. Keep the bandage dry. If the bandage has been removed, you can wash the area with warm water and soap. Pat the area dry. ? · You will probably need to take several weeks off from work. How much time you need to take off depends on the type of work you do and the extent of your surgery. ? · You may need to avoid heavy lifting for 3 to 8 weeks or longer, depending on the type of surgery you had.   ? · You may need to do regular rehabilitation (rehab) exercises to strengthen your foot and improve movement. Start out slowly, and follow your doctor's instructions. Diet  ? · You can eat your normal diet. If your stomach is upset, try bland, low-fat foods like plain rice, broiled chicken, toast, and yogurt. ? · You may notice that your bowel movements are not regular right after your surgery. This is common. Try to avoid constipation and straining with bowel movements. You may want to take a fiber supplement every day.  If you have not had a bowel movement after a couple of days, ask your doctor about taking a mild laxative. Medicines  ? · Your doctor will tell you if and when you can restart your medicines. He or she will also give you instructionsabout taking any new medicines. ? · If you take blood thinners, such as warfarin (Coumadin), clopidogrel (Plavix), or aspirin, be sure to talk to yourdoctor. He or she will tell you if and when to start taking those medicines again. Make sure that you understandexactly what your doctor wants you to do. ? · Be safe with medicines. Take pain medicines exactly as directed. ¨ If the doctor gave you a prescription medicine for pain, take it as prescribed. ¨ If you are not taking a prescription pain medicine, ask your doctor if you can take an over-the-counter medicine. ? · If your doctor prescribed antibiotics, take them as directed. Do not stop taking them just because you feel better. You need to take the full course of antibiotics. ? · If you think your pain medicine is making you sick to your stomach:  ¨ Take your medicine after meals (unless your doctor has told you not to). ¨ Ask your doctor for a different pain medicine. Incision care  ? · You will leave the hospital with bandages holding your toe in the correct position. Your doctor will probably remove the bandages after several days. Or your doctor may have you remove your bandages at home. Do not touch the surgery area. Keep it dry. ? · Do not soak your foot until your doctor says it is okay. Ice and elevation  ? · For pain and swelling, put ice or a cold pack on your foot for 10 to 20 minutes each hour. Put a thin cloth between the ice and your skin. ? · Prop up your foot and leg on a pillow when you ice it or anytime you sit or lie down during the next 3 days. Try to keep it above the level of your heart. This will help reduce swelling. Follow-up care is a key part of your treatment and safety.  Be sure to make and go to all appointments, and call your doctor if you are having problems. It's also a good idea to know your test results and keep a list of the medicines you take. When should you call for help? Call 911 anytime you think you may need emergency care. For example, call if:  ? · You passed out (lost consciousness). ? · You have sudden chest pain and shortness of breath, or you cough up blood. ? · You have severe trouble breathing. ?Call your doctor now or seek immediate medical care if:  ? · Your foot or toe is cool or pale or changes color. ? · You have numbness, tingling, or less feeling in your foot or toes. ? · You have pain that does not get better after you take pain medicine. ? · You have loose stitches, or your incision comes open. ? · Bright red blood has soaked through the bandage over your incision. ? · You have signs of infection, such as:  ¨ Increased pain, swelling, warmth, or redness. ¨ Red streaks leading from the incision. ¨ Pus draining from the incision. ¨ Swollen lymph nodes in your neck, armpits, or groin. ¨ A fever. ? Watch closely for any changes in your health, and be sure to contact your doctor if:  ? · You do not have a bowel movement after taking a laxative. Where can you learn more? Go to http://horace-marco.info/. Enter [phone] [id number] in the search box to learn more about \"Bunionectomy: What to Expect at Home. \"  Current as of: March 21, 2017  Content Version: 11.4  © 5976-2268 [employer]. Care instructions adapted under license by [employer] (which disclaims liability or warranty for this information). If you have questions about a medical condition or this instruction, always ask your healthcare professional. [address] any warranty or liability for your use of this information.

## 2018-02-13 NOTE — MR AVS SNAPSHOT
[address], [address] 200 [facility] 
[phone] Patient: Bernadette Villalobos MRN: [id number] Visit Information Date & Time Provider Department Dept. Phone Encounter #  
 2/13/2018 11:15 AM Maryellen Lacey [address] [facility] [phone] [id number] Follow-up Instructions Return in about 1 week (around 2/20/2018) for follow up evaluation, suture removal.  
  
Your Appointments 2/14/2018  8:00 AM  
Follow Up with Gibson Back MD  
[facility]) Appt Note: 6 MO F/U  
 [address] [facility] [id number] [phone]  
  
   
 [address] [facility]  
  
    
 2/20/2018 11:00 AM  
Follow Up with Nakia Figueroa PA-C  
[facility] [place] [address] ([facility]) Appt Note: Left Bunion 1wk fu  
 [address], [address] 200 [facility]  
[phone] 2301 [facility]  
  
    
 2/23/2018  4:00 PM  
PROCEDURE with Garrick Houston MD  
[facility] ([facility]) Appt Note: [address] [place] [address]  
[address] [facility] 301 [facility] [address] [place] [address] Upcoming Health Maintenance Date Due Hepatitis C Screening 1945 DTaP/Tdap/Td series (1 - Tdap) 5/16/1966 BREAST CANCER SCRN MAMMOGRAM 5/16/1995 FOBT Q 1 YEAR AGE 50-75 5/16/1995 ZOSTER VACCINE AGE 60> 3/16/2005 GLAUCOMA SCREENING Q2Y 5/16/2010 OSTEOPOROSIS SCREENING (DEXA) 5/16/2010 MEDICARE YEARLY EXAM 5/16/2010 Influenza Age 5 to Adult 8/1/2017 Allergies as of 2/13/2018  Review Complete On: 2/13/2018 By: Nakia Figueroa PA-C Severity Noted Reaction Type Reactions Simvastatin Medium 12/30/2013    Unknown (comments) Lipitor [Atorvastatin]  01/29/2018    Myalgia Current Immunizations  Never Reviewed No immunizations on file. Not reviewed this visit You Were Diagnosed With   
  
 Codes Comments Left foot pain    -  Primary ICD-10-CM: Z72.544 ICD-9-CM: 729.5 Bunion of left foot     ICD-10-CM: M21.612 ICD-9-CM: 727.1 Post-operative state     ICD-10-CM: T20.325 ICD-9-CM: V45.89 Posterior tibial tendonitis, right     ICD-10-CM: U97.856 ICD-9-CM: 726.72 Vitals BP Pulse Height(growth percentile) Weight(growth percentile) SpO2 BMI  
 121/46 (BP 1 Location: Left arm) 67 5' 2\" (1.575 m) 146 lb (66.2 kg) 100% 26.7 kg/m2 OB Status Smoking Status Postmenopausal Former Smoker Vitals History BMI and BSA Data Body Mass Index Body Surface Area  
 26.7 kg/m 2 1.7 m 2 Preferred Pharmacy Pharmacy Name Phone [facility] [address] [place] [address] AT [facility] [phone] Your Updated Medication List  
  
   
This list is accurate as of: 2/13/18 11:53 AM.  Always use your most recent med list.  
  
  
  
  
 ALEVE 220 mg Cap Generic drug:  naproxen sodium Take  by mouth as needed. aspirin delayed-release 81 mg tablet Take  by mouth daily. gabapentin 300 mg capsule Commonly known as:  NEURONTIN  
TK 2 CS PO Q NIGHT HYDROcodone-acetaminophen 7.5-325 mg per tablet Commonly known as:  NORCO  
TAKE ONE  TABLET BY MOUTH Q 6-8 HRS PRN PAIN  Indications: Pain  
  
 meclizine 12.5 mg tablet Commonly known as:  ANTIVERT Take  by mouth three (3) times daily as needed. multivitamin tablet Commonly known as:  ONE A DAY Take 1 Tab by mouth daily. ondansetron 4 mg disintegrating tablet Commonly known as:  ZOFRAN ODT  
DISSOLVE 1 T ON TONGUE Q 8 H PRN NV  
  
 polyethylene glycol 17 gram packet Commonly known as:  Odella Cadet Take 1 Packet by mouth daily. pravastatin 20 mg tablet Commonly known as:  PRAVACHOL Take 20 mg by mouth nightly. promethazine 25 mg tablet Commonly known as:  PHENERGAN Take 1 Tab by mouth every six (6) hours as needed for Nausea. THERALOGIX  PO Take 2 Tabs by mouth daily. traZODone 50 mg tablet Commonly known as:  DESYREL  
TK 1 T PO QHS Prescriptions Printed Refills HYDROcodone-acetaminophen (NORCO) 7.5-325 mg per tablet 0 Sig: TAKE ONE  TABLET BY MOUTH Q 6-8 HRS PRN PAIN  Indications: Pain Class: Print Follow-up Instructions Return in about 1 week (around 2/20/2018) for follow up evaluation, suture removal.  
  
  
Patient Instructions · Dressing: changed in the office today. Patient placed in post op shoe · Keep the current dressings on and in place. You need to keep this incision clean and dry. If you have a splint or cast on please keep this clean and dry as well. · You should expect swelling and bruising in the area over the next several days. You may elevate the left extremity on 1 pillow. Place the pillow horizontal so that no pressure is on the back of your heel. You may apply an icepack on top of the dressing to help minimize the swelling. · Keep all pets away from  any wound present in order to prevent infection. · Continue Activity modification · Weight bearing status:  non weight bearing to the left lower extremity (heel walking only) · No lifting, twisting, squatting, deep bending, driving or strenuous activity. PLEASE SEEK IMMEDIATE ASSESSMENT BY ER PHYSICIAN IF ANY OF THE FOLLOWING EXIST:  
 
Excessive pain, swelling, redness or odor of or around the surgical area Temperature over 100.5 Nausea and vomiting lasting longer than 4 hours or if unable to take medications Any signs of decreased circulation or nerve impairment to extremity: change in color, persistent numbness, tingling, coldness or increase pain If any calf pain, calf tightness, shortness of breath, chest pain Any difficulty breathing at rest or with ambulation, any chest tightness/soreness Severe intractable pain, persistent swelling or drainage, development of a wound, incisional redness, finger/toe swelling or color changes, or CALF PAIN 
 
· Perform ankle pumps with non-surgical/non-injured extremity to help reduce the risk of blood clots · Please follow up in 1 week · DO NOT DRIVE WHILE TAKING NARCOTIC PAIN MEDICINE Bunionectomy: What to Expect at [facility] Your Recovery You had bunion surgery to remove a lump of bone (bunion) from the joint where your big toe joins your foot, and to straighten your big toe. You will have pain and swelling that slowly improves in the 6 weeks after surgery. You may have some minor pain and swelling that lasts as long as 6 months to a year. After surgery, you will need to wear a cast or a special type of shoe to protect your toe and to keep it in the right position for at least 3 to 6 weeks. After some types of surgeries, a cast or special shoe is used for a few months. Your doctor will remove your stitches or sutures about 2 weeks after the surgery. If you have removable pins holding your toe in place, they are usually removed in about 4 to 6 weeks. This care sheet gives you a general idea about how long it will take for you to recover. But each person recovers at a different pace. Follow the steps below to get better as quickly as possible. How can you care for yourself at home? Activity ? · Rest when you feel tired. Getting enough sleep will help you recover. ? · Ask your doctor when you can drive again. ? · You may shower, unless your doctor tells you not to. Keep the bandage dry. If the bandage has been removed, you can wash the area with warm water and soap. Pat the area dry. ? · You will probably need to take several weeks off from work.  How much time you need to take off depends on the type of work you do and the extent of your surgery. ? · You may need to avoid heavy lifting for 3 to 8 weeks or longer, depending on the type of surgery you had.  
? · You may need to do regular rehabilitation (rehab) exercises to strengthen your foot and improve movement. Start out slowly, and follow your doctor's instructions. Diet ? · You can eat your normal diet. If your stomach is upset, try bland, low-fat foods like plain rice, broiled chicken, toast, and yogurt. ? · You may notice that your bowel movements are not regular right after your surgery. This is common. Try to avoid constipation and straining with bowel movements. You may want to take a fiber supplement every day. If you have not had a bowel movement after a couple of days, ask your doctor about taking a mild laxative. Medicines ? · Your doctor will tell you if and when you can restart your medicines. He or she will also give you instructionsabout taking any new medicines. ? · If you take blood thinners, such as warfarin (Coumadin), clopidogrel (Plavix), or aspirin, be sure to talk to yourdoctor. He or she will tell you if and when to start taking those medicines again. Make sure that you understandexactly what your doctor wants you to do. ? · Be safe with medicines. Take pain medicines exactly as directed. ¨ If the doctor gave you a prescription medicine for pain, take it as prescribed. ¨ If you are not taking a prescription pain medicine, ask your doctor if you can take an over-the-counter medicine. ? · If your doctor prescribed antibiotics, take them as directed. Do not stop taking them just because you feel better. You need to take the full course of antibiotics. ? · If you think your pain medicine is making you sick to your stomach: 
¨ Take your medicine after meals (unless your doctor has told you not to). ¨ Ask your doctor for a different pain medicine. Incision care ? · You will leave the hospital with bandages holding your toe in the correct position. Your doctor will probably remove the bandages after several days. Or your doctor may have you remove your bandages at home. Do not touch the surgery area. Keep it dry. ? · Do not soak your foot until your doctor says it is okay. Ice and elevation ? · For pain and swelling, put ice or a cold pack on your foot for 10 to 20 minutes each hour. Put a thin cloth between the ice and your skin. ? · Prop up your foot and leg on a pillow when you ice it or anytime you sit or lie down during the next 3 days. Try to keep it above the level of your heart. This will help reduce swelling. Follow-up care is a key part of your treatment and safety. Be sure to make and go to all appointments, and call your doctor if you are having problems. It's also a good idea to know your test results and keep a list of the medicines you take. When should you call for help? Call 911 anytime you think you may need emergency care. For example, call if: 
? · You passed out (lost consciousness). ? · You have sudden chest pain and shortness of breath, or you cough up blood. ? · You have severe trouble breathing. ?Call your doctor now or seek immediate medical care if: 
? · Your foot or toe is cool or pale or changes color. ? · You have numbness, tingling, or less feeling in your foot or toes. ? · You have pain that does not get better after you take pain medicine. ? · You have loose stitches, or your incision comes open. ? · Bright red blood has soaked through the bandage over your incision. ? · You have signs of infection, such as: 
¨ Increased pain, swelling, warmth, or redness. ¨ Red streaks leading from the incision. ¨ Pus draining from the incision. ¨ Swollen lymph nodes in your neck, armpits, or groin. ¨ A fever. ? Watch closely for any changes in your health, and be sure to contact your doctor if: ? · You do not have a bowel movement after taking a laxative. Where can you learn more? Go to http://horace-marco.info/. Enter [phone] [id number] [phone] in the search box to learn more about \"Bunionectomy: What to Expect at Home. \" Current as of: March 21, 2017 Content Version: 11.4 © 3322-6433 [employer]. Care instructions adapted under license by [facility] (which disclaims liability or warranty for this information). If you have questions about a medical condition or this instruction, always ask your healthcare professional. [address] any warranty or liability for your use of this information. Introducing [facility]! Dear Alex Garcia: Thank you for requesting a Vuze account. Our records indicate that you already have an active Vuze account. You can access your account anytime at [email or web address] Did you know that you can access your hospital and ER discharge instructions at any time in Vuze? You can also review all of your test results from your hospital stay or ER visit. Additional Information If you have questions, please visit the Frequently Asked Questions section of the Vuze website at [email or web address] Remember, Vuze is NOT to be used for urgent needs. For medical emergencies, dial 911. Now available from your iPhone and Android! Please provide this summary of care documentation to your next provider. Your primary care clinician is listed as Elaine Tovar. If you have any questions after today's visit, please call [phone].

## 2018-02-20 ENCOUNTER — OFFICE VISIT (OUTPATIENT)
Dept: ORTHOPEDIC SURGERY | Age: 73
End: 2018-02-20

## 2018-02-20 VITALS
WEIGHT: 146.6 LBS | DIASTOLIC BLOOD PRESSURE: 61 MMHG | OXYGEN SATURATION: 100 % | TEMPERATURE: 98.6 F | HEIGHT: 62 IN | BODY MASS INDEX: 26.98 KG/M2 | SYSTOLIC BLOOD PRESSURE: 134 MMHG | HEART RATE: 79 BPM

## 2018-02-20 DIAGNOSIS — M21.612 BUNION OF LEFT FOOT: ICD-10-CM

## 2018-02-20 DIAGNOSIS — Z98.890 POST-OPERATIVE STATE: ICD-10-CM

## 2018-02-20 DIAGNOSIS — M79.672 LEFT FOOT PAIN: Primary | ICD-10-CM

## 2018-02-20 NOTE — LETTER
NOTIFICATION RETURN TO WORK / SCHOOL 
 
2/20/2018 12:14 PM 
 
Ms. Adilene [address] [id number] To Whom It May Concern: 
 
Adilene Thomson is currently under the care of [address] [place] [address] Konstantin Qureshi. She will return to work on 2-26-18 with the following restrictions: 
 Sedentary work only until her follow up appointment. If there are questions or concerns please have the patient contact our office. Sincerely, Marc Charles PA-C

## 2018-02-20 NOTE — PROGRESS NOTES
Patient: Anthony Parker                MRN: [id number]       SSN: [id number]  YOB: 1945                  AGE: 67 y.o. SEX: female    Dex Correia MD    POST OP OFFICE NOTE  DOS: 2/1/18    Chief Complaint:   Chief Complaint   Patient presents with    Follow-up     left foot surg feb 01, 2018 f/u       HPI:     The patient is a 67 y.o. female who presents today for follow up 19 days s/p Left Lapidus bunionectomy procedure. Patient has been NWB to the left lower extremity. Patient reports doing well other than post op pain. Patient denies any fever, chills, chest pain, shortness of breath or calf pain. Patient is on ASA  for DVT prophylaxis. Patient would like to try to return to work on sedentary duty on 3/1/18. PHYSICAL EXAM:     Visit Vitals    /61    Pulse 79    Temp 98.6 °F (37 °C) (Oral)    Ht 5' 2\" (1.575 m)    Wt 146 lb 9.6 oz (66.5 kg)    SpO2 100%    BMI 26.81 kg/m2         Pain Scale: /10    GEN:  Alert, well developed, well nourished, well appearing 67 y.o. female in no acute distress. PSYCH:  Normal affect, mood, and conduct. alert, oriented x 3 alert, oriented x 3, no dementia  M/S EXAMINATION OF: left foot/ankle  DRESSINGS: CDI   DRAINAGE: none  INCISION: Incision looks good, skin well approximated, no dehiscence, nylon sutures in place with a few sutures disrupted. SKIN: moderate edema , no erythema, no  ecchymosis, no warmth    TENDERNESS:  mild tenderness to palpation  NEUROVASCULAR:  Patient reports some random sharp shooting pains on occasion, otherwise grossly intact. Positive distal pulses and capillary refill. DVT ASSESSMENT:  The calf is not tender to palpation. No evidence of DVT seen on physical exam.  ROM: not tested       RADIOGRAPHS & DIAGNOSTIC STUDIES     No results found for any visits on 02/20/18. IMPRESSION:     Encounter Diagnoses     ICD-10-CM ICD-9-CM   1. Left foot pain M79.672 729.5   2.  Bunion of left foot M21.612 727.1   3. Post-operative state Z98.890 V45.89       PLAN:         No orders of the defined types were placed in this encounter.                 · Dressing: sutures were removed in the office today. Patient placed in CAM BOOT    · You may shower in 2 days, no submerging in any water    · Keep all pets away from  any wound present in order to prevent infection. · Continue Activity modification    · Weight bearing status:  non weight bearing to the left lower extremity (heel walking only)    · No lifting, twisting, squatting, deep bending, driving or strenuous activity. PLEASE SEEK IMMEDIATE ASSESSMENT BY ER PHYSICIAN IF ANY OF THE FOLLOWING EXIST:     Excessive pain, swelling, redness or odor of or around the surgical area   Temperature over 100.5   Nausea and vomiting lasting longer than 4 hours or if unable to take medications   Any signs of decreased circulation or nerve impairment to extremity: change in color, persistent numbness, tingling, coldness or increase pain   If any calf pain, calf tightness, shortness of breath, chest pain   Any difficulty breathing at rest or with ambulation, any chest tightness/soreness  Severe intractable pain, persistent swelling or drainage, development of a wound, incisional redness, finger/toe swelling or color changes, or CALF PAIN    · Perform ankle pumps with non-surgical/non-injured extremity to help reduce the risk of blood clots    · Please follow up in 3-4 weeks, X-rays at [facility]    · DO NOT [address]              Patient has been discussed with Dr. Bartolo Bryson during this visit and he agrees with the assessment and plan    Patient expresses understanding of the plan. Patient education provided on post surgical care.       REVIEW OF SYSTEMS:     Otherwise as noted in HPI      PAST MEDICAL HISTORY:     Past Medical History:   Diagnosis Date    Bladder cancer ([facility] [address] 2015 (approx)    Foraminal stenosis of lumbar region     Hematuria  Hypercholesterolemia     Low back pain        MEDICATIONS:     Current Outpatient Prescriptions   Medication Sig    HYDROcodone-acetaminophen (NORCO) 7.5-325 mg per tablet TAKE ONE  TABLET BY MOUTH Q 6-8 HRS PRN PAIN  Indications: Pain    polyethylene glycol (MIRALAX) 17 gram packet Take 1 Packet by mouth daily.  MULTIVIT-MINERALS/FOLIC ACID (THERALOGIX  PO) Take 2 Tabs by mouth daily.  gabapentin (NEURONTIN) 300 mg capsule TK 2 CS PO Q NIGHT    naproxen sodium (ALEVE) 220 mg cap Take  by mouth as needed.  traZODone (DESYREL) 50 mg tablet TK 1 T PO QHS    aspirin delayed-release 81 mg tablet Take  by mouth daily.  multivitamin (ONE A DAY) tablet Take 1 Tab by mouth daily.  pravastatin (PRAVACHOL) 20 mg tablet Take 20 mg by mouth nightly.  promethazine (PHENERGAN) 25 mg tablet Take 1 Tab by mouth every six (6) hours as needed for Nausea.  ondansetron (ZOFRAN ODT) 4 mg disintegrating tablet DISSOLVE 1 T ON TONGUE Q 8 H PRN NV    meclizine (ANTIVERT) 12.5 mg tablet Take  by mouth three (3) times daily as needed. No current facility-administered medications for this visit.         ALLERGIES:     Allergies   Allergen Reactions    Simvastatin Unknown (comments)    Lipitor [Atorvastatin] Myalgia         PAST SURGICAL HISTORY:     Past Surgical History:   Procedure Laterality Date    FOOT/TOES SURGERY PROC UNLISTED Left 02/01/2018    bunionectomy    HX BUNIONECTOMY      HX COLONOSCOPY      HX LUMBAR LAMINECTOMY  6/2/15    with fusion    HX ORTHOPAEDIC  Bunionionectomy R    HX TUBAL LIGATION      HX UROLOGICAL      treatment BCG for Bladder Cancer       SOCIAL HISTORY:     Social History     Social History    Marital status:      Spouse name: N/A    Number of children: N/A    Years of education: N/A     Occupational History    nurse      Social History Main Topics    Smoking status: Former Smoker     Quit date: 1/1/1980    Smokeless tobacco: Never Used   [facility] Alcohol use 0.0 oz/week     0 Standard drinks or equivalent per week      Comment: 1 glass of wine 3-4 times per month    Drug use: No    Sexual activity: Not on file     Other Topics Concern    Not on file     Social History Narrative       FAMILY HISTORY:     Family History   Problem Relation Age of Onset    No Known Problems Mother     No Known Problems Father     Diabetes Sister            Nakia Figueroa, [facility]  2/20/2018

## 2018-02-20 NOTE — LETTER
NOTIFICATION RETURN TO WORK / SCHOOL 
 
2/20/2018 12:19 PM 
 
Ms. Arian Batres [address] [phone] To Whom It May Concern: 
 
Arian Batres is currently under the care of [address] [place], [place] [address]haley Qureshi. She will return to work/school on: March 1, 2018 with the following restrictions: 
 Sedentary work only until follow up appointment. If there are questions or concerns please have the patient contact our office. Sincerely, Yina Dolan PA-C

## 2018-02-20 NOTE — MR AVS SNAPSHOT
[address], [address] 200 [facility] 
[phone] Patient: Artemio Og MRN: [id number]:[id number] Visit Information Date & Time Provider Department Dept. Phone Encounter #  
 2/20/2018 11:00 AM Maryellen Rod[facility]guez [facility] [phone] Follow-up Instructions Return in about 4 weeks (around 3/20/2018) for follow up evaluation. Your Appointments 2/22/2018  9:00 AM  
PROCEDURE with Lamin Allen MD  
[facility] of [facility] ([facility]) Appt Note: cysto; Pt aware of appt change [address] [place] [id number]  
[address] [facility] [place] [address] [facility] [address] Upcoming Health Maintenance Date Due Hepatitis C Screening 1945 DTaP/Tdap/Td series (1 - Tdap) 5/16/1966 BREAST CANCER SCRN MAMMOGRAM 5/16/1995 FOBT Q 1 YEAR AGE 50-75 5/16/1995 ZOSTER VACCINE AGE 60> 3/16/2005 GLAUCOMA SCREENING Q2Y 5/16/2010 OSTEOPOROSIS SCREENING (DEXA) 5/16/2010 MEDICARE YEARLY EXAM 5/16/2010 Influenza Age 5 to Adult 8/1/2017 Allergies as of 2/20/2018  Review Complete On: 2/20/2018 By: Alina Rivera PA-C Severity Noted Reaction Type Reactions Simvastatin Medium 12/30/2013    Unknown (comments) Lipitor [Atorvastatin]  01/29/2018    Myalgia Current Immunizations  Never Reviewed No immunizations on file. Not reviewed this visit You Were Diagnosed With   
  
 Codes Comments Left foot pain    -  Primary ICD-10-CM: J21.220 ICD-9-CM: 729.5 Bunion of left foot     ICD-10-CM: M21.612 ICD-9-CM: 727.1 Post-operative state     ICD-10-CM: L27.436 ICD-9-CM: V45.89 Vitals BP Pulse Temp Height(growth percentile) Weight(growth percentile) SpO2  
 134/61 79 98.6 °F (37 °C) (Oral) 5' 2\" (1.575 m) 146 lb 9.6 oz (66.5 kg) 100% BMI OB Status Smoking Status 26.81 kg/m2 Postmenopausal Former Smoker BMI and BSA Data Body Mass Index Body Surface Area  
 26.81 kg/m 2 1.71 m 2 Preferred Pharmacy Pharmacy Name Phone [facility] 3003 [facility] AT [facility] [phone] Your Updated Medication List  
  
   
This list is accurate as of: 2/20/18 12:22 PM.  Always use your most recent med list.  
  
  
  
  
 ALEVE 220 mg Cap Generic drug:  naproxen sodium Take  by mouth as needed. aspirin delayed-release 81 mg tablet Take  by mouth daily. gabapentin 300 mg capsule Commonly known as:  NEURONTIN  
TK 2 CS PO Q NIGHT HYDROcodone-acetaminophen 7.5-325 mg per tablet Commonly known as:  NORCO  
TAKE ONE  TABLET BY MOUTH Q 6-8 HRS PRN PAIN  Indications: Pain  
  
 meclizine 12.5 mg tablet Commonly known as:  ANTIVERT Take  by mouth three (3) times daily as needed. multivitamin tablet Commonly known as:  ONE A DAY Take 1 Tab by mouth daily. ondansetron 4 mg disintegrating tablet Commonly known as:  ZOFRAN ODT  
DISSOLVE 1 T ON TONGUE Q 8 H PRN NV  
  
 polyethylene glycol 17 gram packet Commonly known as:  [place] Mall Take 1 Packet by mouth daily. pravastatin 20 mg tablet Commonly known as:  PRAVACHOL Take 20 mg by mouth nightly. promethazine 25 mg tablet Commonly known as:  PHENERGAN Take 1 Tab by mouth every six (6) hours as needed for Nausea. THERALOGIX  PO Take 2 Tabs by mouth daily. traZODone 50 mg tablet Commonly known as:  DESYREL  
TK 1 T PO QHS We Performed the Following AMB SUPPLY ORDER [[id number] Custom] Comments:  
 Short CAM boot Follow-up Instructions Return in about 4 weeks (around 3/20/2018) for follow up evaluation. Patient Instructions · Dressing: changed in the office today. Patient placed in CAM BOOT 
 
· You may shower in 2 days, no submerging in any water · Apply a gentle moisturizer twice daily · Keep all pets away from  any wound present in order to prevent infection. · Continue Activity modification · Weight bearing status:  non weight bearing to the left lower extremity (heel walking only) · No lifting, twisting, squatting, deep bending, driving or strenuous activity. PLEASE SEEK IMMEDIATE ASSESSMENT BY ER PHYSICIAN IF ANY OF THE FOLLOWING EXIST:  
 
Excessive pain, swelling, redness or odor of or around the surgical area Temperature over 100.5 Nausea and vomiting lasting longer than 4 hours or if unable to take medications Any signs of decreased circulation or nerve impairment to extremity: change in color, persistent numbness, tingling, coldness or increase pain If any calf pain, calf tightness, shortness of breath, chest pain Any difficulty breathing at rest or with ambulation, any chest tightness/soreness Severe intractable pain, persistent swelling or drainage, development of a wound, incisional redness, finger/toe swelling or color changes, or CALF PAIN 
 
· Perform ankle pumps with non-surgical/non-injured extremity to help reduce the risk of blood clots · Please follow up in 3-4 weeks · DO NOT DRIVE WHILE TAKING NARCOTIC PAIN MEDICINE 
 
  
  
 
 
 
  
Introducing [facility]! Dear Destiny Herrera: Thank you for requesting a Mtime account. Our records indicate that you already have an active Mtime account. You can access your account anytime at [email or web address]. [email or web address] Did you know that you can access your hospital and ER discharge instructions at any time in Mtime? You can also review all of your test results from your hospital stay or ER visit. Additional Information If you have questions, please visit the Frequently Asked Questions section of the ShoutOmatic website at [email or web address] Remember, ShoutOmatic is NOT to be used for urgent needs. For medical emergencies, dial 911. Now available from your iPhone and Android! Please provide this summary of care documentation to your next provider. Your primary care clinician is listed as Jeanna Londono. If you have any questions after today's visit, please call [phone].

## 2018-02-20 NOTE — PATIENT INSTRUCTIONS
· Dressing: changed in the office today. Patient placed in CAM BOOT    · You may shower in 2 days, no submerging in any water    · Apply a gentle moisturizer twice daily    · Keep all pets away from  any wound present in order to prevent infection. · Continue Activity modification    · Weight bearing status:  non weight bearing to the left lower extremity (heel walking only)    · No lifting, twisting, squatting, deep bending, driving or strenuous activity.     PLEASE SEEK IMMEDIATE ASSESSMENT BY ER PHYSICIAN IF ANY OF THE FOLLOWING EXIST:     Excessive pain, swelling, redness or odor of or around the surgical area   Temperature over 100.5   Nausea and vomiting lasting longer than 4 hours or if unable to take medications   Any signs of decreased circulation or nerve impairment to extremity: change in color, persistent numbness, tingling, coldness or increase pain   If any calf pain, calf tightness, shortness of breath, chest pain   Any difficulty breathing at rest or with ambulation, any chest tightness/soreness  Severe intractable pain, persistent swelling or drainage, development of a wound, incisional redness, finger/toe swelling or color changes, or CALF PAIN    · Perform ankle pumps with non-surgical/non-injured extremity to help reduce the risk of blood clots    · Please follow up in 3-4 weeks     · DO NOT DRIVE WHILE TAKING NARCOTIC PAIN MEDICINE

## 2018-03-14 DIAGNOSIS — M76.821 POSTERIOR TIBIAL TENDONITIS, RIGHT: ICD-10-CM

## 2018-03-14 DIAGNOSIS — M21.612 BUNION OF LEFT FOOT: ICD-10-CM

## 2018-03-14 RX ORDER — HYDROCODONE BITARTRATE AND ACETAMINOPHEN 7.5; 325 MG/1; MG/1
TABLET ORAL
Qty: 30 TAB | Refills: 0 | Status: SHIPPED | OUTPATIENT
Start: 2018-03-14 | End: 2018-05-01

## 2018-03-14 NOTE — TELEPHONE ENCOUNTER
Last Visit: 02/20/2018 with FERN Hutton    Next Appointment: 03/20/2018 with FERN Hutton   Previous Refill Encounters: 02/13/2018 per FERN Jenkins #30    Requested Prescriptions     Pending Prescriptions Disp Refills    HYDROcodone-acetaminophen (NORCO) 7.5-325 mg per tablet 30 Tab 0     Sig: TAKE ONE  TABLET BY MOUTH Q 6-8 HRS PRN PAIN  Indications: Pain

## 2018-03-14 NOTE — TELEPHONE ENCOUNTER
Rx refill for Norco approved and is ready for  at the [facility] office.     555 Nilesh Jc PA-C  3/14/2018   3:06 PM

## 2018-03-20 ENCOUNTER — OFFICE VISIT (OUTPATIENT)
Dept: ORTHOPEDIC SURGERY | Age: 73
End: 2018-03-20

## 2018-03-20 VITALS
DIASTOLIC BLOOD PRESSURE: 61 MMHG | RESPIRATION RATE: 14 BRPM | HEIGHT: 62 IN | HEART RATE: 74 BPM | OXYGEN SATURATION: 96 % | WEIGHT: 147 LBS | BODY MASS INDEX: 27.05 KG/M2 | TEMPERATURE: 97.8 F | SYSTOLIC BLOOD PRESSURE: 136 MMHG

## 2018-03-20 DIAGNOSIS — M21.612 BUNION OF LEFT FOOT: Primary | ICD-10-CM

## 2018-03-20 DIAGNOSIS — Z98.890 POST-OPERATIVE STATE: ICD-10-CM

## 2018-03-20 DIAGNOSIS — M76.821 POSTERIOR TIBIAL TENDONITIS, RIGHT: ICD-10-CM

## 2018-03-20 DIAGNOSIS — M79.672 LEFT FOOT PAIN: ICD-10-CM

## 2018-03-20 NOTE — MR AVS SNAPSHOT
[address], [address] 200 [facility] 
[phone] Patient: Ander Mathews MRN: [id number] Visit Information Date & Time Provider Department Dept. Phone Encounter #  
 3/20/2018  9:45  Nilesh Jc, [address] [facility] [id number] Follow-up Instructions Return in about 4 weeks (around 4/17/2018) for follow up evaluation. Your Appointments 8/1/2018  1:15 PM  
PROCEDURE with Merlin Mayhew, MD  
[facility] ([address] [place] [address]) Appt Note: cysto 709 [facility] 1097 [address] [address] [address] [address] 7/11/2018  9:10 AM  
INJECTION with [facility] ([address]) Appt Note: BCG # 262 Tate Palomares [facility] 1097 [facility]  
  
   
 709 [facility] [address] [address] [address] 7/18/2018  9:10 AM  
INJECTION with [facility]) Appt Note: BCG #2  
 [address] [address] [address] [address]  
[phone]  
  
    
 7/25/2018  9:10 AM  
INJECTION with [facility] ([address]) Appt Note: BCG #3  
 709 [facility] 200 [facility]  
[phone] Upcoming Health Maintenance Date Due Hepatitis C Screening 1945 DTaP/Tdap/Td series (1 - Tdap) 5/16/1966 BREAST CANCER SCRN MAMMOGRAM 5/16/1995 FOBT Q 1 YEAR AGE 50-75 5/16/1995 ZOSTER VACCINE AGE 60> 3/16/2005 GLAUCOMA SCREENING Q2Y 5/16/2010 Bone Densitometry (Dexa) Screening 5/16/2010 Influenza Age 5 to Adult 8/1/2017 MEDICARE YEARLY EXAM 3/14/2018 Allergies as of 3/20/2018  Review Complete On: 3/20/2018 By: 555 Nilesh Jc, AJIT  
  
 Severity Noted Reaction Type Reactions Simvastatin Medium 12/30/2013    Unknown (comments) Lipitor [Atorvastatin]  01/29/2018    Myalgia Current Immunizations  Never Reviewed No immunizations on file. Not reviewed this visit You Were Diagnosed With   
  
 Codes Comments Bunion of left foot    -  Primary ICD-10-CM: X54.672 ICD-9-CM: 727.1 Posterior tibial tendonitis, right     ICD-10-CM: S91.096 ICD-9-CM: 726.72 Left foot pain     ICD-10-CM: G22.995 ICD-9-CM: 729.5 Post-operative state     ICD-10-CM: F75.365 ICD-9-CM: V45.89 Vitals BP Pulse Temp Resp Height(growth percentile) Weight(growth percentile) 136/61 74 97.8 °F (36.6 °C) 14 5' 2\" (1.575 m) 147 lb (66.7 kg) SpO2 BMI OB Status Smoking Status 96% 26.89 kg/m2 Postmenopausal Former Smoker Vitals History BMI and BSA Data Body Mass Index Body Surface Area  
 26.89 kg/m 2 1.71 m 2 Preferred Pharmacy Pharmacy Name Phone [facility] DR[facility]ORE [address] [place] [address] AT [facility] [phone] Your Updated Medication List  
  
   
This list is accurate as of 3/20/18 11:09 AM.  Always use your most recent med list.  
  
  
  
  
 ALEVE 220 mg Cap Generic drug:  naproxen sodium Take  by mouth as needed. aspirin delayed-release 81 mg tablet Take  by mouth daily. gabapentin 300 mg capsule Commonly known as:  NEURONTIN  
TK 2 CS PO Q NIGHT HYDROcodone-acetaminophen 7.5-325 mg per tablet Commonly known as:  NORCO  
TAKE ONE  TABLET BY MOUTH Q 6-8 HRS PRN PAIN  Indications: Pain  
  
 meclizine 12.5 mg tablet Commonly known as:  ANTIVERT Take  by mouth three (3) times daily as needed. multivitamin tablet Commonly known as:  ONE A DAY Take 1 Tab by mouth daily. ondansetron 4 mg disintegrating tablet Commonly known as:  ZOFRAN ODT  
DISSOLVE 1 T ON TONGUE Q 8 H PRN NV  
  
 polyethylene glycol 17 gram packet Commonly known as:  Veda Christie Take 1 Packet by mouth daily. pravastatin 20 mg tablet Commonly known as:  PRAVACHOL Take 20 mg by mouth nightly. promethazine 25 mg tablet Commonly known as:  PHENERGAN Take 1 Tab by mouth every six (6) hours as needed for Nausea. THERALOGIX  PO Take 2 Tabs by mouth daily. traMADol 50 mg tablet Commonly known as:  ULTRAM  
TK 1 T PO Q 6 H PRN P  
  
 traZODone 50 mg tablet Commonly known as:  DESYREL  
TK 1 T PO QHS We Performed the Following AMB POC XRAY, FOOT; COMPLETE, 3+ VIEW [21820 CPT(R)] Follow-up Instructions Return in about 4 weeks (around 4/17/2018) for follow up evaluation. Patient Instructions · Patient will continue in CAM BOOT and will wear shoe of comparable heel height on the right foot · Patient will return to work at the end of the month wearing CAM boot · Continue Activity modification · Weight bearing status:   weight bearing to the left lower extremity in CAM boot · No lifting, twisting, squatting, deep bending, driving or strenuous activity. · Perform ankle pumps with non-surgical/non-injured extremity to help reduce the risk of blood clots · Patient will follow up with Kong Wilson on 4/3/18 for insert for right foot · Please follow up in 4 weeks · DO NOT DRIVE WHILE TAKING NARCOTIC PAIN MEDICINE 
 
  
  
 
 
 
  
Introducing [facility]! Dear Tres Cazares: Thank you for requesting a Givespark account. Our records indicate that you already have an active Givespark account. You can access your account anytime at [email or web address] Did you know that you can access your hospital and ER discharge instructions at any time in Givespark? You can also review all of your test results from your hospital stay or ER visit. Additional Information If you have questions, please visit the Frequently Asked Questions section of the Fundrisehart website at [email or web address] Remember, Gimado is NOT to be used for urgent needs. For medical emergencies, dial 911. Now available from your iPhone and Android! Please provide this summary of care documentation to your next provider. Your primary care clinician is listed as Iris Henderson. If you have any questions after today's visit, please call [phone].

## 2018-03-20 NOTE — PROGRESS NOTES
Patient: Ander Mathews                MRN: [id number]       SSN: [id number]  YOB: 1945                  AGE: 67 y.o. SEX: female    Axel Horton MD    POST OP OFFICE NOTE  DOS: 2/1/18    Chief Complaint:   Chief Complaint   Patient presents with    Surgical Follow-up     lt foot       HPI:     The patient is a 67 y.o. female who presents today for follow up 52 days s/p Left Lapidus bunionectomy procedure. Patient has been NWB to the left lower extremity. Patient reports doing well other than post op pain. Patient denies any fever, chills, chest pain, shortness of breath or calf pain. Patient is on ASA  for DVT prophylaxis. Patient states that CAM boot is causing some hip discomfort due to height difference. She states that she has SMO for right foot which compresses against the navicular bone. She states that Arian Burow is making an insert for her to address the height difference. She will try to wear SMO with band aid cushion medially. Patient states that she is ready to return to work on 3/29/18. She reports a nylon suture erupting from incision. PHYSICAL EXAM:     Visit Vitals    /61    Pulse 74    Temp 97.8 °F (36.6 °C)    Resp 14    Ht 5' 2\" (1.575 m)    Wt 147 lb (66.7 kg)    SpO2 96%    BMI 26.89 kg/m2     Pain Scale: 0 - No pain/10      GEN:  Alert, well developed, well nourished, well appearing 67 y.o. female in no acute distress. PSYCH:  Normal affect, mood, and conduct. alert, oriented x 3 alert, oriented x 3, no dementia  M/S EXAMINATION OF: left foot/ankle  INCISION: Incision looks good, skin well healed, nylon suture was removed without difficulty   SKIN: moderate edema , no erythema, no  ecchymosis, no warmth    TENDERNESS:  mild tenderness to palpation  NEUROVASCULAR:   grossly intact. Positive distal pulses and capillary refill. DVT ASSESSMENT:  The calf is not tender to palpation.  No evidence of DVT seen on physical exam.  ROM: limited RADIOGRAPHS & DIAGNOSTIC STUDIES     No results found for any visits on 03/20/18. IMPRESSION:     Encounter Diagnoses     ICD-10-CM ICD-9-CM   1. Bunion of left foot M21.612 727.1   2. Posterior tibial tendonitis, right M76.821 726.72   3. Left foot pain M79.672 729.5   4. Post-operative state Z98.890 V45.89       PLAN:         Orders Placed This Encounter    [02509] Foot Min 3V            Patient Instructions                 · Patient will continue in CAM BOOT and will wear shoe of comparable heel height on the right foot    · Patient will return to work at the end of the month wearing CAM boot    · Continue Activity modification    · Weight bearing status:   weight bearing to the left lower extremity in CAM boot    · No lifting, twisting, squatting, deep bending, driving or strenuous activity. · Perform ankle pumps with non-surgical/non-injured extremity to help reduce the risk of blood clots  · Patient will follow up with Ja on 4/3/18 for insert for right foot    · Please follow up in 4 weeks     · DO NOT [address]                   Patient has been discussed with Dr. Huong Gifford during this visit and he agrees with the assessment and plan    Patient expresses understanding of the plan. Patient education provided on post surgical care. REVIEW OF SYSTEMS:     Otherwise as noted in HPI      PAST MEDICAL HISTORY:     Past Medical History:   Diagnosis Date    Bladder cancer ([facility] [address] 2015 (approx)    Foraminal stenosis of lumbar region     Hematuria     Hypercholesterolemia     Low back pain        MEDICATIONS:     Current Outpatient Prescriptions   Medication Sig    HYDROcodone-acetaminophen (NORCO) 7.5-325 mg per tablet TAKE ONE  TABLET BY MOUTH Q 6-8 HRS PRN PAIN  Indications: Pain    traMADol (ULTRAM) 50 mg tablet TK 1 T PO Q 6 H PRN P    promethazine (PHENERGAN) 25 mg tablet Take 1 Tab by mouth every six (6) hours as needed for Nausea.     polyethylene glycol (MIRALAX) 17 gram packet Take 1 Packet by mouth daily.  MULTIVIT-MINERALS/FOLIC ACID (THERALOGIX  PO) Take 2 Tabs by mouth daily.  gabapentin (NEURONTIN) 300 mg capsule TK 2 CS PO Q NIGHT    naproxen sodium (ALEVE) 220 mg cap Take  by mouth as needed.  ondansetron (ZOFRAN ODT) 4 mg disintegrating tablet DISSOLVE 1 T ON TONGUE Q 8 H PRN NV    meclizine (ANTIVERT) 12.5 mg tablet Take  by mouth three (3) times daily as needed.  traZODone (DESYREL) 50 mg tablet TK 1 T PO QHS    aspirin delayed-release 81 mg tablet Take  by mouth daily.  multivitamin (ONE A DAY) tablet Take 1 Tab by mouth daily.  pravastatin (PRAVACHOL) 20 mg tablet Take 20 mg by mouth nightly. No current facility-administered medications for this visit.         ALLERGIES:     Allergies   Allergen Reactions    Simvastatin Unknown (comments)    Lipitor [Atorvastatin] Myalgia         PAST SURGICAL HISTORY:     Past Surgical History:   Procedure Laterality Date    FOOT/TOES SURGERY PROC UNLISTED Left 02/01/2018    bunionectomy    HX BUNIONECTOMY      HX COLONOSCOPY      HX LUMBAR LAMINECTOMY  6/2/15    with fusion    HX ORTHOPAEDIC  Bunionionectomy R    HX TUBAL LIGATION      HX UROLOGICAL      treatment BCG for Bladder Cancer       SOCIAL HISTORY:     Social History     Social History    Marital status:      Spouse name: N/A    Number of children: N/A    Years of education: N/A     Occupational History    nurse      Social History Main Topics    Smoking status: Former Smoker     Quit date: 1/1/1980    Smokeless tobacco: Never Used    Alcohol use 0.0 oz/week     0 Standard drinks or equivalent per week      Comment: 1 glass of wine 3-4 times per month    Drug use: No    Sexual activity: Not on file     Other Topics Concern    Not on file     Social History Narrative       FAMILY HISTORY:     Family History   Problem Relation Age of Onset    No Known Problems Mother     No Known Problems Father  Diabetes Sister            Cayetano Manrique PA-C  3/20/2018

## 2018-03-20 NOTE — PATIENT INSTRUCTIONS
· Patient will continue in CAM BOOT and will wear shoe of comparable heel height on the right foot    · Patient will return to work at the end of the month wearing CAM boot    · Continue Activity modification    · Weight bearing status:   weight bearing to the left lower extremity in CAM boot    · No lifting, twisting, squatting, deep bending, driving or strenuous activity.     · Perform ankle pumps with non-surgical/non-injured extremity to help reduce the risk of blood clots  · Patient will follow up with Ja on 4/3/18 for insert for right foot    · Please follow up in 4 weeks     · DO NOT [address]

## 2018-03-20 NOTE — LETTER
NOTIFICATION RETURN TO WORK / SCHOOL 
 
3/20/2018 11:11 AM 
 
Ms. Loretta [address] [address] To Whom It May Concern: 
 
Loretta Hawkins is currently under the care of [address] [place] [address] Konstantin Qureshi. She will return to work/school full duty in a boot on: 3/29/2018 If there are questions or concerns please have the patient contact our office. Sincerely, Luis Neumann PA-C

## 2018-04-17 ENCOUNTER — OFFICE VISIT (OUTPATIENT)
Dept: ORTHOPEDIC SURGERY | Age: 73
End: 2018-04-17

## 2018-04-17 VITALS
SYSTOLIC BLOOD PRESSURE: 148 MMHG | WEIGHT: 148 LBS | RESPIRATION RATE: 16 BRPM | DIASTOLIC BLOOD PRESSURE: 64 MMHG | BODY MASS INDEX: 27.23 KG/M2 | OXYGEN SATURATION: 100 % | HEIGHT: 62 IN | TEMPERATURE: 97.7 F | HEART RATE: 67 BPM

## 2018-04-17 DIAGNOSIS — Z98.890 POST-OPERATIVE STATE: ICD-10-CM

## 2018-04-17 DIAGNOSIS — M76.822 POSTERIOR TIBIAL TENDONITIS, LEFT: Primary | ICD-10-CM

## 2018-04-17 NOTE — PATIENT INSTRUCTIONS
· Patient will discontinue CAM BOOT to supportive shoe    · Theraband provided for home exercises    · Continue Activity modification    · Please follow up in 4 weeks     · Follow up with [facility] for insert    · DO NOT DRIVE WHILE TAKING NARCOTIC PAIN MEDICINE

## 2018-04-17 NOTE — MR AVS SNAPSHOT
[address], [address] 200 [facility] 
[phone] Patient: Nancy Srinivasan MRN: [id number] SP Visit Information Date & Time Provider Department Dept. Phone Encounter #  
 2018  1:15 PM Olivia Danielle [address] [facility] [phone] [id number] Follow-up Instructions Return in about 4 weeks (around 5/15/2018) for follow up evaluation. Your Appointments 2018  1:15 PM  
PROCEDURE with Vibha Soliz MD  
[facility] ([address] [place] [address] Road) Appt Note: cysto 709 [facility] 1097 [facility]  
  
   
 [address] [place] [id number] [address] [address] 2018  9:10 AM  
INJECTION with [facility] ([address] [place] [address]) Appt Note: BCG # 262 Tate Palomares [facility] 1097 [facility]  
  
   
 [address] [place] [id number] [address] [address] 2018  9:10 AM  
INJECTION with [facility] ([facility]) Appt Note: BCG #2  
 [address] [address] [address] [address]  
[phone]  
  
    
 2018  9:10 AM  
INJECTION with [facility] ([address] [place] [address]) Appt Note: BCG #3  
 709 [facility] 200 [facility]  
[phone] Upcoming Health Maintenance Date Due Hepatitis C Screening 1945 DTaP/Tdap/Td series (1 - Tdap) 1966 BREAST CANCER SCRN MAMMOGRAM 1995 FOBT Q 1 YEAR AGE 50-75 1995 ZOSTER VACCINE AGE 60> 3/16/2005 GLAUCOMA SCREENING Q2Y 2010 Bone Densitometry (Dexa) Screening 2010 Influenza Age 5 to Adult 2017 MEDICARE YEARLY EXAM 3/14/2018 Allergies as of 2018  Review Complete On: 2018 By: Olivia Danielle, AJIT  
  
 Severity Noted Reaction Type Reactions Simvastatin Medium 12/30/2013    Unknown (comments) Lipitor [Atorvastatin]  01/29/2018    Myalgia Current Immunizations  Never Reviewed No immunizations on file. Not reviewed this visit You Were Diagnosed With   
  
 Codes Comments Posterior tibial tendonitis, left    -  Primary ICD-10-CM: A27.376 ICD-9-CM: 726.72 Post-operative state     ICD-10-CM: E56.121 ICD-9-CM: V45.89 Vitals BP Pulse Temp Resp Height(growth percentile) Weight(growth percentile) 148/64 67 97.7 °F (36.5 °C) 16 5' 2\" (1.575 m) 148 lb (67.1 kg) SpO2 BMI OB Status Smoking Status 100% 27.07 kg/m2 Postmenopausal Former Smoker Vitals History BMI and BSA Data Body Mass Index Body Surface Area  
 27.07 kg/m 2 1.71 m 2 Preferred Pharmacy Pharmacy Name Phone [facility] [address] [place] [address] [phone] Your Updated Medication List  
  
   
This list is accurate as of 4/17/18  1:43 PM.  Always use your most recent med list.  
  
  
  
  
 ALEVE 220 mg Cap Generic drug:  naproxen sodium Take  by mouth as needed. aspirin delayed-release 81 mg tablet Take  by mouth daily. gabapentin 300 mg capsule Commonly known as:  NEURONTIN  
TK 2 CS PO Q NIGHT HYDROcodone-acetaminophen 7.5-325 mg per tablet Commonly known as:  NORCO  
TAKE ONE  TABLET BY MOUTH Q 6-8 HRS PRN PAIN  Indications: Pain  
  
 meclizine 12.5 mg tablet Commonly known as:  ANTIVERT Take  by mouth three (3) times daily as needed. multivitamin tablet Commonly known as:  ONE A DAY Take 1 Tab by mouth daily. ondansetron 4 mg disintegrating tablet Commonly known as:  ZOFRAN ODT  
DISSOLVE 1 T ON TONGUE Q 8 H PRN NV  
  
 polyethylene glycol 17 gram packet Commonly known as:  [place] Take 1 Packet by mouth daily. pravastatin 20 mg tablet Commonly known as:  PRAVACHOL Take 20 mg by mouth nightly. promethazine 25 mg tablet Commonly known as:  PHENERGAN Take 1 Tab by mouth every six (6) hours as needed for Nausea. THERALOGIX  PO Take 2 Tabs by mouth daily. traMADol 50 mg tablet Commonly known as:  ULTRAM  
TK 1 T PO Q 6 H PRN P  
  
 traZODone 50 mg tablet Commonly known as:  DESYREL  
TK 1 T PO QHS Follow-up Instructions Return in about 4 weeks (around 5/15/2018) for follow up evaluation. Patient Instructions · Patient will discontinue CAM BOOT to supportive shoe · Theraband provided for home exercises · Continue Activity modification · Please follow up in 4 weeks · Follow up with [facility] for insert · DO NOT DRIVE WHILE TAKING NARCOTIC PAIN MEDICINE 
 
  
  
 
 
 
  
Introducing [facility] SERVICES! Dear Ariana Payne: Thank you for requesting a "[employer]" account. Our records indicate that you already have an active "[employer]" account. You can access your account anytime at [email or web address] Did you know that you can access your hospital and ER discharge instructions at any time in "[employer]"? You can also review all of your test results from your hospital stay or ER visit. Additional Information If you have questions, please visit the Frequently Asked Questions section of the "[employer]" website at [email or web address] Remember, "[employer]" is NOT to be used for urgent needs. For medical emergencies, dial 911. Now available from your iPhone and Android! Please provide this summary of care documentation to your next provider. Your primary care clinician is listed as Kristin Moses. If you have any questions after today's visit, please call [phone].

## 2018-04-17 NOTE — PROGRESS NOTES
Patient: Dorinda Jolly                MRN: [id number]       SSN: [id number]  YOB: 1945                  AGE: 67 y.o. SEX: female    Noble Barraza MD    POST OP OFFICE NOTE  DOS: 2/1/18    Chief Complaint:   Chief Complaint   Patient presents with    Follow-up     lt foot        HPI:     The patient is a 67 y.o. female who presents today for follow up 52 days s/p Left Lapidus bunionectomy procedure. Patient has been NWB to the left lower extremity. Patient reports doing well other than post op pain. Patient denies any fever, chills, chest pain, shortness of breath or calf pain. She states that she has SMO for right foot which compresses against the navicular bone. She states that [employer] is making an insert for her to address the height difference. She states she has some occasional discomfort around her ankle and forefoot with weight bearing, but not all the time. Some of her discomfort may be due to the CAM boot which we will have her discontinue today. PHYSICAL EXAM:     Visit Vitals    /64    Pulse 67    Temp 97.7 °F (36.5 °C)    Resp 16    Ht 5' 2\" (1.575 m)    Wt 148 lb (67.1 kg)    SpO2 100%    BMI 27.07 kg/m2     Pain Scale: 0 - No pain/10      GEN:  Alert, well developed, well nourished, well appearing 67 y.o. female in no acute distress. PSYCH:  Normal affect, mood, and conduct. alert, oriented x 3 alert, oriented x 3, no dementia  M/S EXAMINATION OF: left foot/ankle  INCISION: Incision looks good, skin well healed, nylon suture was removed without difficulty   SKIN: mild  edema , no erythema, no  ecchymosis, no warmth    TENDERNESS:  mild tenderness to palpation  NEUROVASCULAR:   grossly intact. Positive distal pulses and capillary refill. DVT ASSESSMENT:  The calf is not tender to palpation. No evidence of DVT seen on physical exam.  ROM: improved     RADIOGRAPHS & DIAGNOSTIC STUDIES     No results found for any visits on 04/17/18. IMPRESSION:     Encounter Diagnoses     ICD-10-CM ICD-9-CM   1. Posterior tibial tendonitis, left M76.822 726.72   2. Post-operative state Z98.890 V45.89       PLAN:         No orders of the defined types were placed in this encounter.           Patient Instructions                 · Patient will discontinue CAM BOOT to supportive shoe    · Theraband provided for home exercises    · Continue Activity modification    · Please follow up in 4 weeks     · Follow up with [facility] for insert    · DO NOT DRIVE WHILE TAKING NARCOTIC PAIN MEDICINE                   Patient has been discussed with Dr. Rony Peters during this visit and he agrees with the assessment and plan    Patient expresses understanding of the plan. Patient education provided on post surgical care. REVIEW OF SYSTEMS:     Otherwise as noted in HPI      PAST MEDICAL HISTORY:     Past Medical History:   Diagnosis Date    Bladder cancer ([facility] [address] 2015 (approx)    Foraminal stenosis of lumbar region     Hematuria     Hypercholesterolemia     Low back pain        MEDICATIONS:     Current Outpatient Prescriptions   Medication Sig    HYDROcodone-acetaminophen (NORCO) 7.5-325 mg per tablet TAKE ONE  TABLET BY MOUTH Q 6-8 HRS PRN PAIN  Indications: Pain    traMADol (ULTRAM) 50 mg tablet TK 1 T PO Q 6 H PRN P    promethazine (PHENERGAN) 25 mg tablet Take 1 Tab by mouth every six (6) hours as needed for Nausea.  polyethylene glycol (MIRALAX) 17 gram packet Take 1 Packet by mouth daily.  MULTIVIT-MINERALS/FOLIC ACID (THERALOGIX  PO) Take 2 Tabs by mouth daily.  gabapentin (NEURONTIN) 300 mg capsule TK 2 CS PO Q NIGHT    naproxen sodium (ALEVE) 220 mg cap Take  by mouth as needed.  ondansetron (ZOFRAN ODT) 4 mg disintegrating tablet DISSOLVE 1 T ON TONGUE Q 8 H PRN NV    meclizine (ANTIVERT) 12.5 mg tablet Take  by mouth three (3) times daily as needed.     traZODone (DESYREL) 50 mg tablet TK 1 T PO QHS    aspirin delayed-release 81 mg tablet Take  by mouth daily.  multivitamin (ONE A DAY) tablet Take 1 Tab by mouth daily.  pravastatin (PRAVACHOL) 20 mg tablet Take 20 mg by mouth nightly. No current facility-administered medications for this visit.         ALLERGIES:     Allergies   Allergen Reactions    Simvastatin Unknown (comments)    Lipitor [Atorvastatin] Myalgia         PAST SURGICAL HISTORY:     Past Surgical History:   Procedure Laterality Date    FOOT/TOES SURGERY PROC UNLISTED Left 02/01/2018    bunionectomy    HX BUNIONECTOMY      HX COLONOSCOPY      HX LUMBAR LAMINECTOMY  6/2/15    with fusion    HX ORTHOPAEDIC  Bunionionectomy R    HX TUBAL LIGATION      HX UROLOGICAL      treatment BCG for Bladder Cancer       SOCIAL HISTORY:     Social History     Social History    Marital status:      Spouse name: N/A    Number of children: N/A    Years of education: N/A     Occupational History    nurse      Social History Main Topics    Smoking status: Former Smoker     Quit date: 1/1/1980    Smokeless tobacco: Never Used    Alcohol use 0.0 oz/week     0 Standard drinks or equivalent per week      Comment: 1 glass of wine 3-4 times per month    Drug use: No    Sexual activity: Not on file     Other Topics Concern    Not on file     Social History Narrative       FAMILY HISTORY:     Family History   Problem Relation Age of Onset    No Known Problems Mother     No Known Problems Father     Diabetes Sister            Niya Nunez, [address]  4/18/2018

## 2018-04-27 ENCOUNTER — TELEPHONE (OUTPATIENT)
Dept: ORTHOPEDIC SURGERY | Age: 73
End: 2018-04-27

## 2018-04-27 RX ORDER — MELOXICAM 15 MG/1
TABLET ORAL
Qty: 30 TAB | Refills: 1 | Status: SHIPPED | OUTPATIENT
Start: 2018-04-27 | End: 2018-07-09

## 2018-04-30 DIAGNOSIS — M21.612 BUNION OF LEFT FOOT: ICD-10-CM

## 2018-04-30 DIAGNOSIS — M76.821 POSTERIOR TIBIAL TENDONITIS, RIGHT: ICD-10-CM

## 2018-04-30 RX ORDER — HYDROCODONE BITARTRATE AND ACETAMINOPHEN 7.5; 325 MG/1; MG/1
TABLET ORAL
Qty: 30 TAB | Refills: 0 | OUTPATIENT
Start: 2018-04-30

## 2018-04-30 NOTE — TELEPHONE ENCOUNTER
Patient is 3 months s/p surgery and Dr. Cali Ellison prefers not to prescribe narcotic pain medicine. Patient can have an rx for antiinflammatory medication only. If she prefers, patient can be referred to pain management.     Niya Nunez PA-C  4/30/2018   4:11 PM

## 2018-04-30 NOTE — TELEPHONE ENCOUNTER
Last Visit: 04/17/2018 with FERN Hutton    Next Appointment: 05/01/2018 with FERN Hutton   Previous Refill Encounters: 03/14/2018 per FERN Will #30    Requested Prescriptions     Pending Prescriptions Disp Refills    HYDROcodone-acetaminophen (NORCO) 7.5-325 mg per tablet 30 Tab 0     Sig: TAKE ONE  TABLET BY MOUTH Q 6-8 HRS PRN PAIN  Indications: Pain

## 2018-04-30 NOTE — TELEPHONE ENCOUNTER
Called patient to informed them their RX was not approved. Unable to reach patient. Left a message for them to give us a call back for more information due to generic voicemail.

## 2018-05-01 ENCOUNTER — OFFICE VISIT (OUTPATIENT)
Dept: ORTHOPEDIC SURGERY | Age: 73
End: 2018-05-01

## 2018-05-01 VITALS
HEART RATE: 74 BPM | HEIGHT: 62 IN | OXYGEN SATURATION: 99 % | BODY MASS INDEX: 27.16 KG/M2 | WEIGHT: 147.6 LBS | SYSTOLIC BLOOD PRESSURE: 151 MMHG | RESPIRATION RATE: 16 BRPM | DIASTOLIC BLOOD PRESSURE: 65 MMHG | TEMPERATURE: 98.5 F

## 2018-05-01 DIAGNOSIS — M79.672 LEFT FOOT PAIN: Primary | ICD-10-CM

## 2018-05-01 DIAGNOSIS — M77.41 METATARSALGIA OF RIGHT FOOT: ICD-10-CM

## 2018-05-01 RX ORDER — TRAMADOL HYDROCHLORIDE 50 MG/1
TABLET ORAL
Qty: 20 TAB | Refills: 0 | Status: SHIPPED | OUTPATIENT
Start: 2018-05-01 | End: 2018-07-09

## 2018-05-01 NOTE — PROGRESS NOTES
Patient: Pan Gottlieb                MRN: [id number]       SSN: [id number]  YOB: 1945                  AGE: 67 y.o. SEX: female    Asia Presley MD    POST OP OFFICE NOTE  DOS: 2/1/18    Chief Complaint:   Chief Complaint   Patient presents with    Foot Pain     L FOOT PAIN        HPI:     The patient is a 67 y.o. female who presents today for follow up 80 days s/p Left Lapidus bunionectomy procedure. Patient has been WBAT to the left lower extremity. Patient reports that she received the orthotic 1 week ago and has had increased pain over the dorsal forefoot since then. Patient states that [facility] informed her to break in the orthotic, but she states that she cannot due to the pain. Patient has not yet been back to see [facility] for an adjustment to her orthotic. She called our office on yesterday to request an refill of Norco which was denied. I advised that patient that we can provide an Rx for Ultram for her acute pain due to the orthotic, but we will not be able to refill the rx. She did not bring/wear the orthotic to the office today. We will provide an order for [facility] to adjust the patient's orthotic. She states that she has taken Aleve which provides some pain relief. PHYSICAL EXAM:     Visit Vitals    /65    Pulse 74    Temp 98.5 °F (36.9 °C) (Oral)    Resp 16    Ht 5' 2\" (1.575 m)    Wt 147 lb 9.6 oz (67 kg)    SpO2 99%    BMI 27 kg/m2     Pain Scale: 6/10      GEN:  Alert, well developed, well nourished, well appearing 67 y.o. female in no acute distress. PSYCH:  Normal affect, mood, and conduct. alert, oriented x 3 alert, oriented x 3, no dementia  M/S EXAMINATION OF: left foot/ankle  INCISION: Incision looks good, skin well healed,  SKIN: mild  edema , no erythema, no  ecchymosis, no warmth    TENDERNESS:  mild tenderness to palpation to dorsal forefoot  NEUROVASCULAR:   grossly intact. Positive distal pulses and capillary refill.    DVT ASSESSMENT:  The calf is not tender to palpation. No evidence of DVT seen on physical exam.  ROM: improved     RADIOGRAPHS & DIAGNOSTIC STUDIES     Results for orders placed or performed in visit on 05/01/18   AMB POC XRAY, FOOT; COMPLETE, 3+ VIEW    Narrative    Jayesh Álvarez PA-C     5/1/2018  6:27 PM  X-rays, 3 views of the left foot reveals post op changes s/p left   Lapidus bunionectomy procedure. Overall alignment looks good. Hardware in good position. Incomplete healing noted at this time. IMPRESSION:     Encounter Diagnoses     ICD-10-CM ICD-9-CM   1. Left foot pain M79.672 729.5   2. Metatarsalgia of right foot M77.41 726.70       PLAN:         Orders Placed This Encounter    AMB SUPPLY ORDER    [35983] Foot Min 3V    REFERRAL TO PAIN MANAGEMENT    traMADol (ULTRAM) 50 mg tablet            Patient Instructions          Foot Pain: Care Instructions  Your Care Instructions  Foot injuries that cause pain and swelling are fairly common. Almost all sports or home repair projects can cause a misstep that ends up as foot pain. Normal wear and tear, especially as you get older, also can cause foot pain. Most minor foot injuries will heal on their own, and home treatment is usually all you need to do. If you have a severe injury, you may need tests and treatment. Follow-up care is a key part of your treatment and safety. Be sure to make and go to all appointments, and call your doctor if you are having problems. It's also a good idea to know your test results and keep a list of the medicines you take. How can you care for yourself at home? · Take pain medicines exactly as directed. ¨ If the doctor gave you a prescription medicine for pain, take it as prescribed. ¨ If you are not taking a prescription pain medicine, ask your doctor if you can take an over-the-counter medicine. · Rest and protect your foot. Take a break from any activity that may cause pain.   · Put ice or a cold pack on your foot for 10 to 20 minutes at a time. Put a thin cloth between the ice and your skin. · Prop up the sore foot on a pillow when you ice it or anytime you sit or lie down during the next 3 days. Try to keep it above the level of your heart. This will help reduce swelling. · Your doctor may recommend that you wrap your foot with an elastic bandage. Keep your foot wrapped for as long as your doctor advises. · If your doctor recommends crutches, use them as directed. · Wear roomy footwear. · As soon as pain and swelling end, begin gentle exercises of your foot. Your doctor can tell you which exercises will help. When should you call for help? Call 911 anytime you think you may need emergency care. For example, call if:  ? · Your foot turns pale, white, blue, or cold. ?Call your doctor now or seek immediate medical care if:  ? · You cannot move or stand on your foot. ? · Your foot looks twisted or out of its normal position. ? · Your foot is not stable when you step down. ? · You have signs of infection, such as:  ¨ Increased pain, swelling, warmth, or redness. ¨ Red streaks leading from the sore area. ¨ Pus draining from a place on your foot. ¨ A fever. ? · Your foot is numb or tingly. ? Watch closely for changes in your health, and be sure to contact your doctor if:  ? · You do not get better as expected. ? · You have bruises from an injury that last longer than 2 weeks. Where can you learn more? Go to http://horace-marco.info/. Enter N306 in the search box to learn more about \"Foot Pain: Care Instructions. \"  Current as of: March 21, 2017  Content Version: 11.4  © 9490-0103 [employer]. Care instructions adapted under license by [employer] (which disclaims liability or warranty for this information).  If you have questions about a medical condition or this instruction, always ask your healthcare professional. Elena Byers disclaims any warranty or liability for your use of this information. Patient will follow up with [facility] for adjustment to orthotic  Wear comfortable shoes  Rx for Ultram provided, no refills will be provided  Possible referral to pain management at [facility]  Patient to follow up in 4 weeks      Patient expresses understanding of the plan. Patient education provided on post surgical care. REVIEW OF SYSTEMS:     Otherwise as noted in HPI      PAST MEDICAL HISTORY:     Past Medical History:   Diagnosis Date    Bladder cancer ([address] 2015 (approx)    Foraminal stenosis of lumbar region     Hematuria     Hypercholesterolemia     Low back pain        MEDICATIONS:     Current Outpatient Prescriptions   Medication Sig    traMADol (ULTRAM) 50 mg tablet 1 po BID    meloxicam (MOBIC) 15 mg tablet Take one tablet by mouth daily with food    MULTIVIT-MINERALS/FOLIC ACID (THERALOGIX  PO) Take 2 Tabs by mouth daily.  gabapentin (NEURONTIN) 300 mg capsule TK 2 CS PO Q NIGHT    ondansetron (ZOFRAN ODT) 4 mg disintegrating tablet DISSOLVE 1 T ON TONGUE Q 8 H PRN NV    meclizine (ANTIVERT) 12.5 mg tablet Take  by mouth three (3) times daily as needed.  traZODone (DESYREL) 50 mg tablet TK 1 T PO QHS    aspirin delayed-release 81 mg tablet Take  by mouth daily.  multivitamin (ONE A DAY) tablet Take 1 Tab by mouth daily.  pravastatin (PRAVACHOL) 20 mg tablet Take 20 mg by mouth nightly. No current facility-administered medications for this visit.         ALLERGIES:     Allergies   Allergen Reactions    Simvastatin Unknown (comments)    Lipitor [Atorvastatin] Myalgia         PAST SURGICAL HISTORY:     Past Surgical History:   Procedure Laterality Date    FOOT/TOES SURGERY PROC UNLISTED Left 02/01/2018    bunionectomy    HX BUNIONECTOMY      HX COLONOSCOPY      HX LUMBAR LAMINECTOMY  6/2/15    with fusion    HX ORTHOPAEDIC  Bunionionectomy R    HX TUBAL LIGATION      HX UROLOGICAL      treatment BCG for Bladder Cancer SOCIAL HISTORY:     Social History     Social History    Marital status:      Spouse name: N/A    Number of children: N/A    Years of education: N/A     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

## 2018-05-01 NOTE — PATIENT INSTRUCTIONS
Foot Pain: Care Instructions  Your Care Instructions  Foot injuries that cause pain and swelling are fairly common. Almost all sports or home repair projects can cause a misstep that ends up as foot pain. Normal wear and tear, especially as you get older, also can cause foot pain. Most minor foot injuries will heal on their own, and home treatment is usually all you need to do. If you have a severe injury, you may need tests and treatment. Follow-up care is a key part of your treatment and safety. Be sure to make and go to all appointments, and call your doctor if you are having problems. It's also a good idea to know your test results and keep a list of the medicines you take. How can you care for yourself at home? · Take pain medicines exactly as directed. ¨ If the doctor gave you a prescription medicine for pain, take it as prescribed. ¨ If you are not taking a prescription pain medicine, ask your doctor if you can take an over-the-counter medicine. · Rest and protect your foot. Take a break from any activity that may cause pain. · Put ice or a cold pack on your foot for 10 to 20 minutes at a time. Put a thin cloth between the ice and your skin. · Prop up the sore foot on a pillow when you ice it or anytime you sit or lie down during the next 3 days. Try to keep it above the level of your heart. This will help reduce swelling. · Your doctor may recommend that you wrap your foot with an elastic bandage. Keep your foot wrapped for as long as your doctor advises. · If your doctor recommends crutches, use them as directed. · Wear roomy footwear. · As soon as pain and swelling end, begin gentle exercises of your foot. Your doctor can tell you which exercises will help. When should you call for help? Call 911 anytime you think you may need emergency care. For example, call if:  ? · Your foot turns pale, white, blue, or cold.    ?Call your doctor now or seek immediate medical care if:  ? · You cannot move or stand on your foot. ? · Your foot looks twisted or out of its normal position. ? · Your foot is not stable when you step down. ? · You have signs of infection, such as:  ¨ Increased pain, swelling, warmth, or redness. ¨ Red streaks leading from the sore area. ¨ Pus draining from a place on your foot. ¨ A fever. ? · Your foot is numb or tingly. ? Watch closely for changes in your health, and be sure to contact your doctor if:  ? · You do not get better as expected. ? · You have bruises from an injury that last longer than 2 weeks. Where can you learn more? Go to http://horace-marco.info/. Enter M349 in the search box to learn more about \"Foot Pain: Care Instructions. \"  Current as of: March 21, 2017  Content Version: 11.4  © 8830-0762 [employer]. Care instructions adapted under license by [employer] (which disclaims liability or warranty for this information). If you have questions about a medical condition or this instruction, always ask your healthcare professional. [address] any warranty or liability for your use of this information.

## 2018-05-01 NOTE — PROCEDURES
X-rays, 3 views of the left foot reveals post op changes s/p left Lapidus bunionectomy procedure. Overall alignment looks good. Hardware in good position. Incomplete healing noted at this time.

## 2018-05-01 NOTE — MR AVS SNAPSHOT
[address], [address] 200 [facility] [address] 
[phone] Patient: Carla Fitzgerald MRN: [id number] MARYAM:[birth year] Visit Information Date & Time Provider Department Dept. Phone Encounter #  
 5/1/2018  1:30 PM Marylen Perla, [address] [facility] [phone] [id number] Your Appointments 5/15/2018  4:30 PM  
Follow Up with Marylen Perla, PA-C  
[facility] ([address]) Appt Note: 1month left foot f/u  
 [address], [address] 200 [facility] [address]  
[phone] [address]  
  
    
 8/1/2018  1:15 PM  
PROCEDURE with Brigid Martins MD  
[facility] ([address]) Appt Note: cysto 709 [facility] 1097 [facility]  
  
   
 709 [facility] [address] [address] [address] 7/11/2018  9:10 AM  
INJECTION with [facility] ([address]) Appt Note: [facility] # 262 [facility] 1097 [facility]  
  
   
 709 [facility] [address] [address] [address] 7/18/2018  9:10 AM  
INJECTION with [facility] ([address]) Appt Note: BCG #2  
 [address] [address] [address] [address]  
[phone]  
  
    
 7/25/2018  9:10 AM  
INJECTION with [facility] ([address]) Appt Note: BCG #3  
 [address] 200 [facility]  
[phone] Upcoming Health Maintenance Date Due Hepatitis C Screening 1945 DTaP/Tdap/Td series (1 - Tdap) 5/16/1966 BREAST CANCER SCRN MAMMOGRAM 5/16/1995 FOBT Q 1 YEAR AGE 50-75 5/16/1995 ZOSTER VACCINE AGE 60> 3/16/2005 GLAUCOMA SCREENING Q2Y 5/16/2010 Bone Densitometry (Dexa) Screening 5/16/2010 MEDICARE YEARLY EXAM 3/14/2018 Influenza Age 5 to Adult 8/1/2018 Allergies as of 5/1/2018  Review Complete On: 5/1/2018 By: Rosie Jc PA-C Severity Noted Reaction Type Reactions Simvastatin Medium 12/30/2013    Unknown (comments) Lipitor [Atorvastatin]  01/29/2018    Myalgia Current Immunizations  Never Reviewed No immunizations on file. Not reviewed this visit You Were Diagnosed With   
  
 Codes Comments Left foot pain    -  Primary ICD-10-CM: P97.285 ICD-9-CM: 729.5 Metatarsalgia of right foot     ICD-10-CM: M77.41 
ICD-9-CM: 726.70 Vitals BP Pulse Temp Resp Height(growth percentile) Weight(growth percentile) 151/65 74 98.5 °F (36.9 °C) (Oral) 16 5' 2\" (1.575 m) 147 lb 9.6 oz (67 kg) SpO2 BMI OB Status Smoking Status 99% 27 kg/m2 Postmenopausal Former Smoker BMI and BSA Data Body Mass Index Body Surface Area  
 27 kg/m 2 1.71 m 2 Preferred Pharmacy Pharmacy Name Phone [facility] [address] [place] [address] AT [facility] [phone] Your Updated Medication List  
  
   
This list is accurate as of 5/1/18  2:29 PM.  Always use your most recent med list.  
  
  
  
  
 aspirin delayed-release 81 mg tablet Take  by mouth daily. gabapentin 300 mg capsule Commonly known as:  NEURONTIN  
TK 2 CS PO Q NIGHT  
  
 meclizine 12.5 mg tablet Commonly known as:  ANTIVERT Take  by mouth three (3) times daily as needed. meloxicam 15 mg tablet Commonly known as:  MOBIC Take one tablet by mouth daily with food  
  
 multivitamin tablet Commonly known as:  ONE A DAY Take 1 Tab by mouth daily. ondansetron 4 mg disintegrating tablet Commonly known as:  ZOFRAN ODT  
DISSOLVE 1 T ON TONGUE Q 8 H PRN NV  
  
 pravastatin 20 mg tablet Commonly known as:  PRAVACHOL  
 Take 20 mg by mouth nightly. THERALOGIX  PO Take 2 Tabs by mouth daily. traZODone 50 mg tablet Commonly known as:  DESYREL  
TK 1 T PO QHS We Performed the Following AMB POC XRAY, FOOT; COMPLETE, 3+ VIEW [10885 CPT(R)] AMB SUPPLY ORDER [[id number] Custom] Comments:  
 Order date: 5/1/2018 Please modify orthotic on the right side REFERRAL TO PAIN MANAGEMENT [[id number] Custom] Comments:  
 Chronic foot pain Referral Information Referral ID Referred By Referred To  
  
 [id number] Wilber MOSS Not Available Visits Status Start Date End Date 1 New Request 5/1/18 5/1/19 If your referral has a status of pending review or denied, additional information will be sent to support the outcome of this decision. Patient Instructions Foot Pain: Care Instructions Your Care Instructions Foot injuries that cause pain and swelling are fairly common. Almost all sports or home repair projects can cause a misstep that ends up as foot pain. Normal wear and tear, especially as you get older, also can cause foot pain. Most minor foot injuries will heal on their own, and home treatment is usually all you need to do. If you have a severe injury, you may need tests and treatment. Follow-up care is a key part of your treatment and safety. Be sure to make and go to all appointments, and call your doctor if you are having problems. It's also a good idea to know your test results and keep a list of the medicines you take. How can you care for yourself at home? · Take pain medicines exactly as directed. ¨ If the doctor gave you a prescription medicine for pain, take it as prescribed. ¨ If you are not taking a prescription pain medicine, ask your doctor if you can take an over-the-counter medicine. · Rest and protect your foot. Take a break from any activity that may cause pain. · Put ice or a cold pack on your foot for 10 to 20 minutes at a time.  Put a thin cloth between the ice and your skin. · Prop up the sore foot on a pillow when you ice it or anytime you sit or lie down during the next 3 days. Try to keep it above the level of your heart. This will help reduce swelling. · Your doctor may recommend that you wrap your foot with an elastic bandage. Keep your foot wrapped for as long as your doctor advises. · If your doctor recommends crutches, use them as directed. · Wear roomy footwear. · As soon as pain and swelling end, begin gentle exercises of your foot. Your doctor can tell you which exercises will help. When should you call for help? Call 911 anytime you think you may need emergency care. For example, call if: 
? · Your foot turns pale, white, blue, or cold. ?Call your doctor now or seek immediate medical care if: 
? · You cannot move or stand on your foot. ? · Your foot looks twisted or out of its normal position. ? · Your foot is not stable when you step down. ? · You have signs of infection, such as: 
¨ Increased pain, swelling, warmth, or redness. ¨ Red streaks leading from the sore area. ¨ Pus draining from a place on your foot. ¨ A fever. ? · Your foot is numb or tingly. ? Watch closely for changes in your health, and be sure to contact your doctor if: 
? · You do not get better as expected. ? · You have bruises from an injury that last longer than 2 weeks. Where can you learn more? Go to http://horace-marco.info/. Enter U105 in the search box to learn more about \"Foot Pain: Care Instructions. \" Current as of: March 21, 2017 Content Version: 11.4 © 2088-4030 [employer]. Care instructions adapted under license by [employer] (which disclaims liability or warranty for this information).  If you have questions about a medical condition or this instruction, always ask your healthcare professional. [address] any warranty or liability for your use of this information. Introducing [facility] & HEALTH SERVICES! Dear Jasson Mullins: Thank you for requesting a [employer] account. Our records indicate that you already have an active [employer] account. You can access your account anytime at [email or web address] Did you know that you can access your hospital and ER discharge instructions at any time in Wherever[employer]? You can also review all of your test results from your hospital stay or ER visit. Additional Information If you have questions, please visit the Frequently Asked Questions section of the [employer] website at [email or web address] Remember, WhereverTV is NOT to be used for urgent needs. For medical emergencies, dial 911. Now available from your iPhone and Android! Please provide this summary of care documentation to your next provider. Your primary care clinician is listed as Ryann Mendes. If you have any questions after today's visit, please call [phone].

## 2018-05-30 ENCOUNTER — OFFICE VISIT (OUTPATIENT)
Dept: ORTHOPEDIC SURGERY | Age: 73
End: 2018-05-30

## 2018-05-30 VITALS
HEART RATE: 80 BPM | OXYGEN SATURATION: 98 % | RESPIRATION RATE: 16 BRPM | WEIGHT: 146.2 LBS | DIASTOLIC BLOOD PRESSURE: 66 MMHG | SYSTOLIC BLOOD PRESSURE: 169 MMHG | TEMPERATURE: 97.6 F | BODY MASS INDEX: 26.91 KG/M2 | HEIGHT: 62 IN

## 2018-05-30 DIAGNOSIS — M21.612 BUNION OF GREAT TOE OF LEFT FOOT: Primary | ICD-10-CM

## 2018-05-30 NOTE — MR AVS SNAPSHOT
[address], [address] 200 [facility] 
[phone] Patient: Francy Lopez MRN: [id number] USS:[birth year] Visit Information Date & Time Provider Department Dept. Phone Encounter #  
 5/30/2018  4:00 PM Onesimo Cuellar MD [place] [facility] [phone] [id number] Your Appointments 8/1/2018  1:15 PM  
PROCEDURE with Amy Perez MD  
[facility] ([address]) Appt Note: cysto 709 [facility] 1097 [facility]  
  
   
 709 [facility] [address] [address] [address] 7/11/2018  9:10 AM  
INJECTION with [facility] ([address]) Appt Note: BCG # 262 Tate Palomares [facility] 1097 [facility]  
  
   
 709 [facility] [address] [address] [address] 7/18/2018  9:10 AM  
INJECTION with [facility] ([address]) Appt Note: BCG #2  
 [address] [id number] [address] [address]  
[phone]  
  
    
 7/25/2018  9:10 AM  
INJECTION with [facility] ([address]) Appt Note: BCG #3  
 709 [facility] 200 [facility]  
[phone] Upcoming Health Maintenance Date Due Hepatitis C Screening 1945 DTaP/Tdap/Td series (1 - Tdap) 5/16/1966 BREAST CANCER SCRN MAMMOGRAM 5/16/1995 FOBT Q 1 YEAR AGE 50-75 5/16/1995 ZOSTER VACCINE AGE 60> 3/16/2005 GLAUCOMA SCREENING Q2Y 5/16/2010 Bone Densitometry (Dexa) Screening 5/16/2010 MEDICARE YEARLY EXAM 3/14/2018 Influenza Age 5 to Adult 8/1/2018 Allergies as of 5/30/2018  Review Complete On: 5/1/2018 By: Christiano Hagan PA-C Severity Noted Reaction Type Reactions Simvastatin Medium 12/30/2013    Unknown (comments) Lipitor [Atorvastatin]  01/29/2018    Myalgia Current Immunizations  Never Reviewed No immunizations on file. Not reviewed this visit Vitals BP Pulse Temp Resp Height(growth percentile) Weight(growth percentile) 169/66 80 97.6 °F (36.4 °C) (Oral) 16 5' 2\" (1.575 m) 146 lb 3.2 oz (66.3 kg) SpO2 BMI OB Status Smoking Status 98% 26.74 kg/m2 Postmenopausal Former Smoker BMI and BSA Data Body Mass Index Body Surface Area  
 26.74 kg/m 2 1.7 m 2 Preferred Pharmacy Pharmacy Name Phone [facility] [address] [place] [address], [facility] AT [facility] [phone] Your Updated Medication List  
  
   
This list is accurate as of 5/30/18  5:06 PM.  Always use your most recent med list.  
  
  
  
  
 aspirin delayed-release 81 mg tablet Take  by mouth daily. gabapentin 300 mg capsule Commonly known as:  NEURONTIN  
TK 2 CS PO Q NIGHT  
  
 meclizine 12.5 mg tablet Commonly known as:  ANTIVERT Take  by mouth three (3) times daily as needed. meloxicam 15 mg tablet Commonly known as:  MOBIC Take one tablet by mouth daily with food  
  
 multivitamin tablet Commonly known as:  ONE A DAY Take 1 Tab by mouth daily. ondansetron 4 mg disintegrating tablet Commonly known as:  ZOFRAN ODT  
DISSOLVE 1 T ON TONGUE Q 8 H PRN NV  
  
 pravastatin 20 mg tablet Commonly known as:  PRAVACHOL Take 20 mg by mouth nightly. THERALOGIX  PO Take 2 Tabs by mouth daily. traMADol 50 mg tablet Commonly known as:  ULTRAM  
1 po BID  
  
 traZODone 50 mg tablet Commonly known as:  DESYREL  
TK 1 T PO QHS Introducing \[facility]\"" & HEALTH SERVICES! Dear Ed Reeder: Thank you for requesting a [employer] account. Our records indicate that you already have an active [employer] account.   You can access your account anytime at [email or web address]. [email or web address] Did you know that you can access your hospital and ER discharge instructions at any time in popchips? You can also review all of your test results from your hospital stay or ER visit. Additional Information If you have questions, please visit the Frequently Asked Questions section of the popchips website at [email or web address] Remember, popchips is NOT to be used for urgent needs. For medical emergencies, dial 911. Now available from your iPhone and Android! Please provide this summary of care documentation to your next provider. Your primary care clinician is listed as Jennifer Villatoro. If you have any questions after today's visit, please call [phone].

## 2018-05-30 NOTE — PROGRESS NOTES
AMBULATORY PROGRESS NOTE      Patient: Sia Castaneda             MRN: [id number]     SSN: [id number] Body mass index is 26.74 kg/(m^2). YOB: 1945     AGE: 68 y.o. EX: female    PCP: Beth Coronado MD    IMPRESSION/DIAGNOSIS AND TREATMENT PLAN     DIAGNOSES  1. Bunion of great toe of left foot        No orders of the defined types were placed in this encounter. Sia Castaneda understands her diagnoses and the proposed plan. Plan:    1) Continue using the orthotic as directed. RTO - 6 weeks / PLEASE OBTAIN X-RAYS OF: left foot 3 VIEWS    HPI AND EXAMINATION     Millie Reina IS A 68 y.o. female who presents to my outpatient office for follow up 117 days s/p Left Lapidus bunionectomy procedure. At last visit, Italia Patricio PA-C, recommended using comfortable shoes, prescribed Ultram, provided an order for modification of her orthotics, and provided a referral for pain management. She is status post bunion procedure. She is doing well. She is wearing an orthotic because it helps to post her foot, as she has some planovalgus alignment and studies show that people with planovalgus alignment have a high chance of having recurrence of her bunion deformity. So, she has in a firm insert that would help with her alignment at the hindfoot. Otherwise, she admits to having no major pain or discomfort. She states she is doing a lot better, but she is wearing an orthotic in her [employer] style shoes. On examination, the patient is alert and follows commands. She is in no acute distress. Her affect and mood are appropriate. Her left foot and ankle are examined. Well healed surgical scars are noted to her left foot and ankle. Her overall alignment looks quite good. She is no longer tender to the medial eminence. There is just some slight numbness to the dorsomedial cutaneous nerve in the first TMT region, but when I stress her midfoot, it does not cause her any discomfort. Her pulses are intact. Her toes are nice and warm and pink. She walks without a limp at this current time. IMPRESSION:  Status post bunionectomy procedure:  Recommendation is for six-week follow up with x-rays, weightbearing films, of her left foot upon next visit. We will see her again after these studies are conducted. CHART REVIEW     Past Medical History:   Diagnosis Date    Bladder cancer ([address] 2015 (approx)    Foraminal stenosis of lumbar region     Hematuria     Hypercholesterolemia     Low back pain      Current Outpatient Prescriptions   Medication Sig    meloxicam (MOBIC) 15 mg tablet Take one tablet by mouth daily with food    MULTIVIT-MINERALS/FOLIC ACID (THERALOGIX  PO) Take 2 Tabs by mouth daily.  gabapentin (NEURONTIN) 300 mg capsule TK 2 CS PO Q NIGHT    traZODone (DESYREL) 50 mg tablet TK 1 T PO QHS    aspirin delayed-release 81 mg tablet Take  by mouth daily.  multivitamin (ONE A DAY) tablet Take 1 Tab by mouth daily.  traMADol (ULTRAM) 50 mg tablet 1 po BID    ondansetron (ZOFRAN ODT) 4 mg disintegrating tablet DISSOLVE 1 T ON TONGUE Q 8 H PRN NV    meclizine (ANTIVERT) 12.5 mg tablet Take  by mouth three (3) times daily as needed.  pravastatin (PRAVACHOL) 20 mg tablet Take 20 mg by mouth nightly. No current facility-administered medications for this visit.       Allergies   Allergen Reactions    Simvastatin Unknown (comments)    Lipitor [Atorvastatin] Myalgia     Past Surgical History:   Procedure Laterality Date    FOOT/TOES SURGERY PROC UNLISTED Left 02/01/2018    bunionectomy    HX BUNIONECTOMY      HX COLONOSCOPY      HX LUMBAR LAMINECTOMY  6/2/15    with fusion    HX ORTHOPAEDIC  Bunionionectomy R    HX TUBAL LIGATION      HX UROLOGICAL      treatment BCG for Bladder Cancer     Social History     Occupational History    nurse      Social History Main Topics    Smoking status: Former Smoker     Quit date: 1/1/1980    Smokeless tobacco: Never Used    Alcohol use 0.0 oz/week     0 Standard drinks or equivalent per week      Comment: 1 glass of wine 3-4 times per month    Drug use: No    Sexual activity: Not on file     Family History   Problem Relation Age of Onset    No Known Problems Mother     No Known Problems Father     Diabetes Sister        REVIEW OF SYSTEMS : 5/30/2018  ALL BELOW ARE Negative except : SEE HPI       Constitutional: Negative for fever, chills and weight loss. Neg Weigh Loss  Cardiovascular: Negative for chest pain, claudication and leg swelling. SOB, MCKOY   Gastrointestinal: Negative for  pain, N/V/D/C, Blood in stool or urine,dysuria, hematuria,        Incontinence, pelvic pain  Musculoskeletal: see HPI. Neurological: Negative for dizziness and weakness. Negative for headaches,Visual Changes, Confusion, Seizures,   Psychiatric/Behavioral: Negative for depression, memory loss and substance abuse. Extremities:  Negative for  hair changes, rash or skin lesion changes. Hematologic: Negative for Bleeding problems, bruising, pallor or swollen lymph nodes. Peripheral Vascular: No calf pain, vascular vein tenderness to calf pain              No calf throbbing, posterior knee throbbing pain    DIAGNOSTIC IMAGING     No notes on file    Written by Jasen Arana, as dictated by Sajan Sanchez MD. IDr., Sajan Sanchez MD, confirm that all documentation is accurate.

## 2018-06-22 NOTE — MR AVS SNAPSHOT
"6 y.o. WELL CHILD CHECKUP    Ayad Carmichael is a 6 y.o. female who presents to the office today with mother for routine health care examination.    PMH:   Past Medical History:   Diagnosis Date    Jaundice      PSH: No past surgical history on file.  FH:   Family History   Problem Relation Age of Onset    Hypertension Maternal Grandmother      SH: presently in grade 1.           ROS: No unusual headaches or abdominal pain. No cough, wheezing, shortness of breath, bowel or bladder problems. Diet is good.    OBJECTIVE:   There were no vitals filed for this visit.  Wt Readings from Last 3 Encounters:   03/07/18 21.4 kg (47 lb 2.9 oz) (62 %, Z= 0.31)*   09/20/17 19.9 kg (43 lb 13.9 oz) (58 %, Z= 0.20)*   06/21/17 18.9 kg (41 lb 10.7 oz) (52 %, Z= 0.06)*     * Growth percentiles are based on [facility] 2-20 Years data.     Ht Readings from Last 3 Encounters:   06/21/17 3' 8.5" (1.13 m) (71 %, Z= 0.57)*   02/17/16 3' 5" (1.041 m) (77 %, Z= 0.74)*   12/14/15 3' 4.5" (1.029 m) (77 %, Z= 0.74)*     * Growth percentiles are based on [facility] 2-20 Years data.     There is no height or weight on file to calculate BMI.  [unfilled]  No weight on file for this encounter.  No height on file for this encounter.    GENERAL: WDWN female  EYES: PERRLA, EOMI, Normal tracking and conjugate gaze  EARS: TM's gray, normal EAC's bilat without excessive cerumen  VISION and HEARING: Normal.  NOSE: nasal passages clear  OP: healthy dentition, tonsills are normal size  NECK: supple, no masses, no lymphadenopathy, no thyroid prominence  RESP: clear to auscultation bilaterally, no wheezes or rhonchi  CV: RRR, normal S1/S2, no murmurs, clicks, or rubs. 2+ distal radial pulses  ABD: soft, nontender, no masses, no hepatosplenomegaly  : normal female exam, Bobby I  MS: spine straight, FROM all joints  SKIN: no rashes or lesions    ASSESSMENT:   Well Child    PLAN:   Diagnoses and all orders for this visit:    Encounter for well child check without abnormal " Visit Information Date & Time Provider Department Dept. Phone Encounter #  
 2/27/2017  9:25 AM Huey Jade MD [facility] [place] [phone] [id number] Follow-up Instructions Return if symptoms worsen or fail to improve. Your Appointments 3/10/2017  4:00 PM  
PROCEDURE with Tony Hutchison MD  
[facility] ([address]) Appt Note: cysto. f/u from [address] [place] [address]  
[address] [facility] [address] 3/17/2017  2:30 PM  
SURGERY CONSULT with Ritika Jang MD  
[facility] [address]) Appt Note: SC/6 WK BLOCK FU; Hedda Anson 2/1/17  
 [address] [facility] [address]  
[phone]  
  
   
 [address] [facility] [address] Upcoming Health Maintenance Date Due Hepatitis C Screening 1945 DTaP/Tdap/Td series (1 - Tdap) 5/16/1966 BREAST CANCER SCRN MAMMOGRAM 5/16/1995 FOBT Q 1 YEAR AGE 50-75 5/16/1995 ZOSTER VACCINE AGE 60> 5/16/2005 GLAUCOMA SCREENING Q2Y 5/16/2010 OSTEOPOROSIS SCREENING (DEXA) 5/16/2010 MEDICARE YEARLY EXAM 5/16/2010 Pneumococcal 65+ High/Highest Risk (2 of 2 - PPSV23) 11/11/2015 INFLUENZA AGE 9 TO ADULT 8/1/2016 Allergies as of 2/27/2017  Review Complete On: 2/27/2017 By: Huey Jade MD  
 No Known Allergies Current Immunizations  Never Reviewed No immunizations on file. Not reviewed this visit You Were Diagnosed With   
  
 Codes Comments Neck pain    -  Primary ICD-10-CM: M54.2 ICD-9-CM: 723.1 Impingement syndrome of right shoulder     ICD-10-CM: M75.41 
ICD-9-CM: 726.2 DDD (degenerative disc disease), cervical     ICD-10-CM: M50.30 ICD-9-CM: 722.4 Postlaminectomy syndrome, lumbar     ICD-10-CM: M96.1 ICD-9-CM: 722.83   
 Cervical spondylosis without myelopathy     ICD-10-CM: T13.225 ICD-9-CM: 721.0 Vitals BP  
  
  
  
  
  
 136/61 Vitals History BMI and BSA Data Body Mass Index Body Surface Area  
 26.7 kg/m 2 1.7 m 2 Preferred Pharmacy Pharmacy Name Phone [facility] [address] [place] [address] [facility] [place] [phone] Your Updated Medication List  
  
   
This list is accurate as of: 2/27/17 10:47 AM.  Always use your most recent med list.  
  
  
  
  
 aspirin delayed-release 81 mg tablet Take  by mouth daily. gabapentin 300 mg capsule Commonly known as:  NEURONTIN  
TAKE 2 CAPSULES BY MOUTH NIGHTLY  
  
 meloxicam 15 mg tablet Commonly known as:  MOBIC TK 1 T PO D  
  
 metaxalone 400 mg tablet Commonly known as:  SKELAXIN  
  
 multivitamin tablet Commonly known as:  ONE A DAY Take 1 Tab by mouth daily. pravastatin 20 mg tablet Commonly known as:  PRAVACHOL Take 20 mg by mouth nightly. THERALOGIX  MULTI PO Take  by mouth daily. traMADol 50 mg tablet Commonly known as:  ULTRAM  
Take 1 Tab by mouth every six (6) hours as needed for Pain. Max Daily Amount: 200 mg.  
  
 traZODone 50 mg tablet Commonly known as:  DESYREL  
TK 1 T PO QHS We Performed the Following AMB POC XRAY, SPINE, CERVICAL; 2 OR 3 [27887 CPT(R)] Follow-up Instructions Return if symptoms worsen or fail to improve. To-Do List   
 02/27/2017 12:00 PM  
  Appointment with Elvia Garnica PTA at [facility] [place] [facility] ([phone])  
  
 03/01/2017 5:00 PM  
  Appointment with Héctor Ayoub PT at [address]  ([phone]) 03/06/2017 Imaging:  MRI SHOULDER RT WO CONT   
  
 03/07/2017 5:30 PM  
  Appointment with Héctor Ayoub PT at [facility] [place]  ([phone])  03/08/2017 5:30 PM  
  Appointment with Elvia Garnica PTA at [address]  findings    See dentist  Counseling regarding the following: bicycle safety, dental care, diet, pool safety, school issues, seat belts and sleep.  Follow up as needed.    Answers for HPI/ROS submitted by the patient on 6/22/2018   activity change: No  appetite change : No  fever: No  congestion: No  sore throat: No  eye discharge: No  eye redness: No  cough: No  wheezing: No  palpitations: No  chest pain: No  constipation: No  diarrhea: No  vomiting: No  difficulty urinating: No  hematuria: No  enuresis: No  rash: No  wound: No  behavior problem: No  sleep disturbance: No  headaches: No  syncope: No     ([phone])  
  
 03/13/2017 5:30 PM  
  Appointment with Elvia Garnica, PTA at [facility] [place]  ([phone])  
  
 03/15/2017 5:30 PM  
  Appointment with Elvia Spore, PTA at [facility] [place]  ([phone])  
  
 03/21/2017 5:30 PM  
  Appointment with Levonia Glass, PT at [address]  ([phone])  
  
 03/22/2017 5:30 PM  
  Appointment with Levonia Glass, PT at [address] [facility] ([phone])  
  
 03/27/2017 5:30 PM  
  Appointment with Elviaaayush Garnica, PTA at [facility] [place]  ([phone])  
  
 03/29/2017 5:30 PM  
  Appointment with Héctor Glass, PT at [address]  ([phone]) Introducing [facility] SERVICES! Lorie Cosme introduces [facility] patient portal. Now you can access parts of your medical record, email your doctor's office, and request medication refills online. 1. In your internet browser, go to [email or web address]/Monitor My Medst 2. Click on the First Time User? Click Here link in the Sign In box. You will see the New Member Sign Up page. 3. Enter your [facility] Access Code exactly as it appears below. You will not need to use this code after youve completed the sign-up process. If you do not sign up before the expiration date, you must request a new code. · [facility] Access Code: [email or web address] Expires: 5/28/2017  9:03 AM 
 
4. Enter the last four digits of your Social Security Number (xxxx) and Date of Birth (mm/dd/yyyy) as indicated and click Submit. You will be taken to the next sign-up page. 5. Create a Spark[employer] mobile [employer] ID. This will be your [facility] login ID and cannot be changed, so think of one that is secure and easy to remember. 6. Create a [facility] password. You can change your password at any time. 7. Enter your Password Reset Question and Answer. This can be used at a later time if you forget your password. 8. Enter your e-mail address. You will receive e-mail notification when new information is available in [address] 9. Click Sign Up. You can now view and download portions of your medical record. 10. Click the Download Summary menu link to download a portable copy of your medical information. If you have questions, please visit the Frequently Asked Questions section of the [employer] website. Remember, [employer] is NOT to be used for urgent needs. For medical emergencies, dial 911. Now available from your iPhone and Android! Please provide this summary of care documentation to your next provider. Your primary care clinician is listed as Miguel Ocampo. If you have any questions after today's visit, please call [phone].

## 2018-07-09 ENCOUNTER — OFFICE VISIT (OUTPATIENT)
Dept: ORTHOPEDIC SURGERY | Age: 73
End: 2018-07-09

## 2018-07-09 VITALS
SYSTOLIC BLOOD PRESSURE: 137 MMHG | WEIGHT: 146 LBS | BODY MASS INDEX: 26.87 KG/M2 | HEART RATE: 56 BPM | DIASTOLIC BLOOD PRESSURE: 48 MMHG | TEMPERATURE: 97.9 F | HEIGHT: 62 IN | RESPIRATION RATE: 16 BRPM | OXYGEN SATURATION: 99 %

## 2018-07-09 DIAGNOSIS — M21.612 BUNION OF GREAT TOE OF LEFT FOOT: Primary | ICD-10-CM

## 2018-07-09 DIAGNOSIS — M79.672 LEFT FOOT PAIN: ICD-10-CM

## 2018-07-09 NOTE — PATIENT INSTRUCTIONS
Please follow up in 4 months. You are advised to contact us if your condition worsens. Foot Pain: Care Instructions Your Care Instructions Foot injuries that cause pain and swelling are fairly common. Almost all sports or home repair projects can cause a misstep that ends up as foot pain. Normal wear and tear, especially as you get older, also can cause foot pain. Most minor foot injuries will heal on their own, and home treatment is usually all you need to do. If you have a severe injury, you may need tests and treatment. Follow-up care is a key part of your treatment and safety. Be sure to make and go to all appointments, and call your doctor if you are having problems. It's also a good idea to know your test results and keep a list of the medicines you take. How can you care for yourself at home? · Take pain medicines exactly as directed. ¨ If the doctor gave you a prescription medicine for pain, take it as prescribed. ¨ If you are not taking a prescription pain medicine, ask your doctor if you can take an over-the-counter medicine. · Rest and protect your foot. Take a break from any activity that may cause pain. · Put ice or a cold pack on your foot for 10 to 20 minutes at a time. Put a thin cloth between the ice and your skin. · Prop up the sore foot on a pillow when you ice it or anytime you sit or lie down during the next 3 days. Try to keep it above the level of your heart. This will help reduce swelling. · Your doctor may recommend that you wrap your foot with an elastic bandage. Keep your foot wrapped for as long as your doctor advises. · If your doctor recommends crutches, use them as directed. · Wear roomy footwear. · As soon as pain and swelling end, begin gentle exercises of your foot. Your doctor can tell you which exercises will help. When should you call for help? Call 911 anytime you think you may need emergency care.  For example, call if: 
? · Your foot turns pale, white, blue, or cold. ?Call your doctor now or seek immediate medical care if: 
? · You cannot move or stand on your foot. ? · Your foot looks twisted or out of its normal position. ? · Your foot is not stable when you step down. ? · You have signs of infection, such as: 
¨ Increased pain, swelling, warmth, or redness. ¨ Red streaks leading from the sore area. ¨ Pus draining from a place on your foot. ¨ A fever. ? · Your foot is numb or tingly. ? Watch closely for changes in your health, and be sure to contact your doctor if: 
? · You do not get better as expected. ? · You have bruises from an injury that last longer than 2 weeks. Where can you learn more? Go to http://horace-marco.info/. Enter I552 in the search box to learn more about \"Foot Pain: Care Instructions. \" Current as of: March 21, 2017 Content Version: 11.4 © 1701-0354 [employer]. Care instructions adapted under license by [employer] (which disclaims liability or warranty for this information). If you have questions about a medical condition or this instruction, always ask your healthcare professional. [place] [address] any warranty or liability for your use of this information.

## 2018-07-09 NOTE — PROGRESS NOTES
AMBULATORY PROGRESS NOTE      Patient: Boy Maxwell             MRN: [id number]     SSN: [id number] Body mass index is 26.7 kg/(m^2). YOB: 1945     AGE: 68 y.o. EX: female    PCP: Kelsey Mckee MD    IMPRESSION/DIAGNOSIS AND TREATMENT PLAN     DIAGNOSES  1. Bunion of great toe of left foot    2. Left foot pain        Orders Placed This Encounter    [54632] Foot Min 3V      Boy Maxwell understands her diagnoses and the proposed plan. X-rays of the left foot, 3 views, AP, lateral, and oblique, weightbearing, shows postop changes from her left 1st TMT arthrodesis. The 1st TMT arthrodesis still has not fully matured, plantar laterally. There are screws in place that are in good position. I see no hardware failure. There is plantar valgus alignment. The sesamoids still appear to be in satisfactory position with no gross recurrence of her MTP region. There is generalized osteopenia, however, at the 1st MTP. Plans listed as below. Plan:    1) Continue using the Custom orthotic as directed. 2) Continue activity modification as directed. RTO - 4 months / PLEASE OBTAIN X-RAYS OF: left foot 3 VIEWS    HPI AND EXAMINATION     Tanya Reina IS A 68 y.o. female who presents to my outpatient office for follow up 157 days s/p Left Lapidus bunionectomy procedure. At last visit, I recommended to continue using the orthotic as directed. Patient reports that she has experienced stiffness when she pushes off her left foot. She notes the Mobic has not provided relief and instead takes Advil which helps with her discomfort. Additionally, she reports that wearing the custom inserts that she obtained from Dr. Colonel Gonsales on 2004 helps with her medial arch discomfort. Otherwise she reports she is doing well and will follow up in 4 months. Left ANKLE and FOOT       Gait: normal  Tenderness: no TTP at this time.    Cutaneous: No rashes, skin patches, wounds, or abrasions to the lower legs           Warm and Normal color. No regions of expressible drainage. Medial Border of Tibia Region: absent           Skin color, texture, turgor normal. Normal.           Well healed surgical scar along the medial aspect of great toe. Joint Motion: ROM Ankle:Normal , Hindfoot: (ST,TN,CC Normal}, Forefoot toes:Normal  Neurologic Exam: Neuro: Motor: normal 5/5 strength in all tested muscle groups and Sensory : no sensory deficits noted. Contractures: Gastrocnemius or Achilles Contractures absent  Joint / Tendon Stability: No Ankle or Subtalar instability or joint laxity. No peroneal sublux ability or dislocation  Alignment:  Normal Foot Alignment and Semi Rigid  Vascular: Normal Pulses/ NL Capillary refill, No evidence of DVT seen on physical exam.   No calf swelling, no tenderness to calf muscles. Lymphatic:  No Evidence of Lymphedema. CHART REVIEW     Past Medical History:   Diagnosis Date    Bladder cancer ([facility] [address] 2015 (approx)    Foraminal stenosis of lumbar region     Hematuria     Hypercholesterolemia     Low back pain      Current Outpatient Prescriptions   Medication Sig    MULTIVIT-MINERALS/FOLIC ACID (THERALOGIX  PO) Take 2 Tabs by mouth daily.  gabapentin (NEURONTIN) 300 mg capsule TK 2 CS PO Q NIGHT    traZODone (DESYREL) 50 mg tablet TK 1 T PO QHS    aspirin delayed-release 81 mg tablet Take  by mouth daily.  multivitamin (ONE A DAY) tablet Take 1 Tab by mouth daily.  pravastatin (PRAVACHOL) 20 mg tablet Take 20 mg by mouth nightly. No current facility-administered medications for this visit.       Allergies   Allergen Reactions    Simvastatin Unknown (comments)    Lipitor [Atorvastatin] Myalgia     Past Surgical History:   Procedure Laterality Date    FOOT/TOES SURGERY PROC UNLISTED Left 02/01/2018    bunionectomy    HX BUNIONECTOMY      HX COLONOSCOPY      HX LUMBAR LAMINECTOMY  6/2/15    with fusion    HX ORTHOPAEDIC Bunionionectomy R    HX TUBAL LIGATION      HX UROLOGICAL      treatment BCG for Bladder Cancer     Social History     Occupational History    nurse      Social History Main Topics    Smoking status: Former Smoker     Quit date: 1/1/1980    Smokeless tobacco: Never Used    Alcohol use 0.0 oz/week     0 Standard drinks or equivalent per week      Comment: 1 glass of wine 3-4 times per month    Drug use: No    Sexual activity: Not on file     Family History   Problem Relation Age of Onset    No Known Problems Mother     No Known Problems Father     Diabetes Sister        REVIEW OF SYSTEMS : 7/9/2018  ALL BELOW ARE Negative except : SEE HPI       Constitutional: Negative for fever, chills and weight loss. Neg Weigh Loss  Cardiovascular: Negative for chest pain, claudication and leg swelling. SOB, MCKOY   Gastrointestinal: Negative for  pain, N/V/D/C, Blood in stool or urine,dysuria, hematuria,        Incontinence, pelvic pain  Musculoskeletal: see HPI. Neurological: Negative for dizziness and weakness. Negative for headaches,Visual Changes, Confusion, Seizures,   Psychiatric/Behavioral: Negative for depression, memory loss and substance abuse. Extremities:  Negative for  hair changes, rash or skin lesion changes. Hematologic: Negative for Bleeding problems, bruising, pallor or swollen lymph nodes. Peripheral Vascular: No calf pain, vascular vein tenderness to calf pain              No calf throbbing, posterior knee throbbing pain    DIAGNOSTIC IMAGING     RADIOGRAPHIC INTERPRETATION    The impression for this/these radiograph(s) will be found in the office visit progress note for this encounter from 7/9/2018. Sergey Colon MD  7/9/2018  5:30 PM          Written by Lobo Stout, as dictated by Eladia Bone MD. IDr., Eladia Bone MD, confirm that all documentation is accurate.

## 2018-07-09 NOTE — MR AVS SNAPSHOT
[address], [address] 200 [facility] 
[phone] Patient: Ethan Carson MRN: [id number] CVS:[id number] Visit Information Date & Time Provider Department Dept. Phone Encounter #  
 7/9/2018  4:00 PM Alek Sharpe, 27 [facility] [id number] Your Appointments 8/1/2018  1:15 PM  
PROCEDURE with Luciana Michelle MD  
[facility] ([address] [place] [address]) Appt Note: cysto 2057 [facility] [address] [address] [address] [place], [place] [address] [facility] 100 200 [facility] 7/11/2018  9:10 AM  
INJECTION with [facility] ([address] [place] [address]) Appt Note: BCG # 1  
 [address] [place] [address] [address] [address] [address] 100 200 [facility] 7/18/2018  9:10 AM  
INJECTION with [facility] ([address] [place] [address]) Appt Note: BCG #2  
 [address]  [address] [address] [address] [address]  
[phone]  
  
    
 7/25/2018  9:10 AM  
INJECTION with [facility] ([address] [place] [address]) Appt Note: BCG #3  
 [address] [place] [address] [address] 200 [facility]  
[phone] Upcoming Health Maintenance Date Due Hepatitis C Screening 1945 DTaP/Tdap/Td series (1 - Tdap) 5/16/1966 BREAST CANCER SCRN MAMMOGRAM 5/16/1995 FOBT Q 1 YEAR AGE 50-75 5/16/1995 ZOSTER VACCINE AGE 60> 3/16/2005 GLAUCOMA SCREENING Q2Y 5/16/2010 Bone Densitometry (Dexa) Screening 5/16/2010 MEDICARE YEARLY EXAM 3/14/2018 Influenza Age 5 to Adult 8/1/2018 Allergies as of 7/9/2018  Review Complete On: 7/9/2018 By: Clare Garcia Severity Noted Reaction Type Reactions Simvastatin Medium 12/30/2013    Unknown (comments) Lipitor [Atorvastatin]  01/29/2018    Myalgia Current Immunizations  Never Reviewed No immunizations on file. Not reviewed this visit You Were Diagnosed With   
  
 Codes Comments Left foot pain    -  Primary ICD-10-CM: E38.639 ICD-9-CM: 729.5 Vitals BP Pulse Temp Resp Height(growth percentile) Weight(growth percentile) 137/48 (!) 56 97.9 °F (36.6 °C) (Oral) 16 5' 2\" (1.575 m) 146 lb (66.2 kg) SpO2 BMI OB Status Smoking Status 99% 26.7 kg/m2 Postmenopausal Former Smoker BMI and BSA Data Body Mass Index Body Surface Area  
 26.7 kg/m 2 1.7 m 2 Preferred Pharmacy Pharmacy Name Phone [facility] [address] [place] [address] AT [facility] [phone] Your Updated Medication List  
  
   
This list is accurate as of 7/9/18  5:13 PM.  Always use your most recent med list.  
  
  
  
  
 aspirin delayed-release 81 mg tablet Take  by mouth daily. gabapentin 300 mg capsule Commonly known as:  NEURONTIN  
TK 2 CS PO Q NIGHT  
  
 multivitamin tablet Commonly known as:  ONE A DAY Take 1 Tab by mouth daily. pravastatin 20 mg tablet Commonly known as:  PRAVACHOL Take 20 mg by mouth nightly. THERALOGIX  PO Take 2 Tabs by mouth daily. traZODone 50 mg tablet Commonly known as:  DESYREL  
TK 1 T PO QHS We Performed the Following AMB POC XRAY, FOOT; COMPLETE, 3+ VIEW [00159 CPT(R)] Patient Instructions Please follow up in 4 months. You are advised to contact us if your condition worsens. Foot Pain: Care Instructions Your Care Instructions Foot injuries that cause pain and swelling are fairly common.  Almost all sports or home repair projects can cause a misstep that ends up as foot pain. Normal wear and tear, especially as you get older, also can cause foot pain. Most minor foot injuries will heal on their own, and home treatment is usually all you need to do. If you have a severe injury, you may need tests and treatment. Follow-up care is a key part of your treatment and safety. Be sure to make and go to all appointments, and call your doctor if you are having problems. It's also a good idea to know your test results and keep a list of the medicines you take. How can you care for yourself at home? · Take pain medicines exactly as directed. ¨ If the doctor gave you a prescription medicine for pain, take it as prescribed. ¨ If you are not taking a prescription pain medicine, ask your doctor if you can take an over-the-counter medicine. · Rest and protect your foot. Take a break from any activity that may cause pain. · Put ice or a cold pack on your foot for 10 to 20 minutes at a time. Put a thin cloth between the ice and your skin. · Prop up the sore foot on a pillow when you ice it or anytime you sit or lie down during the next 3 days. Try to keep it above the level of your heart. This will help reduce swelling. · Your doctor may recommend that you wrap your foot with an elastic bandage. Keep your foot wrapped for as long as your doctor advises. · If your doctor recommends crutches, use them as directed. · Wear roomy footwear. · As soon as pain and swelling end, begin gentle exercises of your foot. Your doctor can tell you which exercises will help. When should you call for help? Call 911 anytime you think you may need emergency care. For example, call if: 
? · Your foot turns pale, white, blue, or cold. ?Call your doctor now or seek immediate medical care if: 
? · You cannot move or stand on your foot. ? · Your foot looks twisted or out of its normal position. ? · Your foot is not stable when you step down. ? · You have signs of infection, such as: ¨ Increased pain, swelling, warmth, or redness. ¨ Red streaks leading from the sore area. ¨ Pus draining from a place on your foot. ¨ A fever. ? · Your foot is numb or tingly. ? Watch closely for changes in your health, and be sure to contact your doctor if: 
? · You do not get better as expected. ? · You have bruises from an injury that last longer than 2 weeks. Where can you learn more? Go to http://horace-marco.info/. Enter G061 in the search box to learn more about \"Foot Pain: Care Instructions. \" Current as of: March 21, 2017 Content Version: 11.4 © 8040-3169 [employer]. Care instructions adapted under license by [employer] (which disclaims liability or warranty for this information). If you have questions about a medical condition or this instruction, always ask your healthcare professional. [address] any warranty or liability for your use of this information. Introducing [facility]! Dear Keri Prado: Thank you for requesting a [employer]pt[employer] account. Our records indicate that you already have an active [employer]pt[employer] account. You can access your account anytime at [email or web address] Did you know that you can access your hospital and ER discharge instructions at any time in Adeptence? You can also review all of your test results from your hospital stay or ER visit. Additional Information If you have questions, please visit the Frequently Asked Questions section of the Adeptence website at [email or web address] Remember, Adeptence is NOT to be used for urgent needs. For medical emergencies, dial 911. Now available from your iPhone and Android! Please provide this summary of care documentation to your next provider. Your primary care clinician is listed as Francesca Erickson. If you have any questions after today's visit, please call [phone].

## 2018-07-09 NOTE — PROCEDURES
RADIOGRAPHIC INTERPRETATION    The impression for this/these radiograph(s) will be found in the office visit progress note for this encounter from 7/9/2018.     Darshana Sweet MD  7/9/2018  5:30 PM

## 2018-07-18 ENCOUNTER — OFFICE VISIT (OUTPATIENT)
Dept: ORTHOPEDIC SURGERY | Age: 73
End: 2018-07-18

## 2018-07-18 VITALS
BODY MASS INDEX: 27.49 KG/M2 | WEIGHT: 149.4 LBS | HEART RATE: 71 BPM | HEIGHT: 62 IN | RESPIRATION RATE: 16 BRPM | DIASTOLIC BLOOD PRESSURE: 70 MMHG | SYSTOLIC BLOOD PRESSURE: 132 MMHG | OXYGEN SATURATION: 99 % | TEMPERATURE: 97.6 F

## 2018-07-18 DIAGNOSIS — M43.16 SPONDYLOLISTHESIS OF LUMBAR REGION: ICD-10-CM

## 2018-07-18 DIAGNOSIS — M54.50 LUMBAR PAIN: Primary | ICD-10-CM

## 2018-07-18 RX ORDER — IBUPROFEN 200 MG
400 TABLET ORAL
COMMUNITY

## 2018-07-18 RX ORDER — KETOROLAC TROMETHAMINE 15 MG/ML
60 INJECTION, SOLUTION INTRAMUSCULAR; INTRAVENOUS ONCE
Qty: 1 VIAL | Refills: 0
Start: 2018-07-18 | End: 2018-07-18

## 2018-07-18 NOTE — PATIENT INSTRUCTIONS
Back Pain: Care Instructions  Your Care Instructions    Back pain has many possible causes. It is often related to problems with muscles and ligaments of the back. It may also be related to problems with the nerves, discs, or bones of the back. Moving, lifting, standing, sitting, or sleeping in an awkward way can strain the back. Sometimes you don't notice the injury until later. Arthritis is another common cause of back pain. Although it may hurt a lot, back pain usually improves on its own within several weeks. Most people recover in 12 weeks or less. Using good home treatment and being careful not to stress your back can help you feel better sooner. Follow-up care is a key part of your treatment and safety. Be sure to make and go to all appointments, and call your doctor if you are having problems. It's also a good idea to know your test results and keep a list of the medicines you take. How can you care for yourself at home? · Sit or lie in positions that are most comfortable and reduce your pain. Try one of these positions when you lie down:  ¨ Lie on your back with your knees bent and supported by large pillows. ¨ Lie on the floor with your legs on the seat of a sofa or chair. Narendra Left on your side with your knees and hips bent and a pillow between your legs. ¨ Lie on your stomach if it does not make pain worse. · Do not sit up in bed, and avoid soft couches and twisted positions. Bed rest can help relieve pain at first, but it delays healing. Avoid bed rest after the first day of back pain. · Change positions every 30 minutes. If you must sit for long periods of time, take breaks from sitting. Get up and walk around, or lie in a comfortable position. · Try using a heating pad on a low or medium setting for 15 to 20 minutes every 2 or 3 hours. Try a warm shower in place of one session with the heating pad. · You can also try an ice pack for 10 to 15 minutes every 2 to 3 hours.  Put a thin cloth between the ice pack and your skin. · Take pain medicines exactly as directed. ¨ If the doctor gave you a prescription medicine for pain, take it as prescribed. ¨ If you are not taking a prescription pain medicine, ask your doctor if you can take an over-the-counter medicine. · Take short walks several times a day. You can start with 5 to 10 minutes, 3 or 4 times a day, and work up to longer walks. Walk on level surfaces and avoid hills and stairs until your back is better. · Return to work and other activities as soon as you can. Continued rest without activity is usually not good for your back. · To prevent future back pain, do exercises to stretch and strengthen your back and stomach. Learn how to use good posture, safe lifting techniques, and proper body mechanics. When should you call for help? Call your doctor now or seek immediate medical care if:    · You have new or worsening numbness in your legs.     · You have new or worsening weakness in your legs. (This could make it hard to stand up.)     · You lose control of your bladder or bowels.    Watch closely for changes in your health, and be sure to contact your doctor if:    · You have a fever, lose weight, or don't feel well.     · You do not get better as expected. Where can you learn more? Go to http://horace-marco.info/. Enter M338 in the search box to learn more about \"Back Pain: Care Instructions. \"  Current as of: November 29, 2017  Content Version: 11.7  © 8305-0519 [employer]. Care instructions adapted under license by [employer] (which disclaims liability or warranty for this information). If you have questions about a medical condition or this instruction, always ask your healthcare professional. [place] [address] any warranty or liability for your use of this information.

## 2018-07-18 NOTE — PROGRESS NOTES
Chief complaint/History of Present Illness:  Chief Complaint   Patient presents with    Back Pain     follow up     HPI  Don Cruz is a  68 y.o.  female      HISTORY OF PRESENT ILLNESS:  The patient comes in today. She was last seen on 10/27/2017 by Dr. Zenaida Toribio. She has had a TLIF at L3-4 by Dr. Vinie Ganser  in 2014 which helped very briefly. Then she developed back pain with radiating right lower extremity pain to her foot. MRI did reveal degenerative spondylolisthesis, L4-5, with left foraminal stenosis. Dr. Zenaida Toribio steered her away from further reconstructive surgery. Her pain level at the time was a like a 3/10. 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  She is a nonsmoker. She comes in today stating she has had some increased back pain over the last 4-6 weeks. She does not know if it was after she lifted a flower pot. It is increased with standing. She denies bowel or bladder dysfunction but she does state when she has formed stools, it does make her back hurt worse. PHYSICAL EXAMINATION:  Ms. Mima Massey is a 77-year-old female. He is alert and oriented. Normal mood and affect. She has a full weightbearing, nonantalgic gait. No assistive device. She has 4/5 strength in bilateral lower extremities. Negative straight leg raise. She is tender to palpation over that right L4-5 facet joint. She has pain with hyperextension of the lumbar spine. ASSESSMENT/PLAN:  This is a patient who has low back pain. She does get some numbness into the right lower extremity to the foot but she is not having pain in that leg any further. I think she is having some facet flare. We are going to give her Toradol 60 mg IM today. I was going to give her a Medrol Dosepak but she got a BCG vaccine so we are not going to do that now. We can consider facet blocks in the future. Hopefully, the Toradol will calm it down. We will see her back as needed.           Review of systems:    Past Medical History:   Diagnosis Date    Bladder cancer ([facility] [address] 2015 (approx)    Foraminal stenosis of lumbar region     Hematuria     Hypercholesterolemia     Low back pain      Past Surgical History:   Procedure Laterality Date    FOOT/TOES SURGERY PROC UNLISTED Left 02/01/2018    bunionectomy    HX BUNIONECTOMY      HX COLONOSCOPY      HX LUMBAR LAMINECTOMY  6/2/15    with fusion    HX ORTHOPAEDIC  Bunionionectomy R    HX TUBAL LIGATION      HX UROLOGICAL      treatment BCG for Bladder Cancer     Social History     Social History    Marital status:      Spouse name: N/A    Number of children: N/A    Years of education: N/A     Occupational History    nurse      Social History Main Topics    Smoking status: Former Smoker     Quit date: 1/1/1980    Smokeless tobacco: Never Used    Alcohol use 0.0 oz/week     0 Standard drinks or equivalent per week      Comment: 1 glass of wine 3-4 times per month    Drug use: No    Sexual activity: Not on file     Other Topics Concern    Not on file     Social History Narrative     Family History   Problem Relation Age of Onset    No Known Problems Mother     No Known Problems Father     Diabetes Sister        Physical Exam:  Visit Vitals    /70    Pulse 71    Temp 97.6 °F (36.4 °C) (Oral)    Resp 16    Ht 5' 2\" (1.575 m)    Wt 149 lb 6.4 oz (67.8 kg)    SpO2 99%    BMI 27.33 kg/m2     Pain Scale: 6/10            has been . reviewed and is appropriate          Diagnoses and all orders for this visit:    1. Lumbar pain  -     KETOROLAC TROMETHAMINE INJ  -     ketorolac (TORADOL) 15 mg/mL soln injection; 4 mL by IntraMUSCular route once for 1 dose. -     THER/PROPH/DIAG INJECTION, SUBCUT/IM    2. Spondylolisthesis of lumbar region  -     KETOROLAC TROMETHAMINE INJ  -     ketorolac (TORADOL) 15 mg/mL soln injection; 4 mL by IntraMUSCular route once for 1 dose.   -     THER/PROPH/DIAG INJECTION, SUBCUT/IM            Follow-up Disposition:  Return if symptoms worsen or fail to improve.         We have informed Rachel Mcgarry to notify us for immediate appointment if she has any worsening neurogical symptoms or if an emergency situation presents, then call [phone]

## 2018-07-23 ENCOUNTER — TELEPHONE (OUTPATIENT)
Dept: ORTHOPEDIC SURGERY | Age: 73
End: 2018-07-23

## 2018-07-23 NOTE — TELEPHONE ENCOUNTER
Patient had a Toradol inj on 07-18-18 with Marybel. She is asking when she can have another one.   Patient can be reached at [phone] until 4pm.

## 2018-07-24 ENCOUNTER — OFFICE VISIT (OUTPATIENT)
Dept: ORTHOPEDIC SURGERY | Age: 73
End: 2018-07-24

## 2018-07-24 VITALS
HEIGHT: 62 IN | DIASTOLIC BLOOD PRESSURE: 67 MMHG | BODY MASS INDEX: 26.5 KG/M2 | HEART RATE: 74 BPM | SYSTOLIC BLOOD PRESSURE: 140 MMHG | RESPIRATION RATE: 16 BRPM | OXYGEN SATURATION: 99 % | WEIGHT: 144 LBS | TEMPERATURE: 98.5 F

## 2018-07-24 DIAGNOSIS — M54.41 CHRONIC RIGHT-SIDED LOW BACK PAIN WITH RIGHT-SIDED SCIATICA: Primary | ICD-10-CM

## 2018-07-24 DIAGNOSIS — S39.012D BACK STRAIN, SUBSEQUENT ENCOUNTER: ICD-10-CM

## 2018-07-24 DIAGNOSIS — G89.29 CHRONIC RIGHT-SIDED LOW BACK PAIN WITH RIGHT-SIDED SCIATICA: Primary | ICD-10-CM

## 2018-07-24 PROBLEM — S39.012A BACK STRAIN: Status: ACTIVE | Noted: 2018-07-24

## 2018-07-24 RX ORDER — BACLOFEN 10 MG/1
10 TABLET ORAL
Qty: 60 TAB | Refills: 1 | Status: ON HOLD | OUTPATIENT
Start: 2018-07-24 | End: 2018-10-31

## 2018-07-24 RX ORDER — METHYLPREDNISOLONE 4 MG/1
TABLET ORAL
Qty: 1 DOSE PACK | Refills: 0 | Status: SHIPPED | OUTPATIENT
Start: 2018-07-24 | End: 2018-07-24 | Stop reason: CLARIF

## 2018-07-24 RX ORDER — GABAPENTIN 300 MG/1
CAPSULE ORAL
Qty: 180 CAP | Refills: 1 | Status: SHIPPED | OUTPATIENT
Start: 2018-07-24 | End: 2018-12-26 | Stop reason: SDUPTHER

## 2018-07-24 NOTE — MR AVS SNAPSHOT
[address] [facility] [address] [place] [id number] 
[phone] Patient: Kayleen Dakins MRN: [id number] OLP:[id number] Visit Information Date & Time Provider Department Dept. Phone Encounter #  
 7/24/2018  2:20 PM Uzma Persaud NP [facility] [phone] Follow-up Instructions Return in about 2 months (around 9/24/2018). Your Appointments 8/1/2018  1:15 PM  
PROCEDURE with Chavez Sheikh MD  
[facility] Appt Note: cysto [address] [facility] [address] [facility] 1097 Located within [facility]  
  
   
 200 [facility]  
  
    
 11/5/2018  7:30 AM  
Follow Up with Octaviano Mariee MD  
[facility] ([facility]) Appt Note: left foot 4 mo fu  
 [address], [address] 200 [facility]  
[phone] [address], [facility]  
  
    
  
 7/25/2018  9:10 AM  
INJECTION with [facility] ([facility]) Appt Note: BCG #3  
 [address] [address] [address] 1097 Located within [facility]  
  
   
 [address] [facility] [address] [facility] 100 200 [facility] Upcoming Health Maintenance Date Due Hepatitis C Screening 1945 DTaP/Tdap/Td series (1 - Tdap) 5/16/1966 BREAST CANCER SCRN MAMMOGRAM 5/16/1995 FOBT Q 1 YEAR AGE 50-75 5/16/1995 ZOSTER VACCINE AGE 60> 3/16/2005 GLAUCOMA SCREENING Q2Y 5/16/2010 Bone Densitometry (Dexa) Screening 5/16/2010 MEDICARE YEARLY EXAM 3/14/2018 Influenza Age 5 to Adult 8/1/2018 Allergies as of 7/24/2018  Review Complete On: 7/24/2018 By: Messi Mccord LPN Severity Noted Reaction Type Reactions Simvastatin Medium 12/30/2013    Unknown (comments) Lipitor [Atorvastatin]  01/29/2018    Myalgia Current Immunizations  Never Reviewed No immunizations on file. Not reviewed this visit You Were Diagnosed With   
  
 Codes Comments Chronic right-sided low back pain with right-sided sciatica    -  Primary ICD-10-CM: M54.41, G89.29 ICD-9-CM: 724.2, 724.3, 338.29 Back strain, subsequent encounter     ICD-10-CM: S39.012D ICD-9-CM: V58.89, 847.9 Vitals BP Pulse Temp Resp Height(growth percentile) Weight(growth percentile) 140/67 (BP 1 Location: Left arm, BP Patient Position: Sitting) 74 98.5 °F (36.9 °C) (Oral) 16 5' 2\" (1.575 m) 144 lb (65.3 kg) SpO2 BMI OB Status Smoking Status 99% 26.34 kg/m2 Postmenopausal Former Smoker BMI and BSA Data Body Mass Index Body Surface Area  
 26.34 kg/m 2 1.69 m 2 Preferred Pharmacy Pharmacy Name Phone [facility] [address] [place] [address] AT [facility] [phone] Your Updated Medication List  
  
   
This list is accurate as of 7/24/18  2:55 PM.  Always use your most recent med list. ADVIL 200 mg tablet Generic drug:  ibuprofen Take  by mouth. aspirin delayed-release 81 mg tablet Take  by mouth daily. baclofen 10 mg tablet Commonly known as:  LIORESAL Take 1 Tab by mouth three (3) times daily as needed. gabapentin 300 mg capsule Commonly known as:  NEURONTIN  
TK 2 CS PO Q NIGHT  
  
 multivitamin tablet Commonly known as:  ONE A DAY Take 1 Tab by mouth daily. pravastatin 20 mg tablet Commonly known as:  PRAVACHOL Take 20 mg by mouth nightly. THERALOGIX  PO Take 2 Tabs by mouth daily. traZODone 50 mg tablet Commonly known as:  DESYREL  
TK 1 T PO QHS Prescriptions Sent to Pharmacy Refills  
 gabapentin (NEURONTIN) 300 mg capsule 1 Sig: TK 2 CS PO Q NIGHT Class: Normal  
 Pharmacy: [facility] [address] [facility] Ph #: [phone]  
 baclofen (LIORESAL) 10 mg tablet 1 Sig: Take 1 Tab by mouth three (3) times daily as needed. Class: Normal  
 Pharmacy: [facility] [address] [facility] 78 Ph #: [phone] Route: Oral  
  
Follow-up Instructions Return in about 2 months (around 9/24/2018). Patient Instructions Baclofen (By mouth) Baclofen (BAK-myles-fen) Treats muscle spasms. Brand Name(s):  
There may be other brand names for this medicine. When This Medicine Should Not Be Used: You should not use this medicine if you have had an allergic reaction to baclofen. How to Use This Medicine:  
Tablet · Your doctor will tell you how much of this medicine to take and how often. Your dose may need to be changed several times in order to find out what works best for you. Do not take more medicine or take it more often than your doctor tells you to. If a dose is missed: · If you miss a dose or forget to take your medicine, take it as soon as you can. If it is almost time for your next dose, wait until then to take the medicine and skip the missed dose. · Do not use extra medicine to make up for a missed dose. How to Store and Dispose of This Medicine: · Store the medicine at room temperature in a closed container, away from heat, moisture, and direct light. · Ask your pharmacist, doctor, or health caregiver about the best way to dispose of any outdated medicine or medicine no longer needed. · Keep all medicine away from children and never share your medicine with anyone. Drugs and Foods to Avoid: Ask your doctor or pharmacist before using any other medicine, including over-the-counter medicines, vitamins, and herbal products. · Make sure your doctor knows if you are using any medicines that make you sleepy. These include sleeping pills, cold and allergy medicine, narcotic pain relievers, and sedatives. Warnings While Using This Medicine: · Make sure your doctor knows if you are pregnant or breastfeeding, or if you have kidney disease, epilepsy, diabetes, or have had a stroke. · Do not stop using this medicine suddenly without asking your doctor. You may need to slowly decrease your dose before stopping it completely. · This medicine may make you dizzy or drowsy. Avoid driving, using machines, or doing anything else that could be dangerous if you are not alert. Possible Side Effects While Using This Medicine:  
Call your doctor right away if you notice any of these side effects: 
· Increase in how much or how often you urinate · Lightheadedness or fainting · Muscles weakness, trouble breathing, trouble seeing · Seizures · Severe tiredness or weakness If you notice these less serious side effects, talk with your doctor: · Confusion · Headache · Nausea, constipation · Skin rash or itching · Swelling in your ankles · Trouble sleeping · Weakness If you notice other side effects that you think are caused by this medicine, tell your doctor. Call your doctor for medical advice about side effects. You may report side effects to FDA at 4-733-FDA-9295 © 2017 [address] Information is for End User's use only and may not be sold, redistributed or otherwise used for commercial purposes. The above information is an  only. It is not intended as medical advice for individual conditions or treatments. Talk to your doctor, nurse or pharmacist before following any medical regimen to see if it is safe and effective for you. Introducing [facility] & HEALTH SERVICES! Dear Lady Oneal: Thank you for requesting a [employer] account.   Our records indicate that you already have an active to be account. You can access your account anytime at [email or web address]. [employer] Did you know that you can access your hospital and ER discharge instructions at any time in to be? You can also review all of your test results from your hospital stay or ER visit. Additional Information If you have questions, please visit the Frequently Asked Questions section of the to be website at [email or web address]. [email or web address] Remember, to be is NOT to be used for urgent needs. For medical emergencies, dial 911. Now available from your iPhone and Android! Please provide this summary of care documentation to your next provider. Your primary care clinician is listed as Froy Rouse. If you have any questions after today's visit, please call [phone].

## 2018-07-24 NOTE — PATIENT INSTRUCTIONS
Baclofen (By mouth)   Baclofen (JONES-myles-fen)  Treats muscle spasms. Brand Name(s):   There may be other brand names for this medicine. When This Medicine Should Not Be Used: You should not use this medicine if you have had an allergic reaction to baclofen. How to Use This Medicine:   Tablet  · Your doctor will tell you how much of this medicine to take and how often. Your dose may need to be changed several times in order to find out what works best for you. Do not take more medicine or take it more often than your doctor tells you to. If a dose is missed:   · If you miss a dose or forget to take your medicine, take it as soon as you can. If it is almost time for your next dose, wait until then to take the medicine and skip the missed dose. · Do not use extra medicine to make up for a missed dose. How to Store and Dispose of This Medicine:   · Store the medicine at room temperature in a closed container, away from heat, moisture, and direct light. · Ask your pharmacist, doctor, or health caregiver about the best way to dispose of any outdated medicine or medicine no longer needed. · Keep all medicine away from children and never share your medicine with anyone. Drugs and Foods to Avoid:   Ask your doctor or pharmacist before using any other medicine, including over-the-counter medicines, vitamins, and herbal products. · Make sure your doctor knows if you are using any medicines that make you sleepy. These include sleeping pills, cold and allergy medicine, narcotic pain relievers, and sedatives. Warnings While Using This Medicine:   · Make sure your doctor knows if you are pregnant or breastfeeding, or if you have kidney disease, epilepsy, diabetes, or have had a stroke. · Do not stop using this medicine suddenly without asking your doctor. You may need to slowly decrease your dose before stopping it completely. · This medicine may make you dizzy or drowsy.  Avoid driving, using machines, or doing anything else that could be dangerous if you are not alert. Possible Side Effects While Using This Medicine:   Call your doctor right away if you notice any of these side effects:  · Increase in how much or how often you urinate  · Lightheadedness or fainting  · Muscles weakness, trouble breathing, trouble seeing  · Seizures  · Severe tiredness or weakness  If you notice these less serious side effects, talk with your doctor:   · Confusion  · Headache  · Nausea, constipation  · Skin rash or itching  · Swelling in your ankles  · Trouble sleeping  · Weakness  If you notice other side effects that you think are caused by this medicine, tell your doctor. Call your doctor for medical advice about side effects. You may report side effects to FDA at 4-447-FDA-5606  © 2017 [address] Information is for End User's use only and may not be sold, redistributed or otherwise used for commercial purposes. The above information is an  only. It is not intended as medical advice for individual conditions or treatments. Talk to your doctor, nurse or pharmacist before following any medical regimen to see if it is safe and effective for you.

## 2018-07-24 NOTE — PROGRESS NOTES
[address]  [facility], [address]  Phone: [phone]  Fax: [phone]  PROGRESS NOTE  Patient: Christain Eisenmenger                MRN: [id number]       SSN: [id number]  YOB: 1945        AGE: 68 y.o. SEX: female  Body mass index is 26.34 kg/(m^2). PCP: Christina Carmona MD  07/24/18    Chief Complaint   Patient presents with    Back Pain     Low back pain       HISTORY OF PRESENT ILLNESS:  Christain Eisenmenger is a 68 y.o.  female with history of chronic back and and RLE pain for several years and radiation of pain to RLE in L4, distribution. Her RLE radicular pain comes and goes. Prior history of back problems: recurrent self limited episodes of low back pain in the past, previous osteoarthritis of lumbar spine and previous spinal surgery - PLIF L3-4 in 2014 by Dr. Kassi Funk a  She saw Dr. Crissy Soni back in 10/2017, he advised against reconstructive surgery. She was just seen on 07/18/18 by Yolanda Bernal NP for a flare of her back pain on the right side for the last six weeks after lifting a heavy pot. She has bladder cancer and is getting BCG vaccines every six months for that. She was given a Toradol injection with minimal benefit. She comes in today with right low back pain from L3-Sacrum and hip region. It is muscular in nature. She is scheduled for more vaccines next week for her bladder cancer. We need approval from her urologist to give any steroids. She is going to ask. . Pain is aching, stabbing and throbbing. Pain is worse with manual/sedentary work, walking, lifting, twisting and affects work and recreational activities. Pain is better with relaxation and lying down. States she has been using Gabapentin 600mg QHS and Advil OTC PRN with moderate, relief. . Denies bladder/bowel dysfunction, saddle paresthesia, weakness, gait disturbance, or other neurological deficits.  Denies chills, fever,night sweats, unexplained weight loss/weight gain, chest pain, sob or anxiety. Reports no new medical issues or hospitalizations since the last visit. Pt at this time desires to  continue with current care/proceed with medication evaluation/proceed with evaluation of back pain. ASSESSMENT   Diagnoses and all orders for this visit:    1. Chronic right-sided low back pain with right-sided sciatica    2. Back strain, subsequent encounter    Other orders  -     gabapentin (NEURONTIN) 300 mg capsule; TK 2 CS PO Q NIGHT  -     baclofen (LIORESAL) 10 mg tablet; Take 1 Tab by mouth three (3) times daily as needed. IMPRESSION AND PLAN:  This is a pt with an acute flare of her back pain after lifting a heavy gardening pot six weeks ago. She is neuro intact and without any radicular pain     1) Pt was given information on baclofen   2) Continue Gabapentin  3) Trial of Baclofen  4) Need approval from urologist prior to any steroids  4) Ms. Marla Dhaliwal has a reminder for a \"due or due soon\" health maintenance. I have asked that she contact her primary care provider, Pierce Sullivan MD, for follow-up on this health maintenance. 5) We have informed patient to notify us for immediate appointment if he has any worsening neurogical symptoms or if an emergency situation presents, then call 911  6)  has been reviewed and is appropriate  7) Pt will follow-up in 2 mo w/ NP. Subjective    Work nurse    Smoking Status non-smoker    Pain Scale: 6/10    Pain Assessment  7/24/2018   Location of Pain Back   Pain Location Comment -   Location Modifiers Left;Right   Severity of Pain 6   Quality of Pain Dull;Aching; Other (Comment)   Quality of Pain Comment Numbness and tingling radiating down right leg   Duration of Pain Persistent   Frequency of Pain Constant   Date Pain First Started -   Aggravating Factors Standing;Walking;Squatting;Bending;Stretching   Aggravating Factors Comment -   Limiting Behavior No   Relieving Factors Rest   Relieving Factors Comment - Result of Injury No         REVIEW OF SYSTEMS  Constitutional: Negative for fever, chills, or weight change. Respiratory: Negative for cough or shortness of breath. Cardiovascular: Negative for chest pain or palpitations. Gastrointestinal: Negative for incontinence, acid reflux, change in bowel habits, or constipation. Genitourinary: Negative for incontinence, dysuria and flank pain. Musculoskeletal: Positive for LBP pain. See HPI. Skin: Negative for rash. Neurological:No  radiculopathy. See HPI. Endo/Heme/Allergies: Negative. Psychiatric/Behavioral: Negative. PHYSICAL EXAMINATION  Visit Vitals    /67 (BP 1 Location: Left arm, BP Patient Position: Sitting)    Pulse 74    Temp 98.5 °F (36.9 °C) (Oral)    Resp 16    Ht 5' 2\" (1.575 m)    Wt 144 lb (65.3 kg)    SpO2 99%    BMI 26.34 kg/m2         Accompanied by self. Constitutional:  Well developed, well nourished, in no acute distress. Psychiatric: Affect and mood are appropriate. Integumentary: No rashes or abrasions noted on exposed areas. Cardiovascular/Peripheral Vascular: +2 radial & pedal pulses. No peripheral edema is noted. Lymphatic:  No evidence of lymphedema. No cervical lymphadenopathy. SPINE/MUSCULOSKELETAL EXAM     Lumbar spine:  No rash, ecchymosis, or gross obliquity. No fasciculations. No focal atrophy is noted. Range of motion is pain with  with flexion, extension. Tenderness to palpation right low back L3-Sacrum. No tenderness to palpation at the sciatic notch. SI joints non-tender. Trochanters non tender. Straight leg raise negative    Sensation grossly intact to light touch. MOTOR:     Hip Flex Quads Hamstrings Ankle DF EHL Ankle PF   Right 5/5 5/5 5/5 5/5 5/5 5/5   Left 5/5 5/5 5/5 5/5 5/5 5/5       Ambulation without assistive device.  FWB.    + 's cart        PAST MEDICAL HISTORY   Past Medical History:   Diagnosis Date    Bladder cancer ([facility] [address] 2015 (approx)    Foraminal stenosis of lumbar region     Hematuria     Hypercholesterolemia     Low back pain        Past Surgical History:   Procedure Laterality Date    FOOT/TOES SURGERY PROC UNLISTED Left 02/01/2018    bunionectomy    HX BUNIONECTOMY      HX COLONOSCOPY      HX LUMBAR LAMINECTOMY  6/2/15    with fusion    HX ORTHOPAEDIC  Bunionionectomy R    HX TUBAL LIGATION      HX UROLOGICAL      treatment BCG for Bladder Cancer   . MEDICATIONS      Current Outpatient Prescriptions   Medication Sig Dispense Refill    gabapentin (NEURONTIN) 300 mg capsule TK 2 CS PO Q NIGHT 180 Cap 1    baclofen (LIORESAL) 10 mg tablet Take 1 Tab by mouth three (3) times daily as needed. 60 Tab 1    ibuprofen (ADVIL) 200 mg tablet Take  by mouth.  MULTIVIT-MINERALS/FOLIC ACID (THERALOGIX  PO) Take 2 Tabs by mouth daily.  traZODone (DESYREL) 50 mg tablet TK 1 T PO QHS  0    aspirin delayed-release 81 mg tablet Take  by mouth daily.  multivitamin (ONE A DAY) tablet Take 1 Tab by mouth daily.  pravastatin (PRAVACHOL) 20 mg tablet Take 20 mg by mouth nightly.   5        ALLERGIES    Allergies   Allergen Reactions    Simvastatin Unknown (comments)    Lipitor [Atorvastatin] Myalgia          SOCIAL HISTORY    Social History     Social History    Marital status:      Spouse name: N/A    Number of children: N/A    Years of education: N/A     Occupational History    nurse      Social History Main Topics    Smoking status: Former Smoker     Quit date: 1/1/1980    Smokeless tobacco: Never Used    Alcohol use 0.0 oz/week     0 Standard drinks or equivalent per week      Comment: 1 glass of wine 3-4 times per month    Drug use: No    Sexual activity: Not on file     Other Topics Concern    Not on file     Social History Narrative       FAMILY HISTORY    Family History   Problem Relation Age of Onset    No Known Problems Mother     No Known Problems Father     Diabetes Sister          Racheal June, NP

## 2018-07-24 NOTE — TELEPHONE ENCOUNTER
Spoke with patient. Both name and  were verified. Patient was informed of documentation below per [facility].  She stated that she'd prefer to see someone about her current pain, appointment was scheduled for today at 3pm with NP.

## 2018-09-24 ENCOUNTER — OFFICE VISIT (OUTPATIENT)
Dept: ORTHOPEDIC SURGERY | Age: 73
End: 2018-09-24

## 2018-09-24 VITALS
BODY MASS INDEX: 26.98 KG/M2 | DIASTOLIC BLOOD PRESSURE: 73 MMHG | RESPIRATION RATE: 17 BRPM | OXYGEN SATURATION: 100 % | HEIGHT: 62 IN | WEIGHT: 146.6 LBS | TEMPERATURE: 98.2 F | HEART RATE: 71 BPM | SYSTOLIC BLOOD PRESSURE: 166 MMHG

## 2018-09-24 DIAGNOSIS — M53.3 SACROILIAC JOINT PAIN: ICD-10-CM

## 2018-09-24 DIAGNOSIS — G89.29 CHRONIC RIGHT-SIDED LOW BACK PAIN WITH RIGHT-SIDED SCIATICA: Primary | ICD-10-CM

## 2018-09-24 DIAGNOSIS — M54.41 CHRONIC RIGHT-SIDED LOW BACK PAIN WITH RIGHT-SIDED SCIATICA: Primary | ICD-10-CM

## 2018-09-24 DIAGNOSIS — M46.1 SACROILIITIS (HCC): ICD-10-CM

## 2018-09-24 NOTE — MR AVS SNAPSHOT
[address] [place] [address] [address] [facility] [address] 
[phone] Patient: Linda Elena MRN: [id number] EAM:5/42/5323 Visit Information Date & Time Provider Department Dept. Phone Encounter #  
 9/24/2018  2:40 PM Abbie Landaverde NP [address] [facility] 26 [id number] Your Appointments 11/5/2018  7:30 AM  
Follow Up with Monique Lloyd MD  
[facility] ([facility]) Appt Note: left foot 4 mo fu  
 [address], [address] 200 [facility]  
[phone] [address], [address]  
  
    
  
 2/6/2019  8:30 AM  
INJECTION with [facility] ([facility]) Appt Note: BCG #1  
 [address] [place] [address] 1097 [facility]  
  
   
 200 [facility]  
  
    
 2/13/2019  8:30 AM  
INJECTION with [facility] ([facility] Appt Note: BCG #2  
 [address] [place] [address]  
[phone]  
  
    
 2/20/2019  8:30 AM  
INJECTION with [facility] ([facility] Appt Note: BCG #3  
 [address] [place] [address]  
[phone]  
  
    
 3/20/2019  2:00 PM  
Any with Ramo Stephens MD  
[facility] [address] [facility] Appt Note: Cystoscopy after BCG  
 [address] [address]  
[phone]  
  
   
 [place] [id number] Upcoming Health Maintenance Date Due Hepatitis C Screening 1945 DTaP/Tdap/Td series (1 - Tdap) 5/16/1966 Shingrix Vaccine Age 50> (1 of 2) 5/16/1995 BREAST CANCER SCRN MAMMOGRAM 5/16/1995 FOBT Q 1 YEAR AGE 50-75 5/16/1995 GLAUCOMA SCREENING Q2Y 5/16/2010 Bone Densitometry (Dexa) Screening 5/16/2010 MEDICARE YEARLY EXAM 3/14/2018 Influenza Age 5 to Adult 8/1/2018 Allergies as of 9/24/2018  Review Complete On: 9/24/2018 By: Luis Rizo Severity Noted Reaction Type Reactions Simvastatin Medium 12/30/2013    Unknown (comments) Lipitor [Atorvastatin]  01/29/2018    Myalgia Current Immunizations  Never Reviewed No immunizations on file. Not reviewed this visit You Were Diagnosed With   
  
 Codes Comments Chronic right-sided low back pain with right-sided sciatica    -  Primary ICD-10-CM: M54.41, G89.29 ICD-9-CM: 724.2, 724.3, 338.29 Sacroiliac joint pain     ICD-10-CM: M53.3 ICD-9-CM: 724.6 Sacroiliitis ([address]     ICD-10-CM: M46.1 ICD-9-CM: 720.2 Vitals BP Pulse Temp Resp Height(growth percentile) Weight(growth percentile) 166/73 71 98.2 °F (36.8 °C) 17 5' 2\" (1.575 m) 146 lb 9.6 oz (66.5 kg) SpO2 BMI OB Status Smoking Status 100% 26.81 kg/m2 Postmenopausal Former Smoker BMI and BSA Data Body Mass Index Body Surface Area  
 26.81 kg/m 2 1.71 m 2 Preferred Pharmacy Pharmacy Name Phone [facility] [address] [place] [address] [phone] Your Updated Medication List  
  
   
This list is accurate as of 9/24/18  3:21 PM.  Always use your most recent med list. ADVIL 200 mg tablet Generic drug:  ibuprofen Take  by mouth. aspirin delayed-release 81 mg tablet Take  by mouth daily. baclofen 10 mg tablet Commonly known as:  LIORESAL Take 1 Tab by mouth three (3) times daily as needed. gabapentin 300 mg capsule Commonly known as:  NEURONTIN  
TK 2 CS PO Q NIGHT  
  
 multivitamin tablet Commonly known as:  ONE A DAY Take 1 Tab by mouth daily. pravastatin 20 mg tablet Commonly known as:  PRAVACHOL Take 20 mg by mouth nightly. THERALOGIX  PO Take 2 Tabs by mouth daily. traZODone 50 mg tablet Commonly known as:  DESYREL  
TK 1 T PO QHS We Performed the Following SCHEDULE SURGERY [BFR4932 Custom] Introducing [facility]! Dear Jose Armando Hou: Thank you for requesting a [employer] account. Our records indicate that you already have an active [employer] account. You can access your account anytime at [email or web address] Did you know that you can access your hospital and ER discharge instructions at any time in [employer]? You can also review all of your test results from your hospital stay or ER visit. Additional Information If you have questions, please visit the Frequently Asked Questions section of the [employer] website at [email or web address] Remember, [employer] is NOT to be used for urgent needs. For medical emergencies, dial 911. Now available from your iPhone and Android! Please provide this summary of care documentation to your next provider. Your primary care clinician is listed as Nikki Lopez. If you have any questions after today's visit, please call [phone].

## 2018-09-24 NOTE — PROGRESS NOTES
[address]  [place], [address] [place] [address] Street  Phone: [phone]  Fax: [phone]  PROGRESS NOTE  Patient: Rachel Mcgarry                MRN: [id number]       SSN: [id number]  YOB: 1945        AGE: 68 y.o. SEX: female  Body mass index is 26.81 kg/(m^2). PCP: Nikki Lopez MD  09/24/18    Chief Complaint   Patient presents with    Back Pain     Lower     Buttocks pain    Follow-up     2 MO        HISTORY OF PRESENT ILLNESS:  Rachel Mcgarry is a 68 y.o.  female with history of chronic back and and RLE pain for several years and radiation of pain to RLE in L4, distribution. Her RLE radicular pain comes and goes. Prior history of back problems: recurrent self limited episodes of low back pain in the past, previous osteoarthritis of lumbar spine and previous spinal surgery - PLIF L3-4 in 2014 by Dr. Case Charles. She saw Dr. Jhon rader in 10/2017, he advised against reconstructive surgery. She has bladder cancer and is getting BCG vaccines every six months for that. I last saw her two months ago and gave her Baclofen. She comes in today with improved right-sided low back pain. The Baclofen made her drowsy, so she stopped that. Today, she reports ongoing bilateral SI joint pain. 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 Pain is aching, stabbing and throbbing. Pain is worse with manual/sedentary work, walking, lifting, twisting and affects work and recreational activities. Pain is better with relaxation and lying down.      States she has been using Gabapentin 600mg QHS and Advil and Tylenol OTC PRN with moderate, relief. . Denies bladder/bowel dysfunction, saddle paresthesia, weakness, gait disturbance, or other neurological deficits. Denies chills, fever,night sweats, unexplained weight loss/weight gain, chest pain, sob or anxiety. Reports no new medical issues or hospitalizations since the last visit.  Pt at this time desires to continue with current care/proceed with medication evaluation/proceed with evaluation of back pain. ASSESSMENT   Diagnoses and all orders for this visit:    1. Chronic right-sided low back pain with right-sided sciatica    2. Sacroiliac joint pain  -     SCHEDULE SURGERY    3. Sacroiliitis ([address]  -     SCHEDULE SURGERY       IMPRESSION AND PLAN:  This is a pt with bilateral SI joint pain. She is interested in trying an SI joint block. She is not a diabetic. We should get approval from her urologist, as she routinely gets injections from them for her bladder cancer. 1) Pt was given information on SI Joint exercises   2) Continue Gabapentin, increased doses cause drowsiness  3) Bilateral SI Joint block  4) Ms. Cindy Payton has a reminder for a \"due or due soon\" health maintenance. I have asked that she contact her primary care provider, John Kelley MD, for follow-up on this health maintenance. 5) We have informed patient to notify us for immediate appointment if he has any worsening neurogical symptoms or if an emergency situation presents, then call 911  6)  has been reviewed and is appropriate  7) Pt will follow-up in 2 mo w/ me. Subjective    Work nurse    Smoking Status non-smoker    Pain Scale: 4/10    Pain Assessment  9/24/2018   Location of Pain Back;Buttocks   Pain Location Comment -   Location Modifiers -   Severity of Pain 3   Quality of Pain Dull;Aching   Quality of Pain Comment numbness   Duration of Pain -   Frequency of Pain Constant   Date Pain First Started -   Aggravating Factors Bending   Aggravating Factors Comment turning, twisting   Limiting Behavior -   Relieving Factors NSAID   Relieving Factors Comment Motrin   Result of Injury -         REVIEW OF SYSTEMS  Constitutional: Negative for fever, chills, or weight change. Respiratory: Negative for cough or shortness of breath. Cardiovascular: Negative for chest pain or palpitations.   Gastrointestinal: Negative for incontinence, acid reflux, change in bowel habits, or constipation. Genitourinary: Negative for incontinence, dysuria and flank pain. Musculoskeletal: Positive for Back and bilateral SI Joint pain. See HPI. Skin: Negative for rash. Neurological:no  radiculopathy. See HPI. Endo/Heme/Allergies: Negative. Psychiatric/Behavioral: Negative. PHYSICAL EXAMINATION  Visit Vitals    /73    Pulse 71    Temp 98.2 °F (36.8 °C)    Resp 17    Ht 5' 2\" (1.575 m)    Wt 146 lb 9.6 oz (66.5 kg)    SpO2 100%    BMI 26.81 kg/m2         Accompanied by self. Constitutional:  Well developed, well nourished, in no acute distress. Psychiatric: Affect and mood are appropriate. Integumentary: No rashes or abrasions noted on exposed areas. Cardiovascular/Peripheral Vascular: +2 radial & pedal pulses. No peripheral edema is noted. Lymphatic:  No evidence of lymphedema. No cervical lymphadenopathy. SPINE/MUSCULOSKELETAL EXAM     Lumbar spine:  No rash, ecchymosis, or gross obliquity. No fasciculations. No focal atrophy is noted. Range of motion is intact with flexion, extension, turning right, turning left. Tenderness to palpation bilateral SI Joints. No tenderness to palpation at the sciatic notch. Trochanters non tender. Straight leg raise negative    Sensation grossly intact to light touch. MOTOR:     Hip Flex Quads Hamstrings Ankle DF EHL Ankle PF   Right +4/5 +4/5 +4/5 +4/5 +4/5 +4/5   Left +4/5 +4/5 +4/5 +4/5 +4/5 +4/5       Ambulation without assistive device.  FWB.    normal gait and station        PAST MEDICAL HISTORY   Past Medical History:   Diagnosis Date    Bladder cancer ([facility] [address] 2015 (approx)    Foraminal stenosis of lumbar region     Hematuria     Hypercholesterolemia     Low back pain        Past Surgical History:   Procedure Laterality Date    FOOT/TOES SURGERY PROC UNLISTED Left 02/01/2018    bunionectomy    HX BUNIONECTOMY      HX COLONOSCOPY      HX LUMBAR LAMINECTOMY  6/2/15 with fusion    HX ORTHOPAEDIC  Bunionionectomy R    HX TUBAL LIGATION      HX UROLOGICAL      treatment BCG for Bladder Cancer   . MEDICATIONS      Current Outpatient Prescriptions   Medication Sig Dispense Refill    gabapentin (NEURONTIN) 300 mg capsule TK 2 CS PO Q NIGHT 180 Cap 1    ibuprofen (ADVIL) 200 mg tablet Take  by mouth.  MULTIVIT-MINERALS/FOLIC ACID (THERALOGIX  PO) Take 2 Tabs by mouth daily.  traZODone (DESYREL) 50 mg tablet TK 1 T PO QHS  0    aspirin delayed-release 81 mg tablet Take  by mouth daily.  multivitamin (ONE A DAY) tablet Take 1 Tab by mouth daily.  pravastatin (PRAVACHOL) 20 mg tablet Take 20 mg by mouth nightly. 5    baclofen (LIORESAL) 10 mg tablet Take 1 Tab by mouth three (3) times daily as needed.  60 Tab 1        ALLERGIES    Allergies   Allergen Reactions    Simvastatin Unknown (comments)    Lipitor [Atorvastatin] Myalgia          SOCIAL HISTORY    Social History     Social History    Marital status:      Spouse name: N/A    Number of children: N/A    Years of education: N/A     Occupational History    nurse      Social History Main Topics    Smoking status: Former Smoker     Quit date: 1/1/1980    Smokeless tobacco: Never Used    Alcohol use 0.0 oz/week     0 Standard drinks or equivalent per week      Comment: 1 glass of wine 3-4 times per month    Drug use: No    Sexual activity: Not on file     Other Topics Concern    Not on file     Social History Narrative       FAMILY HISTORY    Family History   Problem Relation Age of Onset    No Known Problems Mother     No Known Problems Father     Diabetes Sister          Nica Jasso NP

## 2018-09-24 NOTE — PATIENT INSTRUCTIONS
Sacroiliac Pain: Exercises  Your Care Instructions  Here are some examples of typical rehabilitation exercises for your condition. Start each exercise slowly. Ease off the exercise if you start to have pain. Your doctor or physical therapist will tell you when you can start these exercises and which ones will work best for you. How to do the exercises  Knee-to-chest stretch    1. Do not do the knee-to-chest exercise if it causes or increases back or leg pain. 2. Lie on your back with your knees bent and your feet flat on the floor. You can put a small pillow under your head and neck if it is more comfortable. 3. Grasp your hands under one knee and bring the knee to your chest, keeping the other foot flat on the floor. 4. Keep your lower back pressed to the floor. Hold for at least 15 to 30 seconds. 5. Relax and lower the knee to the starting position. Repeat with the other leg. 6. Repeat 2 to 4 times with each leg. 7. To get more stretch, keep your other leg flat on the floor while pulling your knee to your chest.  Bridging    1. Lie on your back with both knees bent. Your knees should be bent about 90 degrees. 2. Tighten your belly muscles by pulling in your belly button toward your spine. Then push your feet into the floor, squeeze your buttocks, and lift your hips off the floor until your shoulders, hips, and knees are all in a straight line. 3. Hold for about 6 seconds as you continue to breathe normally, and then slowly lower your hips back down to the floor and rest for up to 10 seconds. 4. Repeat 8 to 12 times. Hip extension    1. Get down on your hands and knees on the floor. 2. Keeping your back and neck straight, lift one leg straight out behind you. When you lift your leg, keep your hips level. Don't let your back twist, and don't let your hip drop toward the floor. 3. Hold for 6 seconds. Repeat 8 to 12 times with each leg.   4. If you feel steady and strong when you do this exercise, you can make it more difficult. To do this, when you lift your leg, also lift the opposite arm straight out in front of you. For example, lift the left leg and the right arm at the same time. (This is sometimes called the \"bird dog exercise. \") Hold for 6 seconds, and repeat 8 to 12 times on each side. Clamshell    1. Lie on your side with a pillow under your head. Keep your feet and knees together and your knees bent. 2. Raise your top knee, but keep your feet together. Do not let your hips roll back. Your legs should open up like a clamshell. 3. Hold for 6 seconds. 4. Slowly lower your knee back down. Rest for 10 seconds. 5. Repeat 8 to 12 times. 6. Switch to your other side and repeat steps 1 through 5. Hamstring wall stretch    1. Lie on your back in a doorway, with one leg through the open door. 2. Slide your affected leg up the wall to straighten your knee. You should feel a gentle stretch down the back of your leg. 1. Do not arch your back. 2. Do not bend either knee. 3. Keep one heel touching the floor and the other heel touching the wall. Do not point your toes. 3. Hold the stretch for at least 1 minute to begin. Then try to lengthen the time you hold the stretch to as long as 6 minutes. 4. Switch legs, and repeat steps 1 through 3.  5. Repeat 2 to 4 times. 6. If you do not have a place to do this exercise in a doorway, there is another way to do it:  7. Lie on your back, and bend one knee. 8. Loop a towel under the ball and toes of that foot, and hold the ends of the towel in your hands. 9. Straighten your knee, and slowly pull back on the towel. You should feel a gentle stretch down the back of your leg. 10. Switch legs, and repeat steps 1 through 3.  11. Repeat 2 to 4 times. Lower abdominal strengthening    1. Lie on your back with your knees bent and your feet flat on the floor. 2. Tighten your belly muscles by pulling your belly button in toward your spine.   3. Lift one foot off the floor and bring your knee toward your chest, so that your knee is straight above your hip and your leg is bent like the letter \"L. \"  4. Lift the other knee up to the same position. 5. Lower one leg at a time to the starting position. 6. Keep alternating legs until you have lifted each leg 8 to 12 times. 7. Be sure to keep your belly muscles tight and your back still as you are moving your legs. Be sure to breathe normally. Piriformis stretch    1. Lie on your back with your legs straight. 2. Lift your affected leg, and bend your knee. With your opposite hand, reach across your body, and then gently pull your knee toward your opposite shoulder. 3. Hold the stretch for 15 to 30 seconds. 4. Switch legs and repeat steps 1 through 3.  5. Repeat 2 to 4 times. Follow-up care is a key part of your treatment and safety. Be sure to make and go to all appointments, and call your doctor if you are having problems. It's also a good idea to know your test results and keep a list of the medicines you take. Where can you learn more? Go to http://horace-marco.info/. Enter S761 in the search box to learn more about \"Sacroiliac Pain: Exercises. \"  Current as of: November 29, 2017  Content Version: 11.7  © 2623-0294 [employer]. Care instructions adapted under license by [employer] (which disclaims liability or warranty for this information). If you have questions about a medical condition or this instruction, always ask your healthcare professional. [address] any warranty or liability for your use of this information.

## 2018-10-31 ENCOUNTER — APPOINTMENT (OUTPATIENT)
Dept: GENERAL RADIOLOGY | Age: 73
End: 2018-10-31
Attending: PHYSICAL MEDICINE & REHABILITATION
Payer: MEDICARE

## 2018-10-31 ENCOUNTER — HOSPITAL ENCOUNTER (OUTPATIENT)
Age: 73
Setting detail: OUTPATIENT SURGERY
Discharge: HOME | End: 2018-10-31
Attending: PHYSICAL MEDICINE & REHABILITATION | Admitting: PHYSICAL MEDICINE & REHABILITATION
Payer: MEDICARE

## 2018-10-31 VITALS
BODY MASS INDEX: 26.87 KG/M2 | HEIGHT: 62 IN | DIASTOLIC BLOOD PRESSURE: 53 MMHG | WEIGHT: 146 LBS | OXYGEN SATURATION: 100 % | SYSTOLIC BLOOD PRESSURE: 146 MMHG | HEART RATE: 70 BPM | TEMPERATURE: 98.3 F | RESPIRATION RATE: 20 BRPM

## 2018-10-31 PROCEDURE — 74011636320 HC RX REV CODE- 636/320

## 2018-10-31 PROCEDURE — 76010000009 HC PAIN MGT 0 TO 30 MIN PROC: Performed by: PHYSICAL MEDICINE & REHABILITATION

## 2018-10-31 PROCEDURE — 74011636320 HC RX REV CODE- 636/320: Performed by: PHYSICAL MEDICINE & REHABILITATION

## 2018-10-31 PROCEDURE — 74011250636 HC RX REV CODE- 250/636

## 2018-10-31 PROCEDURE — 74011250636 HC RX REV CODE- 250/636: Performed by: PHYSICAL MEDICINE & REHABILITATION

## 2018-10-31 PROCEDURE — 77030039433 HC TY MYLEOGRAM BD -B: Performed by: PHYSICAL MEDICINE & REHABILITATION

## 2018-10-31 RX ORDER — DEXAMETHASONE SODIUM PHOSPHATE 100 MG/10ML
INJECTION INTRAMUSCULAR; INTRAVENOUS AS NEEDED
Status: DISCONTINUED | OUTPATIENT
Start: 2018-10-31 | End: 2018-10-31 | Stop reason: HOSPADM

## 2018-10-31 RX ORDER — LIDOCAINE 50 MG/G
1 PATCH TOPICAL EVERY 24 HOURS
COMMUNITY

## 2018-10-31 RX ORDER — DIAZEPAM 5 MG/1
5-20 TABLET ORAL ONCE
Status: DISCONTINUED | OUTPATIENT
Start: 2018-10-31 | End: 2018-10-31 | Stop reason: HOSPADM

## 2018-10-31 RX ORDER — LIDOCAINE HYDROCHLORIDE 10 MG/ML
INJECTION, SOLUTION EPIDURAL; INFILTRATION; INTRACAUDAL; PERINEURAL AS NEEDED
Status: DISCONTINUED | OUTPATIENT
Start: 2018-10-31 | End: 2018-10-31 | Stop reason: HOSPADM

## 2018-10-31 NOTE — H&P
Date of Surgery Update:  Alex Dixon was seen and examined. History and physical has been reviewed. The patient has been examined. There have been no significant clinical changes since the last office visit.       Signed By: Mary Méndez MD     October 31, 2018 1:52 PM

## 2018-10-31 NOTE — DISCHARGE INSTRUCTIONS
[facility]   ([place])  Dr. Vinny Park, Dr. Magalis Alvarado, Dr. Simon Mirza not drive a car, operate heavy machinery or dangerous equipment for 24 hours. * Activity as tolerated; rest for the remainder of the day. * Resume pre-block medications including those for your family doctor. * Do not drink alcoholic beverages for 24 hours. Alcohol and the medications you have received may interact and cause an adverse reaction. * You may feel better this evening and worse tomorrow, as the numbing medications wears off and the steroid has yet to begin to work. After 48 hrs the steroid should begin to release bringing you relief. * You may shower this evening and remove any bandages. * Avoid hot tubs and heating pads for 24 hours. You may use cold packs on the procedure site as tolerated for the first 24 hours. * If a headache develops, drink plenty of fluids and rest.  Take over the counter medications for headache if needed. If the headache continues longer than 24 hours, call MD at the [address] [place] [address]. [phone]    * Continue taking pain medications as needed. * You may resume your regular diet if tolerated. Otherwise, start with sips of water and advance slowly. * If Diabetic: check your blood sugar three times a day for the next 3 days. If your sugar is greater than 300 call your family doctor. If your sugar is greater than 400, have someone transport you to the nearest Emergency Room. * If you experience any of the following problems, Please Call the [address] [place] [address] at [phone].         * Shortness of Breath    * Fever of 101 or higher    * Nausea / Vomiting    * Severe Headache    * Weakness or numbness in arms or legs that is not      resolving    * Prolonged increase in pain greater than 4 days      DISCHARGE SUMMARY from Nurse      PATIENT INSTRUCTIONS:    After oral sedation, for 24 hours or while taking prescription Narcotics:  · Limit your activities  · Do not drive and operate hazardous machinery  · Do not make important personal or business decisions  · Do  not drink alcoholic beverages  · If you have not urinated within 8 hours after discharge, please contact your surgeon on call. Report the following to your surgeon:  · Excessive pain, swelling, redness or odor of or around the surgical area  · Temperature over 101  · Nausea and vomiting lasting longer than 4 hours or if unable to take medications  · Any signs of decreased circulation or nerve impairment to extremity: change in color, persistent  numbness, tingling, coldness or increase pain  · Any questions            What to do at Home:  Recommended activity: Activity as tolerated, NO DRIVING FOR 12 Hours post injection          *  Please give a list of your current medications to your Primary Care Provider. *  Please update this list whenever your medications are discontinued, doses are      changed, or new medications (including over-the-counter products) are added. *  Please carry medication information at all times in case of emergency situations. These are general instructions for a healthy lifestyle:    No smoking/ No tobacco products/ Avoid exposure to second hand smoke    Surgeon General's Warning:  Quitting smoking now greatly reduces serious risk to your health. Obesity, smoking, and sedentary lifestyle greatly increases your risk for illness    A healthy diet, regular physical exercise & weight monitoring are important for maintaining a healthy lifestyle    You may be retaining fluid if you have a history of heart failure or if you experience any of the following symptoms:  Weight gain of 3 pounds or more overnight or 5 pounds in a week, increased swelling in our hands or feet or shortness of breath while lying flat in bed.   Please call your doctor as soon as you notice any of these symptoms; do not wait until your next office visit. Recognize signs and symptoms of STROKE:    F-face looks uneven    A-arms unable to move or move unevenly    S-speech slurred or non-existent    T-time-call 911 as soon as signs and symptoms begin-DO NOT go       Back to bed or wait to see if you get better-TIME IS BRAIN.

## 2018-10-31 NOTE — PROCEDURES
Bi Procedure Note    Patient Name: Sergio Durant    Date of Procedure: October 31, 2018    Preoperative Diagnosis: Bilateral Sacroiliac Joint Pain    Post Operative Diagnosis: same    Procedure: SI Joint Injection bilateral      Consent: Informed consent was obtained prior to the procedure. The patient was given the opportunity to ask questions regarding the procedure and its associated risks. In addition to the potential risks associated with the procedure itself, the patient was informed both verbally and in writing of potential side effects of the use of glucocorticoids. The patient appeared to comprehend the informed consent and desired to have the procedure performed. Procedure: The patient was placed in the prone position on the flouroscopy table and the back was prepped and draped in the usual sterile manner. A #22 gauge spinal needle was then advanced to lie within the SI joint after local Lidocaine 1% injection. The procedure was repeated for the contralateral SI joint. A total of 30 mg of preservative free dexamethasone and 5 ml of Lidocaine was introduced into SI joints. The injection area was cleaned and bandaids applied. No excessive bleeding was noted. Patient dressed and was discharged to home with instructions. Discussion:  (x) The patient tolerated the procedure well.     ( )       Raoul Gilliland MD  October 31, 2018

## 2018-11-12 ENCOUNTER — OFFICE VISIT (OUTPATIENT)
Dept: ORTHOPEDIC SURGERY | Age: 73
End: 2018-11-12

## 2018-11-12 VITALS
HEART RATE: 64 BPM | RESPIRATION RATE: 16 BRPM | HEIGHT: 62 IN | DIASTOLIC BLOOD PRESSURE: 58 MMHG | TEMPERATURE: 96.3 F | SYSTOLIC BLOOD PRESSURE: 142 MMHG | WEIGHT: 151 LBS | OXYGEN SATURATION: 99 % | BODY MASS INDEX: 27.79 KG/M2

## 2018-11-12 DIAGNOSIS — M79.675 PAIN OF LEFT GREAT TOE: Primary | ICD-10-CM

## 2018-11-12 DIAGNOSIS — Z98.890 HISTORY OF BUNIONECTOMY OF LEFT GREAT TOE: ICD-10-CM

## 2018-11-12 NOTE — PROGRESS NOTES
AMBULATORY PROGRESS NOTE Patient: Bekah Aguirre             MRN: [id number]     SSN: [id number] Body mass index is 27.62 kg/m². YOB: 1945     AGE: 68 y.o. EX: female PCP: Guerrero Barraza MD 
 
 IMPRESSION/DIAGNOSIS AND TREATMENT PLAN  
 
DIAGNOSES 1. History of bunionectomy 2. Bunion of great toe Orders Placed This Encounter  [32920] Foot Min 3V Bekah Aguirre understands her diagnoses and the proposed plan. Asad Kaur continues to do well. She admits to having no pain or discomfort in her foot at this current time. My plan is to see her in six months, activities as tolerated. DIAGNOSTIC STUDIES: X-rays, today, three views of the left foot, show postop changes from her left bunion procedure. She had a Lapiplasty procedure with two compression screws and a Lapiplasty stabilizing plate. Her overall alignment is much improved compared to preop. I can just barely see a little, slight, linear line at the first TMT proposed arthrodesis site, but she is absolutely nontender in this region. She has a healed second metatarsal stress fracture and generalized osteopenia with presumed osteoporosis. She will continue calcium and Vitamin D over-the-counter. Plan: 
 
1) Continue activity as tolerated RTO - 6 months HPI AND EXAMINATION Bekah Aguirre IS A 68 y.o. female who presents to my outpatient office for follow up 9 months days s/p Left Lapidus bunionectomy procedure. At last visit, I instructed the patient to continue activity modification as directed and to continue wearing the custom orthotic as directed. Since we saw her last, Ms. Isela Closs states that she is doing well and that she is not experiencing any major pain or discomfort. She notes that she experiences a shooting pain every so often, but that it is not too bothersome. She reports that she wears tennis shoes mostly. She will follow up in 6 months. Visit Vitals /58 Pulse 64 Temp 96.3 °F (35.7 °C) (Oral) Resp 16 Ht 5' 2\" (1.575 m) Wt 151 lb (68.5 kg) SpO2 99% BMI 27.62 kg/m² Left ANKLE and FOOT    
  
Gait: normal 
Tenderness: no TTP at this time. Cutaneous: No rashes, skin patches, wounds, or abrasions to the lower legs Warm and Normal color. No regions of expressible drainage. Medial Border of Tibia Region: absent Skin color, texture, turgor normal. Normal. 
                    Well healed surgical scar along the medial aspect of great toe. Joint Motion: ROM Ankle:Normal , Hindfoot: (ST,TN,CC Normal}, Forefoot toes:Normal 
Neurologic Exam: Neuro: Motor: normal 5/5 strength in all tested muscle groups and Sensory : no sensory deficits noted. Contractures: Gastrocnemius or Achilles Contractures absent Joint / Tendon Stability: No Ankle or Subtalar instability or joint laxity. No peroneal sublux ability or dislocation Alignment:  Normal Foot Alignment and Semi Rigid Vascular: Normal Pulses/ NL Capillary refill, No evidence of DVT seen on physical exam. 
            No calf swelling, no tenderness to calf muscles. Lymphatic:  No Evidence of Lymphedema. CHART REVIEW Past Medical History:  
Diagnosis Date  Bladder cancer ([facility] [address] 2015 (approx)  Foraminal stenosis of lumbar region  Hematuria  Hypercholesterolemia  Low back pain Current Outpatient Medications Medication Sig  
 lidocaine (LIDODERM) 5 % 1 Patch by TransDERmal route every twenty-four (24) hours. Apply patch to the affected area for 12 hours a day and remove for 12 hours a day.  gabapentin (NEURONTIN) 300 mg capsule TK 2 CS PO Q NIGHT  ibuprofen (ADVIL) 200 mg tablet Take  by mouth.  MULTIVIT-MINERALS/FOLIC ACID (THERALOGIX  PO) Take 2 Tabs by mouth daily.  traZODone (DESYREL) 50 mg tablet TK 1 T PO QHS  aspirin delayed-release 81 mg tablet Take  by mouth daily.  multivitamin (ONE A DAY) tablet Take 1 Tab by mouth daily.  pravastatin (PRAVACHOL) 20 mg tablet Take 20 mg by mouth nightly. No current facility-administered medications for this visit. Allergies Allergen Reactions  Simvastatin Unknown (comments)  Lipitor [Atorvastatin] Myalgia Past Surgical History:  
Procedure Laterality Date  FOOT/TOES SURGERY PROC UNLISTED Left 2018  
 bunionectomy  HX BUNIONECTOMY  HX COLONOSCOPY    
 HX LUMBAR LAMINECTOMY  6/2/15  
 with fusion  HX ORTHOPAEDIC  Bunionionectomy R  
 HX TUBAL LIGATION    
 HX UROLOGICAL    
 treatment BCG for Bladder Cancer Social History Occupational History  Occupation: nurse Tobacco Use  Smoking status: Former Smoker Last attempt to quit: 1980 Years since quittin.8  Smokeless tobacco: Never Used Substance and Sexual Activity  Alcohol use: Yes Alcohol/week: 0.0 oz  
  Comment: 1 glass of wine 3-4 times per month  Drug use: No  
 Sexual activity: Not on file Family History Problem Relation Age of Onset  No Known Problems Mother  No Known Problems Father  Diabetes Sister REVIEW OF SYSTEMS : 2018  ALL BELOW ARE Negative except : SEE HPI Constitutional: Negative for fever, chills and weight loss. Neg Weight Loss Cardiovascular: Negative for chest pain, claudication and leg swelling. SOB, MCKOY Gastrointestinal/Urological: Negative for  pain, N/V/D/C, Blood in stool or urine,dysuria                         Hematuria, Incontinence, pelvic pain Musculoskeletal: see HPI. Neurological: Negative for dizziness and weakness, headaches,Visual Changes             Confusion,  Or Seizures, Psychiatric/Behavioral: Negative for depression, memory loss and substance abuse. Extremities:  Negative for hair changes, rash or skin lesion changes. Hematologic: Negative for Bleeding problems, bruising, pallor or swollen lymph nodes. Peripheral Vascular: No calf pain, vascular vein tenderness to calf pain No calf throbbing, posterior knee throbbing pain DIAGNOSTIC IMAGING No notes on file Please see above section of this report. I have personally reviewed the results of the above study. The interpretation of this study is my professional opinion. Written by Stacia Gonsalez, as dictated by Dr. Deshaun Barrios. I, Dr. Deshaun Barrios, confirm that all documentation is accurate.

## 2018-11-20 ENCOUNTER — OFFICE VISIT (OUTPATIENT)
Dept: ORTHOPEDIC SURGERY | Age: 73
End: 2018-11-20

## 2018-11-20 VITALS
DIASTOLIC BLOOD PRESSURE: 66 MMHG | HEIGHT: 62 IN | HEART RATE: 62 BPM | SYSTOLIC BLOOD PRESSURE: 148 MMHG | TEMPERATURE: 98.3 F | BODY MASS INDEX: 28.08 KG/M2 | WEIGHT: 152.6 LBS

## 2018-11-20 DIAGNOSIS — M96.1 LUMBAR POST-LAMINECTOMY SYNDROME: Primary | ICD-10-CM

## 2018-11-20 DIAGNOSIS — M54.41 CHRONIC RIGHT-SIDED LOW BACK PAIN WITH RIGHT-SIDED SCIATICA: ICD-10-CM

## 2018-11-20 DIAGNOSIS — G89.29 CHRONIC RIGHT-SIDED LOW BACK PAIN WITH RIGHT-SIDED SCIATICA: ICD-10-CM

## 2018-11-20 NOTE — PROGRESS NOTES
Chief complaint/History of Present Illness:  Chief Complaint   Patient presents with    Back Pain     lower back pain     Leg Pain     charlene numbness and tingling mostly in right leg      HPI  Janie Montes is a  68 y.o.  female      HISTORY OF PRESENT ILLNESS:  The patient comes in today for follow-up of her chronic low back pain. She gets right greater than left lower extremity pain down to her knee. She is status post a PLIF L3-4 by Dr. Crystal Swenson around the 2013/2014 timeframe. She states she is currently taking gabapentin 1200 mg at night which does help. She does find it very sedating. She is taking Motrin 400; sometimes she takes up to 7 pills a day. She also takes Tylenol. She rates her pain as a 6/10. On 10/31/2018, she underwent bilateral S1 joint injections which she states only helped for about a week and then her pain came back. She would like to have some kind of medication that might help with her back pain. PHYSICAL EXAMINATION:  Ms. Anna Borrero is a 77-year-old female. She is alert and oriented with normal mood and affect. She has a full weight bearing nonantalgic gait. No assistive device. She has 4/5 strength bilateral lower extremities. Negative straight leg raise. She has pain with hyperextension of her spine. ASSESSMENT/PLAN:  This is a patient with post lumbar laminectomy syndrome with right-sided sciatica and chronic low back pain. We did discuss changing her to Gralise since her gabapentin does work; she just has side effects from it. We are going to give her Gralise 1200 mg daily to take q.d. Hopefully that will be more beneficial for her. She has a follow-up appointment with Dr. Nicholas Mcclure in 01/2019. She will keep that and we can see how she does with this medication.            Review of systems:    Past Medical History:   Diagnosis Date    Bladder cancer ([facility] [address] 2015 (approx)    Foraminal stenosis of lumbar region     Hematuria     Hypercholesterolemia     Low back pain Past Surgical History:   Procedure Laterality Date    FOOT/TOES SURGERY PROC UNLISTED Left 2018    bunionectomy    HX BUNIONECTOMY      HX COLONOSCOPY      HX LUMBAR LAMINECTOMY  6/2/15    with fusion    HX ORTHOPAEDIC  Bunionionectomy R    HX TUBAL LIGATION      HX UROLOGICAL      treatment BCG for Bladder Cancer     Social History     Socioeconomic History    Marital status:      Spouse name: Not on file    Number of children: Not on file    Years of education: Not on file    Highest education level: Not on file   Social Needs    Financial resource strain: Not on file    Food insecurity - worry: Not on file    Food insecurity - inability: Not on file   Digital Lumens needs - medical: Not on file   Digital Lumens needs - non-medical: Not on file   Occupational History    Occupation: nurse   Tobacco Use    Smoking status: Former Smoker     Last attempt to quit: 1980     Years since quittin.9    Smokeless tobacco: Never Used   Substance and Sexual Activity    Alcohol use: Yes     Alcohol/week: 0.0 oz     Comment: 1 glass of wine 3-4 times per month    Drug use: No    Sexual activity: Not on file   Other Topics Concern    Not on file   Social History Narrative    Not on file     Family History   Problem Relation Age of Onset    No Known Problems Mother     No Known Problems Father     Diabetes Sister        Physical Exam:  Visit Vitals  /66   Pulse 62   Temp 98.3 °F (36.8 °C) (Oral)   Ht 5' 2\" (1.575 m)   Wt 152 lb 9.6 oz (69.2 kg)   BMI 27.91 kg/m²     Pain Scale: 6/10       has been . reviewed and is appropriate        Diagnoses and all orders for this visit:    1. Lumbar post-laminectomy syndrome    2. Chronic right-sided low back pain with right-sided sciatica    Other orders  -     gabapentin (GRALISE) 600 mg Tb24; Take 1,200 mg by mouth daily. Follow-up Disposition:  Return for as scheduled with Dr Izabella Holcomb in January .         We have informed Asad Kaur to notify us for immediate appointment if she has any worsening neurogical symptoms or if an emergency situation presents, then call [phone]

## 2018-12-24 RX ORDER — GABAPENTIN 300 MG/1
CAPSULE ORAL
Qty: 180 CAP | Refills: 0 | OUTPATIENT
Start: 2018-12-24

## 2018-12-26 RX ORDER — GABAPENTIN 300 MG/1
CAPSULE ORAL
Qty: 180 CAP | Refills: 1 | Status: SHIPPED | OUTPATIENT
Start: 2018-12-26 | End: 2019-06-19 | Stop reason: SDUPTHER

## 2018-12-26 NOTE — TELEPHONE ENCOUNTER
Called and spoke with patient, name and  were both verified. Patient prefers Gabapentin 300 mg. Gabapentin Rx  from 2018 has been pended for [facility] on [employer] per patient.

## 2018-12-26 NOTE — TELEPHONE ENCOUNTER
Pt left vm on rx line inquiring on status of refill for Gabapentin 300mg. Pt uses [facility] ([address]

## 2018-12-26 NOTE — TELEPHONE ENCOUNTER
Please call patient. She was last prescribed gralise. Did she not get this and she still on gabapentin?  If so, please repend gabapentin as before

## 2019-01-02 ENCOUNTER — OFFICE VISIT (OUTPATIENT)
Dept: ORTHOPEDIC SURGERY | Age: 74
End: 2019-01-02

## 2019-01-02 VITALS
OXYGEN SATURATION: 99 % | HEIGHT: 62 IN | BODY MASS INDEX: 27.79 KG/M2 | HEART RATE: 73 BPM | WEIGHT: 151 LBS | DIASTOLIC BLOOD PRESSURE: 73 MMHG | RESPIRATION RATE: 17 BRPM | SYSTOLIC BLOOD PRESSURE: 146 MMHG | TEMPERATURE: 97.8 F

## 2019-01-02 DIAGNOSIS — M47.816 LUMBAR FACET ARTHROPATHY: ICD-10-CM

## 2019-01-02 DIAGNOSIS — M96.1 LUMBAR POST-LAMINECTOMY SYNDROME: Primary | ICD-10-CM

## 2019-01-02 DIAGNOSIS — M54.41 CHRONIC RIGHT-SIDED LOW BACK PAIN WITH RIGHT-SIDED SCIATICA: ICD-10-CM

## 2019-01-02 DIAGNOSIS — G89.29 CHRONIC RIGHT-SIDED LOW BACK PAIN WITH RIGHT-SIDED SCIATICA: ICD-10-CM

## 2019-01-02 RX ORDER — TOPIRAMATE 25 MG/1
TABLET ORAL
Qty: 90 TAB | Refills: 1 | Status: SHIPPED | OUTPATIENT
Start: 2019-01-02 | End: 2022-06-16 | Stop reason: ALTCHOICE

## 2019-01-02 RX ORDER — TOPIRAMATE 25 MG/1
TABLET ORAL
Qty: 270 TAB | Refills: 1 | OUTPATIENT
Start: 2019-01-02

## 2019-01-02 NOTE — TELEPHONE ENCOUNTER
Returned call to Carolyn De La Torre at [facility], informed of below. Carolyn De La Torre verbalized agreement/understanding. No further action required at this time.

## 2019-01-02 NOTE — TELEPHONE ENCOUNTER
We are just starting this medication. I am not sure what the final dose will be or even if she will tolerate this medication. Will decide at follow up visit in 6 weeks.   Thanks---yasmeen

## 2019-01-02 NOTE — PROGRESS NOTES
[facility] [address] 
[address] [place], [address] [place] [address] Street Phone: [phone] Fax: [phone] Pt's YOB: 1945 ASSESSMENT Diagnoses and all orders for this visit: 1. Lumbar post-laminectomy syndrome 2. Chronic right-sided low back pain with right-sided sciatica -     topiramate (TOPAMAX) 25 mg tablet; Take 1 in the evening for 1 week, then increase to 2 the second week and continue with 3 in the evening 3. Lumbar facet arthropathy IMPRESSION AND PLAN: 
Renan Tate is a 68 y.o. female with history of lumbar pain. Pt complains of pain in the lower back that radiates into both feet, R>L. She takes Neurontin 300 mg 2 tabs QHS but admits to sedation with the medication. She tolerated Topamax in the past.  
 
1) Pt was given information on lumbar arthritis exercises. 2) Discussed treatment options with the Pt, including medication and steroid injections. 3) I encouraged the patient to exercise regularly, 30 minutes a day, 5 days a week. 4) She will discontinue Neurontin. Pt was prescribed Topamax 3 tabs QHS, tapering up as directed, to take in place of the Neurontin. 5) Ms. Nic Jaeger has a reminder for a \"due or due soon\" health maintenance. I have asked that she contact her primary care provider, Joanne Marquez MD, for follow-up on this health maintenance. 6)  demonstrated consistency with prescribing. 7) Pt will follow-up in 6 weeks or sooner if needed. HISTORY OF PRESENT ILLNESS: 
Renan Tate is a 68 y.o. female with history of lumbar pain and presents to the office today for follow up. Pt complains of pain in the lower back that radiates into both feet, R>L. She notes 4/10 pain with rest, and significantly increased pain with activity. Of note, she had a previous lumbar fusion with Dr. Marce Loyd in 2013 and reports relief lasting about 1 year after the surgery.  She notes minimal relief when trying sacroiliac joint injections. Pt has been prescribed Neurontin 300 mg and take 2 tabs QHS. She admits to sedation with the Neurontin but admits to substantially increased pain when she does not take the medication. Pt denies any history of glaucoma or renal stones. She notes that she tolerated Topamax when she took the medication in the past for migraine headaches. Pt performs exercises 2-3 x a week. Pt at this time desires to proceed with medication evaluation. Pain Scale: 5/10 PCP: Evert Velez MD 
  
Past Medical History:  
Diagnosis Date  Bladder cancer ([address]  (approx)  Foraminal stenosis of lumbar region  Hematuria  Hypercholesterolemia  Low back pain Social History Socioeconomic History  Marital status:  Spouse name: Not on file  Number of children: Not on file  Years of education: Not on file  Highest education level: Not on file Social Needs  Financial resource strain: Not on file  Food insecurity - worry: Not on file  Food insecurity - inability: Not on file  Transportation needs - medical: Not on file  Transportation needs - non-medical: Not on file Occupational History  Occupation: nurse Tobacco Use  Smoking status: Former Smoker Last attempt to quit: 1980 Years since quittin.0  Smokeless tobacco: Never Used Substance and Sexual Activity  Alcohol use: Yes Alcohol/week: 0.0 oz  
  Comment: 1 glass of wine 3-4 times per month  Drug use: No  
 Sexual activity: Not on file Other Topics Concern  Not on file Social History Narrative  Not on file Current Outpatient Medications Medication Sig Dispense Refill  topiramate (TOPAMAX) 25 mg tablet Take 1 in the evening for 1 week, then increase to 2 the second week and continue with 3 in the evening 90 Tab 1  
 gabapentin (NEURONTIN) 300 mg capsule TK 2 CS PO Q NIGHT 180 Cap 1  
  lidocaine (LIDODERM) 5 % 1 Patch by TransDERmal route every twenty-four (24) hours. Apply patch to the affected area for 12 hours a day and remove for 12 hours a day.  ibuprofen (ADVIL) 200 mg tablet Take 400 mg by mouth every eight (8) hours as needed.  MULTIVIT-MINERALS/FOLIC ACID (THERALOGIX  PO) Take 2 Tabs by mouth daily.  traZODone (DESYREL) 50 mg tablet TK 1 T PO QHS  0  
 aspirin delayed-release 81 mg tablet Take  by mouth daily.  multivitamin (ONE A DAY) tablet Take 1 Tab by mouth daily.  pravastatin (PRAVACHOL) 20 mg tablet Take 20 mg by mouth nightly. 5 Allergies Allergen Reactions  Simvastatin Unknown (comments)  Lipitor [Atorvastatin] Myalgia REVIEW OF SYSTEMS Constitutional: Negative for fever, chills, or weight change. Respiratory: Negative for cough or shortness of breath. Cardiovascular: Negative for chest pain or palpitations. Gastrointestinal: Negative for acid reflux, change in bowel habits, or constipation. Genitourinary: Negative for dysuria and flank pain. Musculoskeletal: Positive for lumbar and right leg pain. Skin: Negative for rash. Neurological: Negative for headaches, dizziness, or numbness. Endo/Heme/Allergies: Negative for increased bruising. Psychiatric/Behavioral: Negative for difficulty with sleep. PHYSICAL EXAMINATION Visit Vitals /73 Pulse 73 Temp 97.8 °F (36.6 °C) (Oral) Resp 17 Ht 5' 2\" (1.575 m) Wt 151 lb (68.5 kg) SpO2 99% BMI 27.62 kg/m² Constitutional: Awake, alert, and in no acute distress. Neurological: 1+ symmetrical DTRs in the lower extremities. Sensation to light touch is intact. Negative Agustín's sign bilaterally. Skin: warm, dry, and intact. Musculoskeletal: Tenderness to palpation in the lower lumbar region. Moderate pain with extension and axial loading. Increased pain with forward flexion. Pain with internal rotation of her right hip.  Negative straight leg raise bilaterally. Hip Flex  Quads Hamstrings Ankle DF EHL Ankle PF Right +4/5 +4/5 +4/5 +4/5 +4/5 +4/5 Left +4/5 +4/5 +4/5 +4/5 +4/5 +4/5 IMAGING: 
 
Lumbar spine MRI from 09/14/2017 was personally reviewed with the patient and demonstrated: FINDINGS: 
Susceptibility artifact from posterior fusion L3-L4. Grade 1 anterior listhesis 
of L4 on L5. Slight retrolisthesis of L2 on L3. Remainder of vertebral bodies 
maintain normal alignment. Vertebral body heights are maintained. Marrow signal 
is normal.  Mild degenerative discogenic disease at L4-L5 and L5-S1. The conus 
medullaris terminates at L2-L3. No abnormal enhancement of the spinal canal. 
  
Partially imaged T11-T12 demonstrates probable tiny left paracentral disc 
protrusion. 
  
T12/L1: No significant central canal or foraminal stenosis. . 
  
L1/2:  No significant central canal or foraminal stenosis 
  
L2/3:  No significant central canal or foraminal stenosis 
  
L3/4:  Status post fusion. Central canal and neural foramen are patent. 
  
L4/5:  Grade 1 anterior listhesis. Diffuse disc bulge and mild broad-based disc 
protrusion with annular tear. Mild foraminal stenosis. Central canal is patent. Mild facet arthropathy.  
  
L5/S1:  Mild disc bulge with posterior annular tear mild facet arthropathy. Mild 
foraminal stenosis. Central canal is patent. 
  
IMPRESSION:   
  
Posterior fusion of L3-L4. No significant central canal or foraminal stenosis. 
  
Grade 1 anterior listhesis, diffuse disc bulge with central disc protrusion at L4-L5. 
  
Diffuse disc bulge with annular tear at L5-S1. Written by Phill Canavan, as dictated by Ijeoma Domingo MD. 
I, Dr. Ijeoma Domingo confirm that all documentation is accurate.

## 2019-01-02 NOTE — PATIENT INSTRUCTIONS
Low Back Arthritis: Exercises Your Care Instructions Here are some examples of typical rehabilitation exercises for your condition. Start each exercise slowly. Ease off the exercise if you start to have pain. Your doctor or physical therapist will tell you when you can start these exercises and which ones will work best for you. When you are not being active, find a comfortable position for rest. Some people are comfortable on the floor or a medium-firm bed with a small pillow under their head and another under their knees. Some people prefer to lie on their side with a pillow between their knees. Don't stay in one position for too long. Take short walks (10 to 20 minutes) every 2 to 3 hours. Avoid slopes, hills, and stairs until you feel better. Walk only distances you can manage without pain, especially leg pain. How to do the exercises Pelvic tilt 1. Lie on your back with your knees bent. 2. \"Brace\" your stomachtighten your muscles by pulling in and imagining your belly button moving toward your spine. 3. Press your lower back into the floor. You should feel your hips and pelvis rock back. 4. Hold for 6 seconds while breathing smoothly. 5. Relax and allow your pelvis and hips to rock forward. 6. Repeat 8 to 12 times. Back stretches 1. Get down on your hands and knees on the floor. 2. Relax your head and allow it to droop. Round your back up toward the ceiling until you feel a nice stretch in your upper, middle, and lower back. Hold this stretch for as long as it feels comfortable, or about 15 to 30 seconds. 3. Return to the starting position with a flat back while you are on your hands and knees. 4. Let your back sway by pressing your stomach toward the floor. Lift your buttocks toward the ceiling. 5. Hold this position for 15 to 30 seconds. 6. Repeat 2 to 4 times. Follow-up care is a key part of your treatment and safety.  Be sure to make and go to all appointments, and call your doctor if you are having problems. It's also a good idea to know your test results and keep a list of the medicines you take. Where can you learn more? Go to http://horace-marco.info/. Enter H428 in the search box to learn more about \"Low Back Arthritis: Exercises. \" Current as of: November 29, 2017 Content Version: 11.8 © 1219-5008 [employer], [employer]. Care instructions adapted under license by [employer] (which disclaims liability or warranty for this information). If you have questions about a medical condition or this instruction, always ask your healthcare professional. [address] any warranty or liability for your use of this information.

## 2019-01-18 ENCOUNTER — HOSPITAL ENCOUNTER (OUTPATIENT)
Age: 74
Discharge: HOME | End: 2019-01-18
Attending: NURSE PRACTITIONER
Payer: MEDICARE

## 2019-01-18 DIAGNOSIS — K76.89 LIVER CYST: ICD-10-CM

## 2019-01-18 DIAGNOSIS — R93.2 ABNORMAL LIVER SCAN: ICD-10-CM

## 2019-01-18 LAB — CREAT UR-MCNC: 0.6 MG/DL (ref 0.6–1.3)

## 2019-01-18 PROCEDURE — A9577 INJ MULTIHANCE: HCPCS | Performed by: NURSE PRACTITIONER

## 2019-01-18 PROCEDURE — 82565 ASSAY OF CREATININE: CPT

## 2019-01-18 PROCEDURE — 74011250636 HC RX REV CODE- 250/636: Performed by: NURSE PRACTITIONER

## 2019-01-18 PROCEDURE — 74183 MRI ABD W/O CNTR FLWD CNTR: CPT

## 2019-01-18 RX ADMIN — GADOBENATE DIMEGLUMINE 20 ML: 529 INJECTION, SOLUTION INTRAVENOUS at 18:00

## 2019-06-10 ENCOUNTER — OFFICE VISIT (OUTPATIENT)
Dept: ORTHOPEDIC SURGERY | Age: 74
End: 2019-06-10

## 2019-06-10 VITALS
DIASTOLIC BLOOD PRESSURE: 70 MMHG | SYSTOLIC BLOOD PRESSURE: 179 MMHG | WEIGHT: 149 LBS | HEIGHT: 62 IN | BODY MASS INDEX: 27.42 KG/M2 | OXYGEN SATURATION: 100 % | HEART RATE: 69 BPM

## 2019-06-10 DIAGNOSIS — M21.619 BUNION OF GREAT TOE: Primary | ICD-10-CM

## 2019-06-10 DIAGNOSIS — M79.675 PAIN OF LEFT GREAT TOE: ICD-10-CM

## 2019-06-10 NOTE — PATIENT INSTRUCTIONS
Bunions: Care Instructions  Your Care Instructions    A bunion is a bump on the outside of the joint at the bottom of your big toe. It can cause pain and swelling in the toe. A bunion forms when bone or tissue around the joint becomes swollen from too much pressure. You also can have a bunionette, or tailor's bunion, which forms on the joint of the little toe. Sometimes, a bunion on the big toe turns the toe in toward the second toe. This is called displacement. It can lead to problems with the other toes. You can get a bunion from having an unusual walking style, having flatfeet, or wearing tight-fitting shoes. You can treat most bunions at home with a few simple steps. If you have a lot of pain, your doctor may inject medicine into the bunion to reduce swelling for a while. If you still have pain, you may need to have surgery. Follow-up care is a key part of your treatment and safety. Be sure to make and go to all appointments, and call your doctor if you are having problems. It's also a good idea to know your test results and keep a list of the medicines you take. How can you care for yourself at home? · Ask your doctor if you can take an over-the-counter pain medicine, such as acetaminophen (Tylenol), ibuprofen (Advil, Motrin), or naproxen (Aleve). Be safe with medicines. Read and follow all instructions on the label. · Wear shoes that have a wide and deep space for the toes. Also, wear shoes that have low or flat heels and good arch supports. Do not wear tight, narrow, or high-heeled shoes. · Try bunion pads, arch supports, toe spacers, or shoe inserts. They can help shift your weight when you walk to take pressure off your big toe. · Put moleskin or another type of cushion on or around the bunion to keep it from rubbing against your shoe. · Put ice or a cold pack on the area for 10 to 20 minutes at a time as needed. Put a thin cloth between the ice and your skin.   · Prop up your foot on a pillow when you ice your toe or anytime you sit or lie down. Try to keep it above the level of your heart. This will help reduce swelling. When should you call for help? Call your doctor now or seek immediate medical care if:    · You have severe pain.     · Your toe is cool or pale or changes color.     · You have tingling, weakness, or numbness in the toe.    Watch closely for changes in your health, and be sure to contact your doctor if:    · Pain and swelling get worse.     · You do not get better as expected. Where can you learn more? Go to http://horaceWasatch VaporStixmarco.info/. Enter H210 in the search box to learn more about \"Bunions: Care Instructions. \"  Current as of: September 20, 2018  Content Version: 11.9  © 9036-8534 [employer]. Care instructions adapted under license by [employer] (which disclaims liability or warranty for this information). If you have questions about a medical condition or this instruction, always ask your healthcare professional. [address] any warranty or liability for your use of this information.

## 2019-06-10 NOTE — PROGRESS NOTES
AMBULATORY PROGRESS NOTE      Patient: Cele Manuel             MRN: [id number]     SSN: [id number] Body mass index is 27.25 kg/m². YOB: 1945     AGE: 76 y.o. EX: female    PCP: Chloé Rodriguez MD     IMPRESSION/DIAGNOSIS AND TREATMENT PLAN     DIAGNOSES  1. Bunion of great toe    2. Pain of left great toe        Orders Placed This Encounter    [88954] Foot Min 3V      Cele Manuel understands her diagnoses and the proposed plan. As such, the patient is doing well. She is in no major discomfort. We will continue conservative care. We will see her again should her symptoms worsen. Plan:    1) Continue activity as tolerated. RTO - PRN     HPI AND EXAMINATION     Katie Reina IS A 76 y.o. female who presents to my outpatient office for follow up 16 months days s/p: Left Lapidus bunionectomy procedure. At last visit, I instructed the patient to continue activity as tolerated. Since we saw her last, Ms. Emma Martinez states that she is doing well. She still experiences intermittent discomfot in her left medial eminence, for which she takes Ibuprofen. Otherwise, she is doing well. Visit Vitals  /70   Pulse 69   Ht 5' 2\" (1.575 m)   Wt 149 lb (67.6 kg)   SpO2 100%   BMI 27.25 kg/m²        Appearance: Alert, well appearing and pleasant patient who is in no distress, oriented to person, place/time, and who follows commands. This patient is accompanied in the examination room by her self. Dementia: no dementia  Psychiatric: Affect and mood are appropriate. Patient arrives to office via: without assistive device  HEENT: Head normocephalic & atraumatic.  Both pupils are round, non icteric sclera   Eye: EOM are intact and sclera are clear    Neck: ROM WNL and JVD neck is not present     Hearings Intact, does not require hearing aid device  Respiratory: Breathing is unlabored without accessory chest muscle use  Cardiovascular/Peripheral Vascular: Normal Pulses to each foot    Left ANKLE and FOOT        Gait: normal  Tenderness: no TTP at this time. Cutaneous: No rashes, skin patches, wounds, or abrasions to the lower legs                      Warm and Normal color. No regions of expressible drainage. Medial Border of Tibia Region: absent                      Skin color, texture, turgor normal. Normal.                      Well healed surgical scar along the medial aspect of great toe. Palpable undissolved suture over the 1st MTP  Joint Motion: ROM Ankle:Normal , Hindfoot: (ST,TN,CC Normal}, Forefoot toes:Normal  Neurologic Exam: Neuro: Motor: normal 5/5 strength in all tested muscle groups and Sensory : no sensory deficits noted. Contractures: Gastrocnemius or Achilles Contractures absent  Joint / Tendon Stability: No Ankle or Subtalar instability or joint laxity. No peroneal sublux ability or dislocation  Alignment:  Normal Foot Alignment and Semi Rigid  Vascular: Normal Pulses/ NL Capillary refill, No evidence of DVT seen on physical exam.              No calf swelling, no tenderness to calf muscles. Lymphatic:  No Evidence of Lymphedema. CHART REVIEW     Past Medical History:   Diagnosis Date    Bladder cancer ([facility] [address] 2015 (approx)    Foraminal stenosis of lumbar region     Hematuria     Hypercholesterolemia     Low back pain      Current Outpatient Medications   Medication Sig    topiramate (TOPAMAX) 25 mg tablet Take 1 in the evening for 1 week, then increase to 2 the second week and continue with 3 in the evening    gabapentin (NEURONTIN) 300 mg capsule TK 2 CS PO Q NIGHT    lidocaine (LIDODERM) 5 % 1 Patch by TransDERmal route every twenty-four (24) hours. Apply patch to the affected area for 12 hours a day and remove for 12 hours a day.  ibuprofen (ADVIL) 200 mg tablet Take 400 mg by mouth every eight (8) hours as needed.     MULTIVIT-MINERALS/FOLIC ACID (THERALOGIX  PO) Take 2 Tabs by mouth daily.  traZODone (DESYREL) 50 mg tablet TK 1 T PO QHS    aspirin delayed-release 81 mg tablet Take  by mouth daily.  multivitamin (ONE A DAY) tablet Take 1 Tab by mouth daily.  pravastatin (PRAVACHOL) 20 mg tablet Take 20 mg by mouth nightly. No current facility-administered medications for this visit. Allergies   Allergen Reactions    Simvastatin Unknown (comments)    Lipitor [Atorvastatin] Myalgia     Past Surgical History:   Procedure Laterality Date    FOOT/TOES SURGERY PROC UNLISTED Left 2018    bunionectomy    HX BUNIONECTOMY      HX COLONOSCOPY      HX LUMBAR LAMINECTOMY  6/2/15    with fusion    HX ORTHOPAEDIC  Bunionionectomy R    HX TUBAL LIGATION      HX UROLOGICAL      treatment BCG for Bladder Cancer     Social History     Occupational History    Occupation: nurse   Tobacco Use    Smoking status: Former Smoker     Last attempt to quit: 1980     Years since quittin.4    Smokeless tobacco: Never Used   Substance and Sexual Activity    Alcohol use: Yes     Alcohol/week: 0.0 oz     Comment: 1 glass of wine 3-4 times per month    Drug use: No    Sexual activity: Not on file     Family History   Problem Relation Age of Onset    No Known Problems Mother     No Known Problems Father     Diabetes Sister         REVIEW OF SYSTEMS : 6/10/2019  ALL BELOW ARE Negative except : SEE HPI     Constitutional: Negative for fever, chills and weight loss. Neg Weight Loss  Cardiovascular: Negative for chest pain, claudication and leg swelling. SOB, MCKOY   Gastrointestinal/Urological: Negative for  pain, N/V/D/C, Blood in stool or urine,dysuria                         Hematuria, Incontinence, pelvic pain  Musculoskeletal: see HPI. Neurological: Negative for dizziness and weakness, headaches,Visual Changes             Confusion,  Or Seizures,   Psychiatric/Behavioral: Negative for depression, memory loss and substance abuse.    Extremities:  Negative for hair changes, rash or skin lesion changes. Hematologic: Negative for Bleeding problems, bruising, pallor or swollen lymph nodes. Peripheral Vascular: No calf pain, vascular vein tenderness to calf pain              No calf throbbing, posterior knee throbbing pain     DIAGNOSTIC IMAGING     No notes on file    Please see above section of this report. I have personally reviewed the results of the above study. The interpretation of this study is my professional opinion. Written by Staci Jack, as dictated by Dr. Lili Leigh. I, Dr. Lili Leigh, confirm that all documentation is accurate.

## 2019-06-13 RX ORDER — GABAPENTIN 300 MG/1
600 CAPSULE ORAL
Qty: 180 CAP | Refills: 1 | OUTPATIENT
Start: 2019-06-13

## 2019-06-13 NOTE — TELEPHONE ENCOUNTER
Last Visit: 1/2/19 with MD Simi Horn  Next Appointment: return in 6 weeks for med f/u  Previous Refill Encounter(s): 12/26/18 #180 with 1 refill    Requested Prescriptions     Pending Prescriptions Disp Refills    gabapentin (NEURONTIN) 300 mg capsule 180 Cap 1     Sig: Take 2 Caps by mouth nightly.

## 2019-06-19 ENCOUNTER — OFFICE VISIT (OUTPATIENT)
Dept: ORTHOPEDIC SURGERY | Age: 74
End: 2019-06-19

## 2019-06-19 VITALS
OXYGEN SATURATION: 99 % | HEART RATE: 52 BPM | RESPIRATION RATE: 12 BRPM | DIASTOLIC BLOOD PRESSURE: 46 MMHG | HEIGHT: 62 IN | BODY MASS INDEX: 27.94 KG/M2 | TEMPERATURE: 98.5 F | SYSTOLIC BLOOD PRESSURE: 128 MMHG | WEIGHT: 151.8 LBS

## 2019-06-19 DIAGNOSIS — M54.16 RIGHT LUMBAR RADICULOPATHY: ICD-10-CM

## 2019-06-19 DIAGNOSIS — M96.1 LUMBAR POST-LAMINECTOMY SYNDROME: Primary | ICD-10-CM

## 2019-06-19 DIAGNOSIS — M47.816 LUMBAR FACET ARTHROPATHY: ICD-10-CM

## 2019-06-19 RX ORDER — GABAPENTIN 300 MG/1
CAPSULE ORAL
Qty: 270 CAP | Refills: 1 | Status: SHIPPED | OUTPATIENT
Start: 2019-06-19 | End: 2020-01-23 | Stop reason: SDUPTHER

## 2019-06-19 NOTE — LETTER
6/20/19 Patient: Екатерина Crews YOB: 1945 Date of Visit: 6/19/2019 Alex Veras MD 
[address] [place] [address] VIA Facsimile: [phone] Dear Alex Veras MD, Thank you for referring Ms. Cong Viramontes to [facility] for evaluation. My notes for this consultation are attached. If you have questions, please do not hesitate to call me. I look forward to following your patient along with you. Sincerely, Gabriella Sidhu MD

## 2019-06-19 NOTE — PROGRESS NOTES
[facility]  [address] [place], [facility] [address]  Phone: [phone]  Fax: [phone]    Pt's YOB: 1945    ASSESSMENT   Diagnoses and all orders for this visit:    1. Lumbar post-laminectomy syndrome  -     gabapentin (NEURONTIN) 300 mg capsule; Take 2-3 in the evening as directed    2. Right lumbar radiculopathy  -     gabapentin (NEURONTIN) 300 mg capsule; Take 2-3 in the evening as directed    3. Lumbar facet arthropathy         IMPRESSION AND PLAN:  Haydee Garnett is a 76 y.o. female with history of lumbar pain. Pt complains of aching pain in the lower back that radiates into both feet, R>L. She reports occasional weakness in the right leg. She continues to take Neurontin 300 mg 2-3 tabs QHS with benefit. 1) Pt was given information on lumbar arthritis exercises. 2) She received a refill of Neurontin 300 mg 2-3 tabs QHS. 3) Ms. Marcial has a reminder for a \"due or due soon\" health maintenance. I have asked that she contact her primary care provider, Eva Alatorre MD, for follow-up on this health maintenance. 4)  demonstrated consistency with prescribing. Follow-up and Dispositions    · Return in about 6 months (around 12/19/2019) for Medication follow up on Thursday. HISTORY OF PRESENT ILLNESS:  Haydee Garnett is a 76 y.o. female with history of lumbar pain and presents to the office today for follow up. Pt complains of aching pain in the lower back that radiates into both feet, R>L. She reports occasional weakness in the right leg and notes that her right leg intermittently gives way when her pain is more severe. Of note, she had previous lumbar fusion with Dr. Mallorie Lombardo in 2013. Pt reports minimal relief with previous sacroiliac joint injections. She continues to take Neurontin 300 mg 2-3 tabs QHS with benefit. Pt denies any daytime sedation with the Neurontin.  She notes that she never took the Topamax prescribed at her last office visit. Pt at this time desires to continue with current care. Pain Scale: 5/10    PCP: Eva Alatorre MD     Past Medical History:   Diagnosis Date    Bladder cancer ([facility] [address] 2015 (approx)    Foraminal stenosis of lumbar region     Hematuria     Hypercholesterolemia     Low back pain         Social History     Socioeconomic History    Marital status:      Spouse name: Not on file    Number of children: Not on file    Years of education: Not on file    Highest education level: Not on file   Occupational History    Occupation: nurse   Social Needs    Financial resource strain: Not on file    Food insecurity:     Worry: Not on file     Inability: Not on file    Transportation needs:     Medical: Not on file     Non-medical: Not on file   Tobacco Use    Smoking status: Former Smoker     Last attempt to quit: 1980     Years since quittin.4    Smokeless tobacco: Never Used   Substance and Sexual Activity    Alcohol use:  Yes     Alcohol/week: 0.0 oz     Comment: 1 glass of wine 3-4 times per month    Drug use: No    Sexual activity: Not on file   Lifestyle    Physical activity:     Days per week: Not on file     Minutes per session: Not on file    Stress: Not on file   Relationships    Social connections:     Talks on phone: Not on file     Gets together: Not on file     Attends Rastafari service: Not on file     Active member of club or organization: Not on file     Attends meetings of clubs or organizations: Not on file     Relationship status: Not on file    Intimate partner violence:     Fear of current or ex partner: Not on file     Emotionally abused: Not on file     Physically abused: Not on file     Forced sexual activity: Not on file   Other Topics Concern    Not on file   Social History Narrative    Not on file       Current Outpatient Medications   Medication Sig Dispense Refill    gabapentin (NEURONTIN) 300 mg capsule Take 2-3 in the evening as directed 270 Cap 1    lidocaine (LIDODERM) 5 % 1 Patch by TransDERmal route every twenty-four (24) hours. Apply patch to the affected area for 12 hours a day and remove for 12 hours a day.  ibuprofen (ADVIL) 200 mg tablet Take 400 mg by mouth every eight (8) hours as needed.  MULTIVIT-MINERALS/FOLIC ACID (THERALOGIX  PO) Take 2 Tabs by mouth daily.  traZODone (DESYREL) 50 mg tablet TK 1 T PO QHS  0    aspirin delayed-release 81 mg tablet Take  by mouth daily.  multivitamin (ONE A DAY) tablet Take 1 Tab by mouth daily.  pravastatin (PRAVACHOL) 20 mg tablet Take 20 mg by mouth nightly. 5    topiramate (TOPAMAX) 25 mg tablet Take 1 in the evening for 1 week, then increase to 2 the second week and continue with 3 in the evening (Patient not taking: Reported on 6/19/2019) 90 Tab 1       Allergies   Allergen Reactions    Simvastatin Unknown (comments)    Lipitor [Atorvastatin] Myalgia         REVIEW OF SYSTEMS    Constitutional: Negative for fever, chills, or weight change. Respiratory: Negative for cough or shortness of breath. Cardiovascular: Negative for chest pain or palpitations. Gastrointestinal: Negative for acid reflux, change in bowel habits, or constipation. Genitourinary: Negative for dysuria and flank pain. Musculoskeletal: Positive for lumbar pain. Skin: Negative for rash. Neurological: Negative for headaches, dizziness, or numbness. Endo/Heme/Allergies: Negative for increased bruising. Psychiatric/Behavioral: Negative for difficulty with sleep. PHYSICAL EXAMINATION  Visit Vitals  /46   Pulse (!) 52   Temp 98.5 °F (36.9 °C) (Oral)   Resp 12   Ht 5' 2\" (1.575 m)   Wt 151 lb 12.8 oz (68.9 kg)   SpO2 99%   BMI 27.76 kg/m²       Constitutional: Awake, alert, and in no acute distress. Neurological: 1+ symmetrical DTRs in the lower extremities. Sensation to light touch is intact. Skin: warm, dry, and intact.    Musculoskeletal: Tenderness to palpation in the lower lumbar region. No tenderness to palpation over the sacroiliac joints. No pain with extension, axial loading, or forward flexion. No pain with internal or external rotation of her hips. Negative straight leg raise bilaterally. Hip Flex  Quads Hamstrings Ankle DF EHL Ankle PF   Right +4/5 +4/5 +4/5 +4/5 +4/5 +4/5   Left +4/5 +4/5 +4/5 +4/5 +4/5 +4/5     IMAGING:    Lumbar spine MRI from 09/14/2017 was personally reviewed with the patient and demonstrated:  FINDINGS:  Susceptibility artifact from posterior fusion L3-L4. Grade 1 anterior listhesis  of L4 on L5. Slight retrolisthesis of L2 on L3. Remainder of vertebral bodies  maintain normal alignment. Vertebral body heights are maintained. Marrow signal  is normal.  Mild degenerative discogenic disease at L4-L5 and L5-S1. The conus  medullaris terminates at L2-L3. No abnormal enhancement of the spinal canal.     Partially imaged T11-T12 demonstrates probable tiny left paracentral disc  protrusion.     T12/L1: No significant central canal or foraminal stenosis. .     L1/2:  No significant central canal or foraminal stenosis     L2/3:  No significant central canal or foraminal stenosis     L3/4:  Status post fusion. Central canal and neural foramen are patent.     L4/5:  Grade 1 anterior listhesis. Diffuse disc bulge and mild broad-based disc  protrusion with annular tear. Mild foraminal stenosis. Central canal is patent. Mild facet arthropathy.      L5/S1:  Mild disc bulge with posterior annular tear mild facet arthropathy. Mild  foraminal stenosis. Central canal is patent.     IMPRESSION:       Posterior fusion of L3-L4. No significant central canal or foraminal stenosis.     Grade 1 anterior listhesis, diffuse disc bulge with central disc protrusion at  L4-L5.     Diffuse disc bulge with annular tear at L5-S1.     Written by Lulú Rincon, as dictated by Mariya Finn MD.  I, Dr. Mariya Finn confirm that all documentation is accurate.

## 2019-06-19 NOTE — PATIENT INSTRUCTIONS
Low Back Arthritis: Exercises Your Care Instructions Here are some examples of typical rehabilitation exercises for your condition. Start each exercise slowly. Ease off the exercise if you start to have pain. Your doctor or physical therapist will tell you when you can start these exercises and which ones will work best for you. When you are not being active, find a comfortable position for rest. Some people are comfortable on the floor or a medium-firm bed with a small pillow under their head and another under their knees. Some people prefer to lie on their side with a pillow between their knees. Don't stay in one position for too long. Take short walks (10 to 20 minutes) every 2 to 3 hours. Avoid slopes, hills, and stairs until you feel better. Walk only distances you can manage without pain, especially leg pain. How to do the exercises Pelvic tilt 1. Lie on your back with your knees bent. 2. \"Brace\" your stomachtighten your muscles by pulling in and imagining your belly button moving toward your spine. 3. Press your lower back into the floor. You should feel your hips and pelvis rock back. 4. Hold for 6 seconds while breathing smoothly. 5. Relax and allow your pelvis and hips to rock forward. 6. Repeat 8 to 12 times. Back stretches 1. Get down on your hands and knees on the floor. 2. Relax your head and allow it to droop. Round your back up toward the ceiling until you feel a nice stretch in your upper, middle, and lower back. Hold this stretch for as long as it feels comfortable, or about 15 to 30 seconds. 3. Return to the starting position with a flat back while you are on your hands and knees. 4. Let your back sway by pressing your stomach toward the floor. Lift your buttocks toward the ceiling. 5. Hold this position for 15 to 30 seconds. 6. Repeat 2 to 4 times. Follow-up care is a key part of your treatment and safety.  Be sure to make and go to all appointments, and call your doctor if you are having problems. It's also a good idea to know your test results and keep a list of the medicines you take. Where can you learn more? Go to http://horace-marco.info/. Enter G823 in the search box to learn more about \"Low Back Arthritis: Exercises. \" Current as of: September 20, 2018 Content Version: 11.9 © 2010-5920 [employer]. Care instructions adapted under license by [employer] (which disclaims liability or warranty for this information). If you have questions about a medical condition or this instruction, always ask your healthcare professional. [address] any warranty or liability for your use of this information.

## 2019-06-19 NOTE — PROGRESS NOTES
Qi Lisa presents today for   Chief Complaint   Patient presents with    Back Pain     right    Leg Pain     right    Follow-up     med refill       Is someone accompanying this pt? no    Is the patient using any DME equipment during OV? no    Depression Screening:  3 most recent PHQ Screens 6/19/2019   Little interest or pleasure in doing things Not at all   Feeling down, depressed, irritable, or hopeless Not at all   Total Score PHQ 2 0       Learning Assessment:  Learning Assessment 6/19/2019   PRIMARY LEARNER Patient   HIGHEST LEVEL OF EDUCATION - PRIMARY LEARNER  4 YEARS [address] [place] [address] CAREGIVER No   PRIMARY LANGUAGE ENGLISH   LEARNER PREFERENCE PRIMARY READING     VIDEOS   ANSWERED BY pt   RELATIONSHIP SELF       Abuse Screening:  Abuse Screening Questionnaire 6/19/2019   Do you ever feel afraid of your partner? N   Are you in a relationship with someone who physically or mentally threatens you? N   Is it safe for you to go home? Y       Fall Risk  Fall Risk Assessment, last 12 mths 6/19/2019   Able to walk? Yes   Fall in past 12 months? No       OPIOID RISK TOOL  No flowsheet data found. Pt currently taking Antiplatelet therapy? no    Coordination of Care:  1. Have you been to the ER, urgent care clinic since your last visit? no  Hospitalized since your last visit? no    2. Have you seen or consulted any other health care providers outside of the [address] [place] [address] since your last visit? no Include any pap smears or colon screening.  no    Last  Checked 06/19/19

## 2019-12-29 DIAGNOSIS — M96.1 LUMBAR POST-LAMINECTOMY SYNDROME: ICD-10-CM

## 2019-12-29 DIAGNOSIS — M54.16 RIGHT LUMBAR RADICULOPATHY: ICD-10-CM

## 2019-12-30 RX ORDER — GABAPENTIN 300 MG/1
CAPSULE ORAL
Qty: 270 CAP | OUTPATIENT
Start: 2019-12-30

## 2019-12-30 NOTE — TELEPHONE ENCOUNTER
Patient called regarding message. Rescheduled patient appt to 1/13. Patient states that she should be able to make it with the medication that she has on hand until then. No further action required at this time.

## 2019-12-30 NOTE — TELEPHONE ENCOUNTER
Returned call to patient, reached male voice, patient is not in at this time, however he will have patient return call to office when she is available.

## 2019-12-30 NOTE — TELEPHONE ENCOUNTER
She has an appt in January -- I am ok with refilling this but she needs to keep her appt -- if she has enough to last till then- otherwise move her appt sooner -- thanks --

## 2020-01-13 ENCOUNTER — OFFICE VISIT (OUTPATIENT)
Dept: ORTHOPEDIC SURGERY | Age: 75
End: 2020-01-13

## 2020-01-13 VITALS
TEMPERATURE: 98.6 F | SYSTOLIC BLOOD PRESSURE: 125 MMHG | DIASTOLIC BLOOD PRESSURE: 54 MMHG | WEIGHT: 144.6 LBS | OXYGEN SATURATION: 100 % | BODY MASS INDEX: 25.62 KG/M2 | HEIGHT: 63 IN | HEART RATE: 78 BPM

## 2020-01-13 DIAGNOSIS — R29.898 WEAKNESS OF BOTH LEGS: ICD-10-CM

## 2020-01-13 DIAGNOSIS — R29.898 WEAKNESS OF BOTH ARMS: ICD-10-CM

## 2020-01-13 DIAGNOSIS — R29.898 WEAKNESS OF BOTH LEGS: Primary | ICD-10-CM

## 2020-01-13 DIAGNOSIS — M62.81 PROXIMAL MUSCLE WEAKNESS: ICD-10-CM

## 2020-01-13 DIAGNOSIS — M96.1 LUMBAR POST-LAMINECTOMY SYNDROME: ICD-10-CM

## 2020-01-13 DIAGNOSIS — G89.29 CHRONIC LEFT SHOULDER PAIN: ICD-10-CM

## 2020-01-13 DIAGNOSIS — M47.816 LUMBAR FACET ARTHROPATHY: ICD-10-CM

## 2020-01-13 DIAGNOSIS — M25.512 CHRONIC LEFT SHOULDER PAIN: ICD-10-CM

## 2020-01-13 RX ORDER — GABAPENTIN 300 MG/1
CAPSULE ORAL
Qty: 270 CAP | Refills: 1 | Status: CANCELLED | OUTPATIENT
Start: 2020-01-13

## 2020-01-13 NOTE — PROGRESS NOTES
[facility]  [address], [facility] [address]  Phone: [phone]  Fax: [phone]    Pt's YOB: 1945    ASSESSMENT   Diagnoses and all orders for this visit:    1. Weakness of both legs  -     SED RATE, AUTOMATED; Future  -     C REACTIVE PROTEIN, QT; Future  -     AMB POC XRAY, SPINE; THORACIC, 2 VIEW    2. Lumbar post-laminectomy syndrome    3. Weakness of both arms  -     SED RATE, AUTOMATED; Future  -     C REACTIVE PROTEIN, QT; Future    4. Proximal muscle weakness  -     SED RATE, AUTOMATED; Future  -     C REACTIVE PROTEIN, QT; Future  -     AMB POC XRAY, SPINE; THORACIC, 2 VIEW    5. Chronic left shoulder pain    6. Lumbar facet arthropathy         IMPRESSION AND PLAN:  Qiana Santiago is a 76 y.o. female with history of lumbar pain. She reports progressive weakness and a tired sensation in the anterior thighs since the end of 07/2019. Pt reports the weakness in her legs is worse with ambulation. She also reports numbness/tingling in the toes in both feet. Pt takes Neurontin 300 mg 2-3 tabs QHS and has not recently taken oral steroids. 1) Pt was given information on lumbar and shoulder arthritis exercises. 2) Sed rate and CRP labs were ordered--consideration for polymyalgia rheumatica. 3) I recommended the patient ambulate with the assistance of a single point cane. 4) Pt was offered a refill of the Neurontin and prednisone but deferred. 5) Ms. Jaswinder Ruano has a reminder for a \"due or due soon\" health maintenance. I have asked that she contact her primary care provider, Kaylie Castrejon MD, for follow-up on this health maintenance. 6)  demonstrated consistency with prescribing. 7) Consider advanced imaging studies of thoracic spine to assess for spinal stenosis pending lab results  Follow-up and Dispositions    · Return in about 1 week (around 1/20/2020) for Diagnostic Test follow up.          HISTORY OF PRESENT ILLNESS:  Laurel Cooper is a 76 y.o. female with history of lumbar pain and presents to the office today for follow up. She reports progressive weakness and a tired sensation in the anterior thighs since the end of 07/2019. Pt reports the weakness in her legs is worse with ambulation. She also reports numbness/tingling in the toes in both feet. Pt notes that she occasionally notices tingling in the toes when laying in bed. She admits to some difficulty with overhead motion and occasional weakness when transitioning from sit to stand (particularly when the chair does not have arms). Pt notes that she will follow up with Dr. Walt Gómez on Friday 01/10/2020, regarding left shoulder pain. Pt denies any headaches or visual changes. She notes that she has not recently taken oral steroids. Pt admits to soreness lasting about 4 weeks shortly after she took prednisone when she had a tick bite. She has been prescribed Neurontin 300 mg and takes generally takes 2 tabs QHS. Pt admits that she on rare occasions takes Neurontin 300 mg 3 tabs QHS. Pt at this time desires to proceed with labs. Of note, she is a former smoker and quit 55 years ago.      Pain Scale: 4/10    PCP: Nyla Oliveira MD     Past Medical History:   Diagnosis Date    Bladder cancer ([facility] [address] 2015 (approx)    Foraminal stenosis of lumbar region     Hematuria     Hypercholesterolemia     Low back pain         Social History     Socioeconomic History    Marital status:      Spouse name: Not on file    Number of children: Not on file    Years of education: Not on file    Highest education level: Not on file   Occupational History    Occupation: nurse   Social Needs    Financial resource strain: Not on file    Food insecurity:     Worry: Not on file     Inability: Not on file    Transportation needs:     Medical: Not on file     Non-medical: Not on file   Tobacco Use    Smoking status: Former Smoker     Last attempt to quit: 1/1/1980     Years since quittin.0    Smokeless tobacco: Never Used   Substance and Sexual Activity    Alcohol use: Yes     Alcohol/week: 0.0 standard drinks     Comment: 1 glass of wine 3-4 times per month    Drug use: No    Sexual activity: Not on file   Lifestyle    Physical activity:     Days per week: Not on file     Minutes per session: Not on file    Stress: Not on file   Relationships    Social connections:     Talks on phone: Not on file     Gets together: Not on file     Attends Holiness service: Not on file     Active member of club or organization: Not on file     Attends meetings of clubs or organizations: Not on file     Relationship status: Not on file    Intimate partner violence:     Fear of current or ex partner: Not on file     Emotionally abused: Not on file     Physically abused: Not on file     Forced sexual activity: Not on file   Other Topics Concern    Not on file   Social History Narrative    Not on file       Current Outpatient Medications   Medication Sig Dispense Refill    gabapentin (NEURONTIN) 300 mg capsule Take 2-3 in the evening as directed 270 Cap 1    ibuprofen (ADVIL) 200 mg tablet Take 400 mg by mouth every eight (8) hours as needed.  MULTIVIT-MINERALS/FOLIC ACID (THERALOGIX  PO) Take 2 Tabs by mouth daily.  traZODone (DESYREL) 50 mg tablet TK 1 T PO QHS  0    aspirin delayed-release 81 mg tablet Take 325 mg by mouth daily.  multivitamin (ONE A DAY) tablet Take 1 Tab by mouth daily.  pravastatin (PRAVACHOL) 20 mg tablet Take 20 mg by mouth nightly. 5    topiramate (TOPAMAX) 25 mg tablet Take 1 in the evening for 1 week, then increase to 2 the second week and continue with 3 in the evening 90 Tab 1    lidocaine (LIDODERM) 5 % 1 Patch by TransDERmal route every twenty-four (24) hours. Apply patch to the affected area for 12 hours a day and remove for 12 hours a day.          Allergies   Allergen Reactions    Simvastatin Unknown (comments)    Lipitor [Atorvastatin] Myalgia         REVIEW OF SYSTEMS    Constitutional: Negative for fever, chills, or weight change. Respiratory: Negative for cough or shortness of breath. Cardiovascular: Negative for chest pain or palpitations. Gastrointestinal: Negative for acid reflux, change in bowel habits, or constipation. Genitourinary: Negative for dysuria and flank pain. Musculoskeletal: Positive for lumbar and shoulder pain and bilateral leg weakness. Skin: Negative for rash. Neurological: Negative for headaches, dizziness, or numbness. Endo/Heme/Allergies: Negative for increased bruising. Psychiatric/Behavioral: Negative for difficulty with sleep. PHYSICAL EXAMINATION  Visit Vitals  /54   Pulse 78   Temp 98.6 °F (37 °C) (Oral)   Ht 5' 2.5\" (1.588 m)   Wt 144 lb 9.6 oz (65.6 kg)   SpO2 100%   BMI 26.03 kg/m²       Constitutional: Awake, alert, and in no acute distress. Neurological: 1+ symmetrical DTRs in the upper extremities. 1+ symmetrical DTRs in the lower extremities. Sensation to light touch is intact. Negative Ying's sign bilaterally. Skin: warm, dry, and intact. Musculoskeletal: Pain with abduction to 90 degrees on the left; good range of motion on the right. Positive Antonio' test bilaterally. Weakness with resisted abduction and adduction on the left; good strength on the right. No pain with extension, axial loading, or forward flexion. No difficulty when transitioning from sit to stand. No pain with internal or external rotation of her hips. Negative straight leg raise bilaterally. Biceps  Triceps Deltoids Wrist Ext Wrist Flex Hand Intrin   Right  4/5 +4/5  4/5 +4/5 +4/5 +4/5   Left  4/5 +4/5  4/5 +4/5 +4/5 +4/5      Hip Flex  Quads Hamstrings Ankle DF EHL Ankle PF   Right 3/5 3/5 3/5 4/5 4/5 4/5   Left 3/5 3/5 3/5 4/5 4/5 4/5     IMAGING:    Thoracic spine 2V x-rays from 01/13/2020 were personally reviewed with the patient and demonstrated:  L3-4 fusion intact.  Mild degenerative disc in the thoracic spine. Lumbar spine MRI from 09/14/2017 was personally reviewed with the patient and demonstrated:  FINDINGS:  Susceptibility artifact from posterior fusion L3-L4. Grade 1 anterior listhesis  of L4 on L5. Slight retrolisthesis of L2 on L3. Remainder of vertebral bodies  maintain normal alignment. Vertebral body heights are maintained. Marrow signal  is normal.  Mild degenerative discogenic disease at L4-L5 and L5-S1. The conus  medullaris terminates at L2-L3. No abnormal enhancement of the spinal canal.     Partially imaged T11-T12 demonstrates probable tiny left paracentral disc  protrusion.     T12/L1: No significant central canal or foraminal stenosis. .     L1/2:  No significant central canal or foraminal stenosis     L2/3:  No significant central canal or foraminal stenosis     L3/4:  Status post fusion. Central canal and neural foramen are patent.     L4/5:  Grade 1 anterior listhesis. Diffuse disc bulge and mild broad-based disc  protrusion with annular tear. Mild foraminal stenosis. Central canal is patent. Mild facet arthropathy.      L5/S1:  Mild disc bulge with posterior annular tear mild facet arthropathy. Mild  foraminal stenosis. Central canal is patent.     IMPRESSION:       Posterior fusion of L3-L4. No significant central canal or foraminal stenosis.     Grade 1 anterior listhesis, diffuse disc bulge with central disc protrusion at  L4-L5.     Diffuse disc bulge with annular tear at L5-S1. Written by Bryce Lino, as dictated by Triston Scott MD.  I, Dr. Triston Scott confirm that all documentation is accurate.

## 2020-01-13 NOTE — LETTER
1/13/20 Patient: Qi Lisa YOB: 1945 Date of Visit: 1/13/2020 Masha Munguia MD 
[place] [address] VIA Facsimile: [phone] Dear Masha Munguia MD, Thank you for referring Ms. Masha Mallory to [facility] for evaluation. My notes for this consultation are attached. If you have questions, please do not hesitate to call me. I look forward to following your patient along with you. Sincerely, Sylvester Graves MD

## 2020-01-13 NOTE — PROGRESS NOTES
Brown Gilman presents today for   Chief Complaint   Patient presents with    Back Pain    Buttocks pain    Leg Pain    Follow-up       Is someone accompanying this pt? no    Is the patient using any DME equipment during OV? no    Depression Screening:  3 most recent PHQ Screens 1/13/2020   Little interest or pleasure in doing things Not at all   Feeling down, depressed, irritable, or hopeless Not at all   Total Score PHQ 2 0       Learning Assessment:  Learning Assessment 6/19/2019   PRIMARY LEARNER Patient   HIGHEST LEVEL OF EDUCATION - PRIMARY LEARNER  4 YEARS [address] [place] [address] CAREGIVER No   PRIMARY LANGUAGE ENGLISH   LEARNER PREFERENCE PRIMARY READING     VIDEOS   ANSWERED BY pt   RELATIONSHIP SELF       Abuse Screening:  Abuse Screening Questionnaire 6/19/2019   Do you ever feel afraid of your partner? N   Are you in a relationship with someone who physically or mentally threatens you? N   Is it safe for you to go home? Y       Fall Risk  Fall Risk Assessment, last 12 mths 1/13/2020   Able to walk? Yes   Fall in past 12 months? No       OPIOID RISK TOOL  No flowsheet data found. Coordination of Care:  1. Have you been to the ER, urgent care clinic since your last visit? no  Hospitalized since your last visit? no    2. Have you seen or consulted any other health care providers outside of the [address] [place] [address] since your last visit? no Include any pap smears or colon screening.  none    Last  Checked 01/13/2020

## 2020-01-13 NOTE — PATIENT INSTRUCTIONS
Low Back Arthritis: Exercises  Introduction  Here are some examples of typical rehabilitation exercises for your condition. Start each exercise slowly. Ease off the exercise if you start to have pain. Your doctor or physical therapist will tell you when you can start these exercises and which ones will work best for you. When you are not being active, find a comfortable position for rest. Some people are comfortable on the floor or a medium-firm bed with a small pillow under their head and another under their knees. Some people prefer to lie on their side with a pillow between their knees. Don't stay in one position for too long. Take short walks (10 to 20 minutes) every 2 to 3 hours. Avoid slopes, hills, and stairs until you feel better. Walk only distances you can manage without pain, especially leg pain. How to do the exercises  Pelvic tilt    1. Lie on your back with your knees bent. 2. \"Brace\" your stomachtighten your muscles by pulling in and imagining your belly button moving toward your spine. 3. Press your lower back into the floor. You should feel your hips and pelvis rock back. 4. Hold for 6 seconds while breathing smoothly. 5. Relax and allow your pelvis and hips to rock forward. 6. Repeat 8 to 12 times. Back stretches    1. Get down on your hands and knees on the floor. 2. Relax your head and allow it to droop. Round your back up toward the ceiling until you feel a nice stretch in your upper, middle, and lower back. Hold this stretch for as long as it feels comfortable, or about 15 to 30 seconds. 3. Return to the starting position with a flat back while you are on your hands and knees. 4. Let your back sway by pressing your stomach toward the floor. Lift your buttocks toward the ceiling. 5. Hold this position for 15 to 30 seconds. 6. Repeat 2 to 4 times. Follow-up care is a key part of your treatment and safety.  Be sure to make and go to all appointments, and call your doctor if you are having problems. It's also a good idea to know your test results and keep a list of the medicines you take. Where can you learn more? Go to http://horace-marco.info/. Enter O019 in the search box to learn more about \"Low Back Arthritis: Exercises. \"  Current as of: June 26, 2019  Content Version: 12.2  © 9727-0996 [employer]. Care instructions adapted under license by [employer] (which disclaims liability or warranty for this information). If you have questions about a medical condition or this instruction, always ask your healthcare professional. [place] [address] any warranty or liability for your use of this information. Shoulder Arthritis: Exercises  Introduction  Here are some examples of exercises for you to try. The exercises may be suggested for a condition or for rehabilitation. Start each exercise slowly. Ease off the exercises if you start to have pain. You will be told when to start these exercises and which ones will work best for you. How to do the exercises  Shoulder flexion (lying down)    7. Lie on your back, holding a wand with both hands. Your palms should face down as you hold the wand. 8. Keeping your elbows straight, slowly raise your arms over your head. Raise them until you feel a stretch in your shoulders, upper back, and chest.  9. Hold for 15 to 30 seconds. 10. Repeat 2 to 4 times. Shoulder rotation (lying down)    7. Lie on your back. Hold a wand with both hands with your elbows bent and palms up. 8. Keep your elbows close to your body, and move the wand across your body toward the sore arm. 9. Hold for 8 to 12 seconds. 10. Repeat 2 to 4 times. Shoulder internal rotation with towel    1. Hold a towel above and behind your head with the arm that is not sore. 2. With your sore arm, reach behind your back and grasp the towel. 3. With the arm above your head, pull the towel upward.  Do this until you feel a stretch on the front and outside of your sore shoulder. 4. Hold 15 to 30 seconds. 5. Repeat 2 to 4 times. Shoulder blade squeeze    1. Stand with your arms at your sides, and squeeze your shoulder blades together. Do not raise your shoulders up as you squeeze. 2. Hold 6 seconds. 3. Repeat 8 to 12 times. Resisted rows    1. Put the band around a solid object at about waist level. (A bedpost will work well.) Each hand should hold an end of the band. 2. With your elbows at your sides and bent to 90 degrees, pull the band back. Your shoulder blades should move toward each other. Return to the starting position. 3. Repeat 8 to 12 times. External rotator strengthening exercise    1. Start by tying a piece of elastic exercise material to a doorknob. You can use surgical tubing or Thera-Band. (You may also hold one end of the band in each hand.)  2. Stand or sit with your shoulder relaxed and your elbow bent 90 degrees. Your upper arm should rest comfortably against your side. Squeeze a rolled towel between your elbow and your body for comfort. This will help keep your arm at your side. 3. Hold one end of the elastic band with the hand of the painful arm. 4. Start with your forearm across your belly. Slowly rotate the forearm out away from your body. Keep your elbow and upper arm tucked against the towel roll or the side of your body until you begin to feel tightness in your shoulder. Slowly move your arm back to where you started. 5. Repeat 8 to 12 times. Internal rotator strengthening exercise    1. Start by tying a piece of elastic exercise material to a doorknob. You can use surgical tubing or Thera-Band. 2. Stand or sit with your shoulder relaxed and your elbow bent 90 degrees. Your upper arm should rest comfortably against your side. Squeeze a rolled towel between your elbow and your body for comfort. This will help keep your arm at your side.   3. Hold one end of the elastic band in the hand of the painful arm. 4. Slowly rotate your forearm toward your body until it touches your belly. Slowly move it back to where you started. 5. Keep your elbow and upper arm firmly tucked against the towel roll or at your side. 6. Repeat 8 to 12 times. Pendulum swing    1. Hold on to a table or the back of a chair with your good arm. Then bend forward a little and let your sore arm hang straight down. This exercise does not use the arm muscles. Rather, use your legs and your hips to create movement that makes your arm swing freely. 2. Use the movement from your hips and legs to guide the slightly swinging arm back and forth like a pendulum (or elephant trunk). Then guide it in circles that start small (about the size of a dinner plate). Make the circles a bit larger each day, as your pain allows. 3. Do this exercise for 5 minutes, 5 to 7 times each day. 4. As you have less pain, try bending over a little farther to do this exercise. This will increase the amount of movement at your shoulder. Follow-up care is a key part of your treatment and safety. Be sure to make and go to all appointments, and call your doctor if you are having problems. It's also a good idea to know your test results and keep a list of the medicines you take. Where can you learn more? Go to http://horace-marco.info/. Enter H562 in the search box to learn more about \"Shoulder Arthritis: Exercises. \"  Current as of: June 26, 2019  Content Version: 12.2  © 0179-7022 [employer]. Care instructions adapted under license by [employer] (which disclaims liability or warranty for this information). If you have questions about a medical condition or this instruction, always ask your healthcare professional. [address] any warranty or liability for your use of this information.

## 2020-01-14 ENCOUNTER — HOSPITAL ENCOUNTER (OUTPATIENT)
Dept: LAB | Age: 75
Discharge: HOME | End: 2020-01-14
Payer: MEDICARE

## 2020-01-14 LAB
CRP SERPL-MCNC: 0.3 MG/DL (ref 0–0.3)
ERYTHROCYTE [SEDIMENTATION RATE] IN BLOOD: 39 MM/HR (ref 0–30)

## 2020-01-14 PROCEDURE — 85652 RBC SED RATE AUTOMATED: CPT

## 2020-01-14 PROCEDURE — 36415 COLL VENOUS BLD VENIPUNCTURE: CPT

## 2020-01-14 PROCEDURE — 86140 C-REACTIVE PROTEIN: CPT

## 2020-01-21 ENCOUNTER — OFFICE VISIT (OUTPATIENT)
Dept: ORTHOPEDIC SURGERY | Age: 75
End: 2020-01-21

## 2020-01-21 VITALS
SYSTOLIC BLOOD PRESSURE: 147 MMHG | HEIGHT: 62 IN | HEART RATE: 75 BPM | DIASTOLIC BLOOD PRESSURE: 61 MMHG | OXYGEN SATURATION: 100 % | TEMPERATURE: 97.8 F | WEIGHT: 142 LBS | BODY MASS INDEX: 26.13 KG/M2

## 2020-01-21 DIAGNOSIS — G62.9 NEUROPATHY: ICD-10-CM

## 2020-01-21 DIAGNOSIS — M96.1 LUMBAR POST-LAMINECTOMY SYNDROME: ICD-10-CM

## 2020-01-21 DIAGNOSIS — R29.898 WEAKNESS OF BOTH LEGS: Primary | ICD-10-CM

## 2020-01-21 DIAGNOSIS — R26.9 GAIT ABNORMALITY: ICD-10-CM

## 2020-01-21 DIAGNOSIS — M47.816 LUMBAR FACET ARTHROPATHY: ICD-10-CM

## 2020-01-21 NOTE — PATIENT INSTRUCTIONS
Low Back Arthritis: Exercises Introduction Here are some examples of typical rehabilitation exercises for your condition. Start each exercise slowly. Ease off the exercise if you start to have pain. Your doctor or physical therapist will tell you when you can start these exercises and which ones will work best for you. When you are not being active, find a comfortable position for rest. Some people are comfortable on the floor or a medium-firm bed with a small pillow under their head and another under their knees. Some people prefer to lie on their side with a pillow between their knees. Don't stay in one position for too long. Take short walks (10 to 20 minutes) every 2 to 3 hours. Avoid slopes, hills, and stairs until you feel better. Walk only distances you can manage without pain, especially leg pain. How to do the exercises Pelvic tilt 1. Lie on your back with your knees bent. 2. \"Brace\" your stomachtighten your muscles by pulling in and imagining your belly button moving toward your spine. 3. Press your lower back into the floor. You should feel your hips and pelvis rock back. 4. Hold for 6 seconds while breathing smoothly. 5. Relax and allow your pelvis and hips to rock forward. 6. Repeat 8 to 12 times. Back stretches 1. Get down on your hands and knees on the floor. 2. Relax your head and allow it to droop. Round your back up toward the ceiling until you feel a nice stretch in your upper, middle, and lower back. Hold this stretch for as long as it feels comfortable, or about 15 to 30 seconds. 3. Return to the starting position with a flat back while you are on your hands and knees. 4. Let your back sway by pressing your stomach toward the floor. Lift your buttocks toward the ceiling. 5. Hold this position for 15 to 30 seconds. 6. Repeat 2 to 4 times. Follow-up care is a key part of your treatment and safety.  Be sure to make and go to all appointments, and call your doctor if you are having problems. It's also a good idea to know your test results and keep a list of the medicines you take. Where can you learn more? Go to http://horace-marco.info/. Enter A468 in the search box to learn more about \"Low Back Arthritis: Exercises. \" Current as of: June 26, 2019 Content Version: 12.2 © 5397-5230 [employer]. Care instructions adapted under license by [employer] (which disclaims liability or warranty for this information). If you have questions about a medical condition or this instruction, always ask your healthcare professional. [address] any warranty or liability for your use of this information. not applicable (Male)

## 2020-01-21 NOTE — PROGRESS NOTES
[facility]  Shazia Samuel., [address] [place] [address], [facility] [address]  Phone: [phone]  Fax: [phone]    Pt's YOB: 1945    ASSESSMENT   Diagnoses and all orders for this visit:    1. Weakness of both legs  -     MRI LUMB SPINE W WO CONT; Future    2. Lumbar post-laminectomy syndrome  -     MRI LUMB SPINE W WO CONT; Future  -     gabapentin (NEURONTIN) 300 mg capsule; Take 2-3 in the evening as directed    3. Neuropathy  -     gabapentin (NEURONTIN) 300 mg capsule; Take 2-3 in the evening as directed    4. Lumbar facet arthropathy  -     MRI LUMB SPINE W WO CONT; Future    5. Gait abnormality  -     MRI LUMB SPINE W WO CONT; Future         IMPRESSION AND PLAN:  Brown Gilman is a 76 y.o. female with history of lumbar pain. She complains of pain in the lower back that extends into the anterior aspect of both thighs, most severe with ambulation. Pt also complains of weakness in the legs and numbness/tingling in the toes on both feet. 1) Pt was given information on lumbar arthritis exercises. 2) A lumbar MRI was ordered. She has progressive lumbar pain with radicular symptoms and weakness in both legs. 3) Paperwork was filled out today for a temporary handicapped placard -- she has difficulty with walking longer distances  4) Ms. Destiny Ford has a reminder for a \"due or due soon\" health maintenance. I have asked that she contact her primary care provider, Phuong Birmingham MD, for follow-up on this health maintenance. 5)  demonstrated consistency with prescribing. .  Follow-up and Dispositions    · Return in about 3 weeks (around 2/11/2020) for Diagnostic Test follow up. HISTORY OF PRESENT ILLNESS:  Brown Gilman is a 76 y.o. female with history of lumbar pain and presents to the office today for follow up. She complains of pain in the lower back that extends into the anterior aspect of both thighs, most severe with ambulation.  Pt also complains of weakness in the legs and numbness/tingling in the toes on both feet. She reports pain/weakness in the right shoulder and notes that she has discussed a shoulder replacement. Pt reports a history of a torn rotator cuff on the left. Labs from 2020 reviewed and demonstrated a C reactive protein of 0.3 mg/dL (ref range 0-0.3) and sed rate of 39 mm/hr (ref range 0-30). Pt notes that she is scheduled to follow up with cardiology on Friday, 2020. Pt at this time desires to proceed with a lumbar MRI. Pt also later called and requested a refill of her Neurontin. Pain Scale: 0 - No pain/10    PCP: Trey Vaca MD     Past Medical History:   Diagnosis Date    Bladder cancer ([facility] [address]  (approx)    Foraminal stenosis of lumbar region     Hematuria     Hypercholesterolemia     Low back pain         Social History     Socioeconomic History    Marital status:      Spouse name: Not on file    Number of children: Not on file    Years of education: Not on file    Highest education level: Not on file   Occupational History    Occupation: nurse   Social Needs    Financial resource strain: Not on file    Food insecurity:     Worry: Not on file     Inability: Not on file    Transportation needs:     Medical: Not on file     Non-medical: Not on file   Tobacco Use    Smoking status: Former Smoker     Last attempt to quit: 1980     Years since quittin.0    Smokeless tobacco: Never Used   Substance and Sexual Activity    Alcohol use:  Yes     Alcohol/week: 0.0 standard drinks     Comment: 1 glass of wine 3-4 times per month    Drug use: No    Sexual activity: Not on file   Lifestyle    Physical activity:     Days per week: Not on file     Minutes per session: Not on file    Stress: Not on file   Relationships    Social connections:     Talks on phone: Not on file     Gets together: Not on file     Attends Islam service: Not on file     Active member of club or organization: Not on file     Attends meetings of clubs or organizations: Not on file     Relationship status: Not on file    Intimate partner violence:     Fear of current or ex partner: Not on file     Emotionally abused: Not on file     Physically abused: Not on file     Forced sexual activity: Not on file   Other Topics Concern    Not on file   Social History Narrative    Not on file       Current Outpatient Medications   Medication Sig Dispense Refill    gabapentin (NEURONTIN) 300 mg capsule Take 2-3 in the evening as directed 270 Cap 1    topiramate (TOPAMAX) 25 mg tablet Take 1 in the evening for 1 week, then increase to 2 the second week and continue with 3 in the evening 90 Tab 1    lidocaine (LIDODERM) 5 % 1 Patch by TransDERmal route every twenty-four (24) hours. Apply patch to the affected area for 12 hours a day and remove for 12 hours a day.  ibuprofen (ADVIL) 200 mg tablet Take 400 mg by mouth every eight (8) hours as needed.  MULTIVIT-MINERALS/FOLIC ACID (THERALOGIX  PO) Take 2 Tabs by mouth daily.  traZODone (DESYREL) 50 mg tablet TK 1 T PO QHS  0    aspirin delayed-release 81 mg tablet Take 325 mg by mouth daily.  multivitamin (ONE A DAY) tablet Take 1 Tab by mouth daily.  pravastatin (PRAVACHOL) 20 mg tablet Take 20 mg by mouth nightly. 5       Allergies   Allergen Reactions    Simvastatin Unknown (comments)    Lipitor [Atorvastatin] Myalgia         REVIEW OF SYSTEMS    Constitutional: Negative for fever, chills, or weight change. Respiratory: Negative for cough or shortness of breath. Cardiovascular: Negative for chest pain or palpitations. Gastrointestinal: Negative for acid reflux, change in bowel habits, or constipation. Genitourinary: Negative for dysuria and flank pain. Musculoskeletal: Positive for lumbar pain and weakness in both legs. Skin: Negative for rash.    Neurological: Negative for headaches, dizziness, or numbness. Endo/Heme/Allergies: Negative for increased bruising. Psychiatric/Behavioral: Negative for difficulty with sleep. PHYSICAL EXAMINATION  Visit Vitals  /61   Pulse 75   Temp 97.8 °F (36.6 °C)   Ht 5' 2\" (1.575 m)   Wt 142 lb (64.4 kg)   SpO2 100%   BMI 25.97 kg/m²       Constitutional: Awake, alert, and in no acute distress. Neurological: 1+ symmetrical DTRs in the upper extremities. 1+ symmetrical DTRs in the lower extremities. Sensation to light touch is intact. Negative Ying's sign bilaterally. Skin: warm, dry, and intact. Musculoskeletal: No pain with extension, axial loading, or forward flexion. No difficulty when transitioning from sit to stand. No pain with internal or external rotation of her hips. Negative straight leg raise bilaterally. Biceps  Triceps Deltoids Wrist Ext Wrist Flex Hand Intrin   Right  4/5  4/5  4/5  4/5  4/5   4/5   Left +4/5 +4/5 +4/5 +4/5 +4/5 +4/5      Hip Flex  Quads Hamstrings Ankle DF EHL Ankle PF   Right -3/5 -4/5 -4/5 +4/5 +4/5 +4/5   Left -3/5 -4/5 -4/5 +4/5 +4/5 +4/5     IMAGING:    Thoracic spine 2V x-rays from 01/13/2020 were personally reviewed with the patient and demonstrated:  L3-4 fusion intact. Mild degenerative disc in the thoracic spine.      Lumbar spine MRI from 09/14/2017 was personally reviewed with the patient and demonstrated:  FINDINGS:  Susceptibility artifact from posterior fusion L3-L4. Grade 1 anterior listhesis  of L4 on L5. Slight retrolisthesis of L2 on L3. Remainder of vertebral bodies  maintain normal alignment. Vertebral body heights are maintained. Marrow signal  is normal.  Mild degenerative discogenic disease at L4-L5 and L5-S1. The conus  medullaris terminates at L2-L3. No abnormal enhancement of the spinal canal.     Partially imaged T11-T12 demonstrates probable tiny left paracentral disc  protrusion.     T12/L1: No significant central canal or foraminal stenosis. .     L1/2:  No significant central canal or foraminal stenosis     L2/3:  No significant central canal or foraminal stenosis     L3/4:  Status post fusion. Central canal and neural foramen are patent.     L4/5:  Grade 1 anterior listhesis. Diffuse disc bulge and mild broad-based disc  protrusion with annular tear. Mild foraminal stenosis. Central canal is patent. Mild facet arthropathy.      L5/S1:  Mild disc bulge with posterior annular tear mild facet arthropathy. Mild  foraminal stenosis. Central canal is patent.     IMPRESSION:       Posterior fusion of L3-L4. No significant central canal or foraminal stenosis.     Grade 1 anterior listhesis, diffuse disc bulge with central disc protrusion at  L4-L5.     Diffuse disc bulge with annular tear at L5-S1. Written by Faye Caceres, as dictated by Isrrael Abdi MD.  I, Dr. Isrrael Abdi confirm that all documentation is accurate.

## 2020-01-21 NOTE — LETTER
1/24/20 Patient: Maria Alejandra Gonzalez YOB: 1945 Date of Visit: 1/21/2020 Jazmin Wild MD 
[address] [place] [address] VIA Facsimile: [phone] Dear Jazmin Wild MD, Thank you for referring Ms. Neeta Tran to [facility] for evaluation. My notes for this consultation are attached. If you have questions, please do not hesitate to call me. I look forward to following your patient along with you. Sincerely, Martina Singh MD

## 2020-01-23 DIAGNOSIS — M96.1 LUMBAR POST-LAMINECTOMY SYNDROME: ICD-10-CM

## 2020-01-23 DIAGNOSIS — M54.16 RIGHT LUMBAR RADICULOPATHY: ICD-10-CM

## 2020-01-23 RX ORDER — GABAPENTIN 300 MG/1
CAPSULE ORAL
Qty: 270 CAP | Refills: 1 | Status: SHIPPED | OUTPATIENT
Start: 2020-01-23 | End: 2020-10-28 | Stop reason: SDUPTHER

## 2020-01-23 RX ORDER — GABAPENTIN 300 MG/1
CAPSULE ORAL
Qty: 270 CAP | Refills: 1 | OUTPATIENT
Start: 2020-01-23

## 2020-01-23 NOTE — TELEPHONE ENCOUNTER
Last Visit: 01/21/2020 with MD Mimi Chowdhury  Next Appointment: 02/12/2020 with MD Mimi Chowdhury  Previous Refill Encounter(s): 06/19/2019 per MD Mimi Chowdhury #[id number] [id number]    Requested Prescriptions     Pending Prescriptions Disp Refills    gabapentin (NEURONTIN) 300 mg capsule 270 Cap 1     Sig: Take 2-3 in the evening as directed

## 2020-01-24 ENCOUNTER — DOCUMENTATION ONLY (OUTPATIENT)
Dept: ORTHOPEDIC SURGERY | Age: 75
End: 2020-01-24

## 2020-01-24 NOTE — PROGRESS NOTES
Order and office notes faxed to [facility] to have them set up MRI L-Spine and to notify the patient of date. They will authorize with the insurance if needed. Patient aware.

## 2020-02-13 ENCOUNTER — OFFICE VISIT (OUTPATIENT)
Dept: ORTHOPEDIC SURGERY | Age: 75
End: 2020-02-13

## 2020-02-13 VITALS
SYSTOLIC BLOOD PRESSURE: 120 MMHG | HEART RATE: 70 BPM | BODY MASS INDEX: 26.06 KG/M2 | RESPIRATION RATE: 16 BRPM | DIASTOLIC BLOOD PRESSURE: 67 MMHG | TEMPERATURE: 98 F | WEIGHT: 141.6 LBS | HEIGHT: 62 IN | OXYGEN SATURATION: 99 %

## 2020-02-13 DIAGNOSIS — M96.1 LUMBAR POST-LAMINECTOMY SYNDROME: ICD-10-CM

## 2020-02-13 DIAGNOSIS — M53.3 SACROILIAC PAIN: ICD-10-CM

## 2020-02-13 DIAGNOSIS — M47.816 LUMBAR FACET ARTHROPATHY: Primary | ICD-10-CM

## 2020-02-13 NOTE — PROGRESS NOTES
[facility]  Shazia Samuel[address] [place], [facility] [address]  Phone: [phone]  Fax: [phone]    Pt's YOB: 1945    ASSESSMENT   Diagnoses and all orders for this visit:    1. Lumbar facet arthropathy    2. Lumbar post-laminectomy syndrome    3. Sacroiliac pain         IMPRESSION AND PLAN:  Anita Hoffman is a 76 y.o. female with history of lumbar pain. She complains of pain in the lower back that extends into the anterior aspect of both thighs, most severe with ambulation. Pt is trying to be more active and notes that she likes to walk around the [facility] [employer] during her lunch breaks. 1) Pt was given information on lumbar arthritis exercises. 2) I encouraged the patient to exercise regularly. 3) I recommended the patient try water exercise. 4) Ms. Yesenia Buckner has a reminder for a \"due or due soon\" health maintenance. I have asked that she contact her primary care provider, Garland Guerrero MD, for follow-up on this health maintenance. 5)  demonstrated consistency with prescribing. Follow-up and Dispositions    · Return if symptoms worsen or fail to improve. HISTORY OF PRESENT ILLNESS:  Anita Hoffman is a 76 y.o. female with history of lumbar pain and presents to the office today for  MRI follow up. She complains of pain in the lower back that extends into the anterior aspect of both thighs, most severe with ambulation. Pt also complains of weakness in the legs and numbness/tingling in the toes on both feet. Labs from 01/14/2020 were reviewed and demonstrated a C reactive protein of 0.3 mg/dL (ref range 0-0.3) and sed rate of 39 mm/hr (ref range 0-30). Pt has been prescribed Neurontin 300 mg and takes 2-3 tabs in the evening. She is trying to be more active and notes that on Sunday, 02/09/2020, she worked in her yard from 6 AM-4:30 PM (with breaks). Pt likes to walk around the [facility] [employer] during her lunch break.  Pt at this time desires to continue with current care. Pain Scale: /10    PCP: Cali Su MD     Past Medical History:   Diagnosis Date    Bladder cancer ([facility] [address] 2015 (approx)    Foraminal stenosis of lumbar region     Hematuria     Hypercholesterolemia     Low back pain         Social History     Socioeconomic History    Marital status:      Spouse name: Not on file    Number of children: Not on file    Years of education: Not on file    Highest education level: Not on file   Occupational History    Occupation: nurse   Social Needs    Financial resource strain: Not on file    Food insecurity:     Worry: Not on file     Inability: Not on file    Transportation needs:     Medical: Not on file     Non-medical: Not on file   Tobacco Use    Smoking status: Former Smoker     Last attempt to quit: 1980     Years since quittin.1    Smokeless tobacco: Never Used   Substance and Sexual Activity    Alcohol use:  Yes     Alcohol/week: 0.0 standard drinks     Comment: 1 glass of wine 3-4 times per month    Drug use: No    Sexual activity: Not on file   Lifestyle    Physical activity:     Days per week: Not on file     Minutes per session: Not on file    Stress: Not on file   Relationships    Social connections:     Talks on phone: Not on file     Gets together: Not on file     Attends Alevism service: Not on file     Active member of club or organization: Not on file     Attends meetings of clubs or organizations: Not on file     Relationship status: Not on file    Intimate partner violence:     Fear of current or ex partner: Not on file     Emotionally abused: Not on file     Physically abused: Not on file     Forced sexual activity: Not on file   Other Topics Concern    Not on file   Social History Narrative    Not on file       Current Outpatient Medications   Medication Sig Dispense Refill    gabapentin (NEURONTIN) 300 mg capsule Take 2-3 in the evening as directed 270 Cap 1    topiramate (TOPAMAX) 25 mg tablet Take 1 in the evening for 1 week, then increase to 2 the second week and continue with 3 in the evening 90 Tab 1    lidocaine (LIDODERM) 5 % 1 Patch by TransDERmal route every twenty-four (24) hours. Apply patch to the affected area for 12 hours a day and remove for 12 hours a day.  ibuprofen (ADVIL) 200 mg tablet Take 400 mg by mouth every eight (8) hours as needed.  MULTIVIT-MINERALS/FOLIC ACID (THERALOGIX  PO) Take 2 Tabs by mouth daily.  traZODone (DESYREL) 50 mg tablet TK 1 T PO QHS  0    aspirin delayed-release 81 mg tablet Take 325 mg by mouth daily.  multivitamin (ONE A DAY) tablet Take 1 Tab by mouth daily.  pravastatin (PRAVACHOL) 20 mg tablet Take 20 mg by mouth nightly. 5       Allergies   Allergen Reactions    Simvastatin Unknown (comments)    Lipitor [Atorvastatin] Myalgia         REVIEW OF SYSTEMS    Constitutional: Negative for fever, chills, or weight change. Respiratory: Negative for cough or shortness of breath. Cardiovascular: Negative for chest pain or palpitations. Gastrointestinal: Negative for acid reflux, change in bowel habits, or constipation. Genitourinary: Negative for dysuria and flank pain. Musculoskeletal: Positive for lumbar pain. Skin: Negative for rash. Neurological: Negative for headaches, dizziness, or numbness. Endo/Heme/Allergies: Negative for increased bruising. Psychiatric/Behavioral: Negative for difficulty with sleep. PHYSICAL EXAMINATION  Visit Vitals  /67 (BP 1 Location: Left arm, BP Patient Position: Sitting)   Pulse 70   Temp 98 °F (36.7 °C) (Oral)   Resp 16   Ht 5' 2\" (1.575 m)   Wt 141 lb 9.6 oz (64.2 kg)   SpO2 99%   BMI 25.90 kg/m²       Constitutional: Awake, alert, and in no acute distress. Neurological: 1+ symmetrical DTRs in the upper extremities. 1+ symmetrical DTRs in the lower extremities. Sensation to light touch is intact.  Negative Ying's sign bilaterally. Skin: warm, dry, and intact. Musculoskeletal: Tenderness to palpation in the lower lumbar region and over the sacroiliac joints. Moderate pain with extension and axial loading. No change with forward flexion. No pain with internal or external rotation of her hips. Negative straight leg raise bilaterally. Biceps  Triceps Deltoids Wrist Ext Wrist Flex Hand Intrin   Right +4/5 +4/5 +4/5 +4/5 +4/5 +4/5   Left +4/5 +4/5 +4/5 +4/5 +4/5 +4/5      Hip Flex  Quads Hamstrings Ankle DF EHL Ankle PF   Right +4/5 +4/5 +4/5 +4/5 +4/5 +4/5   Left +4/5 +4/5 +4/5 +4/5 +4/5 +4/5     IMAGING:    Lumbar spine MRI from 02/07/2020 was personally reviewed with the patient and demonstrated:  Findings: The lumbar spine is normally aligned. Evidence of circumferential fusion L3-L4. Very mild anterolisthesis of L4 on L5 has occurred since the previous CT. There is degenerative disc disease with disc desiccation at several levels. There is no destructive bony lesion. Axial scans show:    T11-T12: Normal except for degenerative change. Small benign liver cyst adjacent to the IVC. T12-L1: Normal.    L1-L2: Normal.    L2/L3: Normal except for minimal degenerative change. L3/L4: L3 pedicle screws are in good position as is the interbody fusion cage. Cross-link in place. No recurrent or residual significant central or lateral stenosis. L4 pedicle screws are in good position. Bandlike presumed artifact within the thecal sac that is T2 hypointense on axial scans. L4/L5: This is the level of the spondylolisthesis. There is facet arthropathy with facet effusion on the left. Bulging disc. No protrusion or extrusion and no high-grade stenosis. L5/S1: Normal except for degenerative change. The more rostral discs are included on parasagittal scans up to T10-T11. No additional herniation or stenosis is seen. The inferior most conus medullaris is unremarkable. IMPRESSION    1.  Evidence of prior surgery with circumferential fusion at L3-L4. Hardware appears in good position by MRI. Some artifact projecting into the thecal sac on axial T2 images. There is no recurrent or residual significant stenosis at this surgical level. 2. Mild anterolisthesis of L4 on L5 since the previous postoperative CT from 2015. This is secondary to facet arthropathy which is more prominent on the left. There is no substantial stenosis at this level. 3. Additional degenerative change but no additional evidence of herniation or high-grade stenosis. Thoracic spine 2V x-rays from 01/13/2020 were personally reviewed with the patient and demonstrated:  L3-4 fusion intact. Mild degenerative disc in the thoracic spine.        Written by Stevie Watt, as dictated by Franchesca Nicolas MD.  I, Dr. Franchesca Nicolas confirm that all documentation is accurate.

## 2020-02-13 NOTE — LETTER
2/14/20 Patient: Lidia Adhikari YOB: 1945 Date of Visit: 2/13/2020 Gilles Barnhart MD 
[place] [address] VIA Facsimile: [phone] Dear Gilles Barnhart MD, Thank you for referring Ms. Tito Tolbert to [facility] for evaluation. My notes for this consultation are attached. If you have questions, please do not hesitate to call me. I look forward to following your patient along with you. Sincerely, Madai Khoury MD

## 2020-02-13 NOTE — PATIENT INSTRUCTIONS
Low Back Arthritis: Exercises Introduction Here are some examples of typical rehabilitation exercises for your condition. Start each exercise slowly. Ease off the exercise if you start to have pain. Your doctor or physical therapist will tell you when you can start these exercises and which ones will work best for you. When you are not being active, find a comfortable position for rest. Some people are comfortable on the floor or a medium-firm bed with a small pillow under their head and another under their knees. Some people prefer to lie on their side with a pillow between their knees. Don't stay in one position for too long. Take short walks (10 to 20 minutes) every 2 to 3 hours. Avoid slopes, hills, and stairs until you feel better. Walk only distances you can manage without pain, especially leg pain. How to do the exercises Pelvic tilt 1. Lie on your back with your knees bent. 2. \"Brace\" your stomachtighten your muscles by pulling in and imagining your belly button moving toward your spine. 3. Press your lower back into the floor. You should feel your hips and pelvis rock back. 4. Hold for 6 seconds while breathing smoothly. 5. Relax and allow your pelvis and hips to rock forward. 6. Repeat 8 to 12 times. Back stretches 1. Get down on your hands and knees on the floor. 2. Relax your head and allow it to droop. Round your back up toward the ceiling until you feel a nice stretch in your upper, middle, and lower back. Hold this stretch for as long as it feels comfortable, or about 15 to 30 seconds. 3. Return to the starting position with a flat back while you are on your hands and knees. 4. Let your back sway by pressing your stomach toward the floor. Lift your buttocks toward the ceiling. 5. Hold this position for 15 to 30 seconds. 6. Repeat 2 to 4 times. Follow-up care is a key part of your treatment and safety.  Be sure to make and go to all appointments, and call your doctor if you are having problems. It's also a good idea to know your test results and keep a list of the medicines you take. Where can you learn more? Go to http://horace-marco.info/. Enter R452 in the search box to learn more about \"Low Back Arthritis: Exercises. \" Current as of: June 26, 2019 Content Version: 12.2 © 3825-9070 [employer]. Care instructions adapted under license by [employer] (which disclaims liability or warranty for this information). If you have questions about a medical condition or this instruction, always ask your healthcare professional. [address] any warranty or liability for your use of this information.

## 2020-03-17 DIAGNOSIS — R29.898 WEAKNESS OF BOTH LEGS: ICD-10-CM

## 2020-03-17 DIAGNOSIS — M96.1 LUMBAR POST-LAMINECTOMY SYNDROME: ICD-10-CM

## 2020-03-17 DIAGNOSIS — R26.9 GAIT ABNORMALITY: ICD-10-CM

## 2020-03-17 DIAGNOSIS — M47.816 LUMBAR FACET ARTHROPATHY: ICD-10-CM

## 2020-09-05 DIAGNOSIS — G62.9 NEUROPATHY: ICD-10-CM

## 2020-09-05 DIAGNOSIS — M96.1 LUMBAR POST-LAMINECTOMY SYNDROME: ICD-10-CM

## 2020-09-08 RX ORDER — GABAPENTIN 300 MG/1
CAPSULE ORAL
Qty: 270 CAP | OUTPATIENT
Start: 2020-09-08

## 2020-10-01 PROBLEM — M25.519 SHOULDER PAIN: Status: ACTIVE | Noted: 2020-10-01

## 2020-10-02 ENCOUNTER — OFFICE VISIT (OUTPATIENT)
Dept: ORTHOPEDIC SURGERY | Age: 75
End: 2020-10-02
Payer: MEDICARE

## 2020-10-02 DIAGNOSIS — M19.011 ARTHRITIS OF RIGHT SHOULDER REGION: Primary | ICD-10-CM

## 2020-10-02 PROCEDURE — G8427 DOCREV CUR MEDS BY ELIG CLIN: HCPCS | Performed by: ORTHOPAEDIC SURGERY

## 2020-10-02 PROCEDURE — 99214 OFFICE O/P EST MOD 30 MIN: CPT | Performed by: ORTHOPAEDIC SURGERY

## 2020-10-02 NOTE — PROGRESS NOTES
Name: Diana Hodge    :      Service Dept: [address] [place] [address] Orthopaedics and Sports Medicine    Patient's Pharmacies:    [id number] - [address] AT [place] [address] [place] [address]  Phone: [phone] Fax: [phone]       No chief complaint on file. There were no vitals taken for this visit. Allergies   Allergen Reactions    Simvastatin Unknown (comments)    Lipitor [Atorvastatin] Myalgia        Current Outpatient Medications   Medication Sig Dispense Refill    gabapentin (NEURONTIN) 300 mg capsule Take 2-3 in the evening as directed 270 Cap 1    topiramate (TOPAMAX) 25 mg tablet Take 1 in the evening for 1 week, then increase to 2 the second week and continue with 3 in the evening 90 Tab 1    lidocaine (LIDODERM) 5 % 1 Patch by TransDERmal route every twenty-four (24) hours. Apply patch to the affected area for 12 hours a day and remove for 12 hours a day.  ibuprofen (ADVIL) 200 mg tablet Take 400 mg by mouth every eight (8) hours as needed.  MULTIVIT-MINERALS/FOLIC ACID (THERALOGIX  PO) Take 2 Tabs by mouth daily.  traZODone (DESYREL) 50 mg tablet TK 1 T PO QHS  0    aspirin delayed-release 81 mg tablet Take 325 mg by mouth daily.  multivitamin (ONE A DAY) tablet Take 1 Tab by mouth daily.  pravastatin (PRAVACHOL) 20 mg tablet Take 20 mg by mouth nightly.   5        Patient Active Problem List   Diagnosis Code    Chronic right-sided low back pain with sciatica M54.40, G89.29    S/P lumbar laminectomy Z98.890    S/P laminectomy with spinal fusion Z98.1    Lumbar post-laminectomy syndrome M96.1    Impingement syndrome of right shoulder M75.41    DDD (degenerative disc disease), cervical M50.30    Postlaminectomy syndrome, lumbar M96.1    Cervical spondylosis without myelopathy M47.812    Spondylolisthesis of lumbar region M43.16    Encounter for long-term opiate analgesic use Z79.891    Lumbar radiculopathy, chronic M54.16    Annular tear of lumbar disc M51.36    Lumbar pain M54.5    Back strain S39.012A    Sacroiliac joint pain M53.3    Sacroiliitis (HCC) M46.1    Shoulder pain M25.519        Family History   Problem Relation Age of Onset    No Known Problems Mother     No Known Problems Father     Diabetes Sister         Social History     Socioeconomic History    Marital status:      Spouse name: Not on file    Number of children: Not on file    Years of education: Not on file    Highest education level: Not on file   Occupational History    Occupation: nurse   Tobacco Use    Smoking status: Former Smoker     Last attempt to quit: 1980     Years since quittin.7    Smokeless tobacco: Never Used   Substance and Sexual Activity    Alcohol use: Yes     Alcohol/week: 0.0 standard drinks     Comment: 1 glass of wine 3-4 times per month    Drug use: No        Past Surgical History:   Procedure Laterality Date    FOOT/TOES SURGERY PROC UNLISTED Left 2018    bunionectomy    HX BUNIONECTOMY      HX COLONOSCOPY      HX LUMBAR LAMINECTOMY  6/2/15    with fusion    HX ORTHOPAEDIC  Bunionionectomy R    HX TUBAL LIGATION      HX UROLOGICAL      treatment BCG for Bladder Cancer        Past Medical History:   Diagnosis Date    Bladder cancer ([facility] [address]  (approx)    Foraminal stenosis of lumbar region     Hematuria     Hypercholesterolemia     Low back pain     Shoulder pain 10/1/2020        I have reviewed and agree with [address] [place] [address] Nw and ROS and intake form in chart and the record. Review of Systems:   Patient is a pleasant appearing individual, appropriately dressed, well hydrated, well nourished, who is alert, appropriately oriented for age, and in no acute distress with a normal gait and normal affect who does not appear to be in any significant pain. Physical Exam:  Right Shoulder - Grossly neurovascularly intact. Range of motion-Decreased actively and passively. No Point tenderness, Strength-weakness with abduction, positive crepitation, No skin lesion are identified, No instabilty is noted, Positive apprehension, No Swelling. Left Shoulder - Grossly neurovascularly intact, Full Range of motion, No point tenderness, No weakness, No skin lesions, No Instability, No apprehension, No swelling. Encounter Diagnoses     ICD-10-CM ICD-9-CM   1. Arthritis of right shoulder region  M19.011 716.91          HPI:  The patient is here with a chief complaint of bilateral shoulder pain, dull pain, progressively getting worse. Pain is 6/10. ROS:  10-point review of systems is positive for nighttime pain. X-rays in the past have shown positive for rotator cuff arthropathy. Assessment/Plan:  Plan at this point is for right reverse prosthesis replacement. I think it will be okay to do in November, but we need to track down a medical clearance note from her primary care doctor, Dr. Manuel Pereira, who I spoke to and her cardiologist, and we will have Anesthesia review everything. We will try to get the paperwork soon and schedule her for right reverse prosthesis in November and go from there. If she gets worse, she is to give me a call. No restrictions in the meantime. Return to Office: Follow-up and Dispositions    · Return for schedule for surgery. Scribed by Faye Poole LPN as dictated by [facility] Jessica MD.    Documentation True and Accepted Robin Jessica MD

## 2020-10-02 NOTE — PATIENT INSTRUCTIONS
Shoulder Pain: Care Instructions  Your Care Instructions     You can hurt your shoulder by using it too much during an activity, such as fishing or baseball. It can also happen as part of the everyday wear and tear of getting older. Shoulder injuries can be slow to heal, but your shoulder should get better with time. Your doctor may recommend a sling to rest your shoulder. If you have injured your shoulder, you may need testing and treatment. Follow-up care is a key part of your treatment and safety. Be sure to make and go to all appointments, and call your doctor if you are having problems. It's also a good idea to know your test results and keep a list of the medicines you take. How can you care for yourself at home? · Take pain medicines exactly as directed. ? If the doctor gave you a prescription medicine for pain, take it as prescribed. ? If you are not taking a prescription pain medicine, ask your doctor if you can take an over-the-counter medicine. ? Do not take two or more pain medicines at the same time unless the doctor told you to. Many pain medicines contain acetaminophen, which is Tylenol. Too much acetaminophen (Tylenol) can be harmful. · If your doctor recommends that you wear a sling, use it as directed. Do not take it off before your doctor tells you to. · Put ice or a cold pack on the sore area for 10 to 20 minutes at a time. Put a thin cloth between the ice and your skin. · If there is no swelling, you can put moist heat, a heating pad, or a warm cloth on your shoulder. Some doctors suggest alternating between hot and cold. · Rest your shoulder for a few days. If your doctor recommends it, you can then begin gentle exercise of the shoulder, but do not lift anything heavy. When should you call for help? Call 911 anytime you think you may need emergency care. For example, call if:    · You have chest pain or pressure. This may occur with:  ? Sweating. ?  Shortness of breath. ? Nausea or vomiting. ? Pain that spreads from the chest to the neck, jaw, or one or both shoulders or arms. ? Dizziness or lightheadedness. ? A fast or uneven pulse. After calling 911, chew 1 adult-strength aspirin. Wait for an ambulance. Do not try to drive yourself.     · Your arm or hand is cool or pale or changes color. Call your doctor now or seek immediate medical care if:    · You have signs of infection, such as:  ? Increased pain, swelling, warmth, or redness in your shoulder. ? Red streaks leading from a place on your shoulder. ? Pus draining from an area of your shoulder. ? Swollen lymph nodes in your neck, armpits, or groin. ? A fever. Watch closely for changes in your health, and be sure to contact your doctor if:    · You cannot use your shoulder.     · Your shoulder does not get better as expected. Where can you learn more? Go to http://www.brown.com/  Enter H996 in the search box to learn more about \"Shoulder Pain: Care Instructions. \"  Current as of: March 2, 2020               Content Version: 12.6  © 4747-4694 [employer]. Care instructions adapted under license by [employer] (which disclaims liability or warranty for this information). If you have questions about a medical condition or this instruction, always ask your healthcare professional. [place] [address] any warranty or liability for your use of this information.

## 2020-10-05 ENCOUNTER — OFFICE VISIT (OUTPATIENT)
Dept: ORTHOPEDIC SURGERY | Age: 75
End: 2020-10-05
Payer: MEDICARE

## 2020-10-05 VITALS
OXYGEN SATURATION: 100 % | HEART RATE: 55 BPM | TEMPERATURE: 98.1 F | HEIGHT: 62 IN | RESPIRATION RATE: 19 BRPM | SYSTOLIC BLOOD PRESSURE: 138 MMHG | WEIGHT: 133 LBS | DIASTOLIC BLOOD PRESSURE: 60 MMHG | BODY MASS INDEX: 24.48 KG/M2

## 2020-10-05 DIAGNOSIS — M47.816 LUMBAR FACET ARTHROPATHY: Primary | ICD-10-CM

## 2020-10-05 DIAGNOSIS — M96.1 LUMBAR POST-LAMINECTOMY SYNDROME: ICD-10-CM

## 2020-10-05 DIAGNOSIS — M53.3 SACROILIAC PAIN: ICD-10-CM

## 2020-10-05 DIAGNOSIS — Z86.2 HISTORY OF ANEMIA: ICD-10-CM

## 2020-10-05 PROCEDURE — 99213 OFFICE O/P EST LOW 20 MIN: CPT | Performed by: PHYSICAL MEDICINE & REHABILITATION

## 2020-10-05 PROCEDURE — G8420 CALC BMI NORM PARAMETERS: HCPCS | Performed by: PHYSICAL MEDICINE & REHABILITATION

## 2020-10-05 PROCEDURE — G8536 NO DOC ELDER MAL SCRN: HCPCS | Performed by: PHYSICAL MEDICINE & REHABILITATION

## 2020-10-05 PROCEDURE — G8400 PT W/DXA NO RESULTS DOC: HCPCS | Performed by: PHYSICAL MEDICINE & REHABILITATION

## 2020-10-05 PROCEDURE — G8432 DEP SCR NOT DOC, RNG: HCPCS | Performed by: PHYSICAL MEDICINE & REHABILITATION

## 2020-10-05 PROCEDURE — G8427 DOCREV CUR MEDS BY ELIG CLIN: HCPCS | Performed by: PHYSICAL MEDICINE & REHABILITATION

## 2020-10-05 PROCEDURE — 1090F PRES/ABSN URINE INCON ASSESS: CPT | Performed by: PHYSICAL MEDICINE & REHABILITATION

## 2020-10-05 PROCEDURE — 1101F PT FALLS ASSESS-DOCD LE1/YR: CPT | Performed by: PHYSICAL MEDICINE & REHABILITATION

## 2020-10-05 PROCEDURE — 3017F COLORECTAL CA SCREEN DOC REV: CPT | Performed by: PHYSICAL MEDICINE & REHABILITATION

## 2020-10-05 RX ORDER — ESCITALOPRAM OXALATE 10 MG/1
TABLET ORAL
COMMUNITY
Start: 2020-04-23

## 2020-10-05 RX ORDER — FUROSEMIDE 20 MG/1
TABLET ORAL
COMMUNITY
Start: 2020-06-09

## 2020-10-05 RX ORDER — FLUTICASONE PROPIONATE 50 MCG
SPRAY, SUSPENSION (ML) NASAL
COMMUNITY

## 2020-10-05 RX ORDER — AMLODIPINE BESYLATE 5 MG/1
TABLET ORAL
COMMUNITY
Start: 2020-08-15

## 2020-10-05 RX ORDER — LANOLIN ALCOHOL/MO/W.PET/CERES
CREAM (GRAM) TOPICAL
COMMUNITY
Start: 2020-08-01 | End: 2022-06-16 | Stop reason: ALTCHOICE

## 2020-10-05 NOTE — PATIENT INSTRUCTIONS
Low Back Arthritis: Exercises  Introduction  Here are some examples of typical rehabilitation exercises for your condition. Start each exercise slowly. Ease off the exercise if you start to have pain. Your doctor or physical therapist will tell you when you can start these exercises and which ones will work best for you. When you are not being active, find a comfortable position for rest. Some people are comfortable on the floor or a medium-firm bed with a small pillow under their head and another under their knees. Some people prefer to lie on their side with a pillow between their knees. Don't stay in one position for too long. Take short walks (10 to 20 minutes) every 2 to 3 hours. Avoid slopes, hills, and stairs until you feel better. Walk only distances you can manage without pain, especially leg pain. How to do the exercises  Pelvic tilt   1. Lie on your back with your knees bent. 2. \"Brace\" your stomach--tighten your muscles by pulling in and imagining your belly button moving toward your spine. 3. Press your lower back into the floor. You should feel your hips and pelvis rock back. 4. Hold for 6 seconds while breathing smoothly. 5. Relax and allow your pelvis and hips to rock forward. 6. Repeat 8 to 12 times. Back stretches   1. Get down on your hands and knees on the floor. 2. Relax your head and allow it to droop. Round your back up toward the ceiling until you feel a nice stretch in your upper, middle, and lower back. Hold this stretch for as long as it feels comfortable, or about 15 to 30 seconds. 3. Return to the starting position with a flat back while you are on your hands and knees. 4. Let your back sway by pressing your stomach toward the floor. Lift your buttocks toward the ceiling. 5. Hold this position for 15 to 30 seconds. 6. Repeat 2 to 4 times. Follow-up care is a key part of your treatment and safety.  Be sure to make and go to all appointments, and call your doctor if you are having problems. It's also a good idea to know your test results and keep a list of the medicines you take. Where can you learn more? Go to http://www.[employer].com/  Enter T094 in the search box to learn more about \"Low Back Arthritis: Exercises. \"  Current as of: March 2, 2020               Content Version: 12.6  © 7611-1249 [employer], [employer]. Care instructions adapted under license by [employer] (which disclaims liability or warranty for this information). If you have questions about a medical condition or this instruction, always ask your healthcare professional. [place] [address] any warranty or liability for your use of this information.

## 2020-10-05 NOTE — PROGRESS NOTES
Guille Concepcion presents today for   Chief Complaint   Patient presents with    Back Pain       Is someone accompanying this pt? no    Is the patient using any DME equipment during OV? no    Depression Screening:  3 most recent PHQ Screens 2/13/2020   [facility] Not Done Patient Decline   Little interest or pleasure in doing things -   Feeling down, depressed, irritable, or hopeless -   Total Score PHQ 2 -       Learning Assessment:  Learning Assessment 10/2/2020   PRIMARY LEARNER Patient   HIGHEST LEVEL OF EDUCATION - PRIMARY LEARNER  4 YEARS OF COLLEGE   BARRIERS PRIMARY LEARNER NONE   CO-LEARNER CAREGIVER -   PRIMARY LANGUAGE ENGLISH   LEARNER PREFERENCE PRIMARY LISTENING     -   ANSWERED BY patient   RELATIONSHIP SELF       Abuse Screening:  Abuse Screening Questionnaire 6/19/2019   Do you ever feel afraid of your partner? N   Are you in a relationship with someone who physically or mentally threatens you? N   Is it safe for you to go home? Y       Fall Risk  Fall Risk Assessment, last 12 mths 2/13/2020   Able to walk? Yes   Fall in past 12 months? No       Coordination of Care:  1. Have you been to the ER, urgent care clinic since your last visit? no  Hospitalized since your last visit? no    2. Have you seen or consulted any other health care providers outside of the [address] [place] [address] since your last visit? Yes, pcp. Include any pap smears or colon screening.  no

## 2020-10-05 NOTE — PROGRESS NOTES
[facility]  [address], [address]  Phone: [phone]  Fax: [phone]    Pt's YOB: 1945    ASSESSMENT   Diagnoses and all orders for this visit:    1. Lumbar facet arthropathy    2. Lumbar post-laminectomy syndrome    3. Sacroiliac pain    4. History of anemia         IMPRESSION AND PLAN:  Amy Loja is a 76 y.o.  female with history of lumbar pain. She was admitted to [facility] after she was told her hemoglobin dropped to 5. Pt notes that she is no longer short of breath and can stand and walk without pain. She reports increased lower back pain when she sits for extended periods of time but she denies any pain, numbness, tingling, or burning in the feet at this time. 1) Pt was given information on lumbar arthritis exercises. 2) Discussed treatment options with the patient including medication and steroid injections. 3) I recommended the patient try naomi chi and chair/beginner's yoga. 4) Ms. Mando Lemons has a reminder for a \"due or due soon\" health maintenance. I have asked that she contact her primary care provider, Jessika Cortes MD, for follow-up on this health maintenance. 5)  demonstrated consistency with prescribing. Follow-up and Dispositions    · Return if symptoms worsen or fail to improve. HISTORY OF PRESENT ILLNESS:  Amy Loja is a 76 y.o.  female with history of lumbar pain and presents to the office today for follow up. Pt notes improvement in the pain and heavy sensation radiating into both legs since her last office visit. She reports that she went to the ER in 6/2020 when she was feeling awful and was admitted to [facility] after she was told her hemoglobin dropped to 5. Pt notes that she had two iron infusions since her last office visit with significant improvement. She admits to craving ice and weakness in her arms prior to her ER visit.  Pt followed up with gastroenterology and had a colonoscopy and upper endoscopy which demonstrated diverticulitis. She reports that recent blood work demonstrated a hemoglobin of 12. Pt notes that she is no longer short of breath and can stand and walk without pain. She reports increased lower back pain when she sits for extended periods of time but she denies any pain, numbness, tingling, or burning in the feet at this time. Pt had a bunionectomy on the right foot and notes that she has a shoe insert at home. Pt at this time desires to continue with current care. Pain Scale: 4/10    PCP: Theo Dickson MD     Past Medical History:   Diagnosis Date    Bladder cancer ([facility] [address] 2015 (approx)    Foraminal stenosis of lumbar region     Hematuria     Hypercholesterolemia     Low back pain     Shoulder pain 10/1/2020        Social History     Socioeconomic History    Marital status:      Spouse name: Not on file    Number of children: Not on file    Years of education: Not on file    Highest education level: Not on file   Occupational History    Occupation: nurse   Social Needs    Financial resource strain: Not on file    Food insecurity     Worry: Not on file     Inability: Not on file    Transportation needs     Medical: Not on file     Non-medical: Not on file   Tobacco Use    Smoking status: Former Smoker     Last attempt to quit: 1980     Years since quittin.7    Smokeless tobacco: Never Used   Substance and Sexual Activity    Alcohol use:  Yes     Alcohol/week: 0.0 standard drinks     Comment: 1 glass of wine 3-4 times per month    Drug use: No    Sexual activity: Not on file   Lifestyle    Physical activity     Days per week: Not on file     Minutes per session: Not on file    Stress: Not on file   Relationships    Social connections     Talks on phone: Not on file     Gets together: Not on file     Attends Caodaism service: Not on file     Active member of club or organization: Not on file     Attends meetings of clubs or organizations: Not on file     Relationship status: Not on file    Intimate partner violence     Fear of current or ex partner: Not on file     Emotionally abused: Not on file     Physically abused: Not on file     Forced sexual activity: Not on file   Other Topics Concern    Not on file   Social History Narrative    Not on file       Current Outpatient Medications   Medication Sig Dispense Refill    amLODIPine (NORVASC) 5 mg tablet TK 1 T PO ONCE A DAY      escitalopram oxalate (LEXAPRO) 10 mg tablet escitalopram 10 mg tablet      ferrous sulfate 325 mg (65 mg iron) tablet TK 1 T PO  ONCE A DAY      fluticasone propionate (FLONASE) 50 mcg/actuation nasal spray fluticasone propionate 50 mcg/actuation nasal spray,suspension      furosemide (LASIX) 20 mg tablet furosemide 20 mg tablet      gabapentin (NEURONTIN) 300 mg capsule Take 2-3 in the evening as directed 270 Cap 1    lidocaine (LIDODERM) 5 % 1 Patch by TransDERmal route every twenty-four (24) hours. Apply patch to the affected area for 12 hours a day and remove for 12 hours a day.  MULTIVIT-MINERALS/FOLIC ACID (THERALOGIX  PO) Take 2 Tabs by mouth daily.  traZODone (DESYREL) 50 mg tablet TK 1 T PO QHS  0    multivitamin (ONE A DAY) tablet Take 1 Tab by mouth daily.  pravastatin (PRAVACHOL) 20 mg tablet Take 20 mg by mouth nightly. 5    topiramate (TOPAMAX) 25 mg tablet Take 1 in the evening for 1 week, then increase to 2 the second week and continue with 3 in the evening 90 Tab 1    ibuprofen (ADVIL) 200 mg tablet Take 400 mg by mouth every eight (8) hours as needed.  aspirin delayed-release 81 mg tablet Take 325 mg by mouth daily. Allergies   Allergen Reactions    Simvastatin Unknown (comments)    Lipitor [Atorvastatin] Myalgia         REVIEW OF SYSTEMS    Constitutional: Negative for fever, chills, or weight change. Respiratory: Negative for cough or shortness of breath.      Cardiovascular: Negative for chest pain or palpitations. Gastrointestinal: Negative for acid reflux, change in bowel habits, or constipation. Genitourinary: Negative for dysuria and flank pain. Musculoskeletal: Positive for lumbar pain. Neurological: Negative for headaches, dizziness, or numbness. Endo/Heme/Allergies: Negative for increased bruising. Psychiatric/Behavioral: Negative for difficulty with sleep. PHYSICAL EXAMINATION  Visit Vitals  /60 (BP 1 Location: Left arm, BP Patient Position: Sitting)   Pulse (!) 55 Comment: pt asymptomatic, MD aware, pt states normal for her   Temp 98.1 °F (36.7 °C) (Skin)   Resp 19   Ht 5' 2\" (1.575 m)   Wt 133 lb (60.3 kg)   SpO2 100% Comment: RA   BMI 24.33 kg/m²       Constitutional: Awake, alert, and in no acute distress. Neurological: 1+ symmetrical DTRs in the upper extremities. 1+ symmetrical DTRs in the lower extremities. Sensation to light touch is intact. Negative Ying's sign bilaterally. Skin: warm, dry, and intact. Musculoskeletal: Tenderness to palpation in the lower lumbar region and over the left sacroiliac joint. Moderate pain with extension and axial loading. No pain with internal or external rotation of her hips. Negative straight leg raise bilaterally. Hip Flex  Quads Hamstrings Ankle DF EHL Ankle PF   Right +4/5 +4/5 +4/5 +4/5 +4/5 +4/5   Left +4/5 +4/5 +4/5 +4/5 +4/5 +4/5     IMAGING:    Lumbar spine MRI from 02/07/2020 was personally reviewed with the patient and demonstrated:  Findings: The lumbar spine is normally aligned. Evidence of circumferential fusion L3-L4. Very mild anterolisthesis of L4 on L5 has occurred since the previous CT. There is degenerative disc disease with disc desiccation at several levels. There is no destructive bony lesion. Axial scans show:    T11-T12: Normal except for degenerative change. Small benign liver cyst adjacent to the IVC.     T12-L1: Normal.    L1-L2: Normal.    L2/L3: Normal except for minimal degenerative change. L3/L4: L3 pedicle screws are in good position as is the interbody fusion cage. Cross-link in place. No recurrent or residual significant central or lateral stenosis. L4 pedicle screws are in good position. Bandlike presumed artifact within the thecal sac that is T2 hypointense on axial scans. L4/L5: This is the level of the spondylolisthesis. There is facet arthropathy with facet effusion on the left. Bulging disc. No protrusion or extrusion and no high-grade stenosis. L5/S1: Normal except for degenerative change. The more rostral discs are included on parasagittal scans up to T10-T11. No additional herniation or stenosis is seen. The inferior most conus medullaris is unremarkable. IMPRESSION    1. Evidence of prior surgery with circumferential fusion at L3-L4. Hardware appears in good position by MRI. Some artifact projecting into the thecal sac on axial T2 images. There is no recurrent or residual significant stenosis at this surgical level. 2. Mild anterolisthesis of L4 on L5 since the previous postoperative CT from 2015. This is secondary to facet arthropathy which is more prominent on the left. There is no substantial stenosis at this level. 3. Additional degenerative change but no additional evidence of herniation or high-grade stenosis.     Thoracic spine 2V x-rays from 01/13/2020 were personally reviewed with the patient and demonstrated:  L3-4 fusion intact. Mild degenerative disc in the thoracic spine.       Written by Jacinto Vasques, as dictated by Trini Mendoza MD.  I, Dr. Trini Mendoza confirm that all documentation is accurate.

## 2020-10-05 NOTE — LETTER
10/6/20 Patient: Nohemy Whatley YOB: 1945 Date of Visit: 10/5/2020 Sonia Haro MD 
[address] [place] [address] VIA Facsimile: [phone] Dear Sonia Haro MD, Thank you for referring Ms. Janee Sauceda to [facility] for evaluation. My notes for this consultation are attached. If you have questions, please do not hesitate to call me. I look forward to following your patient along with you. Sincerely, Anne Garnica MD

## 2020-10-28 DIAGNOSIS — M96.1 LUMBAR POST-LAMINECTOMY SYNDROME: ICD-10-CM

## 2020-10-28 DIAGNOSIS — G62.9 NEUROPATHY: ICD-10-CM

## 2020-10-28 RX ORDER — GABAPENTIN 300 MG/1
CAPSULE ORAL
Qty: 270 CAP | Refills: 1 | Status: SHIPPED | OUTPATIENT
Start: 2020-10-28 | End: 2022-06-16 | Stop reason: SDUPTHER

## 2021-03-25 NOTE — TELEPHONE ENCOUNTER
Phone call to patient and states she has been having elevated blood pressures lately  Today her blood pressure was 150/90 and is concerned. She has been experiencing headaches for the last couple of days which has been int the back of the head  and is taking Advil for this which does help. Denies visual changes or numbness,weakness or tingling in any extremity. She states she is taking her medications as directed and on Metoprolol 50 mg daily.     Please advise Please see below.

## 2021-07-26 ENCOUNTER — OFFICE VISIT (OUTPATIENT)
Dept: ORTHOPEDIC SURGERY | Age: 76
End: 2021-07-26
Payer: MEDICARE

## 2021-07-26 ENCOUNTER — HOSPITAL ENCOUNTER (OUTPATIENT)
Dept: GENERAL RADIOLOGY | Age: 76
Discharge: HOME | End: 2021-07-26
Payer: MEDICARE

## 2021-07-26 ENCOUNTER — TELEPHONE (OUTPATIENT)
Dept: ORTHOPEDIC SURGERY | Age: 76
End: 2021-07-26

## 2021-07-26 VITALS
TEMPERATURE: 97.5 F | BODY MASS INDEX: 26.54 KG/M2 | WEIGHT: 144.2 LBS | RESPIRATION RATE: 20 BRPM | HEART RATE: 58 BPM | DIASTOLIC BLOOD PRESSURE: 68 MMHG | OXYGEN SATURATION: 97 % | SYSTOLIC BLOOD PRESSURE: 113 MMHG | HEIGHT: 62 IN

## 2021-07-26 DIAGNOSIS — M47.816 LUMBAR FACET ARTHROPATHY: ICD-10-CM

## 2021-07-26 DIAGNOSIS — M62.838 MUSCLE SPASM: ICD-10-CM

## 2021-07-26 DIAGNOSIS — M54.50 LUMBAR PAIN: ICD-10-CM

## 2021-07-26 DIAGNOSIS — M54.42 ACUTE MIDLINE LOW BACK PAIN WITH BILATERAL SCIATICA: Primary | ICD-10-CM

## 2021-07-26 DIAGNOSIS — Z98.1 HISTORY OF LUMBAR FUSION: ICD-10-CM

## 2021-07-26 DIAGNOSIS — M54.41 ACUTE MIDLINE LOW BACK PAIN WITH BILATERAL SCIATICA: Primary | ICD-10-CM

## 2021-07-26 PROCEDURE — G8510 SCR DEP NEG, NO PLAN REQD: HCPCS | Performed by: PHYSICAL MEDICINE & REHABILITATION

## 2021-07-26 PROCEDURE — 1090F PRES/ABSN URINE INCON ASSESS: CPT | Performed by: PHYSICAL MEDICINE & REHABILITATION

## 2021-07-26 PROCEDURE — G8536 NO DOC ELDER MAL SCRN: HCPCS | Performed by: PHYSICAL MEDICINE & REHABILITATION

## 2021-07-26 PROCEDURE — 1101F PT FALLS ASSESS-DOCD LE1/YR: CPT | Performed by: PHYSICAL MEDICINE & REHABILITATION

## 2021-07-26 PROCEDURE — G8419 CALC BMI OUT NRM PARAM NOF/U: HCPCS | Performed by: PHYSICAL MEDICINE & REHABILITATION

## 2021-07-26 PROCEDURE — 99214 OFFICE O/P EST MOD 30 MIN: CPT | Performed by: PHYSICAL MEDICINE & REHABILITATION

## 2021-07-26 PROCEDURE — G8427 DOCREV CUR MEDS BY ELIG CLIN: HCPCS | Performed by: PHYSICAL MEDICINE & REHABILITATION

## 2021-07-26 PROCEDURE — G8400 PT W/DXA NO RESULTS DOC: HCPCS | Performed by: PHYSICAL MEDICINE & REHABILITATION

## 2021-07-26 PROCEDURE — 72110 X-RAY EXAM L-2 SPINE 4/>VWS: CPT

## 2021-07-26 RX ORDER — GABAPENTIN 300 MG/1
CAPSULE ORAL
Qty: 270 CAPSULE | Refills: 1 | Status: SHIPPED | OUTPATIENT
Start: 2021-07-26 | End: 2021-12-02 | Stop reason: SDUPTHER

## 2021-07-26 NOTE — PATIENT INSTRUCTIONS
Low Back Arthritis: Exercises  Introduction  Here are some examples of typical rehabilitation exercises for your condition. Start each exercise slowly. Ease off the exercise if you start to have pain. Your doctor or physical therapist will tell you when you can start these exercises and which ones will work best for you. When you are not being active, find a comfortable position for rest. Some people are comfortable on the floor or a medium-firm bed with a small pillow under their head and another under their knees. Some people prefer to lie on their side with a pillow between their knees. Don't stay in one position for too long. Take short walks (10 to 20 minutes) every 2 to 3 hours. Avoid slopes, hills, and stairs until you feel better. Walk only distances you can manage without pain, especially leg pain. How to do the exercises  Pelvic tilt   1. Lie on your back with your knees bent. 2. \"Brace\" your stomachtighten your muscles by pulling in and imagining your belly button moving toward your spine. 3. Press your lower back into the floor. You should feel your hips and pelvis rock back. 4. Hold for 6 seconds while breathing smoothly. 5. Relax and allow your pelvis and hips to rock forward. 6. Repeat 8 to 12 times. Back stretches   1. Get down on your hands and knees on the floor. 2. Relax your head and allow it to droop. Round your back up toward the ceiling until you feel a nice stretch in your upper, middle, and lower back. Hold this stretch for as long as it feels comfortable, or about 15 to 30 seconds. 3. Return to the starting position with a flat back while you are on your hands and knees. 4. Let your back sway by pressing your stomach toward the floor. Lift your buttocks toward the ceiling. 5. Hold this position for 15 to 30 seconds. 6. Repeat 2 to 4 times. Follow-up care is a key part of your treatment and safety.  Be sure to make and go to all appointments, and call your doctor if you are having problems. It's also a good idea to know your test results and keep a list of the medicines you take. Where can you learn more? Go to http://www.[employer].com/  Enter T094 in the search box to learn more about \"Low Back Arthritis: Exercises. \"  Current as of: November 16, 2020               Content Version: 12.8  © 7018-1508 [employer]. Care instructions adapted under license by [employer] (which disclaims liability or warranty for this information). If you have questions about a medical condition or this instruction, always ask your healthcare professional. [place] [address] any warranty or liability for your use of this information.

## 2021-07-26 NOTE — LETTER
7/26/2021    Patient: Gustavo Martins   YOB: 1945   Date of Visit: 7/26/2021     Leona Martinez MD  [address] [facility] [address] [place] [address]  Via Fax: [phone]    Dear Leona Martinez MD,      Thank you for referring Ms. Jsesica Gardner to [facility] for evaluation. My notes for this consultation are attached. If you have questions, please do not hesitate to call me. I look forward to following your patient along with you.       Sincerely,    Sherwin Werner MD

## 2021-07-26 NOTE — PROGRESS NOTES
[facility]  [address] [place] [address], [facility] [address]  Phone: [phone]  Fax: [phone]    Pt's YOB: 1945    ASSESSMENT   Diagnoses and all orders for this visit:    1. Acute midline low back pain with bilateral sciatica  -     MRI LUMB SPINE W WO CONT; Future  -     XR SPINE LUMB MIN 4 V; Future  -     gabapentin (Neurontin) 300 mg capsule; Take 1 cap by mouth in the morning and 2 at night as directed. 2. Lumbar facet arthropathy  -     MRI LUMB SPINE W WO CONT; Future    3. Muscle spasm  -     MRI LUMB SPINE W WO CONT; Future    4. History of lumbar fusion  -     MRI LUMB SPINE W WO CONT; Future         IMPRESSION AND PLAN:  Karma Aleman is a 68 y.o. female with history of lumbar pain. She complains of pain across the lower back that radiates down both legs to the toes x 1 month which may be associated with changes in activity. Pt takes Neurontin 300 mg 2 caps QHS with sedation. 1) Pt was given information on lumbar arthritis exercises. 2) Lumbar spine x-rays were ordered--results are currently pending. 3) A lumbar MRI was ordered as she has progressive lumbar pain with bilateral radicular symptoms--. 4) Pt will increase her Neurontin 300 mg to 1 cap QAM and 2 caps QHS. 5) Ms. Kyree Linda has a reminder for a \"due or due soon\" health maintenance. I have asked that she contact her primary care provider, Ayana Blum MD, for follow-up on this health maintenance. 6)  demonstrated consistency with prescribing. Follow-up and Dispositions    · Return in about 3 weeks (around 8/16/2021) for Diagnostic Test follow up, Medication follow up. HISTORY OF PRESENT ILLNESS:  Karma Aleman is a 68 y.o. female with history of lumbar pain and presents to the office today for follow up and was last seen on 10/5/2020.  She complains of pain across the lower back that radiates down both legs to the toes x 1 month which may be associated with changes in activity. Pt also reports numbness/tingling in the toes. She notices increased pain in the feet when wearing closed toed shoes. Pt reports pain when changing positions and her pain generally improves when sitting. She admits to difficulty with sleeps secondary to pain and weakness in the legs. Of note, she had a lumbar fusion in . She takes Neurontin 300 mg 2 caps QHS with sedation. Pt denies ever taking the Neurontin during the day. Pt at this time desires to proceed with a lumbar MRI and medication evaluation. Pain Scale: 8/10    PCP: Scott Henderson MD     Past Medical History:   Diagnosis Date    Bladder cancer ([facility] [address] 2015 (approx)    Foraminal stenosis of lumbar region     Hematuria     Hypercholesterolemia     Low back pain     Shoulder pain 10/1/2020        Social History     Socioeconomic History    Marital status:      Spouse name: Not on file    Number of children: Not on file    Years of education: Not on file    Highest education level: Not on file   Occupational History    Occupation: nurse   Tobacco Use    Smoking status: Former Smoker     Quit date: 1980     Years since quittin.5    Smokeless tobacco: Never Used   Substance and Sexual Activity    Alcohol use: Yes     Alcohol/week: 0.0 standard drinks     Comment: 1 glass of wine 3-4 times per month    Drug use: No    Sexual activity: Not on file   Other Topics Concern    Not on file   Social History Narrative    Not on file     Social Determinants of Health     Financial Resource Strain:     Difficulty of Paying Living Expenses:    Food Insecurity:     Worried About Running Out of Food in the Last Year:     [address] in the Last Year:    Transportation Needs:     Lack of Transportation (Medical):      Lack of Transportation (Non-Medical):    Physical Activity:     Days of Exercise per Week:     Minutes of Exercise per Session:    Stress:     Feeling of Stress : Social Connections:     Frequency of Communication with Friends and Family:     Frequency of Social Gatherings with Friends and Family:     Attends Islam Services:     Active Member of Clubs or Organizations:     Attends Club or Organization Meetings:     Marital Status:    Intimate Partner Violence:     Fear of Current or Ex-Partner:     Emotionally Abused:     Physically Abused:     Sexually Abused:        Current Outpatient Medications   Medication Sig Dispense Refill    gabapentin (Neurontin) 300 mg capsule Take 1 cap by mouth in the morning and 2 at night as directed. 270 Capsule 1    potassium chloride SA (MICRO-K) 10 mEq capsule TK 1 C PO QD      gabapentin (NEURONTIN) 300 mg capsule Take 2-3 in the evening as directed 270 Cap 1    amLODIPine (NORVASC) 5 mg tablet TK 1 T PO ONCE A DAY      escitalopram oxalate (LEXAPRO) 10 mg tablet escitalopram 10 mg tablet      fluticasone propionate (FLONASE) 50 mcg/actuation nasal spray fluticasone propionate 50 mcg/actuation nasal spray,suspension      furosemide (LASIX) 20 mg tablet furosemide 20 mg tablet      lidocaine (LIDODERM) 5 % 1 Patch by TransDERmal route every twenty-four (24) hours. Apply patch to the affected area for 12 hours a day and remove for 12 hours a day.  MULTIVIT-MINERALS/FOLIC ACID (THERALOGIX  PO) Take 2 Tabs by mouth daily.  traZODone (DESYREL) 50 mg tablet TK 1 T PO QHS  0    multivitamin (ONE A DAY) tablet Take 1 Tab by mouth daily.  pravastatin (PRAVACHOL) 20 mg tablet Take 20 mg by mouth nightly.   5    metaxalone (SKELAXIN) 800 mg tablet TK 1 T PO TID PRF MSP (Patient not taking: Reported on 7/26/2021)      ferrous sulfate 325 mg (65 mg iron) tablet TK 1 T PO  ONCE A DAY (Patient not taking: Reported on 7/26/2021)      topiramate (TOPAMAX) 25 mg tablet Take 1 in the evening for 1 week, then increase to 2 the second week and continue with 3 in the evening (Patient not taking: Reported on 7/26/2021) 90 Tab 1    ibuprofen (ADVIL) 200 mg tablet Take 400 mg by mouth every eight (8) hours as needed. (Patient not taking: Reported on 7/26/2021)      aspirin delayed-release 81 mg tablet Take 325 mg by mouth daily. (Patient not taking: Reported on 7/26/2021)         Allergies   Allergen Reactions    Simvastatin Unknown (comments)    Lipitor [Atorvastatin] Myalgia         REVIEW OF SYSTEMS    Constitutional: Negative for fever, chills, or weight change. Respiratory: Negative for cough or shortness of breath. Cardiovascular: Negative for chest pain or palpitations. Gastrointestinal: Negative for acid reflux, change in bowel habits, or constipation. Genitourinary: Negative for dysuria and flank pain. Musculoskeletal: Positive for lumbar pain  Neurological: Negative for headaches, dizziness, or numbness. Endo/Heme/Allergies: Negative for increased bruising. Psychiatric/Behavioral: Negative for difficulty with sleep. As per HPI    PHYSICAL EXAMINATION  Visit Vitals  /68 (BP 1 Location: Right arm, BP Patient Position: Sitting, BP Cuff Size: Adult)   Pulse (!) 58   Temp 97.5 °F (36.4 °C) (Temporal)   Resp 20   Ht 5' 2\" (1.575 m)   Wt 144 lb 3.2 oz (65.4 kg)   SpO2 97%   BMI 26.37 kg/m²       Constitutional: Awake, alert, and in no acute distress. Neurological: 1+ symmetrical DTRs in the upper extremities. 1+ symmetrical DTRs in the lower extremities. Sensation to light touch is intact. Negative Ying's sign bilaterally. Skin: warm, dry, and intact. Musculoskeletal: Tenderness to palpation in the lumbar region. Moderate pain with extension and axial loading. No pain with internal or external rotation of her hips. Negative straight leg raise bilaterally.      Hip Flex  Quads Hamstrings Ankle DF EHL Ankle PF   Right +4/5 +4/5 +4/5 +4/5 +4/5 +4/5   Left +4/5 +4/5 +4/5 +4/5 +4/5 +4/5     IMAGING:    Lumbar spine MRI from 02/07/2020 was personally reviewed with the patient and demonstrated:  Findings: The lumbar spine is normally aligned. Evidence of circumferential fusion L3-L4. Very mild anterolisthesis of L4 on L5 has occurred since the previous CT. There is degenerative disc disease with disc desiccation at several levels.  There is no destructive bony lesion. Axial scans show:    T11-T12: Normal except for degenerative change. Small benign liver cyst adjacent to the IVC. T12-L1: Normal.    L1-L2: Normal.    L2/L3: Normal except for minimal degenerative change. L3/L4: L3 pedicle screws are in good position as is the interbody fusion cage. Cross-link in place. No recurrent or residual significant central or lateral stenosis. L4 pedicle screws are in good position. Bandlike presumed artifact within the thecal sac that is T2 hypointense on axial scans. L4/L5: This is the level of the spondylolisthesis. There is facet arthropathy with facet effusion on the left. Bulging disc. No protrusion or extrusion and no high-grade stenosis. L5/S1: Normal except for degenerative change. The more rostral discs are included on parasagittal scans up to T10-T11.  No additional herniation or stenosis is seen.  The inferior most conus medullaris is unremarkable. IMPRESSION    1. Evidence of prior surgery with circumferential fusion at L3-L4. Hardware appears in good position by MRI. Some artifact projecting into the thecal sac on axial T2 images. There is no recurrent or residual significant stenosis at this surgical level. 2. Mild anterolisthesis of L4 on L5 since the previous postoperative CT from 2015. This is secondary to facet arthropathy which is more prominent on the left. There is no substantial stenosis at this level. 3. Additional degenerative change but no additional evidence of herniation or high-grade stenosis.     Thoracic spine 2V x-rays from 01/13/2020 were personally reviewed with the patient and demonstrated:  L3-4 fusion intact.  Mild degenerative disc in the thoracic spine.      Written by Ry Gomez, as dictated by Lino Villanueva MD.  I, Dr. Lino Villanueva confirm that all documentation is accurate.

## 2021-07-26 NOTE — TELEPHONE ENCOUNTER
Patient called in requesting for her MRI order faxed over to any [facility] facility. Patient stated she's in closer proximity to all [facility] facilities than [facility].     Patient's contact is [phone]

## 2021-07-26 NOTE — PROGRESS NOTES
1. Have you been to an emergency room or urgent care clinic since your last visit? No     Hospitalized since your last visit? If yes, where, when, and reason for visit? No     2. Have you seen or consulted any other health care providers outside of the [facility] since your last visit including any procedures, health maintenance items. If yes, where, when and reason for visit?  Yes; cardiology            3 most recent [facility] 36 Screens 7/26/2021   PHQ Not Done -   Little interest or pleasure in doing things Not at all   Feeling down, depressed, irritable, or hopeless Not at all   Total Score PHQ 2 0

## 2021-07-29 NOTE — TELEPHONE ENCOUNTER
MRI of the Lumbar Spine with and without contrast has been faxed to [facility] for scheduling, [phone], fax [phone]. No Medicare pre-authorization is required.

## 2021-08-02 DIAGNOSIS — M62.838 MUSCLE SPASM: ICD-10-CM

## 2021-08-02 DIAGNOSIS — Z98.1 HISTORY OF LUMBAR FUSION: ICD-10-CM

## 2021-08-02 DIAGNOSIS — M54.41 ACUTE MIDLINE LOW BACK PAIN WITH BILATERAL SCIATICA: ICD-10-CM

## 2021-08-02 DIAGNOSIS — M47.816 LUMBAR FACET ARTHROPATHY: ICD-10-CM

## 2021-08-02 DIAGNOSIS — M54.42 ACUTE MIDLINE LOW BACK PAIN WITH BILATERAL SCIATICA: ICD-10-CM

## 2021-08-09 ENCOUNTER — HOSPITAL ENCOUNTER (OUTPATIENT)
Age: 76
Discharge: HOME | End: 2021-08-09
Attending: PHYSICAL MEDICINE & REHABILITATION
Payer: MEDICARE

## 2021-08-09 PROCEDURE — 72158 MRI LUMBAR SPINE W/O & W/DYE: CPT

## 2021-08-09 PROCEDURE — A9575 INJ GADOTERATE MEGLUMI 0.1ML: HCPCS | Performed by: PHYSICAL MEDICINE & REHABILITATION

## 2021-08-09 PROCEDURE — 82565 ASSAY OF CREATININE: CPT

## 2021-08-09 PROCEDURE — 74011636320 HC RX REV CODE- 636/320: Performed by: PHYSICAL MEDICINE & REHABILITATION

## 2021-08-09 RX ADMIN — GADOTERATE MEGLUMINE 13 ML: 376.9 INJECTION INTRAVENOUS at 09:24

## 2021-08-10 LAB — CREAT UR-MCNC: 0.7 MG/DL (ref 0.6–1.3)

## 2021-09-08 NOTE — PROGRESS NOTES
[facility]  [address] [facility], [facility]   Phone: [phone]  Fax: [phone]    Pt's YOB: 1945    ASSESSMENT   Diagnoses and all orders for this visit:    1. Sacroiliac pain  -     SCHEDULE SURGERY    2. Lumbar facet arthropathy    3. Muscle spasm    4. Lumbar post-laminectomy syndrome         IMPRESSION AND PLAN:  Gus Bland is a 68 y.o. female with history of lumbar pain. She complains of pain across the lower back that radiates down both legs to the toes. Pt admits to difficulty turning side to side in bed. 1) Pt was given information on lumbar arthritis and sacroiliac exercises. 2) She was scheduled for a bilateral sacroiliac joint injection. 3) Ms. Ladonna Vo has a reminder for a \"due or due soon\" health maintenance. I have asked that she contact her primary care provider, Mendel Constable, MD, for follow-up on this health maintenance. 4)  demonstrated consistency with prescribing. Follow-up and Dispositions    · Return in about 6 weeks (around 10/21/2021) for injection follow up. HISTORY OF PRESENT ILLNESS:  Gus Bland is a 68 y.o. female with history of lumbar pain and presents to the office today for MRI follow up. She complains of pain across the lower back that radiates down both legs to the toes. Pt admits to difficulty turning side to side in bed. She denies any recent falls or injuries. Of note, she had an L3-4 fusion in 2013. She denies a history of diabetes mellitus and is not currently on anticoagulants. Pt at this time desires to proceed with steroid injections.     Pain Scale: 5/10    PCP: Mendel Constable, MD     Past Medical History:   Diagnosis Date    Bladder cancer ([facility] [address] 2015 (approx)    Foraminal stenosis of lumbar region     Hematuria     Hypercholesterolemia     Low back pain     Shoulder pain 10/1/2020        Social History     Socioeconomic History    Marital status:      Spouse name: Not on file    Number of children: Not on file    Years of education: Not on file    Highest education level: Not on file   Occupational History    Occupation: nurse   Tobacco Use    Smoking status: Former Smoker     Quit date: 1980     Years since quittin.7    Smokeless tobacco: Never Used   Substance and Sexual Activity    Alcohol use: Yes     Alcohol/week: 0.0 standard drinks     Comment: 1 glass of wine 3-4 times per month    Drug use: No    Sexual activity: Not on file   Other Topics Concern    Not on file   Social History Narrative    Not on file     Social Determinants of Health     Financial Resource Strain:     Difficulty of Paying Living Expenses:    Food Insecurity:     Worried About Running Out of Food in the Last Year:     [address] in the Last Year:    Transportation Needs:     Lack of Transportation (Medical):      Lack of Transportation (Non-Medical):    Physical Activity:     Days of Exercise per Week:     Minutes of Exercise per Session:    Stress:     Feeling of Stress :    Social Connections:     Frequency of Communication with Friends and Family:     Frequency of Social Gatherings with Friends and Family:     Attends [employer]ism Services:     Active Member of Clubs or Organizations:     Attends Club or Organization Meetings:     Marital Status:    Intimate Partner Violence:     Fear of Current or Ex-Partner:     Emotionally Abused:     Physically Abused:     Sexually Abused:        Current Outpatient Medications   Medication Sig Dispense Refill    potassium chloride SA (MICRO-K) 10 mEq capsule TK 1 C PO QD      gabapentin (NEURONTIN) 300 mg capsule Take 2-3 in the evening as directed 270 Cap 1    amLODIPine (NORVASC) 5 mg tablet TK 1 T PO ONCE A DAY      escitalopram oxalate (LEXAPRO) 10 mg tablet escitalopram 10 mg tablet      ferrous sulfate 325 mg (65 mg iron) tablet TK 1 T PO  ONCE A DAY      fluticasone propionate (FLONASE) 50 mcg/actuation nasal spray fluticasone propionate 50 mcg/actuation nasal spray,suspension      furosemide (LASIX) 20 mg tablet furosemide 20 mg tablet      lidocaine (LIDODERM) 5 % 1 Patch by TransDERmal route every twenty-four (24) hours. Apply patch to the affected area for 12 hours a day and remove for 12 hours a day.  traZODone (DESYREL) 50 mg tablet TK 1 T PO QHS  0    multivitamin (ONE A DAY) tablet Take 1 Tab by mouth daily.  pravastatin (PRAVACHOL) 20 mg tablet Take 20 mg by mouth nightly. 5    gabapentin (Neurontin) 300 mg capsule Take 1 cap by mouth in the morning and 2 at night as directed. 270 Capsule 1    metaxalone (SKELAXIN) 800 mg tablet TK 1 T PO TID PRF MSP (Patient not taking: Reported on 7/26/2021)      topiramate (TOPAMAX) 25 mg tablet Take 1 in the evening for 1 week, then increase to 2 the second week and continue with 3 in the evening (Patient not taking: Reported on 7/26/2021) 90 Tab 1    ibuprofen (ADVIL) 200 mg tablet Take 400 mg by mouth every eight (8) hours as needed. (Patient not taking: Reported on 7/26/2021)      MULTIVIT-MINERALS/FOLIC ACID (THERALOGIX  PO) Take 2 Tabs by mouth daily. Allergies   Allergen Reactions    Simvastatin Unknown (comments)    Lipitor [Atorvastatin] Myalgia         REVIEW OF SYSTEMS    Constitutional: Negative for fever, chills, or weight change. Respiratory: Negative for cough or shortness of breath. Cardiovascular: Negative for chest pain or palpitations. Gastrointestinal: Negative for acid reflux, change in bowel habits, or constipation. Genitourinary: Negative for dysuria and flank pain. Musculoskeletal: Positive for lumbar pain. Skin: Negative for rash. Neurological: Negative for headaches, dizziness, or numbness. Endo/Heme/Allergies: Negative for increased bruising. Psychiatric/Behavioral: Negative for difficulty with sleep.     As per HPI    PHYSICAL EXAMINATION  Visit Vitals  /63 (BP 1 Location: Left upper arm)   Pulse 62   Temp 97.2 °F (36.2 °C)   Resp 19   Wt 147 lb (66.7 kg)   SpO2 99%   BMI 26.89 kg/m²       Constitutional: Awake, alert, and in no acute distress. Neurological: 1+ symmetrical DTRs in the upper extremities. 1+ symmetrical DTRs in the lower extremities. Sensation to light touch is intact. Negative Ying's sign bilaterally. Skin: warm, dry, and intact. Musculoskeletal: Tenderness to palpation in the lumbar region and over the sacroiliac joint bilaterally. Moderate pain with extension and axial loading. No change with forward flexion. No pain with internal or external rotation of her hips. Positive ZACH's test bilaterally, L>R. Positive Gaenslen's test and compression test bilaterally. Negative straight leg raise bilaterally. Hip Flex  Quads Hamstrings Ankle DF EHL Ankle PF   Right  4/5 +4/5 +4/5 +4/5 +4/5 +4/5   Left  4/5 +4/5 +4/5 +4/5 +4/5 +4/5     IMAGING:    Lumbar spine MRI from 8/9/2021 was personally reviewed with the patient and demonstrated:  Results from Orders Only encounter on 08/02/21    MRI LUMB SPINE W WO CONT    Narrative  EXAMINATION: MRI lumbar spine with and without contrast    INDICATION: Lumbar pain, lower extremity radiculopathy, postoperative lumbar  spine    COMPARISON: Radiographs 7/26/2021, MRI 9/14/2017    TECHNIQUE: Sagittal and axial multiecho MRI sequences of the lumbar spine  performed before and after 13 cc IV Dotarem. FINDINGS:    Postoperative: Status post L3-L4 posterior hardware fixation. No evidence of  postoperative collection. Artifact slightly limits evaluation. Mild lower lumbar  paraspinous muscle fatty atrophy. General: Vertebral body heights preserved. Multilevel mild disc space loss and  mild endplate spurring. Mild L2 on L3 retrolisthesis and grade 1 L4-L5  anterolisthesis. Lumbar lordosis maintained. Conus ends at level L2-L3. No  evidence of abnormal signal or enhancement in the distal cord.  No abnormal  epidural collection identified. No suspicious marrow signal.    Miscellaneous: Incompletely imaged probable central right hepatic lobe cyst and  subcentimeter left renal cysts. Incompletely imaged suspected uterine fibroids. Levels:    T10-T11, T11-T12, T12-L1: Sagittal plane only. No significant degenerative  change or stenosis. L1-L2: No significant disc disease or stenosis. Mild facet arthropathy. L2-L3: Mild retrolisthesis. Small left lateral recess disc protrusion and  background mild disc bulge. Mild facet arthropathy. Spinal canal patent. Mild  foraminal stenoses. L3-L4: Postoperative lumbar spine. Mild facet arthropathy. Spinal canal patent. Mild left foraminal stenosis. Right foramen patent. L4-L5: Grade 1 anterolisthesis with uncovering of disc and mild disc bulge. Severe facet arthropathy. Minimal spinal canal narrowing. Mild to moderate  foraminal stenoses. L5-S1: Small posterior central disc protrusion with associated annular fissure  and background mild disc bulge. Mild facet arthropathy. Spinal canal patent. Mild left greater than right foraminal stenoses. Imaged sacrum: Unremarkable. Impression  L3-L4 posterior hardware fixation. Spinal canal patent at this level, but mild  left foraminal stenosis. L4-L5 notable degenerative changes characterized by grade 1 anterolisthesis and  severe facet arthropathy resulting in minimal spinal canal narrowing and mild to  moderate foraminal stenoses. Notable degenerative changes at L2-L3 and L5-S1, but no more mild stenosis. See level by level details above. Compared to 2017 MRI, degenerative changes  have overall worsened. Thoracic spine 2V x-rays from 01/13/2020 were personally reviewed with the patient and demonstrated:  L3-4 fusion intact.  Mild degenerative disc in the thoracic spine.     Written by Ludivina Humphrey, as dictated by Fracisco East MD.  I, Dr. Fracisco East confirm that all documentation is accurate.

## 2021-09-09 ENCOUNTER — OFFICE VISIT (OUTPATIENT)
Dept: ORTHOPEDIC SURGERY | Age: 76
End: 2021-09-09
Payer: MEDICARE

## 2021-09-09 VITALS
TEMPERATURE: 97.2 F | SYSTOLIC BLOOD PRESSURE: 128 MMHG | OXYGEN SATURATION: 99 % | DIASTOLIC BLOOD PRESSURE: 63 MMHG | BODY MASS INDEX: 26.89 KG/M2 | RESPIRATION RATE: 19 BRPM | HEART RATE: 62 BPM | WEIGHT: 147 LBS

## 2021-09-09 DIAGNOSIS — M47.816 LUMBAR FACET ARTHROPATHY: ICD-10-CM

## 2021-09-09 DIAGNOSIS — M96.1 LUMBAR POST-LAMINECTOMY SYNDROME: ICD-10-CM

## 2021-09-09 DIAGNOSIS — M62.838 MUSCLE SPASM: ICD-10-CM

## 2021-09-09 DIAGNOSIS — M53.3 SACROILIAC PAIN: Primary | ICD-10-CM

## 2021-09-09 PROCEDURE — G8427 DOCREV CUR MEDS BY ELIG CLIN: HCPCS | Performed by: PHYSICAL MEDICINE & REHABILITATION

## 2021-09-09 PROCEDURE — G8419 CALC BMI OUT NRM PARAM NOF/U: HCPCS | Performed by: PHYSICAL MEDICINE & REHABILITATION

## 2021-09-09 PROCEDURE — 1101F PT FALLS ASSESS-DOCD LE1/YR: CPT | Performed by: PHYSICAL MEDICINE & REHABILITATION

## 2021-09-09 PROCEDURE — G8432 DEP SCR NOT DOC, RNG: HCPCS | Performed by: PHYSICAL MEDICINE & REHABILITATION

## 2021-09-09 PROCEDURE — G8536 NO DOC ELDER MAL SCRN: HCPCS | Performed by: PHYSICAL MEDICINE & REHABILITATION

## 2021-09-09 PROCEDURE — 99213 OFFICE O/P EST LOW 20 MIN: CPT | Performed by: PHYSICAL MEDICINE & REHABILITATION

## 2021-09-09 PROCEDURE — G8400 PT W/DXA NO RESULTS DOC: HCPCS | Performed by: PHYSICAL MEDICINE & REHABILITATION

## 2021-09-09 PROCEDURE — 1090F PRES/ABSN URINE INCON ASSESS: CPT | Performed by: PHYSICAL MEDICINE & REHABILITATION

## 2021-09-09 NOTE — PATIENT INSTRUCTIONS
Low Back Arthritis: Exercises  Introduction  Here are some examples of typical rehabilitation exercises for your condition. Start each exercise slowly. Ease off the exercise if you start to have pain. Your doctor or physical therapist will tell you when you can start these exercises and which ones will work best for you. When you are not being active, find a comfortable position for rest. Some people are comfortable on the floor or a medium-firm bed with a small pillow under their head and another under their knees. Some people prefer to lie on their side with a pillow between their knees. Don't stay in one position for too long. Take short walks (10 to 20 minutes) every 2 to 3 hours. Avoid slopes, hills, and stairs until you feel better. Walk only distances you can manage without pain, especially leg pain. How to do the exercises  Pelvic tilt   1. Lie on your back with your knees bent. 2. \"Brace\" your stomachtighten your muscles by pulling in and imagining your belly button moving toward your spine. 3. Press your lower back into the floor. You should feel your hips and pelvis rock back. 4. Hold for 6 seconds while breathing smoothly. 5. Relax and allow your pelvis and hips to rock forward. 6. Repeat 8 to 12 times. Back stretches   1. Get down on your hands and knees on the floor. 2. Relax your head and allow it to droop. Round your back up toward the ceiling until you feel a nice stretch in your upper, middle, and lower back. Hold this stretch for as long as it feels comfortable, or about 15 to 30 seconds. 3. Return to the starting position with a flat back while you are on your hands and knees. 4. Let your back sway by pressing your stomach toward the floor. Lift your buttocks toward the ceiling. 5. Hold this position for 15 to 30 seconds. 6. Repeat 2 to 4 times. Follow-up care is a key part of your treatment and safety.  Be sure to make and go to all appointments, and call your doctor if you are having problems. It's also a good idea to know your test results and keep a list of the medicines you take. Where can you learn more? Go to http://www.brown.com/  Enter T094 in the search box to learn more about \"Low Back Arthritis: Exercises. \"  Current as of: November 16, 2020               Content Version: 12.8  © 2006-2021 [employer]. Care instructions adapted under license by [employer] (which disclaims liability or warranty for this information). If you have questions about a medical condition or this instruction, always ask your healthcare professional. [place] [address] any warranty or liability for your use of this information. Sacroiliac Pain: Exercises  Introduction  Here are some examples of exercises for you to try. The exercises may be suggested for a condition or for rehabilitation. Start each exercise slowly. Ease off the exercises if you start to have pain. You will be told when to start these exercises and which ones will work best for you. How to do the exercises  Knee-to-chest stretch   7. Do not do the knee-to-chest exercise if it causes or increases back or leg pain. 8. Lie on your back with your knees bent and your feet flat on the floor. You can put a small pillow under your head and neck if it is more comfortable. 9. Grasp your hands under one knee and bring the knee to your chest, keeping the other foot flat on the floor. 10. Keep your lower back pressed to the floor. Hold for at least 15 to 30 seconds. 11. Relax and lower the knee to the starting position. Repeat with the other leg. 12. Repeat 2 to 4 times with each leg. 13. To get more stretch, keep your other leg flat on the floor while pulling your knee to your chest.    Bridging   7. Lie on your back with both knees bent. Your knees should be bent about 90 degrees.   8. Tighten your belly muscles by pulling in your belly button toward your spine. Then push your feet into the floor, squeeze your buttocks, and lift your hips off the floor until your shoulders, hips, and knees are all in a straight line. 9. Hold for about 6 seconds as you continue to breathe normally, and then slowly lower your hips back down to the floor and rest for up to 10 seconds. 10. Repeat 8 to 12 times. Hip extension   1. Get down on your hands and knees on the floor. 2. Keeping your back and neck straight, lift one leg straight out behind you. When you lift your leg, keep your hips level. Don't let your back twist, and don't let your hip drop toward the floor. 3. Hold for 6 seconds. Repeat 8 to 12 times with each leg. 4. If you feel steady and strong when you do this exercise, you can make it more difficult. To do this, when you lift your leg, also lift the opposite arm straight out in front of you. For example, lift the left leg and the right arm at the same time. (This is sometimes called the \"bird dog exercise. \") Hold for 6 seconds, and repeat 8 to 12 times on each side. Clamshell   1. Lie on your side with a pillow under your head. Keep your feet and knees together and your knees bent. 2. Raise your top knee, but keep your feet together. Do not let your hips roll back. Your legs should open up like a clamshell. 3. Hold for 6 seconds. 4. Slowly lower your knee back down. Rest for 10 seconds. 5. Repeat 8 to 12 times. 6. Switch to your other side and repeat steps 1 through 5. Hamstring wall stretch   1. Lie on your back in a doorway, with one leg through the open door. 2. Slide your affected leg up the wall to straighten your knee. You should feel a gentle stretch down the back of your leg. 3. Hold the stretch for at least 1 minute to begin. Then try to lengthen the time you hold the stretch to as long as 6 minutes. 4. Switch legs, and repeat steps 1 through 3.  5. Repeat 2 to 4 times.   6. If you do not have a place to do this exercise in a doorway, there is another way to do it:  7. Lie on your back, and bend one knee. 8. Loop a towel under the ball and toes of that foot, and hold the ends of the towel in your hands. 9. Straighten your knee, and slowly pull back on the towel. You should feel a gentle stretch down the back of your leg. 10. Switch legs, and repeat steps 1 through 3.  11. Repeat 2 to 4 times. 1. Do not arch your back. 2. Do not bend either knee. 3. Keep one heel touching the floor and the other heel touching the wall. Do not point your toes. Lower abdominal strengthening   1. Lie on your back with your knees bent and your feet flat on the floor. 2. Tighten your belly muscles by pulling your belly button in toward your spine. 3. Lift one foot off the floor and bring your knee toward your chest, so that your knee is straight above your hip and your leg is bent like the letter \"L. \"  4. Lift the other knee up to the same position. 5. Lower one leg at a time to the starting position. 6. Keep alternating legs until you have lifted each leg 8 to 12 times. 7. Be sure to keep your belly muscles tight and your back still as you are moving your legs. Be sure to breathe normally. Piriformis stretch   1. Lie on your back with your legs straight. 2. Lift your affected leg, and bend your knee. With your opposite hand, reach across your body, and then gently pull your knee toward your opposite shoulder. 3. Hold the stretch for 15 to 30 seconds. 4. Switch legs and repeat steps 1 through 3.  5. Repeat 2 to 4 times. Follow-up care is a key part of your treatment and safety. Be sure to make and go to all appointments, and call your doctor if you are having problems. It's also a good idea to know your test results and keep a list of the medicines you take. Where can you learn more? Go to http://www.gray.com/  Enter O536 in the search box to learn more about \"Sacroiliac Pain: Exercises. \"  Current as of: November 16, 2020               Content Version: 12.8  © 0977-6232 [employer]. Care instructions adapted under license by [employer] (which disclaims liability or warranty for this information). If you have questions about a medical condition or this instruction, always ask your healthcare professional. [address] any warranty or liability for your use of this information.

## 2021-09-09 NOTE — LETTER
9/9/2021    Patient: Migel Moore   YOB: 1945   Date of Visit: 9/9/2021     Juve Camacho MD  [address] [facility] [address] [facility] [address]  Via Fax: [phone]    Dear Juve Camacho MD,      Thank you for referring Ms. Gabe Mejia to [address] for evaluation. My notes for this consultation are attached. If you have questions, please do not hesitate to call me. I look forward to following your patient along with you.       Sincerely,    Francis Mike MD

## 2021-09-15 ENCOUNTER — APPOINTMENT (OUTPATIENT)
Dept: GENERAL RADIOLOGY | Age: 76
End: 2021-09-15
Attending: PHYSICAL MEDICINE & REHABILITATION
Payer: MEDICARE

## 2021-09-15 ENCOUNTER — HOSPITAL ENCOUNTER (OUTPATIENT)
Age: 76
Setting detail: OUTPATIENT SURGERY
Discharge: HOME | End: 2021-09-15
Attending: PHYSICAL MEDICINE & REHABILITATION | Admitting: PHYSICAL MEDICINE & REHABILITATION
Payer: MEDICARE

## 2021-09-15 VITALS
RESPIRATION RATE: 16 BRPM | HEIGHT: 63 IN | DIASTOLIC BLOOD PRESSURE: 55 MMHG | TEMPERATURE: 98.8 F | HEART RATE: 83 BPM | BODY MASS INDEX: 25.34 KG/M2 | SYSTOLIC BLOOD PRESSURE: 136 MMHG | OXYGEN SATURATION: 97 % | WEIGHT: 143 LBS

## 2021-09-15 DIAGNOSIS — M53.3 SACROILIAC JOINT PAIN: ICD-10-CM

## 2021-09-15 PROCEDURE — 77030003676 HC NDL SPN MPRI -A: Performed by: PHYSICAL MEDICINE & REHABILITATION

## 2021-09-15 PROCEDURE — 76010000009 HC PAIN MGT 0 TO 30 MIN PROC: Performed by: PHYSICAL MEDICINE & REHABILITATION

## 2021-09-15 PROCEDURE — 27096 INJECT SACROILIAC JOINT: CPT | Performed by: PHYSICAL MEDICINE & REHABILITATION

## 2021-09-15 PROCEDURE — 2709999900 HC NON-CHARGEABLE SUPPLY: Performed by: PHYSICAL MEDICINE & REHABILITATION

## 2021-09-15 PROCEDURE — 74011000250 HC RX REV CODE- 250: Performed by: PHYSICAL MEDICINE & REHABILITATION

## 2021-09-15 PROCEDURE — 77030039433 HC TY MYLEOGRAM BD -B: Performed by: PHYSICAL MEDICINE & REHABILITATION

## 2021-09-15 PROCEDURE — 74011250637 HC RX REV CODE- 250/637: Performed by: PHYSICAL MEDICINE & REHABILITATION

## 2021-09-15 PROCEDURE — 74011000636 HC RX REV CODE- 636: Performed by: PHYSICAL MEDICINE & REHABILITATION

## 2021-09-15 RX ORDER — DIAZEPAM 5 MG/1
5-20 TABLET ORAL ONCE
Status: COMPLETED | OUTPATIENT
Start: 2021-09-15 | End: 2021-09-15

## 2021-09-15 RX ORDER — LIDOCAINE HYDROCHLORIDE 10 MG/ML
INJECTION, SOLUTION EPIDURAL; INFILTRATION; INTRACAUDAL; PERINEURAL AS NEEDED
Status: DISCONTINUED | OUTPATIENT
Start: 2021-09-15 | End: 2021-09-16 | Stop reason: HOSPADM

## 2021-09-15 RX ADMIN — DIAZEPAM 5 MG: 5 TABLET ORAL at 13:51

## 2021-09-15 NOTE — PROCEDURES
Bi Procedure Note    Patient Name: Екатерина Morse    Date of Procedure: September 15, 2021    Preoperative Diagnosis: Bilateral Sacroiliac Joint Pain    Post Operative Diagnosis: same    Procedure: SI Joint Injection bilateral      Consent: Informed consent was obtained prior to the procedure. The patient was given the opportunity to ask questions regarding the procedure and its associated risks. In addition to the potential risks associated with the procedure itself, the patient was informed both verbally and in writing of potential side effects of the use of glucocorticoids. The patient appeared to comprehend the informed consent and desired to have the procedure performed. Procedure: The patient was placed in the prone position on the flouroscopy table and the back was prepped and draped in the usual sterile manner. A #22 gauge spinal needle was then advanced to lie within the SI joint after local Lidocaine 1% injection. 1 cc isovue used to confirm the position. The procedure was repeated for the contralateral SI joint. A total of 10 mg of preservative free dexamethasone and 5 ml of Lidocaine was introduced into and around the SI joints. The injection area was cleaned and bandaids applied. No excessive bleeding was noted. Patient dressed and was discharged to home with instructions. Discussion:  (x) The patient tolerated the procedure well.     ( )       Juarez Palacios MD  September 15, 2021

## 2021-09-15 NOTE — H&P
Date of Surgery Update:  Gus Bland was seen and examined. History and physical has been reviewed. The patient has been examined. There have been no significant clinical changes since the last office visit. The patient was counseled at length about the risks of hima Covid-19 during their perioperative period and any recovery window from their procedure. The patient was made aware that hima Covid-19  may worsen their prognosis for recovering from their procedure and lend to a higher morbidity and/or mortality risk. All material risks, benefits, and reasonable alternatives including postponing the procedure were discussed. The patient does  wish to proceed with the procedure at this time.       Pre Injection pain level 6/10  Post Injection pain level 1/10    Signed By: Dianna Forbes MD     September 15, 2021 1:29 PM

## 2021-09-16 NOTE — PERIOP NOTES
Patient tolerated procedure well. No complications noted. VSS. No redness, swelling, or bleeding from injection site. Dressing dry and intact. Armband removed and shredded. Patient wheeled to main entrance by RN; discharged alive and well, in stable condition.

## 2021-10-12 ENCOUNTER — OFFICE VISIT (OUTPATIENT)
Dept: ORTHOPEDIC SURGERY | Age: 76
End: 2021-10-12
Payer: MEDICARE

## 2021-10-12 VITALS
TEMPERATURE: 96.2 F | WEIGHT: 143.5 LBS | HEART RATE: 60 BPM | DIASTOLIC BLOOD PRESSURE: 46 MMHG | OXYGEN SATURATION: 99 % | SYSTOLIC BLOOD PRESSURE: 134 MMHG | RESPIRATION RATE: 16 BRPM | HEIGHT: 63 IN | BODY MASS INDEX: 25.43 KG/M2

## 2021-10-12 DIAGNOSIS — M47.816 LUMBAR FACET ARTHROPATHY: Primary | ICD-10-CM

## 2021-10-12 DIAGNOSIS — M53.3 SACROILIAC PAIN: ICD-10-CM

## 2021-10-12 DIAGNOSIS — M96.1 LUMBAR POST-LAMINECTOMY SYNDROME: ICD-10-CM

## 2021-10-12 DIAGNOSIS — M62.838 MUSCLE SPASM: ICD-10-CM

## 2021-10-12 PROCEDURE — G8432 DEP SCR NOT DOC, RNG: HCPCS | Performed by: PHYSICAL MEDICINE & REHABILITATION

## 2021-10-12 PROCEDURE — 1101F PT FALLS ASSESS-DOCD LE1/YR: CPT | Performed by: PHYSICAL MEDICINE & REHABILITATION

## 2021-10-12 PROCEDURE — 99213 OFFICE O/P EST LOW 20 MIN: CPT | Performed by: PHYSICAL MEDICINE & REHABILITATION

## 2021-10-12 PROCEDURE — G8427 DOCREV CUR MEDS BY ELIG CLIN: HCPCS | Performed by: PHYSICAL MEDICINE & REHABILITATION

## 2021-10-12 PROCEDURE — G8419 CALC BMI OUT NRM PARAM NOF/U: HCPCS | Performed by: PHYSICAL MEDICINE & REHABILITATION

## 2021-10-12 PROCEDURE — G8536 NO DOC ELDER MAL SCRN: HCPCS | Performed by: PHYSICAL MEDICINE & REHABILITATION

## 2021-10-12 PROCEDURE — G8400 PT W/DXA NO RESULTS DOC: HCPCS | Performed by: PHYSICAL MEDICINE & REHABILITATION

## 2021-10-12 PROCEDURE — 1090F PRES/ABSN URINE INCON ASSESS: CPT | Performed by: PHYSICAL MEDICINE & REHABILITATION

## 2021-10-12 RX ORDER — METHYLPREDNISOLONE 4 MG/1
TABLET ORAL
Qty: 1 DOSE PACK | Refills: 0 | Status: SHIPPED | OUTPATIENT
Start: 2021-10-12 | End: 2022-06-16 | Stop reason: ALTCHOICE

## 2021-10-12 NOTE — PATIENT INSTRUCTIONS
Low Back Arthritis: Exercises  Introduction  Here are some examples of typical rehabilitation exercises for your condition. Start each exercise slowly. Ease off the exercise if you start to have pain. Your doctor or physical therapist will tell you when you can start these exercises and which ones will work best for you. When you are not being active, find a comfortable position for rest. Some people are comfortable on the floor or a medium-firm bed with a small pillow under their head and another under their knees. Some people prefer to lie on their side with a pillow between their knees. Don't stay in one position for too long. Take short walks (10 to 20 minutes) every 2 to 3 hours. Avoid slopes, hills, and stairs until you feel better. Walk only distances you can manage without pain, especially leg pain. How to do the exercises  Pelvic tilt    1. Lie on your back with your knees bent. 2. \"Brace\" your stomachtighten your muscles by pulling in and imagining your belly button moving toward your spine. 3. Press your lower back into the floor. You should feel your hips and pelvis rock back. 4. Hold for 6 seconds while breathing smoothly. 5. Relax and allow your pelvis and hips to rock forward. 6. Repeat 8 to 12 times. Back stretches    1. Get down on your hands and knees on the floor. 2. Relax your head and allow it to droop. Round your back up toward the ceiling until you feel a nice stretch in your upper, middle, and lower back. Hold this stretch for as long as it feels comfortable, or about 15 to 30 seconds. 3. Return to the starting position with a flat back while you are on your hands and knees. 4. Let your back sway by pressing your stomach toward the floor. Lift your buttocks toward the ceiling. 5. Hold this position for 15 to 30 seconds. 6. Repeat 2 to 4 times. Follow-up care is a key part of your treatment and safety.  Be sure to make and go to all appointments, and call your doctor if you are having problems. It's also a good idea to know your test results and keep a list of the medicines you take. Where can you learn more? Go to http://www.Beaming.com/  Enter T094 in the search box to learn more about \"Low Back Arthritis: Exercises. \"  Current as of: July 1, 2021               Content Version: 13.0  © 2006-2021 [employer]. Care instructions adapted under license by [employer] (which disclaims liability or warranty for this information). If you have questions about a medical condition or this instruction, always ask your healthcare professional. [place] [address] any warranty or liability for your use of this information. Sacroiliac Pain: Exercises  Introduction  Here are some examples of exercises for you to try. The exercises may be suggested for a condition or for rehabilitation. Start each exercise slowly. Ease off the exercises if you start to have pain. You will be told when to start these exercises and which ones will work best for you. How to do the exercises  Knee-to-chest stretch    1. Do not do the knee-to-chest exercise if it causes or increases back or leg pain. 2. Lie on your back with your knees bent and your feet flat on the floor. You can put a small pillow under your head and neck if it is more comfortable. 3. Grasp your hands under one knee and bring the knee to your chest, keeping the other foot flat on the floor. 4. Keep your lower back pressed to the floor. Hold for at least 15 to 30 seconds. 5. Relax and lower the knee to the starting position. Repeat with the other leg. 6. Repeat 2 to 4 times with each leg. 7. To get more stretch, keep your other leg flat on the floor while pulling your knee to your chest.  Bridging    1. Lie on your back with both knees bent. Your knees should be bent about 90 degrees. 2. Tighten your belly muscles by pulling in your belly button toward your spine.  Then push your feet into the floor, squeeze your buttocks, and lift your hips off the floor until your shoulders, hips, and knees are all in a straight line. 3. Hold for about 6 seconds as you continue to breathe normally, and then slowly lower your hips back down to the floor and rest for up to 10 seconds. 4. Repeat 8 to 12 times. Hip extension    1. Get down on your hands and knees on the floor. 2. Keeping your back and neck straight, lift one leg straight out behind you. When you lift your leg, keep your hips level. Don't let your back twist, and don't let your hip drop toward the floor. 3. Hold for 6 seconds. Repeat 8 to 12 times with each leg. 4. If you feel steady and strong when you do this exercise, you can make it more difficult. To do this, when you lift your leg, also lift the opposite arm straight out in front of you. For example, lift the left leg and the right arm at the same time. (This is sometimes called the \"bird dog exercise. \") Hold for 6 seconds, and repeat 8 to 12 times on each side. Clamshell    1. Lie on your side with a pillow under your head. Keep your feet and knees together and your knees bent. 2. Raise your top knee, but keep your feet together. Do not let your hips roll back. Your legs should open up like a clamshell. 3. Hold for 6 seconds. 4. Slowly lower your knee back down. Rest for 10 seconds. 5. Repeat 8 to 12 times. 6. Switch to your other side and repeat steps 1 through 5. Hamstring wall stretch    1. Lie on your back in a doorway, with one leg through the open door. 2. Slide your affected leg up the wall to straighten your knee. You should feel a gentle stretch down the back of your leg. 3. Hold the stretch for at least 1 minute to begin. Then try to lengthen the time you hold the stretch to as long as 6 minutes. 4. Switch legs, and repeat steps 1 through 3.  5. Repeat 2 to 4 times.   6. If you do not have a place to do this exercise in a doorway, there is another way to do it:  7. Lie on your back, and bend one knee. 8. Loop a towel under the ball and toes of that foot, and hold the ends of the towel in your hands. 9. Straighten your knee, and slowly pull back on the towel. You should feel a gentle stretch down the back of your leg. 10. Switch legs, and repeat steps 1 through 3.  11. Repeat 2 to 4 times. 1. Do not arch your back. 2. Do not bend either knee. 3. Keep one heel touching the floor and the other heel touching the wall. Do not point your toes. Lower abdominal strengthening    1. Lie on your back with your knees bent and your feet flat on the floor. 2. Tighten your belly muscles by pulling your belly button in toward your spine. 3. Lift one foot off the floor and bring your knee toward your chest, so that your knee is straight above your hip and your leg is bent like the letter \"L. \"  4. Lift the other knee up to the same position. 5. Lower one leg at a time to the starting position. 6. Keep alternating legs until you have lifted each leg 8 to 12 times. 7. Be sure to keep your belly muscles tight and your back still as you are moving your legs. Be sure to breathe normally. Piriformis stretch    1. Lie on your back with your legs straight. 2. Lift your affected leg, and bend your knee. With your opposite hand, reach across your body, and then gently pull your knee toward your opposite shoulder. 3. Hold the stretch for 15 to 30 seconds. 4. Switch legs and repeat steps 1 through 3.  5. Repeat 2 to 4 times. Follow-up care is a key part of your treatment and safety. Be sure to make and go to all appointments, and call your doctor if you are having problems. It's also a good idea to know your test results and keep a list of the medicines you take. Where can you learn more? Go to http://www.gray.com/  Enter Z183 in the search box to learn more about \"Sacroiliac Pain: Exercises. \"  Current as of: July 1, 2021               Content Version: 13.0  © 1199-8258 [employer]. Care instructions adapted under license by [employer] (which disclaims liability or warranty for this information). If you have questions about a medical condition or this instruction, always ask your healthcare professional. [address] any warranty or liability for your use of this information.

## 2021-10-12 NOTE — PROGRESS NOTES
[facility]  [address], [facility] [address]  Phone: [phone]  Fax: [phone]    Pt's YOB: 1945    ASSESSMENT   Diagnoses and all orders for this visit:    1. Lumbar facet arthropathy  -     SCHEDULE SURGERY  -     methylPREDNISolone (MEDROL DOSEPACK) 4 mg tablet; Per dose pack instructions    2. Muscle spasm  -     SCHEDULE SURGERY    3. Lumbar post-laminectomy syndrome    4. Sacroiliac pain  -     methylPREDNISolone (MEDROL DOSEPACK) 4 mg tablet; Per dose pack instructions         IMPRESSION AND PLAN:  Efe Szymanski is a 68 y.o. female with history of lumbar pain. She complains of pain across the lower back since 4941-6370. Pt reports relief x 48 hours with a bilateral sacroiliac joint injection on 9/15/2021. 1) Pt was given information on lumbar arthritis and sacroiliac exercises. 2) Discussed treatment options with the patient including steroid injections and a lumbar RFA. 3) Pt was scheduled for bilateral L4-5 facet injections. 4) She was prescribed a Medrol Dosepak. 5) Ms. Gela Swanson has a reminder for a \"due or due soon\" health maintenance. I have asked that she contact her primary care provider, Jen Bradshaw MD, for follow-up on this health maintenance. 6)  demonstrated consistency with prescribing. Follow-up and Dispositions    · Return in about 6 weeks (around 11/23/2021) for injection follow up, Medication follow up. HISTORY OF PRESENT ILLNESS:  Efe Szymanski is a 68 y.o. female with history of lumbar pain and presents to the office today for follow up. She complains of pain across the lower back since 8086-4390. Her pain generally worsens when sitting and lying down and improves when standing. Pt reports relief x 48 hours with a bilateral sacroiliac joint injection on 9/15/2021. She notes that her pain has returned to pre-injection levels.  Pt denies any history of diabetes mellitus and notes that she is not on anticoagulants. Pt at this time desires to proceed with steroid injections and medication evaluation. Pain Scale: 7/10    PCP: Pina Zimmer MD     Past Medical History:   Diagnosis Date    Bladder cancer ([facility] [address] 2015 (approx)    Foraminal stenosis of lumbar region     Hematuria     Hypercholesterolemia     Low back pain     Shoulder pain 10/1/2020        Social History     Socioeconomic History    Marital status:      Spouse name: Not on file    Number of children: Not on file    Years of education: Not on file    Highest education level: Not on file   Occupational History    Occupation: nurse   Tobacco Use    Smoking status: Former Smoker     Quit date: 1980     Years since quittin.8    Smokeless tobacco: Never Used   Substance and Sexual Activity    Alcohol use: Yes     Alcohol/week: 0.0 standard drinks     Comment: 1 glass of wine 3-4 times per month    Drug use: No    Sexual activity: Not on file   Other Topics Concern    Not on file   Social History Narrative    Not on file     Social Determinants of Health     Financial Resource Strain:     Difficulty of Paying Living Expenses:    Food Insecurity:     Worried About Running Out of Food in the Last Year:     [address] in the Last Year:    Transportation Needs:     Lack of Transportation (Medical):      Lack of Transportation (Non-Medical):    Physical Activity:     Days of Exercise per Week:     Minutes of Exercise per Session:    Stress:     Feeling of Stress :    Social Connections:     Frequency of Communication with Friends and Family:     Frequency of Social Gatherings with Friends and Family:     Attends Mu-ism Services:     Active Member of Clubs or Organizations:     Attends Club or Organization Meetings:     Marital Status:    Intimate Partner Violence:     Fear of Current or Ex-Partner:     Emotionally Abused:     Physically Abused:     Sexually Abused: Current Outpatient Medications   Medication Sig Dispense Refill    methylPREDNISolone (MEDROL DOSEPACK) 4 mg tablet Per dose pack instructions 1 Dose Pack 0    gabapentin (Neurontin) 300 mg capsule Take 1 cap by mouth in the morning and 2 at night as directed. 270 Capsule 1    gabapentin (NEURONTIN) 300 mg capsule Take 2-3 in the evening as directed 270 Cap 1    amLODIPine (NORVASC) 5 mg tablet TK 1 T PO ONCE A DAY      escitalopram oxalate (LEXAPRO) 10 mg tablet escitalopram 10 mg tablet      fluticasone propionate (FLONASE) 50 mcg/actuation nasal spray fluticasone propionate 50 mcg/actuation nasal spray,suspension      furosemide (LASIX) 20 mg tablet furosemide 20 mg tablet      MULTIVIT-MINERALS/FOLIC ACID (THERALOGIX  PO) Take 2 Tabs by mouth daily.  traZODone (DESYREL) 50 mg tablet TK 1 T PO QHS  0    multivitamin (ONE A DAY) tablet Take 1 Tab by mouth daily.  pravastatin (PRAVACHOL) 20 mg tablet Take 20 mg by mouth nightly. 5    metaxalone (SKELAXIN) 800 mg tablet TK 1 T PO TID PRF MSP (Patient not taking: Reported on 7/26/2021)      potassium chloride SA (MICRO-K) 10 mEq capsule TK 1 C PO QD      ferrous sulfate 325 mg (65 mg iron) tablet TK 1 T PO  ONCE A DAY (Patient not taking: Reported on 10/12/2021)      topiramate (TOPAMAX) 25 mg tablet Take 1 in the evening for 1 week, then increase to 2 the second week and continue with 3 in the evening (Patient not taking: Reported on 10/12/2021) 90 Tab 1    lidocaine (LIDODERM) 5 % 1 Patch by TransDERmal route every twenty-four (24) hours. Apply patch to the affected area for 12 hours a day and remove for 12 hours a day. (Patient not taking: Reported on 10/12/2021)      ibuprofen (ADVIL) 200 mg tablet Take 400 mg by mouth every eight (8) hours as needed.  (Patient not taking: Reported on 10/12/2021)         Allergies   Allergen Reactions    Simvastatin Unknown (comments)    Lipitor [Atorvastatin] Myalgia         REVIEW OF SYSTEMS    Constitutional: Negative for fever, chills, or weight change. Respiratory: Negative for cough or shortness of breath. Cardiovascular: Negative for chest pain or palpitations. Gastrointestinal: Negative for acid reflux, change in bowel habits, or constipation. Genitourinary: Negative for dysuria and flank pain. Musculoskeletal: Positive for lumbar pain. Skin: Negative for rash. Neurological: Negative for headaches, dizziness, or numbness. Endo/Heme/Allergies: Negative for increased bruising. Psychiatric/Behavioral: Negative for difficulty with sleep. As per HPI    PHYSICAL EXAMINATION  Visit Vitals  BP (!) 134/46 (BP 1 Location: Right arm, BP Patient Position: Sitting, BP Cuff Size: Adult)   Pulse 60   Temp (!) 96.2 °F (35.7 °C) (Tympanic)   Resp 16   Ht 5' 2.5\" (1.588 m)   Wt 143 lb 8 oz (65.1 kg)   SpO2 99%   BMI 25.83 kg/m²       Constitutional: Awake, alert, and in no acute distress. Neurological: 1+ symmetrical DTRs in the upper extremities. 1+ symmetrical DTRs in the lower extremities. Sensation to light touch is intact. Negative Ying's sign bilaterally. Skin: warm, dry, and intact. Musculoskeletal: Tenderness to palpation in the lumbar region and over the sacroiliac joint bilaterally. Moderate pain with extension and axial loading. Improvement with forward flexion. Negative straight leg raise bilaterally.      Hip Flex  Quads Hamstrings Ankle DF EHL Ankle PF   Right +4/5 +4/5 +4/5 +4/5 +4/5 +4/5   Left +4/5 +4/5 +4/5 +4/5 +4/5 +4/5     IMAGING:    Lumbar spine MRI from 8/9/2021 was personally reviewed with the patient and demonstrated:  Results from Orders Only encounter on 08/02/21     MRI LUMB SPINE W WO CONT     Narrative  EXAMINATION: MRI lumbar spine with and without contrast     INDICATION: Lumbar pain, lower extremity radiculopathy, postoperative lumbar  spine     COMPARISON: Radiographs 7/26/2021, MRI 9/14/2017     TECHNIQUE: Sagittal and axial multiecho MRI sequences of the lumbar spine  performed before and after 13 cc IV Dotarem.     FINDINGS:     Postoperative: Status post L3-L4 posterior hardware fixation. No evidence of  postoperative collection. Artifact slightly limits evaluation. Mild lower lumbar  paraspinous muscle fatty atrophy.     General: Vertebral body heights preserved. Multilevel mild disc space loss and  mild endplate spurring. Mild L2 on L3 retrolisthesis and grade 1 L4-L5  anterolisthesis. Lumbar lordosis maintained. Conus ends at level L2-L3. No  evidence of abnormal signal or enhancement in the distal cord. No abnormal  epidural collection identified. No suspicious marrow signal.     Miscellaneous: Incompletely imaged probable central right hepatic lobe cyst and  subcentimeter left renal cysts. Incompletely imaged suspected uterine fibroids.     Levels:     T10-T11, T11-T12, T12-L1: Sagittal plane only. No significant degenerative  change or stenosis.     L1-L2: No significant disc disease or stenosis. Mild facet arthropathy.     L2-L3: Mild retrolisthesis. Small left lateral recess disc protrusion and  background mild disc bulge. Mild facet arthropathy. Spinal canal patent. Mild  foraminal stenoses.     L3-L4: Postoperative lumbar spine. Mild facet arthropathy. Spinal canal patent. Mild left foraminal stenosis. Right foramen patent.     L4-L5: Grade 1 anterolisthesis with uncovering of disc and mild disc bulge. Severe facet arthropathy. Minimal spinal canal narrowing. Mild to moderate  foraminal stenoses.     L5-S1: Small posterior central disc protrusion with associated annular fissure  and background mild disc bulge. Mild facet arthropathy. Spinal canal patent. Mild left greater than right foraminal stenoses.     Imaged sacrum: Unremarkable.     Impression  L3-L4 posterior hardware fixation.  Spinal canal patent at this level, but mild  left foraminal stenosis.     L4-L5 notable degenerative changes characterized by grade 1 anterolisthesis and  severe facet arthropathy resulting in minimal spinal canal narrowing and mild to  moderate foraminal stenoses.     Notable degenerative changes at L2-L3 and L5-S1, but no more mild stenosis.     See level by level details above. Compared to 2017 MRI, degenerative changes  have overall worsened.        Thoracic spine 2V x-rays from 01/13/2020 were personally reviewed with the patient and demonstrated:  L3-4 fusion intact. Mild degenerative disc in the thoracic spine.       Written by Adriana Lynch, as dictated by Verenice Carrazna MD.  I, Dr. Verenice Carranza confirm that all documentation is accurate.

## 2021-10-12 NOTE — LETTER
Dheeraj John 56 Request Form for [facility]     Scheduling - Abraham Munson  Fax to [phone]  Telephone: [phone]     To be completed by Physician Office:    Date: 10/12/2021    Requested by: Meka Boss    Phone No: [phone]  Fax No:  [phone]    Surgery Date: 10/20/21   Injection Time: 3:45pm          Provider: Asael Cornejo MD     CPT CODE*  Procedure   42273,45 Bilateral Lumbar Four- Five Facet Block           *CPT code is required for ALL procedures.   Patient Information:    Name: Denis Thomas    SSN: [id number]  : 1945   Male/Female: female    Home Phone No: [phone] (home) [phone] (work)    Primary Insurance: Medicare   Insurance ID #: [id number]    Authorization:  No PreAuth Required     ICD10 code(s):M47.816  Lumbar Facet Arthropathy    Diabetic/finger stick to be done BS level must be <200 mg/dl     Anesthesia Type Local: Valium 10 mg PO 30 Minutes prior to procedure with Contrast

## 2021-10-12 NOTE — LETTER
10/15/2021    Patient: Phuong Crane   YOB: 1945   Date of Visit: 10/12/2021     Tree Watts MD  [address] [facility] [address] [facility] [address]  Via Fax: [phone]    Dear Tree Watts MD,      Thank you for referring Ms. Kelly Gomez to [facility] for evaluation. My notes for this consultation are attached. If you have questions, please do not hesitate to call me. I look forward to following your patient along with you.       Sincerely,    Chavez Bautista MD

## 2021-10-12 NOTE — PROGRESS NOTES
Chief Complaint   Patient presents with    Follow-up     injection       Pt preferred language for health care discussion is english. Is someone accompanying this pt? no    Is the patient using any DME equipment during OV? no    Depression Screening:  3 most recent [facility] Screens 7/26/2021 2/13/2020 1/21/2020 1/13/2020 6/19/2019 1/2/2019 11/20/2018   PHQ Not Done - Patient Decline - - - - -   Little interest or pleasure in doing things Not at all - Not at all Not at all Not at all Not at all Not at all   Feeling down, depressed, irritable, or hopeless Not at all - Not at all Not at all Not at all Several days Not at all   Total Score PHQ 2 0 - 0 0 0 1 0       Learning Assessment:  Learning Assessment 10/2/2020 6/19/2019   PRIMARY LEARNER Patient Patient   HIGHEST LEVEL OF EDUCATION - PRIMARY LEARNER  4 YEARS OF COLLEGE 4 [address] [facility] CAREGIVER - No   PRIMARY LANGUAGE ENGLISH ENGLISH   LEARNER PREFERENCE PRIMARY LISTENING READING     - VIDEOS   ANSWERED BY patient pt   RELATIONSHIP SELF SELF       Abuse Screening:  Abuse Screening Questionnaire 6/19/2019   Do you ever feel afraid of your partner? N   Are you in a relationship with someone who physically or mentally threatens you? N   Is it safe for you to go home? Y       Fall Risk  Fall Risk Assessment, last 12 mths 10/12/2021   Able to walk? Yes   Fall in past 12 months? 0   Do you feel unsteady? 0   Are you worried about falling 0           Advance Directive:  1. Do you have an advance directive in place? Patient Reply:no    2. If not, would you like material regarding how to put one in place? Patient Reply: no    2. Per patient no changes to their ACP contact no. Coordination of Care:  1. Have you been to the ER, urgent care clinic since your last visit? Hospitalized since your last visit? Yes creaked rib    2.  Have you seen or consulted any other health care providers outside of the [facility] since your last visit? Include any pap smears or colon screening.  no

## 2021-10-20 ENCOUNTER — APPOINTMENT (OUTPATIENT)
Dept: GENERAL RADIOLOGY | Age: 76
End: 2021-10-20
Attending: PHYSICAL MEDICINE & REHABILITATION
Payer: MEDICARE

## 2021-10-20 ENCOUNTER — HOSPITAL ENCOUNTER (OUTPATIENT)
Age: 76
Setting detail: OUTPATIENT SURGERY
Discharge: HOME | End: 2021-10-20
Attending: PHYSICAL MEDICINE & REHABILITATION | Admitting: PHYSICAL MEDICINE & REHABILITATION
Payer: MEDICARE

## 2021-10-20 VITALS
SYSTOLIC BLOOD PRESSURE: 154 MMHG | DIASTOLIC BLOOD PRESSURE: 57 MMHG | OXYGEN SATURATION: 97 % | HEART RATE: 65 BPM | TEMPERATURE: 98.1 F | RESPIRATION RATE: 16 BRPM

## 2021-10-20 DIAGNOSIS — M62.838 MUSCLE SPASM: ICD-10-CM

## 2021-10-20 DIAGNOSIS — M47.816 LUMBAR FACET ARTHROPATHY: ICD-10-CM

## 2021-10-20 PROCEDURE — 76010000009 HC PAIN MGT 0 TO 30 MIN PROC: Performed by: PHYSICAL MEDICINE & REHABILITATION

## 2021-10-20 PROCEDURE — 74011000250 HC RX REV CODE- 250: Performed by: PHYSICAL MEDICINE & REHABILITATION

## 2021-10-20 PROCEDURE — 74011250636 HC RX REV CODE- 250/636: Performed by: PHYSICAL MEDICINE & REHABILITATION

## 2021-10-20 PROCEDURE — 77030003676 HC NDL SPN MPRI -A: Performed by: PHYSICAL MEDICINE & REHABILITATION

## 2021-10-20 PROCEDURE — 2709999900 HC NON-CHARGEABLE SUPPLY: Performed by: PHYSICAL MEDICINE & REHABILITATION

## 2021-10-20 PROCEDURE — 77030039433 HC TY MYLEOGRAM BD -B: Performed by: PHYSICAL MEDICINE & REHABILITATION

## 2021-10-20 PROCEDURE — 64493 INJ PARAVERT F JNT L/S 1 LEV: CPT | Performed by: PHYSICAL MEDICINE & REHABILITATION

## 2021-10-20 RX ORDER — LIDOCAINE HYDROCHLORIDE 10 MG/ML
INJECTION, SOLUTION EPIDURAL; INFILTRATION; INTRACAUDAL; PERINEURAL AS NEEDED
Status: DISCONTINUED | OUTPATIENT
Start: 2021-10-20 | End: 2021-10-20 | Stop reason: HOSPADM

## 2021-10-20 RX ORDER — DEXAMETHASONE SODIUM PHOSPHATE 100 MG/10ML
INJECTION INTRAMUSCULAR; INTRAVENOUS AS NEEDED
Status: DISCONTINUED | OUTPATIENT
Start: 2021-10-20 | End: 2021-10-20 | Stop reason: HOSPADM

## 2021-10-20 RX ORDER — DIAZEPAM 5 MG/1
5-20 TABLET ORAL ONCE
Status: DISCONTINUED | OUTPATIENT
Start: 2021-10-20 | End: 2021-10-20 | Stop reason: HOSPADM

## 2021-10-20 NOTE — PROCEDURES
Facet Joint Block Procedure Note    Patient Name: Yana Rodriguez    Date of Procedure: October 20, 2021    Preoperative Diagnosis: Lumbar facet arthropathy [M47.816]    Post Operative Diagnosis:Lumbar facet arthropathy [M47.816]     Procedure:  bilateral  L4-L5 Facet Joint Block    Consent: Informed consent was obtained prior to the procedure. The patient was given the opportunity to ask questions regarding the procedure and its associated risks. In addition to the potential risks associated with the procedure itself, the patient was informed both verbally and in writing of potential side effects of the use of glucocorticoids. The patient appeared to comprehend the informed consent and desired to have the procedure performed. Procedure: The patient was placed in the prone position on the flouroscopy table and the back was prepped and draped in the usual sterile manner. At each blocked level, the exact location of the facet joint was identified with flouroscopy, and after local Lidocaine 1% injection, a #22 gauge spinal needle was then advanced toward the joint. A total of 10 mg of preservative free Dexamethasone and 5 cc of Lidocaine was injected around and equally divided among all of the sites. The patient tolerated the procedure well. The injection area was cleaned and bandaids applied. No excessive bleeding was noted. Patient dressed and was discharged to home with instructions.        Bryce Avalos MD  October 20, 2021

## 2021-10-20 NOTE — H&P
Date of Surgery Update:  Leyla Mendiola was seen and examined. History and physical has been reviewed. The patient has been examined. There have been no significant clinical changes since the last office visit. The patient was counseled at length about the risks of hima Covid-19 during their perioperative period and any recovery window from their procedure. The patient was made aware that hima Covid-19  may worsen their prognosis for recovering from their procedure and lend to a higher morbidity and/or mortality risk. All material risks, benefits, and reasonable alternatives including postponing the procedure were discussed. The patient does  wish to proceed with the procedure at this time.     Pre injection pain level  6/10  Post injection pain level  5.5/10    Signed By: Wilbur Faith MD     October 20, 2021 3:42 PM

## 2021-10-20 NOTE — DISCHARGE INSTRUCTIONS
[facility]   ([place])  Dr. Dex Qiu, Dr. Suha Araya, Dr. Lauryn Rosario Spinal Procedure (Block) Instructions    * Do not drive a car, operate heavy machinery or dangerous equipment, or make important decisions for 12-24 hours. * Light activity as tolerated; may rest for the remainder of the day. * Resume pre-block medications including those from your other doctors. * Do not drink alcoholic beverages for 24 hours. Alcohol and the medications you have received may interact and cause an adverse reaction. * You may feel better this evening and worse tomorrow, as the numbing medications wears off and the steroid has yet to begin to work. After 48-72 hrs the steroid should begin to release bringing you relief. If you had a medial branch block, no steroids were used. The medial branch block is a test to see if you are a candidate for radiofrequency ablation (RFA). The anesthetic (numbing medicine)  will wear off by the next day. * You may shower this evening and remove any bandages. * Avoid hot tubs/pools/tub soaks and heating pads for 24 hours. You may use cold packs on the procedure site as tolerated for the first 24 hours. * If a headache develops, drink plenty of fluids and rest.  Take over the counter medications for headache if needed. If the headache continues longer than 24 hours, call MD at the [address] [place] [address] Avenue. [phone]    * Continue taking pain medications as needed. * You may resume your regular diet if tolerated. Otherwise, start with sips of water and advance slowly. * If Diabetic: check your blood sugar three times a day for the next 3 days. If your sugar is greater than 300 call your family doctor. If your sugar is greater than 400, have someone transport you to the nearest Emergency Room. * If you experience any of the following problems, Please Call the [address] [place] [address] Avenue at [phone].         * Excessive pain, swelling, redness or odor at or around the surgical area    * Fever of 101 or higher    * Nausea / Vomiting lasting longer than 4 hours or if unable to take medications. * Severe Headache    * Weakness or numbness in arms or legs that is not      resolving   * Any NEW signs of decreased circulation or nerve impairment in leg: change in color, swelling, persistent numbness, tingling                    * Prolonged increase in pain greater than 4 days      PATIENT INSTRUCTIONS:    After oral sedation, for 12-24 hours or while taking prescription Narcotics:  · Limit your activities  · Do not drive and operate hazardous machinery  · Do not make important personal or business decisions  · Do  not drink alcoholic beverages  · If you have not urinated within 8 hours after discharge, please contact your surgeon on call. *  Please give a list of your current medications to your Primary Care Provider. *  Please update this list whenever your medications are discontinued, doses are      changed, or new medications (including over-the-counter products) are added. *  Please carry medication information at all times in case of emergency situations. These are general instructions for a healthy lifestyle:    No smoking/ No tobacco products/ Avoid exposure to second hand smoke    Surgeon General's Warning:  Quitting smoking now greatly reduces serious risk to your health. Obesity, smoking, and sedentary lifestyle greatly increases your risk for illness    A healthy diet, regular physical exercise & weight monitoring are important for maintaining a healthy lifestyle    You may be retaining fluid if you have a history of heart failure or if you experience any of the following symptoms:  Weight gain of 3 pounds or more overnight or 5 pounds in a week, increased swelling in our hands or feet or shortness of breath while lying flat in bed.   Please call your doctor as soon as you notice any of these symptoms; do not wait until your next office visit. Recognize signs and symptoms of STROKE:    F-face looks uneven    A-arms unable to move or move unevenly    S-speech slurred or non-existent    T-time-call 911 as soon as signs and symptoms begin-DO NOT go       Back to bed or wait to see if you get better-TIME IS BRAIN.

## 2021-12-02 ENCOUNTER — OFFICE VISIT (OUTPATIENT)
Dept: ORTHOPEDIC SURGERY | Age: 76
End: 2021-12-02
Payer: MEDICARE

## 2021-12-02 VITALS
TEMPERATURE: 97.1 F | OXYGEN SATURATION: 99 % | WEIGHT: 142.4 LBS | HEIGHT: 63 IN | RESPIRATION RATE: 16 BRPM | HEART RATE: 65 BPM | BODY MASS INDEX: 25.23 KG/M2

## 2021-12-02 DIAGNOSIS — M47.816 LUMBAR FACET ARTHROPATHY: Primary | ICD-10-CM

## 2021-12-02 DIAGNOSIS — M96.1 LUMBAR POST-LAMINECTOMY SYNDROME: ICD-10-CM

## 2021-12-02 DIAGNOSIS — Z98.1 HISTORY OF LUMBAR FUSION: ICD-10-CM

## 2021-12-02 DIAGNOSIS — M62.838 MUSCLE SPASM: ICD-10-CM

## 2021-12-02 DIAGNOSIS — M70.61 GREATER TROCHANTERIC BURSITIS OF RIGHT HIP: ICD-10-CM

## 2021-12-02 DIAGNOSIS — M53.3 SACROILIAC JOINT PAIN: ICD-10-CM

## 2021-12-02 PROCEDURE — G8432 DEP SCR NOT DOC, RNG: HCPCS | Performed by: PHYSICAL MEDICINE & REHABILITATION

## 2021-12-02 PROCEDURE — G8419 CALC BMI OUT NRM PARAM NOF/U: HCPCS | Performed by: PHYSICAL MEDICINE & REHABILITATION

## 2021-12-02 PROCEDURE — G8400 PT W/DXA NO RESULTS DOC: HCPCS | Performed by: PHYSICAL MEDICINE & REHABILITATION

## 2021-12-02 PROCEDURE — 1101F PT FALLS ASSESS-DOCD LE1/YR: CPT | Performed by: PHYSICAL MEDICINE & REHABILITATION

## 2021-12-02 PROCEDURE — 99214 OFFICE O/P EST MOD 30 MIN: CPT | Performed by: PHYSICAL MEDICINE & REHABILITATION

## 2021-12-02 PROCEDURE — G8427 DOCREV CUR MEDS BY ELIG CLIN: HCPCS | Performed by: PHYSICAL MEDICINE & REHABILITATION

## 2021-12-02 PROCEDURE — G8536 NO DOC ELDER MAL SCRN: HCPCS | Performed by: PHYSICAL MEDICINE & REHABILITATION

## 2021-12-02 PROCEDURE — 1090F PRES/ABSN URINE INCON ASSESS: CPT | Performed by: PHYSICAL MEDICINE & REHABILITATION

## 2021-12-02 RX ORDER — GABAPENTIN 300 MG/1
CAPSULE ORAL
Qty: 270 CAPSULE | Refills: 1 | Status: SHIPPED | OUTPATIENT
Start: 2021-12-02 | End: 2022-06-16

## 2021-12-02 RX ORDER — DEXTROMETHORPHAN HYDROBROMIDE, GUAIFENESIN 5; 100 MG/5ML; MG/5ML
1300 LIQUID ORAL
COMMUNITY

## 2021-12-02 NOTE — PROGRESS NOTES
[facility]  [address] [facility], [facility] [place]   Phone: [phone]  Fax: [phone]    Pt's YOB: 1945    ASSESSMENT   Diagnoses and all orders for this visit:    1. Lumbar facet arthropathy  -     REFERRAL TO PAIN MANAGEMENT    2. Muscle spasm  -     REFERRAL TO PAIN MANAGEMENT    3. Lumbar post-laminectomy syndrome  -     gabapentin (Neurontin) 300 mg capsule; Take 1 cap by mouth in the morning and 2 at night as directed. -     REFERRAL TO PAIN MANAGEMENT    4. Greater trochanteric bursitis of right hip    5. Sacroiliac joint pain    6. History of lumbar fusion         IMPRESSION AND PLAN:  Lisa Abdullahi is a 68 y.o. female with history of lumbar pain. She complains of return of her lumbar pain following a bilateral L4-5 facet block on 10/20/2021, noting relief x 3 days following the injections, and continued right hip and right knee pain. Pt is taking Neurontin 300 mg 1 cap QAM and 2 caps QHS and uses Voltaren gel, a heating pad, and OTC patches on her hip. 1) Pt was given information on lumbar arthritis, sacroiliac, and trochanteric bursitis exercises. 2) Discussed treatment options with the patient including a lumbar radiofrequency ablation. Pt was provided information about radiofrequency ablation. 3) She received a refill of Neurontin 300 mg for neuropathic symptoms. 4) Pt was referred to Dr. Leslye Romero for evaluation for a lumbar RFA. 5) I recommended the pt try using ice/ cool topical treatment on her hip. 6) Ms. Maxx Stroud has a reminder for a \"due or due soon\" health maintenance. I have asked that she contact her primary care provider, Jessica Gao MD, for follow-up on this health maintenance. 7)  demonstrated consistency with prescribing. 8) Pt offered a trochanteric bursitis injection and defers at this time. Follow-up and Dispositions    · Return in about 6 months (around 6/2/2022) for Medication follow up. HISTORY OF PRESENT ILLNESS:  Lisa Abdullahi is a 68 y.o. female with history of lumbar pain and presents to the office today for bilateral L4-5 facet injection follow up. Pt complains of return of her lumbar pain following a bilateral L4-5 facet block on 10/20/2021 and continued right hip and right knee pain. She also admits that she occasionally drags her right foot with ambulation. She reports relief x 3 days following the facet injections but gradually her pain has returned. Pt continues to take Neurontin 300 mg 1 cap QAM and 2 caps QHS. She also reports using Voltaren gel 2x daily, a heating pad, and OTC patches on her right hip. She states that she has undergone a prior spinal fusion at L3-4. Pt at this time desires to proceed with a lumbar radiofrequency ablation and to continue with gabapentin. Pain Scale: 5/10    PCP: Jessica Gao MD     Past Medical History:   Diagnosis Date    Bladder cancer ([address] 2015 (approx)    Foraminal stenosis of lumbar region     Hematuria     Hypercholesterolemia     Low back pain     Shoulder pain 10/1/2020        Social History     Socioeconomic History    Marital status:      Spouse name: Not on file    Number of children: Not on file    Years of education: Not on file    Highest education level: Not on file   Occupational History    Occupation: nurse   Tobacco Use    Smoking status: Former Smoker     Quit date: 1980     Years since quittin.9    Smokeless tobacco: Never Used   Substance and Sexual Activity    Alcohol use:  Yes     Alcohol/week: 0.0 standard drinks     Comment: 1 glass of wine 3-4 times per month    Drug use: No    Sexual activity: Not on file   Other Topics Concern    Not on file   Social History Narrative    Not on file     Social Determinants of Health     Financial Resource Strain:     Difficulty of Paying Living Expenses: Not on file   Food Insecurity:     Worried About Running Out of Food in the Last Year: Not on file    Ran Out of Food in the Last Year: Not on file   Transportation Needs:     Lack of Transportation (Medical): Not on file    Lack of Transportation (Non-Medical): Not on file   Physical Activity:     Days of Exercise per Week: Not on file    Minutes of Exercise per Session: Not on file   Stress:     Feeling of Stress : Not on file   Social Connections:     Frequency of Communication with Friends and Family: Not on file    Frequency of Social Gatherings with Friends and Family: Not on file    Attends Advent Services: Not on file    Active Member of [address] [place] [address] [employer] or Organizations: Not on file    Attends Club or Organization Meetings: Not on file    Marital Status: Not on file   Intimate Partner Violence:     Fear of Current or Ex-Partner: Not on file    Emotionally Abused: Not on file    Physically Abused: Not on file    Sexually Abused: Not on file   Housing Stability:     Unable to Pay for Housing in the Last Year: Not on file    Number of [place] in the Last Year: Not on file    Unstable Housing in the Last Year: Not on file       Current Outpatient Medications   Medication Sig Dispense Refill    acetaminophen (TYLENOL) 650 mg TbER Take 1,300 mg by mouth.  gabapentin (Neurontin) 300 mg capsule Take 1 cap by mouth in the morning and 2 at night as directed.  270 Capsule 1    methylPREDNISolone (MEDROL DOSEPACK) 4 mg tablet Per dose pack instructions 1 Dose Pack 0    potassium chloride SA (MICRO-K) 10 mEq capsule TK 1 C PO QD      gabapentin (NEURONTIN) 300 mg capsule Take 2-3 in the evening as directed 270 Cap 1    amLODIPine (NORVASC) 5 mg tablet TK 1 T PO ONCE A DAY      escitalopram oxalate (LEXAPRO) 10 mg tablet escitalopram 10 mg tablet      fluticasone propionate (FLONASE) 50 mcg/actuation nasal spray fluticasone propionate 50 mcg/actuation nasal spray,suspension      furosemide (LASIX) 20 mg tablet furosemide 20 mg tablet      MULTIVIT-MINERALS/FOLIC ACID (THERALOGIX  PO) Take 2 Tabs by mouth daily.  traZODone (DESYREL) 50 mg tablet TK 1 T PO QHS  0    multivitamin (ONE A DAY) tablet Take 1 Tab by mouth daily.  pravastatin (PRAVACHOL) 20 mg tablet Take 20 mg by mouth nightly. 5    metaxalone (SKELAXIN) 800 mg tablet TK 1 T PO TID PRF MSP (Patient not taking: Reported on 7/26/2021)      ferrous sulfate 325 mg (65 mg iron) tablet TK 1 T PO  ONCE A DAY (Patient not taking: Reported on 10/12/2021)      topiramate (TOPAMAX) 25 mg tablet Take 1 in the evening for 1 week, then increase to 2 the second week and continue with 3 in the evening (Patient not taking: Reported on 10/12/2021) 90 Tab 1    lidocaine (LIDODERM) 5 % 1 Patch by TransDERmal route every twenty-four (24) hours. Apply patch to the affected area for 12 hours a day and remove for 12 hours a day. (Patient not taking: Reported on 10/12/2021)      ibuprofen (ADVIL) 200 mg tablet Take 400 mg by mouth every eight (8) hours as needed. (Patient not taking: Reported on 10/12/2021)         Allergies   Allergen Reactions    Simvastatin Unknown (comments)    Lipitor [Atorvastatin] Myalgia         REVIEW OF SYSTEMS    Constitutional: Negative for fever, chills, or weight change. Respiratory: Negative for cough or shortness of breath. Cardiovascular: Negative for chest pain or palpitations. Gastrointestinal: Negative for acid reflux, change in bowel habits, or constipation. Genitourinary: Negative for dysuria and flank pain. Musculoskeletal: Positive for lumbar, right hip, and right knee pain. Skin: Negative for rash. Neurological: Negative for headaches, dizziness, or numbness. Endo/Heme/Allergies: Negative for increased bruising. Psychiatric/Behavioral: Negative for difficulty with sleep.     As per HPI    PHYSICAL EXAMINATION  Visit Vitals  Pulse 65   Temp 97.1 °F (36.2 °C) (Temporal)   Resp 16   Ht 5' 2.5\" (1.588 m)   Wt 142 lb 6.4 oz (64.6 kg)   SpO2 99%   BMI 25.63 kg/m² Constitutional: Awake, alert, and in no acute distress. Neurological: Sensation to light touch is intact. Skin: warm, dry, and intact. Musculoskeletal: Tenderness to palpation over the lower lumbar region. Improvement with forward flexion. Tenderness to palpation over the right greater trochanter. No pain with internal or external rotation of her hips. Negative straight leg raise bilaterally. Hip Flex  Quads Hamstrings Ankle DF EHL Ankle PF   Right +4/5 +4/5 +4/5 -4/5 +4/5 +4/5   Left +4/5 +4/5 +4/5 +4/5 +4/5 +4/5     IMAGING:    Lumbar spine MRI from 8/9/2021 was personally reviewed with the patient and demonstrated:  Results from Orders Only encounter on 08/02/21     MRI LUMB SPINE W WO CONT     Narrative  EXAMINATION: MRI lumbar spine with and without contrast     INDICATION: Lumbar pain, lower extremity radiculopathy, postoperative lumbar  spine     COMPARISON: Radiographs 7/26/2021, MRI 9/14/2017     TECHNIQUE: Sagittal and axial multiecho MRI sequences of the lumbar spine  performed before and after 13 cc IV Dotarem.     FINDINGS:     Postoperative: Status post L3-L4 posterior hardware fixation. No evidence of  postoperative collection. Artifact slightly limits evaluation. Mild lower lumbar  paraspinous muscle fatty atrophy.     General: Vertebral body heights preserved. Multilevel mild disc space loss and  mild endplate spurring. Mild L2 on L3 retrolisthesis and grade 1 L4-L5  anterolisthesis. Lumbar lordosis maintained. Conus ends at level L2-L3. No  evidence of abnormal signal or enhancement in the distal cord. No abnormal  epidural collection identified. No suspicious marrow signal.     Miscellaneous: Incompletely imaged probable central right hepatic lobe cyst and  subcentimeter left renal cysts. Incompletely imaged suspected uterine fibroids.     Levels:     T10-T11, T11-T12, T12-L1: Sagittal plane only.  No significant degenerative  change or stenosis.     L1-L2: No significant disc disease or stenosis. Mild facet arthropathy.     L2-L3: Mild retrolisthesis. Small left lateral recess disc protrusion and  background mild disc bulge. Mild facet arthropathy. Spinal canal patent. Mild  foraminal stenoses.     L3-L4: Postoperative lumbar spine. Mild facet arthropathy. Spinal canal patent. Mild left foraminal stenosis. Right foramen patent.     L4-L5: Grade 1 anterolisthesis with uncovering of disc and mild disc bulge. Severe facet arthropathy. Minimal spinal canal narrowing. Mild to moderate  foraminal stenoses.     L5-S1: Small posterior central disc protrusion with associated annular fissure  and background mild disc bulge. Mild facet arthropathy. Spinal canal patent. Mild left greater than right foraminal stenoses.     Imaged sacrum: Unremarkable.     Impression  L3-L4 posterior hardware fixation. Spinal canal patent at this level, but mild  left foraminal stenosis.     L4-L5 notable degenerative changes characterized by grade 1 anterolisthesis and  severe facet arthropathy resulting in minimal spinal canal narrowing and mild to  moderate foraminal stenoses.     Notable degenerative changes at L2-L3 and L5-S1, but no more mild stenosis.     See level by level details above. Compared to 2017 MRI, degenerative changes  have overall worsened. Lumbar spine x-rays from 07/26/2021 were reviewed and demonstrated:    FINDINGS: There are 5 lumbar-type vertebra. Postoperative changes are noted. Circumferential fusion of the L3-L4 level. No hardware complications identified. No fracture appreciated. There is anterolisthesis of L4 on L5 by 6 mm. This  appears increased since prior MRI. Progressive degenerative disc disease at  L4-L5 and facet arthropathy are noted. Moderate degenerative changes at the  L5-S1 level are noted.     IMPRESSION  1.   Postoperative changes at the L3-L4 level without evidence of hardware  complication.     2.  Progressive anterolisthesis and degenerative changes and facet arthropathy  at L4-L5. Written by Dat Beckman, as dictated by Alesha Odonnell MD.  I, Dr. Alesha Odonnell confirm that all documentation is accurate.

## 2021-12-02 NOTE — PATIENT INSTRUCTIONS
Low Back Arthritis: Exercises  Introduction  Here are some examples of typical rehabilitation exercises for your condition. Start each exercise slowly. Ease off the exercise if you start to have pain. Your doctor or physical therapist will tell you when you can start these exercises and which ones will work best for you. When you are not being active, find a comfortable position for rest. Some people are comfortable on the floor or a medium-firm bed with a small pillow under their head and another under their knees. Some people prefer to lie on their side with a pillow between their knees. Don't stay in one position for too long. Take short walks (10 to 20 minutes) every 2 to 3 hours. Avoid slopes, hills, and stairs until you feel better. Walk only distances you can manage without pain, especially leg pain. How to do the exercises  Pelvic tilt    1. Lie on your back with your knees bent. 2. \"Brace\" your stomachtighten your muscles by pulling in and imagining your belly button moving toward your spine. 3. Press your lower back into the floor. You should feel your hips and pelvis rock back. 4. Hold for 6 seconds while breathing smoothly. 5. Relax and allow your pelvis and hips to rock forward. 6. Repeat 8 to 12 times. Back stretches    1. Get down on your hands and knees on the floor. 2. Relax your head and allow it to droop. Round your back up toward the ceiling until you feel a nice stretch in your upper, middle, and lower back. Hold this stretch for as long as it feels comfortable, or about 15 to 30 seconds. 3. Return to the starting position with a flat back while you are on your hands and knees. 4. Let your back sway by pressing your stomach toward the floor. Lift your buttocks toward the ceiling. 5. Hold this position for 15 to 30 seconds. 6. Repeat 2 to 4 times. Follow-up care is a key part of your treatment and safety.  Be sure to make and go to all appointments, and call your doctor if you are having problems. It's also a good idea to know your test results and keep a list of the medicines you take. Where can you learn more? Go to http://www.[employer].com/  Enter T094 in the search box to learn more about \"Low Back Arthritis: Exercises. \"  Current as of: July 1, 2021               Content Version: 13.0  © 2006-2021 [employer]. Care instructions adapted under license by [employer] (which disclaims liability or warranty for this information). If you have questions about a medical condition or this instruction, always ask your healthcare professional. [place] [address] any warranty or liability for your use of this information. Sacroiliac Pain: Exercises  Introduction  Here are some examples of exercises for you to try. The exercises may be suggested for a condition or for rehabilitation. Start each exercise slowly. Ease off the exercises if you start to have pain. You will be told when to start these exercises and which ones will work best for you. How to do the exercises  Knee-to-chest stretch    1. Do not do the knee-to-chest exercise if it causes or increases back or leg pain. 2. Lie on your back with your knees bent and your feet flat on the floor. You can put a small pillow under your head and neck if it is more comfortable. 3. Grasp your hands under one knee and bring the knee to your chest, keeping the other foot flat on the floor. 4. Keep your lower back pressed to the floor. Hold for at least 15 to 30 seconds. 5. Relax and lower the knee to the starting position. Repeat with the other leg. 6. Repeat 2 to 4 times with each leg. 7. To get more stretch, keep your other leg flat on the floor while pulling your knee to your chest.  Bridging    1. Lie on your back with both knees bent. Your knees should be bent about 90 degrees. 2. Tighten your belly muscles by pulling in your belly button toward your spine.  Then push your feet into the floor, squeeze your buttocks, and lift your hips off the floor until your shoulders, hips, and knees are all in a straight line. 3. Hold for about 6 seconds as you continue to breathe normally, and then slowly lower your hips back down to the floor and rest for up to 10 seconds. 4. Repeat 8 to 12 times. Hip extension    1. Get down on your hands and knees on the floor. 2. Keeping your back and neck straight, lift one leg straight out behind you. When you lift your leg, keep your hips level. Don't let your back twist, and don't let your hip drop toward the floor. 3. Hold for 6 seconds. Repeat 8 to 12 times with each leg. 4. If you feel steady and strong when you do this exercise, you can make it more difficult. To do this, when you lift your leg, also lift the opposite arm straight out in front of you. For example, lift the left leg and the right arm at the same time. (This is sometimes called the \"bird dog exercise. \") Hold for 6 seconds, and repeat 8 to 12 times on each side. Clamshell    1. Lie on your side with a pillow under your head. Keep your feet and knees together and your knees bent. 2. Raise your top knee, but keep your feet together. Do not let your hips roll back. Your legs should open up like a clamshell. 3. Hold for 6 seconds. 4. Slowly lower your knee back down. Rest for 10 seconds. 5. Repeat 8 to 12 times. 6. Switch to your other side and repeat steps 1 through 5. Hamstring wall stretch    1. Lie on your back in a doorway, with one leg through the open door. 2. Slide your affected leg up the wall to straighten your knee. You should feel a gentle stretch down the back of your leg. 3. Hold the stretch for at least 1 minute to begin. Then try to lengthen the time you hold the stretch to as long as 6 minutes. 4. Switch legs, and repeat steps 1 through 3.  5. Repeat 2 to 4 times.   6. If you do not have a place to do this exercise in a doorway, there is another way to do it:  7. Lie on your back, and bend one knee. 8. Loop a towel under the ball and toes of that foot, and hold the ends of the towel in your hands. 9. Straighten your knee, and slowly pull back on the towel. You should feel a gentle stretch down the back of your leg. 10. Switch legs, and repeat steps 1 through 3.  11. Repeat 2 to 4 times. 1. Do not arch your back. 2. Do not bend either knee. 3. Keep one heel touching the floor and the other heel touching the wall. Do not point your toes. Lower abdominal strengthening    1. Lie on your back with your knees bent and your feet flat on the floor. 2. Tighten your belly muscles by pulling your belly button in toward your spine. 3. Lift one foot off the floor and bring your knee toward your chest, so that your knee is straight above your hip and your leg is bent like the letter \"L. \"  4. Lift the other knee up to the same position. 5. Lower one leg at a time to the starting position. 6. Keep alternating legs until you have lifted each leg 8 to 12 times. 7. Be sure to keep your belly muscles tight and your back still as you are moving your legs. Be sure to breathe normally. Piriformis stretch    1. Lie on your back with your legs straight. 2. Lift your affected leg, and bend your knee. With your opposite hand, reach across your body, and then gently pull your knee toward your opposite shoulder. 3. Hold the stretch for 15 to 30 seconds. 4. Switch legs and repeat steps 1 through 3.  5. Repeat 2 to 4 times. Follow-up care is a key part of your treatment and safety. Be sure to make and go to all appointments, and call your doctor if you are having problems. It's also a good idea to know your test results and keep a list of the medicines you take. Where can you learn more? Go to http://www.gray.com/  Enter Q944 in the search box to learn more about \"Sacroiliac Pain: Exercises. \"  Current as of: July 1, 2021               Content Version: 13.0  © 6571-1491 [employer]. Care instructions adapted under license by [employer] (which disclaims liability or warranty for this information). If you have questions about a medical condition or this instruction, always ask your healthcare professional. [address] any warranty or liability for your use of this information. Learning About Medial Branch Block and Neurotomy  What are medial branch block and neurotomy? Facet joints connect your vertebrae to each other. Problems in these joints can cause chronic (long-term) pain in the neck or back. Medial branch nerves are the nerves that carry many of the pain messages from your facet joints. Radiofrequency medial branch neurotomy is a type of medial branch neurotomy that is used to relieve arthritis pain. It uses radio waves to damage nerves in your neck or back so that they can no longer send pain messages to your brain. Before your doctor knows if a neurotomy will help you, you will get a medial branch block to find out if certain nerves are the ones that are a source of your pain. You will need two separate visits to the outpatient center or hospital to have both procedures. You will need someone to drive you home. How is a medial branch block done? The doctor will use a tiny needle to numb the skin where you will get the block. Then the doctor puts the block needle into the numbed area. You may feel some pressure, but you should not feel pain. Using fluoroscopy (live X-ray) to guide the needle, the doctor injects medicine onto one or more nerves to make them numb. If you get relief from your pain in the next 4 to 6 hours, it's a sign that those nerves may be contributing to your pain. The relief will last only a short time. You may then have a medial branch neurotomy at a later visit to try to get longer relief. How is a medial branch neurotomy done?   The doctor will use a tiny needle to numb the skin where you will get the neurotomy. Then the doctor puts the neurotomy needle into the numbed area. You may feel some pressure. Using fluoroscopy (live X-ray) to guide the needle, the doctor sends radio waves through the needle to the nerve for 60 to 90 seconds. The radio waves heat the nerve, which damages it. The doctor may do this several times. And more than one nerve may be treated. How long do medial branch block and neurotomy take? It takes 20 to 30 minutes to get the block. You can go home after the doctor watches you for about an hour. It takes 45 to 90 minutes to get a neurotomy, depending on how many nerves are heated. You will probably go home 30 to 60 minutes after the procedure. What can you expect after a neurotomy? You will get instructions on how to report how much pain you have when you are at home. You may feel a little sore or tender at the injection site at first. But after a successful neurotomy, most people have pain relief right away. It often lasts for several months, but your pain may come back. If your pain does come back, it may mean that the damaged nerve has healed and can send pain messages again. Or it can mean that a different nerve is causing pain. Your doctor will discuss your options with you. Follow-up care is a key part of your treatment and safety. Be sure to make and go to all appointments, and call your doctor if you are having problems. It's also a good idea to know your test results and keep a list of the medicines you take. Where can you learn more? Go to http://www.gray.com/  Enter T494 in the search box to learn more about \"Learning About Medial Branch Block and Neurotomy. \"  Current as of: April 8, 2021               Content Version: 13.0  © 2916-0599 [employer]. Care instructions adapted under license by [employer] (which disclaims liability or warranty for this information).  If you have questions about a medical condition or this instruction, always ask      Trochanteric Bursitis: Exercises  Introduction  Here are some examples of exercises for you to try. The exercises may be suggested for a condition or for rehabilitation. Start each exercise slowly. Ease off the exercises if you start to have pain. You will be told when to start these exercises and which ones will work best for you. How to do the exercises  Hamstring wall stretch    1. Lie on your back in a doorway, with your good leg through the open door. 2. Slide your affected leg up the wall to straighten your knee. You should feel a gentle stretch down the back of your leg. 3. Hold the stretch for at least 1 minute to begin. Then try to lengthen the time you hold the stretch to as long as 6 minutes. 4. Repeat 2 to 4 times. 5. If you do not have a place to do this exercise in a doorway, there is another way to do it:  6. Lie on your back, and bend the knee of your affected leg. 7. Loop a towel under the ball and toes of that foot, and hold the ends of the towel in your hands. 8. Straighten your knee, and slowly pull back on the towel. You should feel a gentle stretch down the back of your leg. 9. Hold the stretch for 15 to 30 seconds. Or even better, hold the stretch for 1 minute if you can. 10. Repeat 2 to 4 times. 1. Do not arch your back. 2. Do not bend either knee. 3. Keep one heel touching the floor and the other heel touching the wall. Do not point your toes. Straight-leg raises to the outside    1. Lie on your side, with your affected leg on top. 2. Tighten the front thigh muscles of your top leg to keep your knee straight. 3. Keep your hip and your leg straight in line with the rest of your body, and keep your knee pointing forward. Do not drop your hip back. 4. Lift your top leg straight up toward the ceiling, about 12 inches off the floor. Hold for about 6 seconds, then slowly lower your leg.   5. Repeat 8 to 12 times. Clamshell    1. Lie on your side, with your affected leg on top and your head propped on a pillow. Keep your feet and knees together and your knees bent. 2. Raise your top knee, but keep your feet together. Do not let your hips roll back. Your legs should open up like a clamshell. 3. Hold for 6 seconds. 4. Slowly lower your knee back down. Rest for 10 seconds. 5. Repeat 8 to 12 times. Standing quadriceps stretch    1. If you are not steady on your feet, hold on to a chair, counter, or wall. You can also lie on your stomach or your side to do this exercise. 2. Bend the knee of the leg you want to stretch, and reach behind you to grab the front of your foot or ankle with the hand on the same side. For example, if you are stretching your right leg, use your right hand. 3. Keeping your knees next to each other, pull your foot toward your buttock until you feel a gentle stretch across the front of your hip and down the front of your thigh. Your knee should be pointed directly to the ground, and not out to the side. 4. Hold the stretch for 15 to 30 seconds. 5. Repeat 2 to 4 times. Piriformis stretch    1. Lie on your back with your legs straight. 2. Lift your affected leg and bend your knee. With your opposite hand, reach across your body, and then gently pull your knee toward your opposite shoulder. 3. Hold the stretch for 15 to 30 seconds. 4. Repeat 2 to 4 times. Double knee-to-chest    1. Lie on your back with your knees bent and your feet flat on the floor. You can put a small pillow under your head and neck if it is more comfortable. 2. Bring both knees to your chest.  3. Keep your lower back pressed to the floor. Hold for 15 to 30 seconds. 4. Relax, and lower your knees to the starting position. 5. Repeat 2 to 4 times. Follow-up care is a key part of your treatment and safety. Be sure to make and go to all appointments, and call your doctor if you are having problems.  It's also a good idea to know your test results and keep a list of the medicines you take. Where can you learn more? Go to http://www.gray.com/  Enter N503 in the search box to learn more about \"Trochanteric Bursitis: Exercises. \"  Current as of: July 1, 2021               Content Version: 13.0  © 3222-4099 [email or web address]. Care instructions adapted under license by [employer] (which disclaims liability or warranty for this information). If you have questions about a medical condition or this instruction, always ask your healthcare professional. [address] any warranty or liability for your use of this information. your healthcare professional. [address] any warranty or liability for your use of this information.

## 2022-03-09 ENCOUNTER — TRANSCRIBE ORDER (OUTPATIENT)
Dept: SCHEDULING | Age: 77
End: 2022-03-09

## 2022-03-09 DIAGNOSIS — M96.1 POST LAMINECTOMY SYNDROME: ICD-10-CM

## 2022-03-09 DIAGNOSIS — M54.16 LUMBAR RADICULOPATHY: Primary | ICD-10-CM

## 2022-03-18 PROBLEM — M50.30 DDD (DEGENERATIVE DISC DISEASE), CERVICAL: Status: ACTIVE | Noted: 2017-02-27

## 2022-03-18 PROBLEM — M47.812 CERVICAL SPONDYLOSIS WITHOUT MYELOPATHY: Status: ACTIVE | Noted: 2017-02-27

## 2022-03-18 PROBLEM — M53.3 SACROILIAC JOINT PAIN: Status: ACTIVE | Noted: 2018-09-24

## 2022-03-19 PROBLEM — M51.36 ANNULAR TEAR OF LUMBAR DISC: Status: ACTIVE | Noted: 2017-10-27

## 2022-03-19 PROBLEM — M75.41 IMPINGEMENT SYNDROME OF RIGHT SHOULDER: Status: ACTIVE | Noted: 2017-02-27

## 2022-03-19 PROBLEM — M54.16 LUMBAR RADICULOPATHY, CHRONIC: Status: ACTIVE | Noted: 2017-09-14

## 2022-03-19 PROBLEM — M25.519 SHOULDER PAIN: Status: ACTIVE | Noted: 2020-10-01

## 2022-03-19 PROBLEM — M51.369 ANNULAR TEAR OF LUMBAR DISC: Status: ACTIVE | Noted: 2017-10-27

## 2022-03-19 PROBLEM — M54.50 LUMBAR PAIN: Status: ACTIVE | Noted: 2018-07-18

## 2022-03-19 PROBLEM — M96.1 POSTLAMINECTOMY SYNDROME, LUMBAR: Status: ACTIVE | Noted: 2017-02-27

## 2022-03-19 PROBLEM — M43.16 SPONDYLOLISTHESIS OF LUMBAR REGION: Status: ACTIVE | Noted: 2017-08-16

## 2022-03-19 PROBLEM — Z79.891 ENCOUNTER FOR LONG-TERM OPIATE ANALGESIC USE: Status: ACTIVE | Noted: 2017-09-14

## 2022-03-20 PROBLEM — M46.1 SACROILIITIS (HCC): Status: ACTIVE | Noted: 2018-09-24

## 2022-03-20 PROBLEM — S39.012A BACK STRAIN: Status: ACTIVE | Noted: 2018-07-24

## 2022-06-16 ENCOUNTER — OFFICE VISIT (OUTPATIENT)
Dept: ORTHOPEDIC SURGERY | Age: 77
End: 2022-06-16
Payer: MEDICARE

## 2022-06-16 VITALS
WEIGHT: 131 LBS | TEMPERATURE: 97.3 F | OXYGEN SATURATION: 98 % | BODY MASS INDEX: 23.96 KG/M2 | HEART RATE: 55 BPM | RESPIRATION RATE: 17 BRPM

## 2022-06-16 DIAGNOSIS — M53.3 SACROILIAC JOINT PAIN: ICD-10-CM

## 2022-06-16 DIAGNOSIS — M47.816 LUMBAR FACET ARTHROPATHY: ICD-10-CM

## 2022-06-16 DIAGNOSIS — Z98.1 HISTORY OF LUMBAR FUSION: ICD-10-CM

## 2022-06-16 DIAGNOSIS — G62.9 NEUROPATHY: Primary | ICD-10-CM

## 2022-06-16 DIAGNOSIS — M62.838 MUSCLE SPASM: ICD-10-CM

## 2022-06-16 DIAGNOSIS — M96.1 LUMBAR POST-LAMINECTOMY SYNDROME: ICD-10-CM

## 2022-06-16 PROCEDURE — G8536 NO DOC ELDER MAL SCRN: HCPCS | Performed by: PHYSICAL MEDICINE & REHABILITATION

## 2022-06-16 PROCEDURE — 1123F ACP DISCUSS/DSCN MKR DOCD: CPT | Performed by: PHYSICAL MEDICINE & REHABILITATION

## 2022-06-16 PROCEDURE — G8400 PT W/DXA NO RESULTS DOC: HCPCS | Performed by: PHYSICAL MEDICINE & REHABILITATION

## 2022-06-16 PROCEDURE — 1101F PT FALLS ASSESS-DOCD LE1/YR: CPT | Performed by: PHYSICAL MEDICINE & REHABILITATION

## 2022-06-16 PROCEDURE — G8427 DOCREV CUR MEDS BY ELIG CLIN: HCPCS | Performed by: PHYSICAL MEDICINE & REHABILITATION

## 2022-06-16 PROCEDURE — 1090F PRES/ABSN URINE INCON ASSESS: CPT | Performed by: PHYSICAL MEDICINE & REHABILITATION

## 2022-06-16 PROCEDURE — G8420 CALC BMI NORM PARAMETERS: HCPCS | Performed by: PHYSICAL MEDICINE & REHABILITATION

## 2022-06-16 PROCEDURE — 99213 OFFICE O/P EST LOW 20 MIN: CPT | Performed by: PHYSICAL MEDICINE & REHABILITATION

## 2022-06-16 PROCEDURE — G8432 DEP SCR NOT DOC, RNG: HCPCS | Performed by: PHYSICAL MEDICINE & REHABILITATION

## 2022-06-16 RX ORDER — GABAPENTIN 300 MG/1
CAPSULE ORAL
Qty: 270 CAPSULE | Refills: 1 | Status: SHIPPED | OUTPATIENT
Start: 2022-06-16

## 2022-06-16 RX ORDER — FAMOTIDINE 20 MG/1
20 TABLET, FILM COATED ORAL 2 TIMES DAILY
COMMUNITY

## 2022-06-16 NOTE — PATIENT INSTRUCTIONS
Sacroiliac Pain: Exercises  Introduction  Here are some examples of exercises for you to try. The exercises may be suggested for a condition or for rehabilitation. Start each exercise slowly. Ease off the exercises if you start to have pain. You will be told when to start these exercises and which ones will work best for you. How to do the exercises  Knee-to-chest stretch    1. Do not do the knee-to-chest exercise if it causes or increases back or leg pain. 2. Lie on your back with your knees bent and your feet flat on the floor. You can put a small pillow under your head and neck if it is more comfortable. 3. Grasp your hands under one knee and bring the knee to your chest, keeping the other foot flat on the floor. 4. Keep your lower back pressed to the floor. Hold for at least 15 to 30 seconds. 5. Relax and lower the knee to the starting position. Repeat with the other leg. 6. Repeat 2 to 4 times with each leg. 7. To get more stretch, keep your other leg flat on the floor while pulling your knee to your chest.  Bridging    1. Lie on your back with both knees bent. Your knees should be bent about 90 degrees. 2. Tighten your belly muscles by pulling in your belly button toward your spine. Then push your feet into the floor, squeeze your buttocks, and lift your hips off the floor until your shoulders, hips, and knees are all in a straight line. 3. Hold for about 6 seconds as you continue to breathe normally, and then slowly lower your hips back down to the floor and rest for up to 10 seconds. 4. Repeat 8 to 12 times. Hip extension    1. Get down on your hands and knees on the floor. 2. Keeping your back and neck straight, lift one leg straight out behind you. When you lift your leg, keep your hips level. Don't let your back twist, and don't let your hip drop toward the floor. 3. Hold for 6 seconds. Repeat 8 to 12 times with each leg.   4. If you feel steady and strong when you do this exercise, you can make it more difficult. To do this, when you lift your leg, also lift the opposite arm straight out in front of you. For example, lift the left leg and the right arm at the same time. (This is sometimes called the \"bird dog exercise. \") Hold for 6 seconds, and repeat 8 to 12 times on each side. Clamshell    1. Lie on your side with a pillow under your head. Keep your feet and knees together and your knees bent. 2. Raise your top knee, but keep your feet together. Do not let your hips roll back. Your legs should open up like a clamshell. 3. Hold for 6 seconds. 4. Slowly lower your knee back down. Rest for 10 seconds. 5. Repeat 8 to 12 times. 6. Switch to your other side and repeat steps 1 through 5. Hamstring wall stretch    1. Lie on your back in a doorway, with one leg through the open door. 2. Slide your affected leg up the wall to straighten your knee. You should feel a gentle stretch down the back of your leg. 3. Hold the stretch for at least 1 minute to begin. Then try to lengthen the time you hold the stretch to as long as 6 minutes. 4. Switch legs, and repeat steps 1 through 3.  5. Repeat 2 to 4 times. 6. If you do not have a place to do this exercise in a doorway, there is another way to do it:  7. Lie on your back, and bend one knee. 8. Loop a towel under the ball and toes of that foot, and hold the ends of the towel in your hands. 9. Straighten your knee, and slowly pull back on the towel. You should feel a gentle stretch down the back of your leg. 10. Switch legs, and repeat steps 1 through 3.  11. Repeat 2 to 4 times. 1. Do not arch your back. 2. Do not bend either knee. 3. Keep one heel touching the floor and the other heel touching the wall. Do not point your toes. Lower abdominal strengthening    1. Lie on your back with your knees bent and your feet flat on the floor. 2. Tighten your belly muscles by pulling your belly button in toward your spine.   3. Lift one foot off the floor and bring your knee toward your chest, so that your knee is straight above your hip and your leg is bent like the letter \"L. \"  4. Lift the other knee up to the same position. 5. Lower one leg at a time to the starting position. 6. Keep alternating legs until you have lifted each leg 8 to 12 times. 7. Be sure to keep your belly muscles tight and your back still as you are moving your legs. Be sure to breathe normally. Piriformis stretch    1. Lie on your back with your legs straight. 2. Lift your affected leg, and bend your knee. With your opposite hand, reach across your body, and then gently pull your knee toward your opposite shoulder. 3. Hold the stretch for 15 to 30 seconds. 4. Switch legs and repeat steps 1 through 3.  5. Repeat 2 to 4 times. Follow-up care is a key part of your treatment and safety. Be sure to make and go to all appointments, and call your doctor if you are having problems. It's also a good idea to know your test results and keep a list of the medicines you take. Where can you learn more? Go to http://www.gray.com/  Enter L266 in the search box to learn more about \"Sacroiliac Pain: Exercises. \"  Current as of: July 1, 2021               Content Version: 13.2  © 2006-2022 Healthwise, Incorporated. Care instructions adapted under license by [employer] (which disclaims liability or warranty for this information). If you have questions about a medical condition or this instruction, always ask your healthcare professional. [place] [address] any warranty or liability for your use of this information. Learning About the [address] Diet  What is the Mediterranean diet? The Mediterranean diet is a style of eating rather than a diet plan. It features foods eaten in [place], [place], [place] and [place], and other countries along the [place].  It emphasizes eating foods like fish, fruits, vegetables, beans, high-fiber breads and whole grains, nuts, and olive oil. This style of eating includes limited red meat, cheese, and sweets. Why choose the Mediterranean diet? A Mediterranean-style diet may improve heart health. It contains more fat than other heart-healthy diets. But the fats are mainly from nuts, unsaturated oils (such as fish oils and olive oil), and certain nut or seed oils (such as canola, soybean, or flaxseed oil). These fats may help protect the heart and blood vessels. How can you get started on the Mediterranean diet? Here are some things you can do to switch to a more Mediterranean way of eating. What to eat  · Eat a variety of fruits and vegetables each day, such as grapes, blueberries, tomatoes, broccoli, peppers, figs, olives, spinach, eggplant, beans, lentils, and chickpeas. · Eat a variety of whole-grain foods each day, such as oats, brown rice, and whole wheat bread, pasta, and couscous. · Eat fish at least 2 times a week. Try tuna, salmon, mackerel, lake trout, herring, or sardines. · Eat moderate amounts of low-fat dairy products, such as milk, cheese, or yogurt. · Eat moderate amounts of poultry and eggs. · Choose healthy (unsaturated) fats, such as nuts, olive oil, and certain nut or seed oils like canola, soybean, and flaxseed. · Limit unhealthy (saturated) fats, such as butter, palm oil, and coconut oil. And limit fats found in animal products, such as meat and dairy products made with whole milk. Try to eat red meat only a few times a month in very small amounts. · Limit sweets and desserts to only a few times a week. This includes sugar-sweetened drinks like soda. The Mediterranean diet may also include red wine with your meal--1 glass each day for women and up to 2 glasses a day for men. Tips for eating at home  · Use herbs, spices, garlic, lemon zest, and citrus juice instead of salt to add flavor to foods. · Add avocado slices to your sandwich instead of lane.   · Have fish for lunch or dinner instead of red meat. Brush the fish with olive oil, and broil or grill it. · Sprinkle your salad with seeds or nuts instead of cheese. · Cook with olive or canola oil instead of butter or oils that are high in saturated fat. · Switch from 2% milk or whole milk to 1% or fat-free milk. · Dip raw vegetables in a vinaigrette dressing or hummus instead of dips made from mayonnaise or sour cream.  · Have a piece of fruit for dessert instead of a piece of cake. Try baked apples, or have some dried fruit. Tips for eating out  · Try broiled, grilled, baked, or poached fish instead of having it fried or breaded. · Ask your  to have your meals prepared with olive oil instead of butter. · Order dishes made with marinara sauce or sauces made from olive oil. Avoid sauces made from cream or mayonnaise. · Choose whole-grain breads, whole wheat pasta and pizza crust, brown rice, beans, and lentils. · Cut back on butter or margarine on bread. Instead, you can dip your bread in a small amount of olive oil. · Ask for a side salad or grilled vegetables instead of french fries or chips. Where can you learn more? Go to http://www.brown.com/  Enter O407 in the search box to learn more about \"Learning About the Mediterranean Diet. \"  Current as of: September 8, 2021               Content Version: 13.2  © 2006-2022 Healthwise, Incorporated. Care instructions adapted under license by [employer] (which disclaims liability or warranty for this information). If you have questions about a medical condition or this instruction, always ask your healthcare professional. [place] [address] any warranty or liability for your use of this information.

## 2022-06-16 NOTE — PROGRESS NOTES
[facility]  [address] [place], [address]  Phone: [phone]  Fax: [phone]    Pt's YOB: 1945    ASSESSMENT   Diagnoses and all orders for this visit:    1. Neuropathy  -     gabapentin (NEURONTIN) 300 mg capsule; Take 2-3 in the evening as directed  Indications: neuropathic pain    2. Lumbar post-laminectomy syndrome  -     gabapentin (NEURONTIN) 300 mg capsule; Take 2-3 in the evening as directed  Indications: neuropathic pain    3. Lumbar facet arthropathy    4. Muscle spasm    5. Sacroiliac joint pain    6. History of lumbar fusion         IMPRESSION AND PLAN:  Pt is 67 yo female with history of lumbar pain recently had lumbar RFA with good results here for follow up and medication refill. 1) Pt was given information on sacroiliac joint exercises and Mediterranean Diet  2) Pt given refill for gabapentin 300 mg -- 2-3 in the evening as directed   3) Pt to use lumbar support intermittently  4) Ms. Alex Erickson has a reminder for a \"due or due soon\" health maintenance. I have asked that she contact her primary care provider, Daniela Schwab MD, for follow-up on this health maintenance. 5)  demonstrated consistency with prescribing. 6) Pt encouraged to be consistent with HEP  Follow-up and Dispositions    · Return in about 6 months (around 12/16/2022) for Medication follow up. HISTORY OF PRESENT ILLNESS:  Mague Diallo is a 68 y.o.  female with history of lumbr pain and presents to the office today for RFA and medication follow up  Pt rates her pain as 0/10 and describes her pain as intermittent and only hurts with activity. Pt reports her pain was severe in January and was using ibuprofen occasionally with minimal improvement. She recently underwent RFA (lumbar) with Dr Daniel Palomino and this has helped her pain significantly . She completed the procedure in April and states the pain has improved significantly. She had RFA more than a year ago and this lasted about 12 months. She is now able to be more active and is gardening again. She does use a lumbar support intermittently when she is more active. She does also take gabapentin for neuropathic symptoms and needs a refill. She also reports uses OTC Advil and gi precautions given. Pain Scale: 0 - No pain/10    PCP: Alida Fitzgerald MD     Past Medical History:   Diagnosis Date    Bladder cancer ([address] 2015 (approx)    Foraminal stenosis of lumbar region     Hematuria     Hypercholesterolemia     Low back pain     Shoulder pain 10/1/2020        Social History     Socioeconomic History    Marital status:      Spouse name: Not on file    Number of children: Not on file    Years of education: Not on file    Highest education level: Not on file   Occupational History    Occupation: nurse   Tobacco Use    Smoking status: Former Smoker     Quit date: 1980     Years since quittin.4    Smokeless tobacco: Never Used   Substance and Sexual Activity    Alcohol use: Yes     Alcohol/week: 0.0 standard drinks     Comment: 1 glass of wine 3-4 times per month    Drug use: No    Sexual activity: Not on file   Other Topics Concern    Not on file   Social History Narrative    Not on file     Social Determinants of Health     Financial Resource Strain:     Difficulty of Paying Living Expenses: Not on file   Food Insecurity:     Worried About Running Out of Food in the Last Year: Not on file    Paramjit of Food in the Last Year: Not on file   Transportation Needs:     Lack of Transportation (Medical): Not on file    Lack of Transportation (Non-Medical):  Not on file   Physical Activity:     Days of Exercise per Week: Not on file    Minutes of Exercise per Session: Not on file   Stress:     Feeling of Stress : Not on file   Social Connections:     Frequency of Communication with Friends and Family: Not on file    Frequency of Social Gatherings with Friends and Family: Not on file    Attends Voodoo Services: Not on file    Active Member of Clubs or Organizations: Not on file    Attends Club or Organization Meetings: Not on file    Marital Status: Not on file   Intimate Partner Violence:     Fear of Current or Ex-Partner: Not on file    Emotionally Abused: Not on file    Physically Abused: Not on file    Sexually Abused: Not on file   Housing Stability:     Unable to Pay for Housing in the Last Year: Not on file    Number of [place] in the Last Year: Not on file    Unstable Housing in the Last Year: Not on file       Current Outpatient Medications   Medication Sig Dispense Refill    famotidine (Pepcid) 20 mg tablet Take 20 mg by mouth two (2) times a day.  gabapentin (NEURONTIN) 300 mg capsule Take 2-3 in the evening as directed  Indications: neuropathic pain 270 Capsule 1    acetaminophen (TYLENOL) 650 mg TbER Take 1,300 mg by mouth.  potassium chloride SA (MICRO-K) 10 mEq capsule TK 1 C PO QD      amLODIPine (NORVASC) 5 mg tablet TK 1 T PO ONCE A DAY      escitalopram oxalate (LEXAPRO) 10 mg tablet escitalopram 10 mg tablet      fluticasone propionate (FLONASE) 50 mcg/actuation nasal spray fluticasone propionate 50 mcg/actuation nasal spray,suspension      furosemide (LASIX) 20 mg tablet furosemide 20 mg tablet      lidocaine (LIDODERM) 5 % 1 Patch by TransDERmal route every twenty-four (24) hours. Apply patch to the affected area for 12 hours a day and remove for 12 hours a day.  ibuprofen (ADVIL) 200 mg tablet Take 400 mg by mouth every eight (8) hours as needed.  traZODone (DESYREL) 50 mg tablet TK 1 T PO QHS  0    multivitamin (ONE A DAY) tablet Take 1 Tab by mouth daily.  pravastatin (PRAVACHOL) 20 mg tablet Take 20 mg by mouth nightly.   5       Allergies   Allergen Reactions    Simvastatin Unknown (comments)    Lipitor [Atorvastatin] Myalgia    Prednisone Other (comments)         REVIEW OF SYSTEMS    Constitutional: Negative for fever, chills, or weight change. Respiratory: Negative for cough or shortness of breath. Cardiovascular: Negative for chest pain or palpitations. Gastrointestinal: Negative for acid reflux, change in bowel habits, or constipation. Genitourinary: Negative for dysuria and flank pain. Musculoskeletal: Positive for lumbar pain. Skin: Negative for rash. Neurological: Negative for headaches, dizziness, or numbness. Endo/Heme/Allergies: Negative for increased bruising. Psychiatric/Behavioral: Negative for difficulty with sleep. As per HPI    PHYSICAL EXAMINATION  Visit Vitals  Pulse (!) 55   Temp 97.3 °F (36.3 °C)   Resp 17   Wt 131 lb (59.4 kg)   SpO2 98%   BMI 23.96 kg/m²       Constitutional: Awake, alert, and in no acute distress. Neurological: 1+ symmetrical DTRs in the upper extremities. 1+ symmetrical DTRs in the lower extremities. Sensation to light touch is intact. Negative Ying's sign bilaterally. Skin: warm, dry, and intact. Musculoskeletal: Mild pain with extension, axial loading, and less with forward flexion. No pain with internal or external rotation of her hips. Negative straight leg raise bilaterally.       Biceps  Triceps Deltoids Wrist Ext Wrist Flex Hand Intrin   Right +4/5 +4/5 +4/5 +4/5 +4/5 +4/5   Left +4/5 +4/5 +4/5 +4/5 +4/5 +4/5      Hip Flex  Quads Hamstrings Ankle DF EHL Ankle PF   Right +4/5 +4/5 +4/5 +4/5 +4/5 +4/5   Left +4/5 +4/5 +4/5 +4/5 +4/5 +4/5   IMAGING:     Lumbar spine MRI from 8/9/2021 was personally reviewed with the patient and demonstrated:  Results from Orders Only encounter on 08/02/21     MRI LUMB SPINE W WO CONT     Narrative  EXAMINATION: MRI lumbar spine with and without contrast     INDICATION: Lumbar pain, lower extremity radiculopathy, postoperative lumbar  spine     COMPARISON: Radiographs 7/26/2021, MRI 9/14/2017     TECHNIQUE: Sagittal and axial multiecho MRI sequences of the lumbar spine  performed before and after 13 cc IV Dotarem.     FINDINGS:     Postoperative: Status post L3-L4 posterior hardware fixation. No evidence of  postoperative collection. Artifact slightly limits evaluation. Mild lower lumbar  paraspinous muscle fatty atrophy.     General: Vertebral body heights preserved. Multilevel mild disc space loss and  mild endplate spurring. Mild L2 on L3 retrolisthesis and grade 1 L4-L5  anterolisthesis. Lumbar lordosis maintained. Conus ends at level L2-L3. No  evidence of abnormal signal or enhancement in the distal cord. No abnormal  epidural collection identified. No suspicious marrow signal.     Miscellaneous: Incompletely imaged probable central right hepatic lobe cyst and  subcentimeter left renal cysts. Incompletely imaged suspected uterine fibroids.     Levels:     T10-T11, T11-T12, T12-L1: Sagittal plane only. No significant degenerative  change or stenosis.     L1-L2: No significant disc disease or stenosis. Mild facet arthropathy.     L2-L3: Mild retrolisthesis. Small left lateral recess disc protrusion and  background mild disc bulge. Mild facet arthropathy. Spinal canal patent. Mild  foraminal stenoses.     L3-L4: Postoperative lumbar spine. Mild facet arthropathy. Spinal canal patent. Mild left foraminal stenosis. Right foramen patent.     L4-L5: Grade 1 anterolisthesis with uncovering of disc and mild disc bulge. Severe facet arthropathy. Minimal spinal canal narrowing. Mild to moderate  foraminal stenoses.     L5-S1: Small posterior central disc protrusion with associated annular fissure  and background mild disc bulge. Mild facet arthropathy. Spinal canal patent. Mild left greater than right foraminal stenoses.     Imaged sacrum: Unremarkable.     Impression  L3-L4 posterior hardware fixation.  Spinal canal patent at this level, but mild  left foraminal stenosis.     L4-L5 notable degenerative changes characterized by grade 1 anterolisthesis and  severe facet arthropathy resulting in minimal spinal canal narrowing and mild to  moderate foraminal stenoses.     Notable degenerative changes at L2-L3 and L5-S1, but no more mild stenosis.     See level by level details above. Compared to 2017 MRI, degenerative changes  have overall worsened.     Lumbar spine x-rays from 07/26/2021 were reviewed and demonstrated:     FINDINGS: There are 5 lumbar-type vertebra. Postoperative changes are noted. Circumferential fusion of the L3-L4 level. No hardware complications identified. No fracture appreciated. There is anterolisthesis of L4 on L5 by 6 mm. This  appears increased since prior MRI. Progressive degenerative disc disease at  L4-L5 and facet arthropathy are noted.  Moderate degenerative changes at the  L5-S1 level are noted.     IMPRESSION  1.  Postoperative changes at the L3-L4 level without evidence of hardware  complication.     2.  Progressive anterolisthesis and degenerative changes and facet arthropathy  at L4-L5.

## 2022-06-16 NOTE — LETTER
6/17/2022    Patient: Harriet Obregon   YOB: 1945   Date of Visit: 6/16/2022     Naya Cyr MD  [address] [facility] [place] [address] [facility] [address]  Via Fax: [phone]    Dear Naya Cyr MD,      Thank you for referring Ms. Pedro Guzman to [address] for evaluation. My notes for this consultation are attached. If you have questions, please do not hesitate to call me. I look forward to following your patient along with you.       Sincerely,    Chalino Andre MD

## 2023-01-16 DIAGNOSIS — M25.551 BILATERAL HIP PAIN: Primary | ICD-10-CM

## 2023-01-16 DIAGNOSIS — M25.552 BILATERAL HIP PAIN: Primary | ICD-10-CM

## 2023-01-23 ENCOUNTER — OFFICE VISIT (OUTPATIENT)
Dept: ORTHOPEDIC SURGERY | Age: 78
End: 2023-01-23
Payer: MEDICARE

## 2023-01-23 VITALS
HEART RATE: 74 BPM | WEIGHT: 126.9 LBS | BODY MASS INDEX: 23.35 KG/M2 | RESPIRATION RATE: 18 BRPM | OXYGEN SATURATION: 100 % | HEIGHT: 62 IN

## 2023-01-23 DIAGNOSIS — M70.62 GREATER TROCHANTERIC BURSITIS OF LEFT HIP: Primary | ICD-10-CM

## 2023-01-23 PROCEDURE — 1101F PT FALLS ASSESS-DOCD LE1/YR: CPT | Performed by: ORTHOPAEDIC SURGERY

## 2023-01-23 PROCEDURE — G8536 NO DOC ELDER MAL SCRN: HCPCS | Performed by: ORTHOPAEDIC SURGERY

## 2023-01-23 PROCEDURE — 1123F ACP DISCUSS/DSCN MKR DOCD: CPT | Performed by: ORTHOPAEDIC SURGERY

## 2023-01-23 PROCEDURE — 20610 DRAIN/INJ JOINT/BURSA W/O US: CPT | Performed by: ORTHOPAEDIC SURGERY

## 2023-01-23 PROCEDURE — 73502 X-RAY EXAM HIP UNI 2-3 VIEWS: CPT | Performed by: ORTHOPAEDIC SURGERY

## 2023-01-23 PROCEDURE — G8427 DOCREV CUR MEDS BY ELIG CLIN: HCPCS | Performed by: ORTHOPAEDIC SURGERY

## 2023-01-23 PROCEDURE — 1090F PRES/ABSN URINE INCON ASSESS: CPT | Performed by: ORTHOPAEDIC SURGERY

## 2023-01-23 PROCEDURE — G8432 DEP SCR NOT DOC, RNG: HCPCS | Performed by: ORTHOPAEDIC SURGERY

## 2023-01-23 PROCEDURE — G8420 CALC BMI NORM PARAMETERS: HCPCS | Performed by: ORTHOPAEDIC SURGERY

## 2023-01-23 PROCEDURE — 99203 OFFICE O/P NEW LOW 30 MIN: CPT | Performed by: ORTHOPAEDIC SURGERY

## 2023-01-23 PROCEDURE — G8400 PT W/DXA NO RESULTS DOC: HCPCS | Performed by: ORTHOPAEDIC SURGERY

## 2023-01-23 RX ORDER — TRIAMCINOLONE ACETONIDE 40 MG/ML
40 INJECTION, SUSPENSION INTRA-ARTICULAR; INTRAMUSCULAR ONCE
Status: COMPLETED | OUTPATIENT
Start: 2023-01-23 | End: 2023-01-23

## 2023-01-23 RX ADMIN — TRIAMCINOLONE ACETONIDE 40 MG: 40 INJECTION, SUSPENSION INTRA-ARTICULAR; INTRAMUSCULAR at 09:07

## 2023-01-23 NOTE — PROGRESS NOTES
Patient: Sole Avalos                MRN: [id number]       SSN: [id number]  YOB: 1945        AGE: 68 y.o. SEX: female  Body mass index is 23.21 kg/m². PCP: Sukhi [place] MD  01/23/23  Occupation:   Retired    Chief Complaint: Hip Pain (Left hip pain)        ICD-10-CM ICD-9-CM    1. Greater trochanteric bursitis of left hip  M70.62 726.5 AMB POC X-RAY RADEX HIP UNI WITH PELVIS 2-3 VIEWS      REFERRAL TO PHYSICAL THERAPY      DRAIN/INJECT LARGE JOINT/BURSA      triamcinolone acetonide (KENALOG-40) 40 mg/mL injection 40 mg          HPI: Sole Avalos is a 68 y.o. female patient with Hip Pain (Left hip pain)  She has been having this pain for between 2 and 3 months. She regularly sees a back doctor and gets injections in her back which do help her with this pain is little bit different from that. She feels it in the lateral hip slightly posterior in the hip. Tobacco Use: Medium Risk    Smoking Tobacco Use: Former    Smokeless Tobacco Use: Never    Passive Exposure: Not on file         PHYSICAL EXAMINATION:  Visit Vitals  Pulse 74   Resp 18   Ht 5' 2\" (1.575 m)   Wt 126 lb 14.4 oz (57.6 kg)   SpO2 100%   BMI 23.21 kg/m²     Body mass index is 23.21 kg/m². GENERAL: Alert and oriented x3, in no acute distress. HEENT: Normocephalic, atraumatic. MSK: Left Hip Exam     Tenderness   The patient is experiencing tenderness in the lateral, greater trochanter and posterior. Range of Motion   Flexion:  110   External rotation:  50   Internal rotation: 20     Muscle Strength   Abduction: 5/5   Adduction: 5/5   Flexion: 5/5     Other   Erythema: absent  Scars: absent  Sensation: normal  Pulse: present           IMAGING:  Imaging read by myself and interpreted as follows:  3 view x-ray of the left hip including AP pelvis and AP and lateral of the left hip demonstrate preservation of the joint space without signs of arthritis. There is no other bony pathology noted.     ASSESSMENT & PLAN  Diagnosis: 68 y.o. female with left hip greater trochanteric bursitis. I did explain the nature of this and that it requires stretching and physical therapy. I recommended an injection to help relieve the pain so she can energetically engage in physical therapy. She accepts this plan. I can see her in about 6 months if she is still having some pain. After explaining potential risk of infection, skin discoloration, hyperglycemia or flushing, I obtained written consent for a left greater trochanteric bursa corticosteroid injection, the patient's left lateral hip was prepped with alcohol and ethyl chloride freeze spray. 80 mg of Kenalog along with 2 mL of 2% lidocaine was injected and the patient tolerated it well. Past Medical History:   Diagnosis Date    Bladder cancer ([facility] [address] 2015 (approx)    Foraminal stenosis of lumbar region     Hematuria     Hypercholesterolemia     Low back pain     Shoulder pain 10/1/2020       Family History   Problem Relation Age of Onset    No Known Problems Mother     No Known Problems Father     Diabetes Sister        Current Outpatient Medications   Medication Sig Dispense Refill    famotidine (PEPCID) 20 mg tablet Take 20 mg by mouth two (2) times a day.      gabapentin (NEURONTIN) 300 mg capsule Take 2-3 in the evening as directed  Indications: neuropathic pain 270 Capsule 1    acetaminophen (TYLENOL) 650 mg TbER Take 1,300 mg by mouth.      potassium chloride SA (MICRO-K) 10 mEq capsule TK 1 C PO QD      amLODIPine (NORVASC) 5 mg tablet TK 1 T PO ONCE A DAY      escitalopram oxalate (LEXAPRO) 10 mg tablet escitalopram 10 mg tablet      fluticasone propionate (FLONASE) 50 mcg/actuation nasal spray fluticasone propionate 50 mcg/actuation nasal spray,suspension      furosemide (LASIX) 20 mg tablet furosemide 20 mg tablet      lidocaine (LIDODERM) 5 % 1 Patch by TransDERmal route every twenty-four (24) hours.  Apply patch to the affected area for 12 hours a day and remove for 12 hours a day. ibuprofen (MOTRIN) 200 mg tablet Take 400 mg by mouth every eight (8) hours as needed. traZODone (DESYREL) 50 mg tablet TK 1 T PO QHS  0    multivitamin (ONE A DAY) tablet Take 1 Tab by mouth daily. pravastatin (PRAVACHOL) 20 mg tablet Take 20 mg by mouth nightly. 5     Current Facility-Administered Medications   Medication Dose Route Frequency Provider Last Rate Last Admin    triamcinolone acetonide (KENALOG-40) 40 mg/mL injection 40 mg  40 mg Intra artICUlar ONCE Westcott, Terris Kussmaul, DO            Allergies   Allergen Reactions    Simvastatin Unknown (comments)    Lipitor [Atorvastatin] Myalgia    Prednisone Other (comments)       Past Surgical History:   Procedure Laterality Date    HX BUNIONECTOMY      HX COLONOSCOPY      HX LUMBAR LAMINECTOMY  6/2/15    with fusion    HX ORTHOPAEDIC  Bunionionectomy R    HX TUBAL LIGATION      HX UROLOGICAL      treatment BCG for Bladder Cancer    DC UNLISTED PROCEDURE FOOT/TOES Left 2018    bunionectomy       Social History     Socioeconomic History    Marital status:      Spouse name: Not on file    Number of children: Not on file    Years of education: Not on file    Highest education level: Not on file   Occupational History    Occupation: nurse   Tobacco Use    Smoking status: Former     Types: Cigarettes     Quit date: 1980     Years since quittin.0    Smokeless tobacco: Never   Substance and Sexual Activity    Alcohol use:  Yes     Alcohol/week: 0.0 standard drinks     Comment: 1 glass of wine 3-4 times per month    Drug use: No    Sexual activity: Not on file   Other Topics Concern    Not on file   Social History Narrative    Not on file     Social Determinants of Health     Financial Resource Strain: Not on file   Food Insecurity: Not on file   Transportation Needs: Not on file   Physical Activity: Not on file   Stress: Not on file   Social Connections: Not on file   Intimate Partner Violence: Not on file Housing Stability: Not on file       REVIEW OF SYSTEMS:      No changes from previous review of systems unless noted. Prescription medication management discussed with patient. Electronically signed by: Rosa Hernandez DO    Note: This note was completed using voice recognition software.   Any typographical/name errors or mistakes are unintentional.

## 2023-01-30 DIAGNOSIS — M96.1 POST LAMINECTOMY SYNDROME: ICD-10-CM

## 2023-01-30 DIAGNOSIS — M54.16 LUMBAR RADICULOPATHY: Primary | ICD-10-CM

## 2023-02-03 ENCOUNTER — HOSPITAL ENCOUNTER (OUTPATIENT)
Dept: PHYSICAL THERAPY | Age: 78
Discharge: HOME | End: 2023-02-03
Attending: ORTHOPAEDIC SURGERY
Payer: MEDICARE

## 2023-02-03 DIAGNOSIS — M96.1 POST LAMINECTOMY SYNDROME: ICD-10-CM

## 2023-02-03 DIAGNOSIS — M54.16 LUMBAR RADICULOPATHY: Primary | ICD-10-CM

## 2023-02-03 PROCEDURE — 97535 SELF CARE MNGMENT TRAINING: CPT

## 2023-02-03 PROCEDURE — 97161 PT EVAL LOW COMPLEX 20 MIN: CPT

## 2023-02-03 PROCEDURE — 97110 THERAPEUTIC EXERCISES: CPT

## 2023-02-03 NOTE — PROGRESS NOTES
PT DAILY TREATMENT NOTE     Patient Name: Kaiden Bajwa  HZGZ:[id number]  : 1945  [x]  Patient  Verified  Payor: Lucas Martinez / Plan: [place] MEDICARE PART A & B / Product Type: Medicare /    In time:800  Out time:845  Total Treatment Time (min): 45  Visit #: 1 of 8    Medicare/BCBS Only   Total Timed Codes (min):  23 1:1 Treatment Time:  45       Treatment Area: Left hip pain [M25.552]    Physical Therapy Evaluation - Hip    SUBJECTIVE      Any medication changes, allergies to medications, adverse drug reactions, diagnosis change, or new procedure performed?: [x] No    [] Yes (see summary sheet for update)    Subjective functional status/changes:     PLOF: No Fall History, (I) Ambulation with AD, (I) Functional ADLs, (I) Self-Care ADLs, Work Part-Time  Current Functional Status: Difficulty with prolonged ambulation, Difficulty with stair management, Difficulty with prolonged prolonged standing  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 (bedroom on second floor)  Comorbidities: Former Smoker, [address] (), Lumbar Fusion (2015), Right Foot Bunionectomy (2018), Hearing Impairment, Fibromyalgia  Medications: Naproxen (1x/day)    Subjective: Patient presents with left hip pain with symptoms having been present x2-3 months (2022). Patient denies trauma, injury nor fall. Patient reports symptoms primarily along the posterolateral aspect of the left hip. With PMH significant for low back pain with recveival of regular injections for pain management every 3 months. Patient evaluated by physician 2023 at which time patient with receival of left hip greater trochanteric bursa corticosteroid injection. Patient denies radiation into anterior groin but does report prior to injection with intermittent radiation along anterior thigh to knee.  Patient reports overall significant improvement in symptoms since receival of injection. Patient reports at this time intermittent low-grade discomfort in the left lateral hip with patient reporting increase in symptoms with repetitive movement and prolonged activity. Patient denies clicking, catching nor locking with good tolerance to ambulation without AD. Patient does report noting when she is standing from a sitting position and intermittently with walking feeling like she is going to fall forward. Patient denies sleep disturbances and reports ability to sleep on left side. Pain Intensity (0-10, VAS): Current 1-2, Worst 3, Best 0    Patient Goals: \"Pain better - no meds\"    IMAGING (per MD note 1/23/2023)  Imaging read by myself and interpreted as follows:  3 view x-ray of the left hip including AP pelvis and AP and lateral of the left hip demonstrate preservation of the joint space without signs of arthritis. There is no other bony pathology noted. OBJECTIVE:    BP: 122/60 mmHg    Gait: Without AD, Left antalgic gait pattern    Functional Tests:  Romberg: EO 30 sec / EC 15 sec  SLS: Left SLS < 2 sec / Right SLS < 2 sec  5x-sit-to-stand (chair, no UE assist): 27 sec    Lumbar Screen:  All AROM WNL and pain-free             ROM/Strength    AROM                 MMT (1-5)  Hip Left Right Left Right   Flexion WNL WNL 5 5   Extension WNL WNL 1 3   Abduction WNL WNL 2+ 2+   ER WNL WNL 3+ 4   IR WNL WNL 5 5   Knee Left Right Left Right   Extension WNL WNL 5 5   Flexion WNL WNL 5 5                                Palpation: TTP left greater trochanter, TTP left Piriformis    Special Tests  Zackary/ Eric Test: [x] Neg    [] Pos  Cody Test:  [x] Neg    [] Pos  Scouring Test:  [x] Neg    [] Pos  Trendelenberg:  [] Neg    [x] Pos [x] Left    [] Right  Supine Bridge x5 - Able to perform throughout full AROM without pain    OBJECTIVE    22 min [x]Eval                  []Re-Eval     8 min Therapeutic Exercise:  [x] See flow sheet : Patient educated regarding completion of prescribed HEP and provided with written HEP instructions, Patient educated regarding diagnosis and PT POC   Rationale: increase ROM and increase strength to improve the patients ability to improve ease with functional ADLs    15 min Self Care/Home Management:   Education re: importance of maintenance of generalized activity  Education re: fall prevention  Education re: potential benefit of use of SPC if symptoms worsen or balance becomes a greater issue   Rationale: increase strength, improve balance, and increase proprioception  to improve the patients ability to decrease falls risk          With   [] TE   [] TA   [] neuro   [] other: Patient Education: [x] Review HEP    [] Progressed/Changed HEP based on:   [] positioning   [] body mechanics   [] transfers   [] heat/ice application    [] other:      Other Objective/Functional Measures: See objective above. Pain Level (0-10 scale) post treatment: 1-2    ASSESSMENT/Changes in Function: Patient presents with s/s consistent with left hip gluteal tendinopathy with at this time primarily strength impairments noted with significant improvement in symptom severity and irritability with receival of corticosteroid injection 1/23/2023. Objectively with weakness of bilateral posterolateral hip girdles (left>right) with TTP noted to the left Greater trochanter. Objectively with balance deficits and difficulty with completion of sit-to-stand transition thus placing patient at increased falls risk. To emphasize improvements in strength of the posterolateral hip girdle bilaterally and static and dynamic balance and stability to improve functional activity tolerance and decrease falls risk.      Patient will continue to benefit from skilled PT services to modify and progress therapeutic interventions, address functional mobility deficits, address ROM deficits, address strength deficits, analyze and address soft tissue restrictions, analyze and cue movement patterns, analyze and modify body mechanics/ergonomics, assess and modify postural abnormalities, address imbalance/dizziness, and instruct in home and community integration to attain remaining goals. [x]  See Plan of Care  []  See progress note/recertification  []  See Discharge Summary         Progress towards goals / Updated goals:    Short Term Goals: To be accomplished in 2 weeks:  1. Patient will subjectively report full compliance with prescribed HEP. Eval: HEP provided  2. Patient will demonstrate left/right hip abduction MMT >/= 4+/5 to improve stability with ambulation on uneven ground. Eval: Left Hip Abduction MMT = 2+/5, Right Hip Abduction MMT = 2+/5  3. Patient will demonstrate left/right hip ER MMT >/= 4+/5 to improve ease with functional lifting. Eval: Left Hip ER MMT = 3+/5, Right Hip ER MMT = 4/5    Long Term Goals: To be accomplished in 4 weeks:  1. Patient will demonstrate a significant functional improvement as demonstrated by a score of >/= 64 on FOTO. Eval: FOTO = 57       2. Patient will demonstrate 5x-sit-to-stand (chair, no UE assist) </= 15 seconds to demonstrate improved ease with functional transfers. Eval: 5x-sit-to-stand (chair, no UE assist) = 27 sec  3. Patient will demonstrate romberg (EC, Airex) >/= 30 seconds to demonstrate a reduced falls risk.   Eval: Romberg (EC) = 15 sec      PLAN  [x]  Upgrade activities as tolerated     []  Continue plan of care  []  Update interventions per flow sheet       []  Discharge due to:_  []  Other:_      Carlos Alberto Davidson, PT 2/3/2023  7:33 AM    Future Appointments   Date Time Provider Onesimo Saba   2/3/2023  8:00 AM Tracy, [address] [facility]   2/9/2023  8:00 AM MD GAURI Dumont BS AMB

## 2023-02-03 NOTE — PROGRESS NOTES
[facility] - [facility]              [address]        [place] [address], [address] [place]   [phone] fax    Plan of Care/ Statement of Necessity for Physical Therapy Services    Patient name: Dominga Patel Start of Care: 2/3/2023   Referral source: Lakshmi GriffinDO : 1945    Medical Diagnosis: Left hip pain [M25.552]  Payor: VA MEDICARE / Plan: VA MEDICARE PART A & B / Product Type: Medicare /  Onset Date:10/2022    Treatment Diagnosis: Left Hip Pain   Prior Hospitalization: see medical history Provider#: [id number]   Medications: Verified on Patient summary List    Comorbidities: Former Smoker, Bladder Cancer (), Lumbar Fusion (2015), Right Foot Bunionectomy (2018), Hearing Impairment, Fibromyalgia   Prior Level of Function: No Fall History, (I) Ambulation with AD, (I) Functional ADLs, (I) Self-Care ADLs, Work Part-Time      The [address] [place] [address] and following information is based on the information from the initial evaluation. Assessment:    Patient presents with s/s consistent with left hip gluteal tendinopathy with at this time primarily strength impairments noted with significant improvement in symptom severity and irritability with receival of corticosteroid injection 2023. Objectively with weakness of bilateral posterolateral hip girdles (left>right) with TTP noted to the left Greater trochanter. Objectively with balance deficits and difficulty with completion of sit-to-stand transition thus placing patient at increased falls risk. To emphasize improvements in strength of the posterolateral hip girdle bilaterally and static and dynamic balance and stability to improve functional activity tolerance and decrease falls risk.       Patient will continue to benefit from skilled PT services to modify and progress therapeutic interventions, address functional mobility deficits, address ROM deficits, address strength deficits, analyze and address soft tissue restrictions, analyze and cue movement patterns, analyze and modify body mechanics/ergonomics, assess and modify postural abnormalities, address imbalance/dizziness, and instruct in home and community integration to attain remaining goals. Key Information:    BP: 122/60 mmHg     Gait:    Without AD, Left antalgic gait pattern     Functional Tests:  Romberg: EO 30 sec / EC 15 sec  SLS: Left SLS < 2 sec / Right SLS < 2 sec  5x-sit-to-stand (chair, no UE assist): 27 sec     Lumbar Screen:  All AROM WNL and pain-free                                 ROM/Strength          AROM                          MMT (1-5)  Hip Left Right Left Right   Flexion WNL WNL 5 5   Extension WNL WNL 1 3   Abduction WNL WNL 2+ 2+   ER WNL WNL 3+ 4   IR WNL WNL 5 5   Knee Left Right Left Right   Extension WNL WNL 5 5   Flexion WNL WNL 5 5                                                      Palpation: TTP left greater trochanter, TTP left Piriformis     Special Tests  Zackary/ Eric Test:    [x] Neg    [] Pos  Cody Test:              [x] Neg    [] Pos  Scouring Test:             [x] Neg    [] Pos  Trendelenberg:           [] Neg    [x] Pos          [x] Left    [] Right  Supine Bridge x5 - Able to perform throughout full AROM without pain    Evaluation Complexity History MEDIUM  Complexity : 1-2 comorbidities / personal factors will impact the outcome/ POC ; Examination LOW Complexity : 1-2 Standardized tests and measures addressing body structure, function, activity limitation and / or participation in recreation  ;Presentation LOW Complexity : Stable, uncomplicated  ;Clinical Decision Making MEDIUM Complexity : FOTO score of 26-74  Overall Complexity Rating: LOW   Problem List: pain affecting function, decrease ROM, decrease strength, impaired gait/ balance, decrease ADL/ functional abilitiies, decrease activity tolerance, decrease flexibility/ joint mobility, and decrease transfer abilities   Treatment Plan may include any combination of the following: Therapeutic exercise, Neuromuscular reeducation, Manual therapy, Therapeutic activity, Self care/home management, Electric stim unattended , Gait training, and Electric stim attended  Patient / Family readiness to learn indicated by: asking questions, trying to perform skills, and interest  Persons(s) to be included in education: patient (P)  Barriers to Learning/Limitations: None  Patient Goal (s): Pain better - no meds  Patient Self Reported Health Status: good  Rehabilitation Potential: good    Short Term Goals: To be accomplished in 2 weeks:  1. Patient will subjectively report full compliance with prescribed HEP. Eval: HEP provided  2. Patient will demonstrate left/right hip abduction MMT >/= 4+/5 to improve stability with ambulation on uneven ground. Eval: Left Hip Abduction MMT = 2+/5, Right Hip Abduction MMT = 2+/5  3. Patient will demonstrate left/right hip ER MMT >/= 4+/5 to improve ease with functional lifting. Eval: Left Hip ER MMT = 3+/5, Right Hip ER MMT = 4/5    Long Term Goals: To be accomplished in 4 weeks:  1. Patient will demonstrate a significant functional improvement as demonstrated by a score of >/= 64 on FOTO. Eval: FOTO = 57       2. Patient will demonstrate 5x-sit-to-stand (chair, no UE assist) </= 15 seconds to demonstrate improved ease with functional transfers. Eval: 5x-sit-to-stand (chair, no UE assist) = 27 sec  3. Patient will demonstrate romberg (EC, Airex) >/= 30 seconds to demonstrate a reduced falls risk. Eval: Romberg (EC) = 15 sec    Frequency / Duration: Patient to be seen 2 times per week for 4 weeks.     Patient/ Caregiver education and instruction: Diagnosis, prognosis, self care, activity modification, and exercises   [x]  Plan of care has been reviewed with PTA      Certification Period: 2/3/2023 - 3/4/2023  Jt Parker, PT 2/3/2023 7:38 AM  ________________________________________________________________________    I certify that the above Therapy Services are being furnished while the patient is under my care. I agree with the treatment plan and certify that this therapy is necessary.     [de-identified] Signature:____________Date:_________TIME:________     Jair Diaz,   ** Signature, Date and Time must be completed for valid certification **  Please sign and return to [facility] - [address], [address] [place] [address] [place]   [phone] fax

## 2023-02-06 ENCOUNTER — HOSPITAL ENCOUNTER (OUTPATIENT)
Dept: PHYSICAL THERAPY | Age: 78
Discharge: HOME | End: 2023-02-06
Attending: ORTHOPAEDIC SURGERY
Payer: MEDICARE

## 2023-02-06 PROCEDURE — 97112 NEUROMUSCULAR REEDUCATION: CPT

## 2023-02-06 PROCEDURE — 97110 THERAPEUTIC EXERCISES: CPT

## 2023-02-06 NOTE — PROGRESS NOTES
PT DAILY TREATMENT NOTE     Patient Name: Yeison Nixon  XGMF:  : 1945  [x]  Patient  Verified  Payor: VA MEDICARE / Plan: VA MEDICARE PART A & B / Product Type: Medicare /    In time:835  Out time:913  Total Treatment Time (min): 38  Visit #: 2 of 8    Medicare/BCBS Only   Total Timed Codes (min):  38 1:1 Treatment Time:  38       Treatment Area: Left hip pain [M25.552]    SUBJECTIVE  Pain Level (0-10 scale): 3-4  Any medication changes, allergies to medications, adverse drug reactions, diagnosis change, or new procedure performed?: [x] No    [] Yes (see summary sheet for update)  Subjective functional status/changes:   [] No changes reported  Pt reported stiff lateral hip pain upon arrival    OBJECTIVE    28 min Therapeutic Exercise:  [] See flow sheet :   Rationale: increase ROM and increase strength to improve the patients ability to perform ADLs      10 min Neuromuscular Re-education:  []  See flow sheet :   Rationale: improve coordination, improve balance, and increase proprioception  to improve the patients ability to perform ADLs          With   [] TE   [] TA   [] neuro   [] other: Patient Education: [x] Review HEP    [] Progressed/Changed HEP based on:   [] positioning   [] body mechanics   [] transfers   [] heat/ice application    [] other:      Other Objective/Functional Measures: initiated treatment     Pain Level (0-10 scale) post treatment: 2    ASSESSMENT/Changes in Function: initiated treatment per flow sheet and POC with decreased pain post and improved gait pattern. Patient will continue to benefit from skilled PT services to modify and progress therapeutic interventions, address functional mobility deficits, address ROM deficits, address strength deficits, analyze and address soft tissue restrictions, and analyze and cue movement patterns to attain remaining goals.      [x]  See Plan of Care  []  See progress note/recertification  []  See Discharge Summary Progress towards goals / Updated goals:  Short Term Goals: To be accomplished in 2 weeks:  1. Patient will subjectively report full compliance with prescribed HEP. Eval: HEP provided  2. Patient will demonstrate left/right hip abduction MMT >/= 4+/5 to improve stability with ambulation on uneven ground. Eval: Left Hip Abduction MMT = 2+/5, Right Hip Abduction MMT = 2+/5  3. Patient will demonstrate left/right hip ER MMT >/= 4+/5 to improve ease with functional lifting. Eval: Left Hip ER MMT = 3+/5, Right Hip ER MMT = 4/5     Long Term Goals: To be accomplished in 4 weeks:  1. Patient will demonstrate a significant functional improvement as demonstrated by a score of >/= 64 on FOTO. Eval: FOTO = 57       2. Patient will demonstrate 5x-sit-to-stand (chair, no UE assist) </= 15 seconds to demonstrate improved ease with functional transfers. Eval: 5x-sit-to-stand (chair, no UE assist) = 27 sec  3. Patient will demonstrate romberg (EC, Airex) >/= 30 seconds to demonstrate a reduced falls risk.   Eval: Romberg (EC) = 15 sec    PLAN  [x]  Upgrade activities as tolerated     [x]  Continue plan of care  []  Update interventions per flow sheet       []  Discharge due to:_  []  Other:_      Michalene Lennox, PTA 2/6/2023  8:46 AM    Future Appointments   Date Time Provider Onesimo Saba   2/9/2023  8:00 AM MD GAURI Silva AMB

## 2023-02-08 ENCOUNTER — APPOINTMENT (OUTPATIENT)
Dept: PHYSICAL THERAPY | Age: 78
End: 2023-02-08
Attending: ORTHOPAEDIC SURGERY
Payer: MEDICARE

## 2023-02-09 ENCOUNTER — OFFICE VISIT (OUTPATIENT)
Dept: ORTHOPEDIC SURGERY | Age: 78
End: 2023-02-09
Payer: MEDICARE

## 2023-02-09 VITALS
HEART RATE: 73 BPM | BODY MASS INDEX: 23.41 KG/M2 | TEMPERATURE: 97.5 F | WEIGHT: 128 LBS | RESPIRATION RATE: 17 BRPM | OXYGEN SATURATION: 97 %

## 2023-02-09 DIAGNOSIS — G62.9 NEUROPATHY: Primary | ICD-10-CM

## 2023-02-09 DIAGNOSIS — M96.1 LUMBAR POST-LAMINECTOMY SYNDROME: ICD-10-CM

## 2023-02-09 DIAGNOSIS — M53.3 SACROILIAC JOINT PAIN: ICD-10-CM

## 2023-02-09 DIAGNOSIS — Z98.1 HISTORY OF LUMBAR FUSION: ICD-10-CM

## 2023-02-09 DIAGNOSIS — M47.816 LUMBAR FACET ARTHROPATHY: ICD-10-CM

## 2023-02-09 RX ORDER — HYDROXYZINE HYDROCHLORIDE 10 MG/1
TABLET, FILM COATED ORAL
COMMUNITY
Start: 2022-10-06

## 2023-02-09 RX ORDER — OMEPRAZOLE 40 MG/1
CAPSULE, DELAYED RELEASE ORAL
COMMUNITY
Start: 2023-01-26

## 2023-02-09 RX ORDER — GABAPENTIN 300 MG/1
CAPSULE ORAL
Qty: 270 CAPSULE | Refills: 1 | Status: SHIPPED | OUTPATIENT
Start: 2023-02-09

## 2023-02-09 NOTE — LETTER
2/10/2023    Patient: Angi [place]   YOB: 1945   Date of Visit: 2/9/2023     Jason Lugo MD  [address] [facility] [address] [place] [facility] [address] [facility] [address]  Via Fax: [phone]    Dear Jason Lugo MD,      Thank you for referring Ms. Angi [place] to [address] for evaluation. My notes for this consultation are attached. If you have questions, please do not hesitate to call me. I look forward to following your patient along with you.       Sincerely,    Kaila Fox MD

## 2023-02-09 NOTE — PATIENT INSTRUCTIONS
Low Back Arthritis: Exercises  Introduction  Here are some examples of typical rehabilitation exercises for your condition. Start each exercise slowly. Ease off the exercise if you start to have pain. Your doctor or physical therapist will tell you when you can start these exercises and which ones will work best for you. When you are not being active, find a comfortable position for rest. Some people are comfortable on the floor or a medium-firm bed with a small pillow under their head and another under their knees. Some people prefer to lie on their side with a pillow between their knees. Don't stay in one position for too long. Take short walks (10 to 20 minutes) every 2 to 3 hours. Avoid slopes, hills, and stairs until you feel better. Walk only distances you can manage without pain, especially leg pain. How to do the exercises  Pelvic tilt  Lie on your back with your knees bent. \"Brace\" your stomach--tighten your muscles by pulling in and imagining your belly button moving toward your spine. Press your lower back into the floor. You should feel your hips and pelvis rock back. Hold for 6 seconds while breathing smoothly. Relax and allow your pelvis and hips to rock forward. Repeat 8 to 12 times. Back stretches  Get down on your hands and knees on the floor. Relax your head and allow it to droop. Round your back up toward the ceiling until you feel a nice stretch in your upper, middle, and lower back. Hold this stretch for as long as it feels comfortable, or about 15 to 30 seconds. Return to the starting position with a flat back while you are on your hands and knees. Let your back sway by pressing your stomach toward the floor. Lift your buttocks toward the ceiling. Hold this position for 15 to 30 seconds. Repeat 2 to 4 times. Follow-up care is a key part of your treatment and safety. Be sure to make and go to all appointments, and call your doctor if you are having problems.  It's also a good idea to know your test results and keep a list of the medicines you take. Current as of: March 9, 2022               Content Version: 13.4  © 2006-2022 [employer]. Care instructions adapted under license by [employer] (which disclaims liability or warranty for this information). If you have questions about a medical condition or this instruction, always ask your healthcare professional. [place] [address] any warranty or liability for your use of this information.

## 2023-02-09 NOTE — PROGRESS NOTES
[facility]  [address] [facility], [address] [place] [address]   Phone: [phone]  Fax: [phone]    Pt's YOB: 1945    ASSESSMENT   Diagnoses and all orders for this visit:    1. Neuropathy  -     gabapentin (NEURONTIN) 300 mg capsule; Take 2-3 in the evening as directed  Indications: neuropathic pain    2. Lumbar post-laminectomy syndrome  -     gabapentin (NEURONTIN) 300 mg capsule; Take 2-3 in the evening as directed  Indications: neuropathic pain    3. Lumbar facet arthropathy    4. History of lumbar fusion    5. Sacroiliac joint pain       IMPRESSION AND PLAN:  Michael Alberto is a 68 y.o. female with history of lumbar pain and presents to the office today for medication follow up. Pt confirms undergoing lumbar radiofrequency ablation with Dr. Giovanna Covington in 04/2022 and continues to report significant benefit with this. She is taking Neurontin 300 mg 2-3 tabs QHS as directed with benefit and no adverse side effects. 1) Pt was given information on lumbar arthritis exercises. 2) I encouraged the patient to exercise regularly, 30 minutes a day, 5 days a week, incorporate weight resistance 2 x weekly, and try water exercise, naomi chi, and chair yoga as time permits. 3) She received refill of Neurontin 300 mg 2-3 caps QHS as directed. 4) Ms. Erika Munoz has a reminder for a \"due or due soon\" health maintenance. I have asked that she contact her primary care provider, Shilpi Soto MD, for follow-up on this health maintenance. 5)  demonstrated consistency with prescribing. Follow-up and Dispositions    Return in about 6 months (around 8/9/2023) for Medication follow up. HISTORY OF PRESENT ILLNESS:  Michael Alberto is a 68 y.o. female with history of lumbar pain and presents to the office today for medication follow up.  Pt confirms undergoing lumbar radiofrequency ablation with Dr. Giovanna Covington in 04/2022 and continues to report significant benefit with this. She reports a new onset of leg weakness which she combats with physical therapy and is anticipating becoming more active. Pt describes the weakness as having issues with lifting the legs and ambulating up stairs. She denies issues with walking or standing at this time. She also denies groin pain and rates her pain as a 2.5-3/10 on her bad days. Pt notes she is anticipating undergoing another RFA evaluation in 2023 with Dr. Umer Linares. She is taking Neurontin 300 mg 2-3 tabs QHS as directed with benefit and no adverse side effects. She also supplements with vitamin D and zinc. Pt at this time desires to continue with current care. Pain Scale: 1/10    PCP: Octavia Elliott MD     Past Medical History:   Diagnosis Date    Bladder cancer ([facility] [address]  (approx)    Foraminal stenosis of lumbar region     Hematuria     Hypercholesterolemia     Low back pain     Shoulder pain 10/1/2020        Social History     Socioeconomic History    Marital status:      Spouse name: Not on file    Number of children: Not on file    Years of education: Not on file    Highest education level: Not on file   Occupational History    Occupation: nurse   Tobacco Use    Smoking status: Former     Types: Cigarettes     Quit date: 1980     Years since quittin.1    Smokeless tobacco: Never   Substance and Sexual Activity    Alcohol use:  Yes     Alcohol/week: 0.0 standard drinks     Comment: 1 glass of wine 3-4 times per month    Drug use: No    Sexual activity: Not on file   Other Topics Concern    Not on file   Social History Narrative    Not on file     Social Determinants of Health     Financial Resource Strain: Not on file   Food Insecurity: Not on file   Transportation Needs: Not on file   Physical Activity: Not on file   Stress: Not on file   Social Connections: Not on file   Intimate Partner Violence: Not on file   Housing Stability: Not on file       Current Outpatient Medications   Medication Sig Dispense Refill    hydrOXYzine HCL (ATARAX) 10 mg tablet hydroxyzine HCl 10 mg tablet   TAKE 1 TABLET BY MOUTH DAILY AS NEEDED FOR ANXIETY      omeprazole (PRILOSEC) 40 mg capsule TAKE ONE CAPSULE BY MOUTH DAILY FOR 30 DAYS      gabapentin (NEURONTIN) 300 mg capsule Take 2-3 in the evening as directed  Indications: neuropathic pain 270 Capsule 1    acetaminophen (TYLENOL) 650 mg TbER Take 1,300 mg by mouth.      potassium chloride SA (MICRO-K) 10 mEq capsule TK 1 C PO QD      amLODIPine (NORVASC) 5 mg tablet TK 1 T PO ONCE A DAY      escitalopram oxalate (LEXAPRO) 10 mg tablet escitalopram 10 mg tablet      fluticasone propionate (FLONASE) 50 mcg/actuation nasal spray fluticasone propionate 50 mcg/actuation nasal spray,suspension      furosemide (LASIX) 20 mg tablet furosemide 20 mg tablet      ibuprofen (MOTRIN) 200 mg tablet Take 400 mg by mouth every eight (8) hours as needed. traZODone (DESYREL) 50 mg tablet TK 1 T PO QHS  0    multivitamin (ONE A DAY) tablet Take 1 Tab by mouth daily. pravastatin (PRAVACHOL) 20 mg tablet Take 20 mg by mouth nightly. 5       Allergies   Allergen Reactions    Simvastatin Unknown (comments)    Lipitor [Atorvastatin] Myalgia    Prednisone Other (comments)         REVIEW OF SYSTEMS    Constitutional: Negative for fever, chills, or weight change. Respiratory: Negative for cough or shortness of breath. Cardiovascular: Negative for chest pain or palpitations. Gastrointestinal: Negative for acid reflux, change in bowel habits, or constipation. Genitourinary: Negative for dysuria and flank pain. Musculoskeletal: Positive for lumbar pain. Skin: Negative for rash. Neurological: Negative for headaches, dizziness, or numbness. Endo/Heme/Allergies: Negative for increased bruising. Psychiatric/Behavioral: Negative for difficulty with sleep.     As per HPI    PHYSICAL EXAMINATION  Visit Vitals  Pulse 73   Temp 97.5 °F (36.4 °C)   Resp 17   Wt 128 lb (58.1 kg)   SpO2 97%   BMI 23.41 kg/m²       Constitutional: Awake, alert, and in no acute distress. Neurological: Sensation to light touch is intact. Negative Ying's sign bilaterally. Skin: warm, dry, and intact. Musculoskeletal: No pain with extension, axial loading, or forward flexion. No pain with internal or external rotation of her hips. Negative straight leg raise bilaterally. Biceps  Triceps Deltoids Wrist Ext Wrist Flex Hand Intrin   Right +4/5 +4/5 +4/5 +4/5 +4/5 +4/5   Left +4/5 +4/5 +4/5 +4/5 +4/5 +4/5      Hip Flex  Quads Hamstrings Ankle DF EHL Ankle PF   Right +4/5 +4/5 +4/5 +4/5 +4/5 +4/5   Left +4/5 +4/5 +4/5 +4/5 +4/5 +4/5     IMAGING:    Lumbar spine MRI from 8/9/2021 was personally reviewed with the patient and demonstrated:   FINDINGS:     Postoperative: Status post L3-L4 posterior hardware fixation. No evidence of  postoperative collection. Artifact slightly limits evaluation. Mild lower lumbar  paraspinous muscle fatty atrophy. General: Vertebral body heights preserved. Multilevel mild disc space loss and  mild endplate spurring. Mild L2 on L3 retrolisthesis and grade 1 L4-L5  anterolisthesis. Lumbar lordosis maintained. Conus ends at level L2-L3. No  evidence of abnormal signal or enhancement in the distal cord. No abnormal  epidural collection identified. No suspicious marrow signal.     Miscellaneous: Incompletely imaged probable central right hepatic lobe cyst and  subcentimeter left renal cysts. Incompletely imaged suspected uterine fibroids. Levels:     T10-T11, T11-T12, T12-L1: Sagittal plane only. No significant degenerative  change or stenosis. L1-L2: No significant disc disease or stenosis. Mild facet arthropathy. L2-L3: Mild retrolisthesis. Small left lateral recess disc protrusion and  background mild disc bulge. Mild facet arthropathy. Spinal canal patent. Mild  foraminal stenoses. L3-L4: Postoperative lumbar spine. Mild facet arthropathy. Spinal canal patent.   Mild left foraminal stenosis. Right foramen patent. L4-L5: Grade 1 anterolisthesis with uncovering of disc and mild disc bulge. Severe facet arthropathy. Minimal spinal canal narrowing. Mild to moderate  foraminal stenoses. L5-S1: Small posterior central disc protrusion with associated annular fissure  and background mild disc bulge. Mild facet arthropathy. Spinal canal patent. Mild left greater than right foraminal stenoses. Imaged sacrum: Unremarkable. Impression  L3-L4 posterior hardware fixation. Spinal canal patent at this level, but mild  left foraminal stenosis. L4-L5 notable degenerative changes characterized by grade 1 anterolisthesis and  severe facet arthropathy resulting in minimal spinal canal narrowing and mild to  moderate foraminal stenoses. Notable degenerative changes at L2-L3 and L5-S1, but no more mild stenosis. See level by level details above. Compared to 2017 MRI, degenerative changes  have overall worsened. Lumbar spine x-rays from 07/26/2021 were reviewed and demonstrated:  FINDINGS: There are 5 lumbar-type vertebra. Postoperative changes are noted. Circumferential fusion of the L3-L4 level. No hardware complications identified. No fracture appreciated. There is anterolisthesis of L4 on L5 by 6 mm. This  appears increased since prior MRI. Progressive degenerative disc disease at  L4-L5 and facet arthropathy are noted. Moderate degenerative changes at the  L5-S1 level are noted. IMPRESSION  1. Postoperative changes at the L3-L4 level without evidence of hardware  complication. 2.  Progressive anterolisthesis and degenerative changes and facet arthropathy  at L4-L5. Written by Mandi Donato, as dictated by Dayanara Coughlin MD.  I, Dr. Dayanara Coughlin confirm that all documentation is accurate.

## 2023-02-14 ENCOUNTER — HOSPITAL ENCOUNTER (OUTPATIENT)
Facility: HOSPITAL | Age: 78
Setting detail: RECURRING SERIES
Discharge: HOME | End: 2023-02-17
Payer: MEDICARE

## 2023-02-14 PROCEDURE — 97112 NEUROMUSCULAR REEDUCATION: CPT

## 2023-02-14 PROCEDURE — 97110 THERAPEUTIC EXERCISES: CPT

## 2023-02-14 NOTE — PROGRESS NOTES
PHYSICAL / OCCUPATIONAL THERAPY - DAILY TREATMENT NOTE (updated )    Patient Name: Marta Biggs    Date: 2023    : 1945  Insurance: Payor: MEDICARE / Plan: MEDICARE PART A AND B / Product Type: *No Product type* /      Patient  verified Yes     Visit #   Current / Total 3 8   Time   In / Out 8:00 8:47   Pain   In / Out 0 0   Subjective Functional Status/Changes: Patient states she has been feeling better and minimal pain, but continues to feel weak in her legs. Changes to:  Meds, Allergies, Med Hx, Sx Hx? If yes, update Summary List no       TREATMENT AREA =  Left hip pain [M25.552]    OBJECTIVE         Therapeutic Procedures: Tx Min Billable or 1:1 Min (if diff from Tx Min) Procedure, Rationale, Specifics   29 10 99852 Therapeutic Exercise (timed):  increase ROM, strength, coordination, balance, and proprioception to improve patient's ability to progress to PLOF and address remaining functional goals. (see flow sheet as applicable)     Details if applicable:       18 15 90292 Neuromuscular Re-Education (timed):  improve balance, coordination, kinesthetic sense, posture, core stability and proprioception to improve patient's ability to develop conscious control of individual muscles and awareness of position of extremities in order to progress to PLOF and address remaining functional goals. (see flow sheet as applicable)     Details if applicable:     52 25 MC BC Totals Reminder: bill using total billable min of TIMED therapeutic procedures (example: do not include dry needle or estim unattended, both untimed codes, in totals to left)  8-22 min = 1 unit; 23-37 min = 2 units; 38-52 min = 3 units; 53-67 min = 4 units; 68-82 min = 5 units   Total Total     [x]  Patient Education billed concurrently with other procedures   [x] Review HEP    [] Progressed/Changed HEP, detail:    [] Other detail:       Objective Information/Functional Measures/Assessment    MMT B hip abduction 3/5.      Patient has slight difficulty with control with performing sit to stand without use of UE. Cues for patient to maintain even pelvis with performing SLS to focus on glut med strength. Patient will continue to benefit from skilled PT / OT services to modify and progress therapeutic interventions, analyze and address functional mobility deficits, analyze and address ROM deficits, analyze and address strength deficits, analyze and address soft tissue restrictions, analyze and cue for proper movement patterns, analyze and modify for postural abnormalities, analyze and address imbalance/dizziness, and instruct in home and community integration to address functional deficits and attain remaining goals. Progress toward goals / Updated goals:  []  See Progress Note/Recertification    Short Term Goals: To be accomplished in 2 weeks:  1. Patient will subjectively report full compliance with prescribed HEP. Eval: HEP provided  Current: Progressing: pt reports performing HEP 3x a week. 2/14/23  2. Patient will demonstrate left/right hip abduction MMT >/= 4+/5 to improve stability with ambulation on uneven ground. Eval: Left Hip Abduction MMT = 2+/5, Right Hip Abduction MMT = 2+/5  Current: progressing: MMT B hip abduction 3/5. 2/14/23  3. Patient will demonstrate left/right hip ER MMT >/= 4+/5 to improve ease with functional lifting. Eval: Left Hip ER MMT = 3+/5, Right Hip ER MMT = 4/5     Long Term Goals: To be accomplished in 4 weeks:  1. Patient will demonstrate a significant functional improvement as demonstrated by a score of >/= 64 on FOTO. Eval: FOTO = 57       2. Patient will demonstrate 5x-sit-to-stand (chair, no UE assist) </= 15 seconds to demonstrate improved ease with functional transfers. Eval: 5x-sit-to-stand (chair, no UE assist) = 27 sec  3. Patient will demonstrate romberg (EC, Airex) >/= 30 seconds to demonstrate a reduced falls risk.   Eval: Romberg (EC) = 15 sec       PLAN  Yes  Continue plan of care  [] Upgrade activities as tolerated  []  Discharge due to :  []  Other:    Denise Giordano PTA    2/14/2023    7:24 AM    Future Appointments   Date Time Provider Cyrus Nguyen   2/14/2023  8:00 AM Paty Sanchez PTA [facility]   2/20/2023  8:00 AM Adin Marrero, PT [facility]   2/27/2023  7:30 AM Adin Marrero PT [facility]   3/2/2023  9:00 AM MD Darleen Hall [facility]   8/10/2023  8:00 AM Ross Reyes MD [facility] AMB

## 2023-02-16 ENCOUNTER — APPOINTMENT (OUTPATIENT)
Facility: HOSPITAL | Age: 78
End: 2023-02-16
Payer: MEDICARE

## 2023-02-17 ENCOUNTER — HOSPITAL ENCOUNTER (OUTPATIENT)
Facility: HOSPITAL | Age: 78
Setting detail: RECURRING SERIES
Discharge: HOME | End: 2023-02-20
Payer: MEDICARE

## 2023-02-17 PROCEDURE — 97110 THERAPEUTIC EXERCISES: CPT | Performed by: PHYSICAL THERAPIST

## 2023-02-17 PROCEDURE — 97112 NEUROMUSCULAR REEDUCATION: CPT | Performed by: PHYSICAL THERAPIST

## 2023-02-17 NOTE — PROGRESS NOTES
PHYSICAL / OCCUPATIONAL THERAPY - DAILY TREATMENT NOTE (updated )    Patient Name: Brain Aayush    Date: 2023    : 1945  Insurance: Payor: MEDICARE / Plan: MEDICARE PART A AND B / Product Type: *No Product type* /      Patient  verified Yes     Visit #   Current / Total 4 8   Time   In / Out 7:30 8:15   Pain   In / Out 0 0   Subjective Functional Status/Changes: Patient reports she is doing better. Able to be more active with less discomfort. Changes to:  Meds, Allergies, Med Hx, Sx Hx? If yes, update Summary List no       TREATMENT AREA =  Left hip pain [M25.552]    OBJECTIVE         Therapeutic Procedures: Tx Min Billable or 1:1 Min (if diff from Tx Min) Procedure, Rationale, Specifics   25 15 80061 Therapeutic Exercise (timed):  increase ROM, strength, coordination, balance, and proprioception to improve patient's ability to progress to PLOF and address remaining functional goals. (see flow sheet as applicable)     Details if applicable:       15 15 84282 Neuromuscular Re-Education (timed):  improve balance, coordination, kinesthetic sense, posture, core stability and proprioception to improve patient's ability to develop conscious control of individual muscles and awareness of position of extremities in order to progress to PLOF and address remaining functional goals.  (see flow sheet as applicable)     Details if applicable:     39 30 MC BC Totals Reminder: bill using total billable min of TIMED therapeutic procedures (example: do not include dry needle or estim unattended, both untimed codes, in totals to left)  8-22 min = 1 unit; 23-37 min = 2 units; 38-52 min = 3 units; 53-67 min = 4 units; 68-82 min = 5 units   Total Total     [x]  Patient Education billed concurrently with other procedures   [x] Review HEP    [] Progressed/Changed HEP, detail:    [] Other detail:       Objective Information/Functional Measures/Assessment  Patient with decreased subjective c/o pain in her left hip and lower back. Improved ability to  Romberg position on level surface. Patient will continue to benefit from skilled PT / OT services to modify and progress therapeutic interventions, analyze and address functional mobility deficits, analyze and address ROM deficits, analyze and address strength deficits, analyze and address soft tissue restrictions, analyze and cue for proper movement patterns, analyze and modify for postural abnormalities, analyze and address imbalance/dizziness, and instruct in home and community integration to address functional deficits and attain remaining goals. Progress toward goals / Updated goals:  []  See Progress Note/Recertification  Short Term Goals: To be accomplished in 2 weeks:  1. Patient will subjectively report full compliance with prescribed HEP. Eval: HEP provided  Current: Progressing: pt reports performing HEP 3x a week. 2/14/23  2. Patient will demonstrate left/right hip abduction MMT >/= 4+/5 to improve stability with ambulation on uneven ground. Eval: Left Hip Abduction MMT = 2+/5, Right Hip Abduction MMT = 2+/5  Current: progressing: MMT B hip abduction 3/5. 2/14/23  3. Patient will demonstrate left/right hip ER MMT >/= 4+/5 to improve ease with functional lifting. Eval: Left Hip ER MMT = 3+/5, Right Hip ER MMT = 4/5     Long Term Goals: To be accomplished in 4 weeks:  1. Patient will demonstrate a significant functional improvement as demonstrated by a score of >/= 64 on FOTO. Eval: FOTO = 57       2. Patient will demonstrate 5x-sit-to-stand (chair, no UE assist) </= 15 seconds to demonstrate improved ease with functional transfers. Eval: 5x-sit-to-stand (chair, no UE assist) = 27 sec  3. Patient will demonstrate romberg (EC, Airex) >/= 30 seconds to demonstrate a reduced falls risk. Eval: Romberg (EC) = 15 sec  Current:  Romberg EC without Airex x 30 seconds. 2/17/2023.   Progressing    PLAN  Yes  Continue plan of care  []  Upgrade activities as tolerated  []  Discharge due to :  []  Other:    Simon Grace, PT    2/17/2023    7:37 AM    Future Appointments   Date Time Provider Cyrus Nguyen   2/20/2023  8:00 AM Simon Grace, PT [facility]   2/22/2023  7:30 AM Simon Grace, PT [facility]   2/27/2023  7:30 AM Simon Grace, PT [facility]   3/2/2023  9:00 AM MD Janelle Doyle [facility]   8/10/2023  8:00 AM Tanna Guerrero MD [facility] AMB

## 2023-02-20 ENCOUNTER — APPOINTMENT (OUTPATIENT)
Facility: HOSPITAL | Age: 78
End: 2023-02-20
Payer: MEDICARE

## 2023-02-20 ENCOUNTER — HOSPITAL ENCOUNTER (OUTPATIENT)
Facility: HOSPITAL | Age: 78
Setting detail: RECURRING SERIES
Discharge: HOME | End: 2023-02-23
Payer: MEDICARE

## 2023-02-20 PROCEDURE — 97140 MANUAL THERAPY 1/> REGIONS: CPT | Performed by: PHYSICAL THERAPIST

## 2023-02-20 PROCEDURE — 97110 THERAPEUTIC EXERCISES: CPT | Performed by: PHYSICAL THERAPIST

## 2023-02-20 PROCEDURE — 97112 NEUROMUSCULAR REEDUCATION: CPT | Performed by: PHYSICAL THERAPIST

## 2023-02-20 NOTE — PROGRESS NOTES
PHYSICAL / OCCUPATIONAL THERAPY - DAILY TREATMENT NOTE (updated )    Patient Name: Walker Going    Date: 2023    : 1945  Insurance: Payor: MEDICARE / Plan: MEDICARE PART A AND B / Product Type: *No Product type* /      Patient  verified Yes     Visit #   Current / Total 5 8   Time   In / Out 8:00 8:53   Pain   In / Out 0 3.5   Subjective Functional Status/Changes: Patient notes she does not typically have pain unless there is direct pressure applied to her hip. Changes to:  Meds, Allergies, Med Hx, Sx Hx? If yes, update Summary List no       TREATMENT AREA =  Left hip pain [M25.552]    OBJECTIVE    Modalities Rationale:     decrease pain to improve patient's ability to progress to PLOF and address remaining functional goals. min [] Estim Unattended, type/location:                                      []  w/ice    []  w/heat    min [] Estim Attended, type/location:                                     []  w/US     []  w/ice    []  w/heat    []  TENS insruct      min []  Mechanical Traction: type/lbs                   []  pro   []  sup   []  int   []  cont    []  before manual    []  after manual    min []  Ultrasound, settings/location:      min []  Iontophoresis w/ dexamethasone, location:                                               []  take home patch       []  in clinic   10 min  unbill []  Ice     [x]  Heat    location/position: Left hip  side lying    min []  Paraffin,  details:     min []  Vasopneumatic Device, press/temp:     min []  Nancy Olmedo / Edda Clause: If using vaso (only need to measure limb vaso being performed on)      pre-treatment girth :       post-treatment girth :       measured at (landmark location) :      min []  Other:    Skin assessment post-treatment (if applicable):    []  intact    []  redness- no adverse reaction                 []redness - adverse reaction:         Therapeutic Procedures:   Tx Min Billable or 1:1 Min (if diff from [employer]) Procedure, Rationale, Specifics   20 15 22255 Therapeutic Exercise (timed):  increase ROM, strength, coordination, balance, and proprioception to improve patient's ability to progress to PLOF and address remaining functional goals. (see flow sheet as applicable)     Details if applicable:       15 15 50373 Neuromuscular Re-Education (timed):  improve balance, coordination, kinesthetic sense, posture, core stability and proprioception to improve patient's ability to develop conscious control of individual muscles and awareness of position of extremities in order to progress to PLOF and address remaining functional goals. (see flow sheet as applicable)     Details if applicable:     8 8 74223 Manual Therapy (timed):  decrease pain, increase ROM, and increase tissue extensibility to improve patient's ability to progress to PLOF and address remaining functional goals. The manual therapy interventions were performed at a separate and distinct time from the therapeutic activities interventions . (see flow sheet as applicable)     Details if applicable:     36 39 [facility] Totals Reminder: bill using total billable min of TIMED therapeutic procedures (example: do not include dry needle or estim unattended, both untimed codes, in totals to left)  8-22 min = 1 unit; 23-37 min = 2 units; 38-52 min = 3 units; 53-67 min = 4 units; 68-82 min = 5 units   Total Total     [x]  Patient Education billed concurrently with other procedures   [x] Review HEP    [] Progressed/Changed HEP, detail:    [] Other detail:       Objective Information/Functional Measures/Assessment  Patient has significant tenderness to palpation along the left lateral hip into the gluteus medius and greater trochanter. Pain noted with Figure 4 stretch and piriformis stretching.     Patient will continue to benefit from skilled PT / OT services to modify and progress therapeutic interventions, analyze and address functional mobility deficits, analyze and address ROM deficits, analyze and address strength deficits, analyze and address soft tissue restrictions, analyze and cue for proper movement patterns, analyze and modify for postural abnormalities, analyze and address imbalance/dizziness, and instruct in home and community integration to address functional deficits and attain remaining goals. Progress toward goals / Updated goals:  []  See Progress Note/Recertification  Short Term Goals: To be accomplished in 2 weeks:  1. Patient will subjectively report full compliance with prescribed HEP. Eval: HEP provided  Current: Progressing: pt reports performing HEP 3x a week. 2/14/23  2. Patient will demonstrate left/right hip abduction MMT >/= 4+/5 to improve stability with ambulation on uneven ground. Eval: Left Hip Abduction MMT = 2+/5, Right Hip Abduction MMT = 2+/5  Current: progressing: MMT B hip abduction 3/5. 2/14/23  3. Patient will demonstrate left/right hip ER MMT >/= 4+/5 to improve ease with functional lifting. Eval: Left Hip ER MMT = 3+/5, Right Hip ER MMT = 4/5     Long Term Goals: To be accomplished in 4 weeks:  1. Patient will demonstrate a significant functional improvement as demonstrated by a score of >/= 64 on FOTO. Eval: FOTO = 57       2. Patient will demonstrate 5x-sit-to-stand (chair, no UE assist) </= 15 seconds to demonstrate improved ease with functional transfers. Eval: 5x-sit-to-stand (chair, no UE assist) = 27 sec  3. Patient will demonstrate romberg (EC, Airex) >/= 30 seconds to demonstrate a reduced falls risk. Eval: Romberg (EC) = 15 sec  Current:  Romberg EC without Airex x 30 seconds. 2/17/2023.   Progressing    PLAN  Yes  Continue plan of care  []  Upgrade activities as tolerated  []  Discharge due to :  []  Other:    Robin Vega PT    2/20/2023    10:14 AM    Future Appointments   Date Time Provider Cyrus Nguyen   2/22/2023  7:30 AM Robin Vega PT [facility]   2/27/2023  7:30 AM Robin Vega PT [facility]   3/2/2023  9:00 AM Kenisha Hoffmann MD [facility]   8/10/2023  8:00 AM Tanna Guerrero MD [facility]

## 2023-02-22 ENCOUNTER — HOSPITAL ENCOUNTER (OUTPATIENT)
Facility: HOSPITAL | Age: 78
Setting detail: RECURRING SERIES
Discharge: HOME | End: 2023-02-25
Payer: MEDICARE

## 2023-02-22 ENCOUNTER — APPOINTMENT (OUTPATIENT)
Facility: HOSPITAL | Age: 78
End: 2023-02-22
Payer: MEDICARE

## 2023-02-22 PROCEDURE — 97112 NEUROMUSCULAR REEDUCATION: CPT

## 2023-02-22 PROCEDURE — 97110 THERAPEUTIC EXERCISES: CPT

## 2023-02-22 NOTE — PROGRESS NOTES
PHYSICAL / OCCUPATIONAL THERAPY - DAILY TREATMENT NOTE (updated )    Patient Name: Renita Orf    Date: 2023    : 1945  Insurance: Payor: MEDICARE / Plan: MEDICARE PART A AND B / Product Type: *No Product type* /      Patient  verified Yes     Visit #   Current / Total 6 8   Time   In / Out 7:32 8:12   Pain   In / Out 0 2   Subjective Functional Status/Changes: Patient states she feels she has gotten stronger. Changes to:  Meds, Allergies, Med Hx, Sx Hx? If yes, update Summary List no       TREATMENT AREA =  Left hip pain [M25.552]    OBJECTIVE      Therapeutic Procedures: Tx Min Billable or 1:1 Min (if diff from Tx Min) Procedure, Rationale, Specifics    25 51432 Therapeutic Exercise (timed):  increase ROM, strength, coordination, balance, and proprioception to improve patient's ability to progress to PLOF and address remaining functional goals. (see flow sheet as applicable)     Details if applicable:        15 27700 Neuromuscular Re-Education (timed):  improve balance, coordination, kinesthetic sense, posture, core stability and proprioception to improve patient's ability to develop conscious control of individual muscles and awareness of position of extremities in order to progress to PLOF and address remaining functional goals.  (see flow sheet as applicable)     Details if applicable:      36 [facility] Totals Reminder: bill using total billable min of TIMED therapeutic procedures (example: do not include dry needle or estim unattended, both untimed codes, in totals to left)  8-22 min = 1 unit; 23-37 min = 2 units; 38-52 min = 3 units; 53-67 min = 4 units; 68-82 min = 5 units   Total Total     [x]  Patient Education billed concurrently with other procedures   [x] Review HEP    [] Progressed/Changed HEP, detail:    [] Other detail:       Objective Information/Functional Measures/Assessment    Patient continues to have difficulty performing sit to stands from standard chair without use of UE. Patient states she has increased her walking tolerance to 1.5 miles of exercises program that she performs 3x a week. Patient will continue to benefit from skilled PT / OT services to modify and progress therapeutic interventions, analyze and address functional mobility deficits, analyze and address ROM deficits, analyze and address strength deficits, analyze and address soft tissue restrictions, analyze and cue for proper movement patterns, analyze and modify for postural abnormalities, analyze and address imbalance/dizziness, and instruct in home and community integration to address functional deficits and attain remaining goals. Progress toward goals / Updated goals:  []  See Progress Note/Recertification    Short Term Goals: To be accomplished in 2 weeks:  1. Patient will subjectively report full compliance with prescribed HEP. Eval: HEP provided  Current: MET: pt reports performing HEP. 2/22/23  2. Patient will demonstrate left/right hip abduction MMT >/= 4+/5 to improve stability with ambulation on uneven ground. Eval: Left Hip Abduction MMT = 2+/5, Right Hip Abduction MMT = 2+/5  Current: progressing: MMT B hip abduction 3/5. 2/14/23  3. Patient will demonstrate left/right hip ER MMT >/= 4+/5 to improve ease with functional lifting. Eval: Left Hip ER MMT = 3+/5, Right Hip ER MMT = 4/5     Long Term Goals: To be accomplished in 4 weeks:  1. Patient will demonstrate a significant functional improvement as demonstrated by a score of >/= 64 on FOTO. Eval: FOTO = 57       2. Patient will demonstrate 5x-sit-to-stand (chair, no UE assist) </= 15 seconds to demonstrate improved ease with functional transfers. Eval: 5x-sit-to-stand (chair, no UE assist) = 27 sec  Current: Progressing 5x-sit-to-stand (chair, no UE) = 25 sec. 2/22/23. 3. Patient will demonstrate romberg (EC, Airex) >/= 30 seconds to demonstrate a reduced falls risk.   Eval: Romberg (EC) = 15 sec  Current: Progressing  Romberg EC without Airex x 30 seconds. 2/17/2023.      PLAN  Yes  Continue plan of care  []  Upgrade activities as tolerated  []  Discharge due to :  []  Other:    Carline Joyce, SHIVA    2/22/2023    7:35 AM    Future Appointments   Date Time Provider Cyrus Nguyen   2/27/2023  7:30 AM Jocy Hughes, PT [facility]   3/2/2023  9:00 AM Radha Luevano MD [facility] Puls Sched   8/10/2023  8:00 AM Fernando Soares MD [facility]

## 2023-02-27 ENCOUNTER — HOSPITAL ENCOUNTER (OUTPATIENT)
Facility: HOSPITAL | Age: 78
Setting detail: RECURRING SERIES
Discharge: HOME | End: 2023-03-02
Payer: MEDICARE

## 2023-02-27 PROCEDURE — 97112 NEUROMUSCULAR REEDUCATION: CPT | Performed by: PHYSICAL THERAPIST

## 2023-02-27 PROCEDURE — 97110 THERAPEUTIC EXERCISES: CPT | Performed by: PHYSICAL THERAPIST

## 2023-02-27 NOTE — PROGRESS NOTES
PHYSICAL / OCCUPATIONAL THERAPY - DAILY TREATMENT NOTE (updated )    Patient Name: Rosa Elena Schmid    Date: 2023    : 1945  Insurance: Payor: MEDICARE / Plan: MEDICARE PART A AND B / Product Type: *No Product type* /      Patient  verified Yes     Visit #   Current / Total 7 8   Time   In / Out 7:32 8:34   Pain   In / Out 0 4.5   Subjective Functional Status/Changes: Patient denies pain, notes a slight \"discomfort\" in her left hip. Changes to:  Meds, Allergies, Med Hx, Sx Hx? If yes, update Summary List no       TREATMENT AREA =  Left hip pain [M25.552]    OBJECTIVE    Modalities Rationale:     decrease pain to improve patient's ability to progress to PLOF and address remaining functional goals. min [] Estim Unattended, type/location:                                      []  w/ice    []  w/heat    min [] Estim Attended, type/location:                                     []  w/US     []  w/ice    []  w/heat    []  TENS insruct      min []  Mechanical Traction: type/lbs                   []  pro   []  sup   []  int   []  cont    []  before manual    []  after manual    min []  Ultrasound, settings/location:      min []  Iontophoresis w/ dexamethasone, location:                                               []  take home patch       []  in clinic   10 min  unbill []  Ice     [x]  Heat    location/position: Left hip  Right side lying. min []  Paraffin,  details:     min []  Vasopneumatic Device, press/temp:     min []  Bk Reid / Justina Combs: If using vaso (only need to measure limb vaso being performed on)      pre-treatment girth :       post-treatment girth :       measured at (landmark location) :      min []  Other:    Skin assessment post-treatment (if applicable):    []  intact    []  redness- no adverse reaction                 []redness - adverse reaction:         Therapeutic Procedures:     Tx Min Billable or 1:1 Min (if diff from Tx Min) Procedure, Rationale, Specifics    04 [id number] Therapeutic Exercise (timed):  increase ROM, strength, coordination, balance, and proprioception to improve patient's ability to progress to PLOF and address remaining functional goals. (see flow sheet as applicable)     Details if applicable:        22 28146 Neuromuscular Re-Education (timed):  improve balance, coordination, kinesthetic sense, posture, core stability and proprioception to improve patient's ability to develop conscious control of individual muscles and awareness of position of extremities in order to progress to PLOF and address remaining functional goals. (see flow sheet as applicable)     Details if applicable:      52 [facility] Totals Reminder: bill using total billable min of TIMED therapeutic procedures (example: do not include dry needle or estim unattended, both untimed codes, in totals to left)  8-22 min = 1 unit; 23-37 min = 2 units; 38-52 min = 3 units; 53-67 min = 4 units; 68-82 min = 5 units   Total Total     [x]  Patient Education billed concurrently with other procedures   [x] Review HEP    [] Progressed/Changed HEP, detail:    [] Other detail:       Objective Information/Functional Measures/Assessment  Patient is able to perform Romberg standing on foam cushion with eyes closed.  She did lose her balance 2x but was able to prevent falling without assistance.  Gait is without deviation.      Patient will continue to benefit from skilled PT / OT services to modify and progress therapeutic interventions, analyze and address functional mobility deficits, analyze and address ROM deficits, analyze and address strength deficits, analyze and address soft tissue restrictions, analyze and cue for proper movement patterns, analyze and modify for postural abnormalities, analyze and address imbalance/dizziness, and instruct in home and community integration to address functional deficits and attain remaining goals.    Progress toward goals / Updated goals:    Short Term Goals: To be accomplished in 2  weeks: 1. Patient will subjectively report full compliance with prescribed HEP. Eval: HEP provided  Current: MET: pt reports performing HEP. 2/22/23  2. Patient will demonstrate left/right hip abduction MMT >/= 4+/5 to improve stability with ambulation on uneven ground. Eval: Left Hip Abduction MMT = 2+/5, Right Hip Abduction MMT = 2+/5  Current: progressing: MMT B hip abduction 3/5. 2/27/2023  3. Patient will demonstrate left/right hip ER MMT >/= 4+/5 to improve ease with functional lifting. Eval: Left Hip ER MMT = 3+/5, Right Hip ER MMT = 4/5  Current:  Left hip ER = 4/5; Right hip ER = 4/5.  2/27/2023. Progressing. Long Term Goals: To be accomplished in 4 weeks:  1. Patient will demonstrate a significant functional improvement as demonstrated by a score of >/= 64 on FOTO. Eval: FOTO = 57       2. Patient will demonstrate 5x-sit-to-stand (chair, no UE assist) </= 15 seconds to demonstrate improved ease with functional transfers. Eval: 5x-sit-to-stand (chair, no UE assist) = 27 sec  Current: Progressing 5x-sit-to-stand (chair, no UE) = 25 sec. 2/22/23. 3. Patient will demonstrate romberg (EC, Airex) >/= 30 seconds to demonstrate a reduced falls risk. Eval: Romberg (EC) = 15 sec  Current: Progressing  Romberg EC with Airex x 30 seconds with loss of balance and self correction. 2/27/2023.      PLAN  Yes  Continue plan of care  [x]  Upgrade activities as tolerated  []  Discharge due to :  []  Other:    Mainor Lunsford, PT    2/27/2023    7:36 AM    Future Appointments   Date Time Provider Cyrus Nguyen   3/2/2023  9:00 AM MD Krystyna العلي   3/7/2023  8:00 AM Ranjan Chaney PTA [facility] [id number]   8/10/2023  8:00 AM Vladimir Orantes MD [facility]

## 2023-03-01 ENCOUNTER — APPOINTMENT (OUTPATIENT)
Facility: HOSPITAL | Age: 78
End: 2023-03-01
Payer: MEDICARE

## 2023-03-07 ENCOUNTER — TELEPHONE (OUTPATIENT)
Facility: HOSPITAL | Age: 78
End: 2023-03-07

## 2023-03-07 ENCOUNTER — HOSPITAL ENCOUNTER (OUTPATIENT)
Facility: HOSPITAL | Age: 78
Setting detail: RECURRING SERIES
End: 2023-03-07
Payer: MEDICARE

## 2023-03-07 NOTE — TELEPHONE ENCOUNTER
PT called last night and stated that she is not feeling well today would like to reschedule. Pt is now scheduled for 1000 with Slime Gurrola tomorrow.

## 2023-03-08 ENCOUNTER — HOSPITAL ENCOUNTER (OUTPATIENT)
Facility: HOSPITAL | Age: 78
Setting detail: RECURRING SERIES
Discharge: HOME | End: 2023-03-11
Payer: MEDICARE

## 2023-03-08 PROCEDURE — 97110 THERAPEUTIC EXERCISES: CPT | Performed by: PHYSICAL THERAPIST

## 2023-03-08 PROCEDURE — 97112 NEUROMUSCULAR REEDUCATION: CPT | Performed by: PHYSICAL THERAPIST

## 2023-03-08 NOTE — PROGRESS NOTES
PHYSICAL / OCCUPATIONAL THERAPY - DAILY TREATMENT NOTE (updated )    Patient Name: Lou Moore    Date: 3/8/2023    : 1945  Insurance: Payor: MEDICARE / Plan: MEDICARE PART A AND B / Product Type: *No Product type* /      Patient  verified Yes     Visit #   Current / Total 8 8   Time   In / Out 10:00 11:23   Pain   In / Out 1.5 3   Subjective Functional Status/Changes: Patient reports she is \"92% better\". She notes she still has some issues with cleaning her home but she just has to take her time. Changes to:  Meds, Allergies, Med Hx, Sx Hx? If yes, update Summary List no       TREATMENT AREA =  Left hip pain [M25.552]    OBJECTIVE    Modalities Rationale:     decrease pain to improve patient's ability to progress to PLOF and address remaining functional goals. min [] Estim Unattended, type/location:                                      []  w/ice    []  w/heat    min [] Estim Attended, type/location:                                     []  w/US     []  w/ice    []  w/heat    []  TENS insruct      min []  Mechanical Traction: type/lbs                   []  pro   []  sup   []  int   []  cont    []  before manual    []  after manual    min []  Ultrasound, settings/location:      min []  Iontophoresis w/ dexamethasone, location:                                               []  take home patch       []  in clinic   10 min  unbill []  Ice     [x]  Heat    location/position: Left hip  Right side lying    min []  Paraffin,  details:     min []  Vasopneumatic Device, press/temp:     min []  Amita Morfin / Sourav Smith: If using vaso (only need to measure limb vaso being performed on)      pre-treatment girth :       post-treatment girth :       measured at (landmark location) :      min []  Other:    Skin assessment post-treatment (if applicable):    []  intact    []  redness- no adverse reaction                 []redness - adverse reaction:         Therapeutic Procedures:     Tx Min Billable or 1:1 Min (if diff from Tx Min) Procedure, Rationale, Specifics   40 20 22541 Therapeutic Exercise (timed):  increase ROM, strength, coordination, balance, and proprioception to improve patient's ability to progress to PLOF and address remaining functional goals. (see flow sheet as applicable)     Details if applicable:       33 10 80082 Neuromuscular Re-Education (timed):  improve balance, coordination, kinesthetic sense, posture, core stability and proprioception to improve patient's ability to develop conscious control of individual muscles and awareness of position of extremities in order to progress to PLOF and address remaining functional goals. (see flow sheet as applicable)     Details if applicable:     68 30 [facility] Totals Reminder: bill using total billable min of TIMED therapeutic procedures (example: do not include dry needle or estim unattended, both untimed codes, in totals to left)  8-22 min = 1 unit; 23-37 min = 2 units; 38-52 min = 3 units; 53-67 min = 4 units; 68-82 min = 5 units   Total Total     [x]  Patient Education billed concurrently with other procedures   [x] Review HEP    [] Progressed/Changed HEP, detail:    [] Other detail:       Objective Information/Functional Measures/Assessment  Romberg EC with Airex x 20 seconds without loss of balance and self correction. MMT B hip abduction 3/5. Left hip ER = 4/5; Right hip ER = 4+/5. 5x-sit-to-stand (chair, no UE) = 21 sec. Patient continues to have movement dysfunction due to back and hip pain.     Patient will continue to benefit from skilled PT / OT services to modify and progress therapeutic interventions, analyze and address functional mobility deficits, analyze and address ROM deficits, analyze and address strength deficits, analyze and address soft tissue restrictions, analyze and cue for proper movement patterns, analyze and modify for postural abnormalities, analyze and address imbalance/dizziness, and instruct in home and community integration to address functional deficits and attain remaining goals.    Progress toward goals / Updated goals:  [x]  See Progress Note/Recertification  Short Term Goals: To be accomplished in 2 weeks:  1. Patient will subjectively report full compliance with prescribed HEP.  Eval: HEP provided  Current: MET: pt reports performing HEP. 3/8/23  2. Patient will demonstrate left/right hip abduction MMT >/= 4+/5 to improve stability with ambulation on uneven ground.  Eval: Left Hip Abduction MMT = 2+/5, Right Hip Abduction MMT = 2+/5  Current:  MMT B hip abduction 3/5. 3/8/2023.  Progressing.  3. Patient will demonstrate left/right hip ER MMT >/= 4+/5 to improve ease with functional lifting.  Eval: Left Hip ER MMT = 3+/5, Right Hip ER MMT = 4/5  Current:  Left hip ER = 4/5; Right hip ER = 4+/5.  3/8/2023.  Progressing.     Long Term Goals: To be accomplished in 4 weeks:  1. Patient will demonstrate a significant functional improvement as demonstrated by a score of >/= 64 on FOTO.  Eval: FOTO = 57   Current:  FOTO = 62.  3/8/2023.  Progressing.      2. Patient will demonstrate 5x-sit-to-stand (chair, no UE assist) </= 15 seconds to demonstrate improved ease with functional transfers.  Eval: 5x-sit-to-stand (chair, no UE assist) = 27 sec  Current: Progressing 5x-sit-to-stand (chair, no UE) = 21 sec. 3/8/23.  3. Patient will demonstrate romberg (EC, Airex) >/= 30 seconds to demonstrate a reduced falls risk.  Eval: Romberg (EC) = 15 sec  Current: Progressing  Romberg EC with Airex x 20 seconds without loss of balance and self correction.  3/8/2023.     PLAN  Yes  Continue plan of care  [x]  Upgrade activities as tolerated  []  Discharge due to :  []  Other:    Torsten Abdul, PT    3/8/2023    10:05 AM    Future Appointments   Date Time Provider Department Center   8/10/2023  8:00 AM MD [facility]

## 2023-03-08 NOTE — THERAPY RECERTIFICATION
[address] PHYSICAL THERAPY  [address], [address] [place] [address] Ph: [phone] Fx: Sanju Major 79. OF CARE/RECERTIFICATION FOR PHYSICAL THERAPY          Patient Name: Ross Burleson :    Treatment/Medical Diagnosis: Left hip pain [M25.552]   Onset Date: 10/2022    Referral Source: Yobany Alford DO Start of Care [facility]): 2/3/2023   Prior Hospitalization: See Medical History Provider #: [id number]   Prior Level of Function: No Fall History, (I) Ambulation with AD, (I) Functional ADLs, (I) Self-Care ADLs, Work Part-Time   Comorbidities: Former Smoker, Bladder Cancer (), Lumbar Fusion (2015), Right Foot Bunionectomy (2018), Hearing Impairment, Fibromyalgia   Medications: Verified on Patient Summary List   Visits from [facility]: 8 Missed Visits: 1     Progress to Goals:  Short Term Goals: To be accomplished in 2 weeks:  1. Patient will subjectively report full compliance with prescribed HEP. Eval: HEP provided  Current: MET: pt reports performing HEP. 23  2. Patient will demonstrate left/right hip abduction MMT >/= 4+/5 to improve stability with ambulation on uneven ground. Eval: Left Hip Abduction MMT = 2+/5, Right Hip Abduction MMT = 2+/5  Current: progressing: MMT B hip abduction 3/5. 2023  3. Patient will demonstrate left/right hip ER MMT >/= 4+/5 to improve ease with functional lifting. Eval: Left Hip ER MMT = 3+/5, Right Hip ER MMT = 4/5  Current:  Left hip ER = 4/5; Right hip ER = 4/5.  2023. Progressing. Long Term Goals: To be accomplished in 4 weeks:  1. Patient will demonstrate a significant functional improvement as demonstrated by a score of >/= 64 on FOTO. Eval: FOTO = 57       2. Patient will demonstrate 5x-sit-to-stand (chair, no UE assist) </= 15 seconds to demonstrate improved ease with functional transfers.   Eval: 5x-sit-to-stand (chair, no UE assist) = 27 sec  Current: Progressing 5x-sit-to-stand (chair, no UE) = 25 sec. 23. 3. Patient will demonstrate romberg (EC, Airex) >/= 30 seconds to demonstrate a reduced falls risk. Eval: Romberg (EC) = 15 sec  Current: Progressing  Romberg EC with Airex x 30 seconds with loss of balance and self correction. 2023. Key Functional Changes/Progress: Patient has made modest gains in strength    Problem List: pain affecting function, decrease strength, impaired gait/balance, decrease ADL/functional abilities, decrease activity tolerance, and decrease transfer abilities      Treatment Plan may include any combination of the followin Therapeutic Exercise, 08809 Neuromuscular Re-Education, 43269 Manual Therapy, 22855 Therapeutic Activity, and 22299 Self Care/Home Management    Patient Goal(s) has been updated and includes: I would like to do more therapy so I can be able to do more and not have as much pain. Goals for this certification period include and are to be achieved in   8  treatments:  Short Term Goals: To be accomplished in 2 weeks: 1. Patient will demonstrate left/right hip abduction MMT >/= 4+/5 to improve stability with ambulation on uneven ground. Recert: progressing: MMT B hip abduction 3/5. 2023  2. Patient will demonstrate left/right hip ER MMT >/= 4+/5 to improve ease with functional lifting. Recert:  Left hip ER = 4/5; Right hip ER = 4/5.  2023. Progressing. 3. Patient will demonstrate a significant functional improvement as demonstrated by a score of >/= 64 on FOTO. Recert: FOTO = 62       4. Patient will demonstrate 5x-sit-to-stand (chair, no UE assist) </= 15 seconds to demonstrate improved ease with functional transfers. Recert: Progressing 5x-sit-to-stand (chair, no UE) = 25 sec. 23. 5. Patient will demonstrate romberg (EC, Airex) >/= 30 seconds to demonstrate a reduced falls risk. Recert: Progressing  Romberg EC with Airex x 30 seconds with loss of balance and self correction. 2023.      Frequency / Duration: Patient to be seen   2   times per week for   8    treatments:    Assessments/Recommendations: Patient continues to have movement dysfunction due to back and hip pain. She continues to exhibit issues with transfers and with LE strength. Patient will continue to benefit from skilled PT / OT services to modify and progress therapeutic interventions, analyze and address functional mobility deficits, analyze and address ROM deficits, analyze and address strength deficits, analyze and address soft tissue restrictions, analyze and cue for proper movement patterns, analyze and modify for postural abnormalities, analyze and address imbalance/dizziness, and instruct in home and community integration to address functional deficits and attain remaining goals. If you have any questions/comments please contact us directly at . Thank you for allowing us to assist in the care of your patient. Certification Period: 3/4/2023-4/2/2023  Reporting Period (date from last assessment to current assessment): 2/3/2023-3/8/2023    Tommi Lanes, PT       3/8/2023       10:55 AM      ___ I have read the above report and request that my patient continue as recommended.   ___ I have read the above report and request that my patient continue therapy with the following changes/special instructions: ________________________________________________   ___ I have read the above report and request that my patient be discharged from therapy. [de-identified] Signature:_________________________   DATE:_________   TIME:________                           Angelo Morales DO    ** Signature, Date and Time must be completed for valid certification **  Please sign and fax to Doroteo Chahal [phone].   Thank you

## 2023-04-04 ENCOUNTER — TELEPHONE (OUTPATIENT)
Facility: HOSPITAL | Age: 78
End: 2023-04-04

## 2023-04-04 NOTE — TELEPHONE ENCOUNTER
Called patient to schedule follow up appointments. Patient has not been seen since 3/8. Patient has been made aware of our 30 day policy. She states that she will call back to schedule.

## 2023-07-03 ENCOUNTER — OFFICE VISIT (OUTPATIENT)
Age: 78
End: 2023-07-03
Payer: MEDICARE

## 2023-07-03 VITALS — BODY MASS INDEX: 23.37 KG/M2 | HEIGHT: 62 IN | WEIGHT: 127 LBS

## 2023-07-03 DIAGNOSIS — M70.62 TROCHANTERIC BURSITIS, LEFT HIP: Primary | ICD-10-CM

## 2023-07-03 PROCEDURE — G8400 PT W/DXA NO RESULTS DOC: HCPCS | Performed by: ORTHOPAEDIC SURGERY

## 2023-07-03 PROCEDURE — 20610 DRAIN/INJ JOINT/BURSA W/O US: CPT | Performed by: ORTHOPAEDIC SURGERY

## 2023-07-03 PROCEDURE — G8420 CALC BMI NORM PARAMETERS: HCPCS | Performed by: ORTHOPAEDIC SURGERY

## 2023-07-03 PROCEDURE — G8428 CUR MEDS NOT DOCUMENT: HCPCS | Performed by: ORTHOPAEDIC SURGERY

## 2023-07-03 PROCEDURE — 1036F TOBACCO NON-USER: CPT | Performed by: ORTHOPAEDIC SURGERY

## 2023-07-03 PROCEDURE — 1123F ACP DISCUSS/DSCN MKR DOCD: CPT | Performed by: ORTHOPAEDIC SURGERY

## 2023-07-03 PROCEDURE — 99213 OFFICE O/P EST LOW 20 MIN: CPT | Performed by: ORTHOPAEDIC SURGERY

## 2023-07-03 PROCEDURE — 1090F PRES/ABSN URINE INCON ASSESS: CPT | Performed by: ORTHOPAEDIC SURGERY

## 2023-07-03 RX ORDER — TRIAMCINOLONE ACETONIDE 40 MG/ML
40 INJECTION, SUSPENSION INTRA-ARTICULAR; INTRAMUSCULAR ONCE
Status: COMPLETED | OUTPATIENT
Start: 2023-07-03 | End: 2023-07-03

## 2023-07-03 RX ADMIN — TRIAMCINOLONE ACETONIDE 40 MG: 40 INJECTION, SUSPENSION INTRA-ARTICULAR; INTRAMUSCULAR at 09:11

## 2023-07-03 NOTE — PROGRESS NOTES
Alert and oriented x3, in no acute distress. HEENT: Normocephalic, atraumatic. MSK: Left Hip Exam     Tenderness   The patient is experiencing tenderness in the greater trochanter. Range of Motion   Flexion:  110   External rotation:  50   Internal rotation: 20     Muscle Strength   Abduction: 5/5   Adduction: 5/5   Flexion: 5/5     Tests   DALY: negative    Other   Erythema: absent  Sensation: normal  Pulse: present           IMAGING:  Imaging read by myself and interpreted as follows:    1/23/23:   3 view x-ray of the left hip including AP pelvis and AP and lateral of the left hip demonstrate preservation of the joint space without signs of arthritis. There is no other bony pathology noted.           Past Medical History:   Diagnosis Date    Bladder cancer ([address]) 2015 (approx)    Foraminal stenosis of lumbar region     Hematuria     Hypercholesterolemia     Low back pain     Shoulder pain 10/1/2020       Family History   Problem Relation Age of Onset    No Known Problems Father     No Known Problems Mother     Diabetes Sister        Current Outpatient Medications   Medication Sig Dispense Refill    acetaminophen (TYLENOL) 650 MG extended release tablet Take 1,300 mg by mouth      amLODIPine (NORVASC) 5 MG tablet TK 1 T PO ONCE A DAY      escitalopram (LEXAPRO) 10 MG tablet escitalopram 10 mg tablet      fluticasone (FLONASE) 50 MCG/ACT nasal spray fluticasone propionate 50 mcg/actuation nasal spray,suspension      furosemide (LASIX) 20 MG tablet furosemide 20 mg tablet      gabapentin (NEURONTIN) 300 MG capsule Take 2-3 in the evening as directed  Indications: neuropathic pain      hydrOXYzine HCl (ATARAX) 10 MG tablet hydroxyzine HCl 10 mg tablet   TAKE 1 TABLET BY MOUTH DAILY AS NEEDED FOR ANXIETY      ibuprofen (ADVIL;MOTRIN) 200 MG tablet Take 400 mg by mouth every 8 hours as needed      omeprazole (PRILOSEC) 40 MG delayed release capsule TAKE ONE CAPSULE BY MOUTH DAILY FOR 30 DAYS      potassium

## 2023-07-03 NOTE — ASSESSMENT & PLAN NOTE
75-year-old female with left greater trochanteric bursitis of the hip. I gave her injection in January which again gave her about 6 months of relief. She did go to formal physical therapy guided home exercise program for which she is continue to do the exercises. I urged her to continue the exercises and running gave her a repeat injection of the greater trochanter. I will see her in 6 months. If she continues to have repeat significant pain we may have to consider doing advanced imaging more than MRI for possible further explore the sacroiliac joint or spine as another source of pain. After obtaining written consent for left hip greater trochanteric bursa injection the patient was prepped in normal sterile fashion with freeze spray and alcohol. 80mg kenalog and 2mL 2% lidocaine was injected . The needle was withdrawn, the area was cleansed and a sterile bandage was applied. The patient tolerated the procedure well.

## 2023-07-20 ENCOUNTER — TELEPHONE (OUTPATIENT)
Age: 78
End: 2023-07-20

## 2023-07-20 NOTE — TELEPHONE ENCOUNTER
Patient called for a work note, stating she needs to return to work, but would like to have a note stating she needs to sit 30 to 40 minutes every 4 hours.  (No prolonged standing)      Please advise patient once note is ready -request to  in [facility]   [phone]

## 2023-08-10 ENCOUNTER — TELEPHONE (OUTPATIENT)
Age: 78
End: 2023-08-10

## 2023-09-14 ENCOUNTER — HOSPITAL ENCOUNTER (OUTPATIENT)
Facility: HOSPITAL | Age: 78
Discharge: HOME | End: 2023-09-14
Payer: MEDICARE

## 2023-09-14 DIAGNOSIS — M54.50 LOW BACK PAIN, UNSPECIFIED BACK PAIN LATERALITY, UNSPECIFIED CHRONICITY, UNSPECIFIED WHETHER SCIATICA PRESENT: ICD-10-CM

## 2023-09-14 PROCEDURE — 72110 X-RAY EXAM L-2 SPINE 4/>VWS: CPT

## 2023-09-29 RX ORDER — GABAPENTIN 300 MG/1
CAPSULE ORAL
Qty: 270 CAPSULE | OUTPATIENT
Start: 2023-09-29

## 2023-10-05 ENCOUNTER — HOSPITAL ENCOUNTER (OUTPATIENT)
Facility: HOSPITAL | Age: 78
Discharge: HOME | End: 2023-10-05
Payer: MEDICARE

## 2023-10-05 DIAGNOSIS — M54.10 RADICULOPATHY, UNSPECIFIED SPINAL REGION: ICD-10-CM

## 2023-10-05 DIAGNOSIS — M54.50 LOW BACK PAIN, UNSPECIFIED BACK PAIN LATERALITY, UNSPECIFIED CHRONICITY, UNSPECIFIED WHETHER SCIATICA PRESENT: ICD-10-CM

## 2023-10-05 PROCEDURE — 72148 MRI LUMBAR SPINE W/O DYE: CPT

## 2023-12-16 ENCOUNTER — HOSPITAL ENCOUNTER (OUTPATIENT)
Facility: HOSPITAL | Age: 78
End: 2023-12-16
Attending: ORTHOPAEDIC SURGERY
Payer: MEDICARE

## 2023-12-16 DIAGNOSIS — Z01.812 BLOOD TESTS PRIOR TO TREATMENT OR PROCEDURE: ICD-10-CM

## 2023-12-16 DIAGNOSIS — M43.16 SPONDYLOLISTHESIS OF LUMBAR REGION: ICD-10-CM

## 2023-12-16 PROCEDURE — 72131 CT LUMBAR SPINE W/O DYE: CPT

## 2024-07-03 ENCOUNTER — HOSPITAL ENCOUNTER (OUTPATIENT)
Facility: HOSPITAL | Age: 79
Discharge: HOME | End: 2024-07-06
Payer: MEDICARE

## 2024-07-03 LAB
ALBUMIN SERPL-MCNC: 3.9 G/DL (ref 3.4–5)
ALBUMIN/GLOB SERPL: 1.2 (ref 0.8–1.7)
ALP SERPL-CCNC: 73 U/L (ref 45–117)
ALT SERPL-CCNC: 22 U/L (ref 13–56)
ANION GAP SERPL CALC-SCNC: 4 MMOL/L (ref 3–18)
AST SERPL-CCNC: 12 U/L (ref 10–38)
BASOPHILS # BLD: 0.1 K/UL (ref 0–0.1)
BASOPHILS NFR BLD: 1 % (ref 0–2)
BILIRUB SERPL-MCNC: 0.8 MG/DL (ref 0.2–1)
BUN SERPL-MCNC: 13 MG/DL (ref 7–18)
BUN/CREAT SERPL: 22 (ref 12–20)
CALCIUM SERPL-MCNC: 9.5 MG/DL (ref 8.5–10.1)
CHLORIDE SERPL-SCNC: 109 MMOL/L (ref 100–111)
CO2 SERPL-SCNC: 27 MMOL/L (ref 21–32)
CREAT SERPL-MCNC: 0.6 MG/DL (ref 0.6–1.3)
DIFFERENTIAL METHOD BLD: ABNORMAL
EOSINOPHIL # BLD: 0.1 K/UL (ref 0–0.4)
EOSINOPHIL NFR BLD: 2 % (ref 0–5)
ERYTHROCYTE [DISTWIDTH] IN BLOOD BY AUTOMATED COUNT: 16 % (ref 11.6–14.5)
GLOBULIN SER CALC-MCNC: 3.3 G/DL (ref 2–4)
GLUCOSE SERPL-MCNC: 82 MG/DL (ref 74–99)
HCT VFR BLD AUTO: 31.6 % (ref 35–45)
HGB BLD-MCNC: 10 G/DL (ref 12–16)
IMM GRANULOCYTES # BLD AUTO: 0 K/UL (ref 0–0.04)
IMM GRANULOCYTES NFR BLD AUTO: 0 % (ref 0–0.5)
LYMPHOCYTES # BLD: 1.4 K/UL (ref 0.9–3.6)
LYMPHOCYTES NFR BLD: 33 % (ref 21–52)
MCH RBC QN AUTO: 29.3 PG (ref 24–34)
MCHC RBC AUTO-ENTMCNC: 31.6 G/DL (ref 31–37)
MCV RBC AUTO: 92.7 FL (ref 78–100)
MONOCYTES # BLD: 0.5 K/UL (ref 0.05–1.2)
MONOCYTES NFR BLD: 11 % (ref 3–10)
NEUTS SEG # BLD: 2.3 K/UL (ref 1.8–8)
NEUTS SEG NFR BLD: 54 % (ref 40–73)
NRBC # BLD: 0 K/UL (ref 0–0.01)
NRBC BLD-RTO: 0 PER 100 WBC
PLATELET # BLD AUTO: 250 K/UL (ref 135–420)
PMV BLD AUTO: 11.8 FL (ref 9.2–11.8)
POTASSIUM SERPL-SCNC: 4.4 MMOL/L (ref 3.5–5.5)
PROT SERPL-MCNC: 7.2 G/DL (ref 6.4–8.2)
RBC # BLD AUTO: 3.41 M/UL (ref 4.2–5.3)
SODIUM SERPL-SCNC: 140 MMOL/L (ref 136–145)
WBC # BLD AUTO: 4.4 K/UL (ref 4.6–13.2)

## 2024-07-03 PROCEDURE — 36415 COLL VENOUS BLD VENIPUNCTURE: CPT

## 2024-07-03 PROCEDURE — 80053 COMPREHEN METABOLIC PANEL: CPT

## 2024-07-03 PROCEDURE — 85025 COMPLETE CBC W/AUTO DIFF WBC: CPT

## 2024-07-03 NOTE — PERIOP NOTE
Instructions for your surgery at [facility]      Today's Date:  7/3/2024      Patient's Name:  Bonnie Stewart           Surgery Date:  7/15/2024              Please enter the main entrance of the hospital and check-in at the front security desk located in the lobby. They will direct you to the area to report for your surgery.     Do NOT eat or drink anything, including candy, gum, or ice chips after midnight prior to your surgery, unless you have specific instructions from your surgeon or anesthesia provider to do so.  Brush your teeth before coming to the hospital. You may swish with water, but do not swallow.  No smoking/Vaping/E-Cigarettes 24 hours prior to the day of surgery.  No alcohol 24 hours prior to the day of surgery.  No recreational drugs for one week prior to the day of surgery.  Bring Photo ID, Insurance information, and Co-pay if required on day of surgery.  Bring in pertinent legal documents, such as, Medical Power of , DNR, Advance Directive, etc.  Leave all valuables, including money/purse, at home.  Remove all jewelry, including ALL body piercings, nail polish, acrylic nails, and makeup (including mascara); no lotions, powders, deodorant, or perfume/cologne/after shave on the skin.  Follow instruction for Hibiclens washes and CHG wipes from surgeon's office.   Glasses and dentures may be worn to the hospital. They must be removed prior to surgery. Please bring case/container for glasses or dentures.   Contact lenses should not be worn on day of surgery.   Call your doctor's office if symptoms of a cold or illness develop within 24-48 hours prior to your surgery.  Call your doctor's office if you have any questions concerning insurance or co-pays.  15. AN ADULT (relative or friend 18 years or older) MUST DRIVE YOU HOME AFTER YOUR SURGERY.  16. Please make arrangements for a responsible adult (18 years or older) to be with you for 24 hours after your surgery.   17.

## 2024-07-12 ENCOUNTER — ANESTHESIA EVENT (OUTPATIENT)
Facility: HOSPITAL | Age: 79
DRG: 460 | End: 2024-07-12
Payer: MEDICARE

## 2024-07-12 RX ORDER — ACETAMINOPHEN 325 MG/1
650 TABLET ORAL ONCE
Status: CANCELLED | OUTPATIENT
Start: 2024-07-12

## 2024-07-14 NOTE — H&P
tablets by mouth every 8 hours as needed (Patient not taking: Reported on 7/3/2024)      omeprazole (PRILOSEC) 40 MG delayed release capsule TAKE ONE CAPSULE BY MOUTH DAILY FOR 30 DAYS (Patient not taking: Reported on 1/16/2024)      potassium chloride (MICRO-K) 10 MEQ extended release capsule Take 1 capsule by mouth daily      pravastatin (PRAVACHOL) 20 MG tablet Take 1 tablet by mouth daily      traZODone (DESYREL) 50 MG tablet TK 1 T PO QHS         ROS:  Denies chills, fever,night sweats,  bowel or bladder dysfunction, unexplained weight loss/weight gain, chest pain, sob or anxiety.    Physical Examination    Gen: Well developed, well nourished 79 y.o. female. Good strength in her lower extremities. No long track signs. Pain into her leg. Mechanical back pain.     Assessment and Plan    Due to the pt's persistent symptoms unrelieved by conservative measure Bonnie Stewart is being admitted to undergo surgical intervention. The post-operative plan of care consists of physical therapy/home health if indicated and a 2 week f/u office visit.  The risks, benefits, complications and alternatives to surgery have been discussed in detail with the patient.  The patient understands and agrees to proceed.

## 2024-07-14 NOTE — DISCHARGE INSTRUCTIONS
your activities  Do not drive and operate hazardous machinery  Do not make important personal or business decisions  Do  not drink alcoholic beverages  If you have not urinated within 8 hours after discharge, please contact your surgeon on call.    Report the following to your surgeon:  Excessive pain, swelling, redness or odor of or around the surgical area  Temperature over 100.5  Nausea and vomiting lasting longer than 4 hours or if unable to take medications  Any signs of decreased circulation or nerve impairment to extremity: change in color, persistent  numbness, tingling, coldness or increase pain  Any questions    What to do at Home:  Recommended activity: activity as tolerated,     If you experience any of the following symptoms Refer to discharge instructions, please follow up with Dr Leonardo in 2 weeks.    *  Please give a list of your current medications to your Primary Care Provider.    *  Please update this list whenever your medications are discontinued, doses are      changed, or new medications (including over-the-counter products) are added.    *  Please carry medication information at all times in case of emergency situations.    These are general instructions for a healthy lifestyle:    No smoking/ No tobacco products/ Avoid exposure to second hand smoke  Surgeon General's Warning:  Quitting smoking now greatly reduces serious risk to your health.    Obesity, smoking, and sedentary lifestyle greatly increases your risk for illness    A healthy diet, regular physical exercise & weight monitoring are important for maintaining a healthy lifestyle    You may be retaining fluid if you have a history of heart failure or if you experience any of the following symptoms:  Weight gain of 3 pounds or more overnight or 5 pounds in a week, increased swelling in our hands or feet or shortness of breath while lying flat in bed.  Please call your doctor as soon as you notice any of these symptoms; do not wait

## 2024-07-15 ENCOUNTER — APPOINTMENT (OUTPATIENT)
Facility: HOSPITAL | Age: 79
DRG: 460 | End: 2024-07-15
Attending: ORTHOPAEDIC SURGERY
Payer: MEDICARE

## 2024-07-15 ENCOUNTER — HOSPITAL ENCOUNTER (INPATIENT)
Facility: HOSPITAL | Age: 79
LOS: 1 days | Discharge: HOME HEALTH | DRG: 460 | End: 2024-07-16
Attending: ORTHOPAEDIC SURGERY | Admitting: ORTHOPAEDIC SURGERY
Payer: MEDICARE

## 2024-07-15 ENCOUNTER — ANESTHESIA (OUTPATIENT)
Facility: HOSPITAL | Age: 79
DRG: 460 | End: 2024-07-15
Payer: MEDICARE

## 2024-07-15 DIAGNOSIS — Z98.1 S/P LAMINECTOMY WITH SPINAL FUSION: Primary | ICD-10-CM

## 2024-07-15 PROBLEM — M43.16 SPONDYLOLISTHESIS AT L4-L5 LEVEL: Status: ACTIVE | Noted: 2024-07-15

## 2024-07-15 LAB — HISTORY CHECK: NORMAL

## 2024-07-15 PROCEDURE — 0SB20ZZ EXCISION OF LUMBAR VERTEBRAL DISC, OPEN APPROACH: ICD-10-PCS | Performed by: ORTHOPAEDIC SURGERY

## 2024-07-15 PROCEDURE — 2500000003 HC RX 250 WO HCPCS: Performed by: ORTHOPAEDIC SURGERY

## 2024-07-15 PROCEDURE — 3600000002 HC SURGERY LEVEL 2 BASE: Performed by: ORTHOPAEDIC SURGERY

## 2024-07-15 PROCEDURE — 2709999900 HC NON-CHARGEABLE SUPPLY: Performed by: ORTHOPAEDIC SURGERY

## 2024-07-15 PROCEDURE — 2580000003 HC RX 258: Performed by: NURSE PRACTITIONER

## 2024-07-15 PROCEDURE — 86850 RBC ANTIBODY SCREEN: CPT

## 2024-07-15 PROCEDURE — 86900 BLOOD TYPING SEROLOGIC ABO: CPT

## 2024-07-15 PROCEDURE — 3700000001 HC ADD 15 MINUTES (ANESTHESIA): Performed by: ORTHOPAEDIC SURGERY

## 2024-07-15 PROCEDURE — A4217 STERILE WATER/SALINE, 500 ML: HCPCS | Performed by: ORTHOPAEDIC SURGERY

## 2024-07-15 PROCEDURE — 6360000002 HC RX W HCPCS: Performed by: NURSE ANESTHETIST, CERTIFIED REGISTERED

## 2024-07-15 PROCEDURE — 2580000003 HC RX 258: Performed by: ORTHOPAEDIC SURGERY

## 2024-07-15 PROCEDURE — 6370000000 HC RX 637 (ALT 250 FOR IP): Performed by: ORTHOPAEDIC SURGERY

## 2024-07-15 PROCEDURE — 2700000000 HC OXYGEN THERAPY PER DAY

## 2024-07-15 PROCEDURE — 6370000000 HC RX 637 (ALT 250 FOR IP): Performed by: NURSE ANESTHETIST, CERTIFIED REGISTERED

## 2024-07-15 PROCEDURE — 6360000002 HC RX W HCPCS: Performed by: ORTHOPAEDIC SURGERY

## 2024-07-15 PROCEDURE — 3600000012 HC SURGERY LEVEL 2 ADDTL 15MIN: Performed by: ORTHOPAEDIC SURGERY

## 2024-07-15 PROCEDURE — 2580000003 HC RX 258: Performed by: NURSE ANESTHETIST, CERTIFIED REGISTERED

## 2024-07-15 PROCEDURE — 1100000000 HC RM PRIVATE

## 2024-07-15 PROCEDURE — 7100000001 HC PACU RECOVERY - ADDTL 15 MIN: Performed by: ORTHOPAEDIC SURGERY

## 2024-07-15 PROCEDURE — 3700000000 HC ANESTHESIA ATTENDED CARE: Performed by: ORTHOPAEDIC SURGERY

## 2024-07-15 PROCEDURE — 2500000003 HC RX 250 WO HCPCS: Performed by: NURSE ANESTHETIST, CERTIFIED REGISTERED

## 2024-07-15 PROCEDURE — 2720000010 HC SURG SUPPLY STERILE: Performed by: ORTHOPAEDIC SURGERY

## 2024-07-15 PROCEDURE — 86923 COMPATIBILITY TEST ELECTRIC: CPT

## 2024-07-15 PROCEDURE — C1729 CATH, DRAINAGE: HCPCS | Performed by: ORTHOPAEDIC SURGERY

## 2024-07-15 PROCEDURE — 97161 PT EVAL LOW COMPLEX 20 MIN: CPT

## 2024-07-15 PROCEDURE — 7100000000 HC PACU RECOVERY - FIRST 15 MIN: Performed by: ORTHOPAEDIC SURGERY

## 2024-07-15 PROCEDURE — 01NB0ZZ RELEASE LUMBAR NERVE, OPEN APPROACH: ICD-10-PCS | Performed by: ORTHOPAEDIC SURGERY

## 2024-07-15 PROCEDURE — 0QP004Z REMOVAL OF INTERNAL FIXATION DEVICE FROM LUMBAR VERTEBRA, OPEN APPROACH: ICD-10-PCS | Performed by: ORTHOPAEDIC SURGERY

## 2024-07-15 PROCEDURE — 86901 BLOOD TYPING SEROLOGIC RH(D): CPT

## 2024-07-15 PROCEDURE — C1713 ANCHOR/SCREW BN/BN,TIS/BN: HCPCS | Performed by: ORTHOPAEDIC SURGERY

## 2024-07-15 PROCEDURE — 6360000002 HC RX W HCPCS: Performed by: NURSE PRACTITIONER

## 2024-07-15 PROCEDURE — 94761 N-INVAS EAR/PLS OXIMETRY MLT: CPT

## 2024-07-15 PROCEDURE — 97530 THERAPEUTIC ACTIVITIES: CPT

## 2024-07-15 PROCEDURE — 0SG1071 FUSION OF 2 OR MORE LUMBAR VERTEBRAL JOINTS WITH AUTOLOGOUS TISSUE SUBSTITUTE, POSTERIOR APPROACH, POSTERIOR COLUMN, OPEN APPROACH: ICD-10-PCS | Performed by: ORTHOPAEDIC SURGERY

## 2024-07-15 DEVICE — CANNULATED POLYAXIAL SCREW
Type: IMPLANTABLE DEVICE | Site: SPINE LUMBAR | Status: FUNCTIONAL
Brand: XIA 3 SYSTEM - SERRATO

## 2024-07-15 DEVICE — BIOACTIVE BONE GRAFT SUBSTITUTE, FOAM PACK; BETA-TRICALCIUM PHOSPHATE, TYPE I BOVINE COLLAGEN, AND BIOACTIVE GLASS
Type: IMPLANTABLE DEVICE | Site: SPINE LUMBAR | Status: FUNCTIONAL
Brand: VITOSS BA2X

## 2024-07-15 DEVICE — TI ALLOY RAD ROD
Type: IMPLANTABLE DEVICE | Site: SPINE LUMBAR | Status: FUNCTIONAL
Brand: XIA 3

## 2024-07-15 DEVICE — BLOCKER
Type: IMPLANTABLE DEVICE | Site: SPINE LUMBAR | Status: FUNCTIONAL
Brand: XIA 3

## 2024-07-15 RX ORDER — ROCURONIUM BROMIDE 10 MG/ML
INJECTION, SOLUTION INTRAVENOUS PRN
Status: DISCONTINUED | OUTPATIENT
Start: 2024-07-15 | End: 2024-07-15 | Stop reason: SDUPTHER

## 2024-07-15 RX ORDER — SODIUM CHLORIDE 0.9 % (FLUSH) 0.9 %
5-40 SYRINGE (ML) INJECTION PRN
Status: DISCONTINUED | OUTPATIENT
Start: 2024-07-15 | End: 2024-07-15 | Stop reason: HOSPADM

## 2024-07-15 RX ORDER — DIPHENHYDRAMINE HCL 25 MG
25 CAPSULE ORAL EVERY 6 HOURS PRN
Status: DISCONTINUED | OUTPATIENT
Start: 2024-07-15 | End: 2024-07-16 | Stop reason: HOSPADM

## 2024-07-15 RX ORDER — SODIUM CHLORIDE, SODIUM LACTATE, POTASSIUM CHLORIDE, CALCIUM CHLORIDE 600; 310; 30; 20 MG/100ML; MG/100ML; MG/100ML; MG/100ML
INJECTION, SOLUTION INTRAVENOUS CONTINUOUS
Status: DISCONTINUED | OUTPATIENT
Start: 2024-07-15 | End: 2024-07-15 | Stop reason: HOSPADM

## 2024-07-15 RX ORDER — LIDOCAINE HYDROCHLORIDE 20 MG/ML
INJECTION, SOLUTION EPIDURAL; INFILTRATION; INTRACAUDAL; PERINEURAL PRN
Status: DISCONTINUED | OUTPATIENT
Start: 2024-07-15 | End: 2024-07-15 | Stop reason: SDUPTHER

## 2024-07-15 RX ORDER — POLYETHYLENE GLYCOL 3350 17 G/17G
17 POWDER, FOR SOLUTION ORAL DAILY
Status: DISCONTINUED | OUTPATIENT
Start: 2024-07-15 | End: 2024-07-16 | Stop reason: HOSPADM

## 2024-07-15 RX ORDER — FAMOTIDINE 20 MG/1
20 TABLET, FILM COATED ORAL 2 TIMES DAILY
Status: DISCONTINUED | OUTPATIENT
Start: 2024-07-15 | End: 2024-07-16 | Stop reason: HOSPADM

## 2024-07-15 RX ORDER — GABAPENTIN 300 MG/1
300 CAPSULE ORAL 3 TIMES DAILY
Status: DISCONTINUED | OUTPATIENT
Start: 2024-07-15 | End: 2024-07-16 | Stop reason: HOSPADM

## 2024-07-15 RX ORDER — METAXALONE 800 MG/1
800 TABLET ORAL EVERY 8 HOURS PRN
Status: DISCONTINUED | OUTPATIENT
Start: 2024-07-15 | End: 2024-07-16 | Stop reason: HOSPADM

## 2024-07-15 RX ORDER — ESCITALOPRAM OXALATE 10 MG/1
20 TABLET ORAL DAILY
Status: DISCONTINUED | OUTPATIENT
Start: 2024-07-15 | End: 2024-07-16 | Stop reason: HOSPADM

## 2024-07-15 RX ORDER — PREGABALIN 75 MG/1
75 CAPSULE ORAL ONCE
Status: COMPLETED | OUTPATIENT
Start: 2024-07-15 | End: 2024-07-15

## 2024-07-15 RX ORDER — SODIUM CHLORIDE 0.9 % (FLUSH) 0.9 %
5-40 SYRINGE (ML) INJECTION EVERY 12 HOURS SCHEDULED
Status: DISCONTINUED | OUTPATIENT
Start: 2024-07-15 | End: 2024-07-16 | Stop reason: HOSPADM

## 2024-07-15 RX ORDER — BISACODYL 5 MG/1
5 TABLET, DELAYED RELEASE ORAL DAILY
Status: DISCONTINUED | OUTPATIENT
Start: 2024-07-15 | End: 2024-07-16 | Stop reason: HOSPADM

## 2024-07-15 RX ORDER — FENTANYL CITRATE 50 UG/ML
50 INJECTION, SOLUTION INTRAMUSCULAR; INTRAVENOUS EVERY 5 MIN PRN
Status: DISCONTINUED | OUTPATIENT
Start: 2024-07-15 | End: 2024-07-15 | Stop reason: HOSPADM

## 2024-07-15 RX ORDER — FAMOTIDINE 20 MG/1
20 TABLET, FILM COATED ORAL ONCE
Status: COMPLETED | OUTPATIENT
Start: 2024-07-15 | End: 2024-07-15

## 2024-07-15 RX ORDER — SUCCINYLCHOLINE/SOD CL,ISO/PF 100 MG/5ML
SYRINGE (ML) INTRAVENOUS PRN
Status: DISCONTINUED | OUTPATIENT
Start: 2024-07-15 | End: 2024-07-15 | Stop reason: SDUPTHER

## 2024-07-15 RX ORDER — EPHEDRINE SULFATE/0.9% NACL/PF 25 MG/5 ML
SYRINGE (ML) INTRAVENOUS PRN
Status: DISCONTINUED | OUTPATIENT
Start: 2024-07-15 | End: 2024-07-15 | Stop reason: SDUPTHER

## 2024-07-15 RX ORDER — HYDROMORPHONE HYDROCHLORIDE 1 MG/ML
0.5 INJECTION, SOLUTION INTRAMUSCULAR; INTRAVENOUS; SUBCUTANEOUS
Status: DISCONTINUED | OUTPATIENT
Start: 2024-07-15 | End: 2024-07-16 | Stop reason: HOSPADM

## 2024-07-15 RX ORDER — LIDOCAINE HYDROCHLORIDE 10 MG/ML
1 INJECTION, SOLUTION EPIDURAL; INFILTRATION; INTRACAUDAL; PERINEURAL
Status: DISCONTINUED | OUTPATIENT
Start: 2024-07-15 | End: 2024-07-15 | Stop reason: HOSPADM

## 2024-07-15 RX ORDER — FENTANYL CITRATE 50 UG/ML
INJECTION, SOLUTION INTRAMUSCULAR; INTRAVENOUS PRN
Status: DISCONTINUED | OUTPATIENT
Start: 2024-07-15 | End: 2024-07-15 | Stop reason: SDUPTHER

## 2024-07-15 RX ORDER — ONDANSETRON 2 MG/ML
4 INJECTION INTRAMUSCULAR; INTRAVENOUS
Status: DISCONTINUED | OUTPATIENT
Start: 2024-07-15 | End: 2024-07-15 | Stop reason: HOSPADM

## 2024-07-15 RX ORDER — TRAZODONE HYDROCHLORIDE 100 MG/1
50 TABLET ORAL NIGHTLY
Status: DISCONTINUED | OUTPATIENT
Start: 2024-07-15 | End: 2024-07-16 | Stop reason: HOSPADM

## 2024-07-15 RX ORDER — NALOXONE HYDROCHLORIDE 0.4 MG/ML
INJECTION, SOLUTION INTRAMUSCULAR; INTRAVENOUS; SUBCUTANEOUS PRN
Status: DISCONTINUED | OUTPATIENT
Start: 2024-07-15 | End: 2024-07-15 | Stop reason: HOSPADM

## 2024-07-15 RX ORDER — PREGABALIN 50 MG/1
50 CAPSULE ORAL ONCE
Status: DISCONTINUED | OUTPATIENT
Start: 2024-07-15 | End: 2024-07-15 | Stop reason: HOSPADM

## 2024-07-15 RX ORDER — FUROSEMIDE 20 MG/1
20 TABLET ORAL DAILY
Status: DISCONTINUED | OUTPATIENT
Start: 2024-07-15 | End: 2024-07-16 | Stop reason: HOSPADM

## 2024-07-15 RX ORDER — SODIUM CHLORIDE 9 MG/ML
INJECTION, SOLUTION INTRAVENOUS CONTINUOUS PRN
Status: DISCONTINUED | OUTPATIENT
Start: 2024-07-15 | End: 2024-07-15 | Stop reason: SDUPTHER

## 2024-07-15 RX ORDER — SODIUM CHLORIDE 9 MG/ML
INJECTION, SOLUTION INTRAVENOUS PRN
Status: DISCONTINUED | OUTPATIENT
Start: 2024-07-15 | End: 2024-07-15 | Stop reason: HOSPADM

## 2024-07-15 RX ORDER — PROPOFOL 10 MG/ML
INJECTION, EMULSION INTRAVENOUS PRN
Status: DISCONTINUED | OUTPATIENT
Start: 2024-07-15 | End: 2024-07-15 | Stop reason: SDUPTHER

## 2024-07-15 RX ORDER — ONDANSETRON 4 MG/1
4 TABLET, ORALLY DISINTEGRATING ORAL EVERY 8 HOURS PRN
Status: DISCONTINUED | OUTPATIENT
Start: 2024-07-15 | End: 2024-07-16 | Stop reason: HOSPADM

## 2024-07-15 RX ORDER — ONDANSETRON 2 MG/ML
4 INJECTION INTRAMUSCULAR; INTRAVENOUS EVERY 6 HOURS PRN
Status: DISCONTINUED | OUTPATIENT
Start: 2024-07-15 | End: 2024-07-16 | Stop reason: HOSPADM

## 2024-07-15 RX ORDER — TAMSULOSIN HYDROCHLORIDE 0.4 MG/1
0.4 CAPSULE ORAL ONCE
Status: COMPLETED | OUTPATIENT
Start: 2024-07-15 | End: 2024-07-15

## 2024-07-15 RX ORDER — PROCHLORPERAZINE EDISYLATE 5 MG/ML
10 INJECTION INTRAMUSCULAR; INTRAVENOUS
Status: DISCONTINUED | OUTPATIENT
Start: 2024-07-15 | End: 2024-07-15 | Stop reason: HOSPADM

## 2024-07-15 RX ORDER — SODIUM CHLORIDE 9 MG/ML
INJECTION, SOLUTION INTRAVENOUS PRN
Status: DISCONTINUED | OUTPATIENT
Start: 2024-07-15 | End: 2024-07-16 | Stop reason: HOSPADM

## 2024-07-15 RX ORDER — ACETAMINOPHEN 500 MG
1000 TABLET ORAL ONCE
Status: COMPLETED | OUTPATIENT
Start: 2024-07-15 | End: 2024-07-15

## 2024-07-15 RX ORDER — HYDROMORPHONE HYDROCHLORIDE 1 MG/ML
0.25 INJECTION, SOLUTION INTRAMUSCULAR; INTRAVENOUS; SUBCUTANEOUS
Status: DISCONTINUED | OUTPATIENT
Start: 2024-07-15 | End: 2024-07-16 | Stop reason: HOSPADM

## 2024-07-15 RX ORDER — SODIUM CHLORIDE 450 MG/100ML
INJECTION, SOLUTION INTRAVENOUS CONTINUOUS
Status: DISCONTINUED | OUTPATIENT
Start: 2024-07-15 | End: 2024-07-16 | Stop reason: HOSPADM

## 2024-07-15 RX ORDER — OXYCODONE HYDROCHLORIDE 10 MG/1
10 TABLET ORAL EVERY 4 HOURS PRN
Status: DISCONTINUED | OUTPATIENT
Start: 2024-07-15 | End: 2024-07-16 | Stop reason: HOSPADM

## 2024-07-15 RX ORDER — HYDROXYZINE HYDROCHLORIDE 10 MG/1
10 TABLET, FILM COATED ORAL 3 TIMES DAILY PRN
Status: DISCONTINUED | OUTPATIENT
Start: 2024-07-15 | End: 2024-07-16 | Stop reason: HOSPADM

## 2024-07-15 RX ORDER — ACETAMINOPHEN 325 MG/1
650 TABLET ORAL EVERY 6 HOURS
Status: DISCONTINUED | OUTPATIENT
Start: 2024-07-15 | End: 2024-07-16 | Stop reason: HOSPADM

## 2024-07-15 RX ORDER — OXYCODONE HYDROCHLORIDE 5 MG/1
5 TABLET ORAL EVERY 4 HOURS PRN
Status: DISCONTINUED | OUTPATIENT
Start: 2024-07-15 | End: 2024-07-16 | Stop reason: HOSPADM

## 2024-07-15 RX ORDER — DEXMEDETOMIDINE HYDROCHLORIDE 100 UG/ML
INJECTION, SOLUTION INTRAVENOUS PRN
Status: DISCONTINUED | OUTPATIENT
Start: 2024-07-15 | End: 2024-07-15 | Stop reason: SDUPTHER

## 2024-07-15 RX ORDER — ONDANSETRON 2 MG/ML
INJECTION INTRAMUSCULAR; INTRAVENOUS PRN
Status: DISCONTINUED | OUTPATIENT
Start: 2024-07-15 | End: 2024-07-15 | Stop reason: SDUPTHER

## 2024-07-15 RX ORDER — SODIUM CHLORIDE 0.9 % (FLUSH) 0.9 %
5-40 SYRINGE (ML) INJECTION EVERY 12 HOURS SCHEDULED
Status: DISCONTINUED | OUTPATIENT
Start: 2024-07-15 | End: 2024-07-15 | Stop reason: HOSPADM

## 2024-07-15 RX ORDER — AMLODIPINE BESYLATE 5 MG/1
5 TABLET ORAL DAILY
Status: DISCONTINUED | OUTPATIENT
Start: 2024-07-15 | End: 2024-07-16 | Stop reason: HOSPADM

## 2024-07-15 RX ORDER — VANCOMYCIN HYDROCHLORIDE 1 G/20ML
INJECTION, POWDER, LYOPHILIZED, FOR SOLUTION INTRAVENOUS PRN
Status: DISCONTINUED | OUTPATIENT
Start: 2024-07-15 | End: 2024-07-15 | Stop reason: HOSPADM

## 2024-07-15 RX ORDER — DOCUSATE SODIUM 100 MG/1
100 CAPSULE, LIQUID FILLED ORAL DAILY
Status: DISCONTINUED | OUTPATIENT
Start: 2024-07-15 | End: 2024-07-16 | Stop reason: HOSPADM

## 2024-07-15 RX ORDER — DIPHENHYDRAMINE HYDROCHLORIDE 50 MG/ML
25 INJECTION INTRAMUSCULAR; INTRAVENOUS EVERY 6 HOURS PRN
Status: DISCONTINUED | OUTPATIENT
Start: 2024-07-15 | End: 2024-07-16 | Stop reason: HOSPADM

## 2024-07-15 RX ADMIN — WATER 2000 MG: 1 INJECTION, SOLUTION INTRAMUSCULAR; INTRAVENOUS; SUBCUTANEOUS at 11:56

## 2024-07-15 RX ADMIN — EPHEDRINE SULFATE 5 MG: 5 INJECTION INTRAVENOUS at 12:40

## 2024-07-15 RX ADMIN — TAMSULOSIN HYDROCHLORIDE 0.4 MG: 0.4 CAPSULE ORAL at 10:37

## 2024-07-15 RX ADMIN — ACETAMINOPHEN 325MG 650 MG: 325 TABLET ORAL at 23:10

## 2024-07-15 RX ADMIN — LIDOCAINE HYDROCHLORIDE 60 MG: 20 INJECTION, SOLUTION EPIDURAL; INFILTRATION; INTRACAUDAL; PERINEURAL at 11:43

## 2024-07-15 RX ADMIN — SODIUM CHLORIDE: 4.5 INJECTION, SOLUTION INTRAVENOUS at 17:00

## 2024-07-15 RX ADMIN — ACETAMINOPHEN 1000 MG: 500 TABLET ORAL at 10:38

## 2024-07-15 RX ADMIN — PREGABALIN 75 MG: 75 CAPSULE ORAL at 10:37

## 2024-07-15 RX ADMIN — EPHEDRINE SULFATE 5 MG: 5 INJECTION INTRAVENOUS at 11:52

## 2024-07-15 RX ADMIN — OXYCODONE HYDROCHLORIDE 10 MG: 10 TABLET ORAL at 19:37

## 2024-07-15 RX ADMIN — PROPOFOL 60 MG: 10 INJECTION, EMULSION INTRAVENOUS at 12:02

## 2024-07-15 RX ADMIN — ROCURONIUM BROMIDE 20 MG: 10 INJECTION, SOLUTION INTRAVENOUS at 11:56

## 2024-07-15 RX ADMIN — EPHEDRINE SULFATE 5 MG: 5 INJECTION INTRAVENOUS at 13:22

## 2024-07-15 RX ADMIN — OXYCODONE HYDROCHLORIDE 10 MG: 10 TABLET ORAL at 23:12

## 2024-07-15 RX ADMIN — SODIUM CHLORIDE, SODIUM LACTATE, POTASSIUM CHLORIDE, AND CALCIUM CHLORIDE: 600; 310; 30; 20 INJECTION, SOLUTION INTRAVENOUS at 11:37

## 2024-07-15 RX ADMIN — DEXMEDETOMIDINE 8 MCG: 100 INJECTION, SOLUTION INTRAVENOUS at 11:39

## 2024-07-15 RX ADMIN — SUGAMMADEX 200 MG: 100 INJECTION, SOLUTION INTRAVENOUS at 13:53

## 2024-07-15 RX ADMIN — FENTANYL CITRATE 50 MCG: 50 INJECTION INTRAMUSCULAR; INTRAVENOUS at 16:15

## 2024-07-15 RX ADMIN — FENTANYL CITRATE 50 MCG: 50 INJECTION INTRAMUSCULAR; INTRAVENOUS at 11:41

## 2024-07-15 RX ADMIN — GABAPENTIN 300 MG: 300 CAPSULE ORAL at 20:53

## 2024-07-15 RX ADMIN — FAMOTIDINE 20 MG: 20 TABLET ORAL at 10:37

## 2024-07-15 RX ADMIN — DEXMEDETOMIDINE 2 MCG: 100 INJECTION, SOLUTION INTRAVENOUS at 12:20

## 2024-07-15 RX ADMIN — FAMOTIDINE 20 MG: 10 INJECTION, SOLUTION INTRAVENOUS at 20:52

## 2024-07-15 RX ADMIN — FENTANYL CITRATE 25 MCG: 50 INJECTION INTRAMUSCULAR; INTRAVENOUS at 14:08

## 2024-07-15 RX ADMIN — FENTANYL CITRATE 25 MCG: 50 INJECTION INTRAMUSCULAR; INTRAVENOUS at 12:19

## 2024-07-15 RX ADMIN — DIPHENHYDRAMINE HYDROCHLORIDE 25 MG: 50 INJECTION, SOLUTION INTRAMUSCULAR; INTRAVENOUS at 21:46

## 2024-07-15 RX ADMIN — ONDANSETRON 4 MG: 2 INJECTION INTRAMUSCULAR; INTRAVENOUS at 11:41

## 2024-07-15 RX ADMIN — WATER 2000 MG: 1 INJECTION INTRAMUSCULAR; INTRAVENOUS; SUBCUTANEOUS at 20:53

## 2024-07-15 RX ADMIN — PROPOFOL 140 MG: 10 INJECTION, EMULSION INTRAVENOUS at 11:43

## 2024-07-15 RX ADMIN — TRANEXAMIC ACID 1000 MG: 100 INJECTION, SOLUTION INTRAVENOUS at 12:04

## 2024-07-15 RX ADMIN — HYDROMORPHONE HYDROCHLORIDE 0.5 MG: 1 INJECTION, SOLUTION INTRAMUSCULAR; INTRAVENOUS; SUBCUTANEOUS at 21:47

## 2024-07-15 RX ADMIN — EPHEDRINE SULFATE 5 MG: 5 INJECTION INTRAVENOUS at 12:07

## 2024-07-15 RX ADMIN — TRANEXAMIC ACID 1000 MG: 100 INJECTION, SOLUTION INTRAVENOUS at 13:50

## 2024-07-15 RX ADMIN — SODIUM CHLORIDE, PRESERVATIVE FREE 10 ML: 5 INJECTION INTRAVENOUS at 20:59

## 2024-07-15 RX ADMIN — TRAZODONE HYDROCHLORIDE 50 MG: 100 TABLET ORAL at 20:53

## 2024-07-15 RX ADMIN — SODIUM CHLORIDE: 9 INJECTION, SOLUTION INTRAVENOUS at 11:45

## 2024-07-15 RX ADMIN — EPHEDRINE SULFATE 5 MG: 5 INJECTION INTRAVENOUS at 12:30

## 2024-07-15 RX ADMIN — FENTANYL CITRATE 50 MCG: 50 INJECTION INTRAMUSCULAR; INTRAVENOUS at 15:00

## 2024-07-15 RX ADMIN — Medication 100 MG: at 11:43

## 2024-07-15 ASSESSMENT — PAIN DESCRIPTION - DESCRIPTORS
DESCRIPTORS: ACHING
DESCRIPTORS: THROBBING
DESCRIPTORS: SHARP
DESCRIPTORS: ACHING
DESCRIPTORS: ACHING
DESCRIPTORS: SHARP
DESCRIPTORS: SHARP
DESCRIPTORS: SORE

## 2024-07-15 ASSESSMENT — PAIN SCALES - GENERAL
PAINLEVEL_OUTOF10: 6
PAINLEVEL_OUTOF10: 0
PAINLEVEL_OUTOF10: 7
PAINLEVEL_OUTOF10: 0
PAINLEVEL_OUTOF10: 3
PAINLEVEL_OUTOF10: 0
PAINLEVEL_OUTOF10: 0
PAINLEVEL_OUTOF10: 9
PAINLEVEL_OUTOF10: 0
PAINLEVEL_OUTOF10: 0
PAINLEVEL_OUTOF10: 7

## 2024-07-15 ASSESSMENT — PAIN DESCRIPTION - ORIENTATION
ORIENTATION: MID
ORIENTATION: MID
ORIENTATION: LOWER
ORIENTATION: MID;LOWER
ORIENTATION: MID
ORIENTATION: LOWER
ORIENTATION: MID;POSTERIOR

## 2024-07-15 ASSESSMENT — PAIN SCALES - WONG BAKER
WONGBAKER_NUMERICALRESPONSE: NO HURT
WONGBAKER_NUMERICALRESPONSE: HURTS A LITTLE BIT
WONGBAKER_NUMERICALRESPONSE: NO HURT

## 2024-07-15 ASSESSMENT — PAIN DESCRIPTION - PAIN TYPE
TYPE: SURGICAL PAIN

## 2024-07-15 ASSESSMENT — PAIN DESCRIPTION - FREQUENCY
FREQUENCY: CONTINUOUS
FREQUENCY: INTERMITTENT

## 2024-07-15 ASSESSMENT — PAIN - FUNCTIONAL ASSESSMENT
PAIN_FUNCTIONAL_ASSESSMENT: ACTIVITIES ARE NOT PREVENTED
PAIN_FUNCTIONAL_ASSESSMENT: 0-10
PAIN_FUNCTIONAL_ASSESSMENT: PREVENTS OR INTERFERES SOME ACTIVE ACTIVITIES AND ADLS
PAIN_FUNCTIONAL_ASSESSMENT: ACTIVITIES ARE NOT PREVENTED

## 2024-07-15 ASSESSMENT — PAIN DESCRIPTION - ONSET
ONSET: ON-GOING

## 2024-07-15 ASSESSMENT — PAIN DESCRIPTION - LOCATION
LOCATION: BACK

## 2024-07-15 NOTE — INTERVAL H&P NOTE
Update History & Physical    The patient's History and Physical of July 15, 2024 was reviewed with the patient and I examined the patient. There was no change. The surgical site was confirmed by the patient and me.     Plan: The risks, benefits, expected outcome, and alternative to the recommended procedure have been discussed with the patient. Patient understands and wants to proceed with the procedure.     Electronically signed by ISAAK HERNANDEZ MD on 7/15/2024 at 9:01 AM

## 2024-07-15 NOTE — PROGRESS NOTES
4 Eyes Skin Assessment     NAME:  Bonnie Stewart  YOB: 1945  MEDICAL RECORD NUMBER:  [id number]    The patient is being assessed for  Shift Handoff    I agree that at least one RN has performed a thorough Head to Toe Skin Assessment on the patient. ALL assessment sites listed below have been assessed.      Areas assessed by both nurses:    Head, Face, Ears, Shoulders, Back, Chest, Arms, Elbows, Hands, Sacrum. Buttock, Coccyx, Ischium, Legs. Feet and Heels, and Under Medical Devices         Does the Patient have a Wound? Yes wound(s) were present on assessment. LDA wound assessment was Initiated and completed by RN    Lower back incision with PRASANTH drain. C/D/I.   Lazaro Prevention initiated by RN: Yes  Wound Care Orders initiated by RN: No    Pressure Injury (Stage 3,4, Unstageable, DTI, NWPT, and Complex wounds) if present, place Wound referral order by RN under : No    New Ostomies, if present place, Ostomy referral order under : No     Nurse 1 eSignature: Electronically signed by Mame Posey RN on 7/15/24 at 8:00 PM EDT    **SHARE this note so that the co-signing nurse can place an eSignature**    Nurse 2 eSignature: Electronically signed by Sara Melton RN on 7/15/24 at 9:28 PM EDT

## 2024-07-15 NOTE — OP NOTE
[address]  [address]                            OPERATIVE REPORT      PATIENT NAME: BLANKA WADE              : 1945  MED REC NO: [id number]                       ROOM: 2214  ACCOUNT NO: [id number]                       ADMIT DATE: 07/15/2024  PROVIDER: Willie Leonardo MD    DATE OF SERVICE:  07/15/2024    PREOPERATIVE DIAGNOSES:  Postlaminectomy syndrome, lumbar herniated nucleus pulposus at L2-3 and degenerative spondylolisthesis with spinal stenosis at L4-5.    POSTOPERATIVE DIAGNOSES:  Postlaminectomy syndrome, lumbar herniated nucleus pulposus at L2-3 and degenerative spondylolisthesis with spinal stenosis at L4-5.    PROCEDURES PERFORMED:  Removal of nonsegmental instrumentation at L3-4; L2-3 left hemilaminectomy, subtotal facetectomy, foraminotomy, diskectomy; L4-5 bilateral hemilaminectomy, medial facetectomy, foraminotomy; L2, L3, L4, and L5 posterolateral fusion; segmental spinal instrumentation at L2, L3, L4, and L5 on the left; and segmental spinal instrumentation at L3, L4, and L5 on the right.    SURGEON:  Willie Leonardo MD    ASSISTANT:  None.    ANESTHESIA:  General endotracheal.    ESTIMATED BLOOD LOSS:  Less than 100 mL.    SPECIMENS REMOVED:  None.    INTRAOPERATIVE FINDINGS:  The patient had gross instability at 2-3 and 4-5 with degenerative spondylolisthesis that reduced almost entirely and decompression at L4-5.  The patient had terrible bone quality.  An instrumental pedicle on the right at L3.  Large subligamentous disk extrusion sequestered fragment at L2-3 going superior to the disk space.     COMPLICATIONS:  None.    IMPLANTS:  Eric.    INDICATIONS:  stas and leg pain    DESCRIPTION OF PROCEDURE:  Following induction of general endotracheal anesthesia, the patient in prone position on a spinal frame.  The patient was prepped and draped in the usual fashion.  Midline incision was made.  A paramedian

## 2024-07-15 NOTE — BRIEF OP NOTE
Brief Postoperative Note      Patient: Bonnie Stewart  YOB: 1945  MRN: [id number]    Date of Procedure: 7/15/2024    Pre-Op Diagnosis Codes:     * HNP (herniated nucleus pulposus), lumbar [M51.26]     * Spondylolisthesis of lumbar region [M43.16]    Post-Op Diagnosis: Same       Procedure(s):  Lumbar two/three laminectomy discectomy, lumbar four/five laminectomy, lumbar two/three/four/five fusion, removal of hardware lumbar three/four    Surgeon(s):  Willie Leonardo MD    Assistant:  Surgical Assistant: Easton Gutierrez    Anesthesia: General    Estimated Blood Loss (mL): less than 100     Complications: None    Specimens:   * No specimens in log *    Implants:  Implant Name Type Inv. Item Serial No.  Lot No. LRB No. Used Action   GRAFT BNE SUB 10CC FOAM PK VERSATILE COMPR RESIST VITOSS [id number]] [employer]] - [id number]  GRAFT BNE SUB 10CC FOAM PK VERSATILE COMPR RESIST VITOSS [id number]] [employer]]  [employer] HOW- [id number] N/A 1 Implanted   SCREW SPNL POLYAX 5.5X35 MM [employer] - [id number]  SCREW SPNL POLYAX 5.5X35 MM [employer]  [employer] HOW-WD 1111 N/A 1 Implanted   SCREW SPNL POLYAX 7.5X40 MM [employer] - [id number]  SCREW SPNL POLYAX 7.5X40 MM [employer]  [employer] HOW-WD 1111 N/A 2 Implanted   SCREW SPNL POLYAX 6.5X45 MM [employer] - [id number]  SCREW SPNL POLYAX 6.5X45 MM [employer]  [employer] HOW-WD 1111 N/A 2 Implanted   SCREW SPNL POLYAX 7.5X45 MM [employer] - [id number]  SCREW SPNL POLYAX 7.5X45 MM [employer]  [employer] HOW-WD 1111 N/A 2 Implanted   REAL SPNL L60MM DIA6MM ANT POST TI SMOOTH CRV ED III - [id number]  REAL SPNL L60MM DIA6MM ANT POST TI SMOOTH CRV ED III  [employer] HOW-WD 1111 N/A 1 Implanted   BLOCKER SPNL L50MM DIA6MM TI 1 LEV ED 3 - [id number]  BLOCKER SPNL L50MM DIA6MM TI 1 MAURI WARD 3  [employer] HOWM-WD 1111 N/A 7 Implanted   REAL SPNL L80MM DIA6MM ANT POST TI SMOOTH CRV

## 2024-07-15 NOTE — ANESTHESIA PRE PROCEDURE
mLs intravenously 1/23/24   Seferino Solis MD   lidocaine (LIDODERM) 5 %  12/23/23   Seferino Solis MD   lidocaine (LIDODERM) 5 % Apply 1 patch as directed for 12 hours every 24 hours (12 hours on, 12 hours off)  Patient not taking: Reported on 1/16/2024 12/23/23   Seferino Solis MD   methylPREDNISolone sodium succinate (SOLU-MEDROL) 2000 MG injection Infuse 125 mg intravenously  Patient not taking: Reported on 7/3/2024 1/23/24   Seferino Solis MD   nitrofurantoin, macrocrystal-monohydrate, (MACROBID) 100 MG capsule TAKE 1 CAPSULE BY MOUTH TWICE DAILY FOR 7 DAYS  Patient not taking: Reported on 1/16/2024 12/7/23   Seferino Solis MD   GAVILYTE-G 236 g solution MIX AND DRINK BY MOUTH 1 LITER AT 5 AM MORNING OF PROCEDURE AS DIRECTED  Patient not taking: Reported on 1/16/2024 12/21/23   Seferino Solis MD   predniSONE (DELTASONE) 20 MG tablet TAKE 1 TABLET BY MOUTH EVERY DAY FOR 5 DAYS  Patient not taking: Reported on 1/16/2024 12/23/23   Seferino Solis MD   pregabalin (LYRICA) 75 MG capsule Take 1 capsule by mouth 2 times daily.  Patient not taking: Reported on 7/3/2024 10/26/23   Seferino Solis MD   pregabalin (LYRICA) 75 MG capsule TAKE 1 CAPSULE BY MOUTH EVERY MORNING AND 2 CAPSULES EVERY EVENING  Patient not taking: Reported on 7/3/2024 12/26/23   Seferino Solis MD   sodium chloride 0.9 % infusion Infuse 500 mLs intravenously 1/23/24   Seferino Solis MD   traMADol (ULTRAM) 50 MG tablet Take 1 tablet by mouth 2 times daily as needed.  Patient not taking: Reported on 7/3/2024 1/12/24   Seferino Solis MD   acetaminophen (TYLENOL) 650 MG extended release tablet Take 1,300 mg by mouth  Patient not taking: Reported on 1/16/2024    Automatic Reconciliation, Ar   amLODIPine (NORVASC) 5 MG tablet TK 1 T PO ONCE A DAY  Patient not taking: Reported on 1/16/2024 8/15/20   Automatic Reconciliation, Ar   escitalopram (LEXAPRO) 20 MG tablet Take 1 tablet 
09-Apr-2018

## 2024-07-15 NOTE — PROGRESS NOTES
1645- Patient arrived on unit in stable condition. Dressing C/D/I. PRASANTH drain compressed with blooding drainage in bulb. Pérez in place with clear yellow urine in bag.    1700- Patient refused all medications. Patient states taking Norvasc prior to arriving to hospital for surgery. Patient states he did not to take her medication now as she was too sleepy to take them. Patient educated on the importance of taking her medication at this time.

## 2024-07-15 NOTE — ANESTHESIA POSTPROCEDURE EVALUATION
Department of Anesthesiology  Postprocedure Note    Patient: Bonnie Stewart  MRN: [id number]  YOB: 1945  Date of evaluation: 7/15/2024    Procedure Summary       Date: 07/15/24 Room / Location: [facility] MAIN 06 / [facility] MAIN OR    Anesthesia Start: 1137 Anesthesia Stop: 1434    Procedure: Lumbar two/three laminectomy discectomy, lumbar four/five laminectomy, lumbar two/three/four/five fusion, removal of hardware lumbar three/four (Spine Lumbar) Diagnosis:       HNP (herniated nucleus pulposus), lumbar      Spondylolisthesis of lumbar region      (HNP (herniated nucleus pulposus), lumbar [M51.26])      (Spondylolisthesis of lumbar region [M43.16])    Surgeons: Willie Leonardo MD Responsible Provider: Ely Ford MD    Anesthesia Type: General ASA Status: 3            Anesthesia Type: General    Janes Phase I: Jnaes Score: 9    Janes Phase II:      Anesthesia Post Evaluation    Patient location during evaluation: bedside  Patient participation: complete - patient participated  Airway patency: patent  Cardiovascular status: hemodynamically stable  Respiratory status: acceptable  Hydration status: stable    No notable events documented.

## 2024-07-15 NOTE — PERIOP NOTE
TRANSFER - OUT REPORT:    Verbal report given to NITHIN Vilchis on Bonnie Stewart  being transferred to 2 Surgical for routine progression of patient care       Report consisted of patient's Situation, Background, Assessment and   Recommendations(SBAR).     Information from the following report(s) Nurse Handoff Report, Surgery Report, and MAR was reviewed with the receiving nurse.           Lines:   Peripheral IV 07/15/24 Left;Anterior Forearm (Active)   Site Assessment Clean, dry & intact 07/15/24 1535   Line Status Normal saline locked 07/15/24 1535   Phlebitis Assessment No symptoms 07/15/24 1535   Dressing Status Clean, dry & intact 07/15/24 1535   Dressing Type Transparent 07/15/24 1535       Peripheral IV 07/15/24 Right Forearm (Active)   Site Assessment Clean, dry & intact 07/15/24 1535   Line Status Normal saline locked 07/15/24 1535   Phlebitis Assessment No symptoms 07/15/24 1535   Dressing Status Clean, dry & intact 07/15/24 1535   Dressing Type Transparent 07/15/24 1535        Opportunity for questions and clarification was provided.      Patient transported with:  Registered Nurse

## 2024-07-16 ENCOUNTER — HOME HEALTH ADMISSION (OUTPATIENT)
Age: 79
End: 2024-07-16
Payer: MEDICARE

## 2024-07-16 VITALS
RESPIRATION RATE: 16 BRPM | WEIGHT: 120 LBS | BODY MASS INDEX: 22.08 KG/M2 | SYSTOLIC BLOOD PRESSURE: 113 MMHG | HEIGHT: 62 IN | DIASTOLIC BLOOD PRESSURE: 56 MMHG | OXYGEN SATURATION: 98 % | HEART RATE: 77 BPM | TEMPERATURE: 98.8 F

## 2024-07-16 LAB
ANION GAP SERPL CALC-SCNC: 7 MMOL/L (ref 3–18)
BUN SERPL-MCNC: 8 MG/DL (ref 7–18)
BUN/CREAT SERPL: 15 (ref 12–20)
CALCIUM SERPL-MCNC: 8.4 MG/DL (ref 8.5–10.1)
CHLORIDE SERPL-SCNC: 111 MMOL/L (ref 100–111)
CO2 SERPL-SCNC: 22 MMOL/L (ref 21–32)
CREAT SERPL-MCNC: 0.52 MG/DL (ref 0.6–1.3)
ERYTHROCYTE [DISTWIDTH] IN BLOOD BY AUTOMATED COUNT: 16.3 % (ref 11.6–14.5)
GLUCOSE SERPL-MCNC: 107 MG/DL (ref 74–99)
HCT VFR BLD AUTO: 22.9 % (ref 35–45)
HGB BLD-MCNC: 7.5 G/DL (ref 12–16)
MCH RBC QN AUTO: 29.9 PG (ref 24–34)
MCHC RBC AUTO-ENTMCNC: 32.8 G/DL (ref 31–37)
MCV RBC AUTO: 91.2 FL (ref 78–100)
NRBC # BLD: 0 K/UL (ref 0–0.01)
NRBC BLD-RTO: 0 PER 100 WBC
PLATELET # BLD AUTO: 239 K/UL (ref 135–420)
PMV BLD AUTO: 10.7 FL (ref 9.2–11.8)
POTASSIUM SERPL-SCNC: 3.5 MMOL/L (ref 3.5–5.5)
RBC # BLD AUTO: 2.51 M/UL (ref 4.2–5.3)
SODIUM SERPL-SCNC: 140 MMOL/L (ref 136–145)
WBC # BLD AUTO: 7.8 K/UL (ref 4.6–13.2)

## 2024-07-16 PROCEDURE — 97116 GAIT TRAINING THERAPY: CPT

## 2024-07-16 PROCEDURE — 97535 SELF CARE MNGMENT TRAINING: CPT

## 2024-07-16 PROCEDURE — 80048 BASIC METABOLIC PNL TOTAL CA: CPT

## 2024-07-16 PROCEDURE — 6360000002 HC RX W HCPCS: Performed by: ORTHOPAEDIC SURGERY

## 2024-07-16 PROCEDURE — 36415 COLL VENOUS BLD VENIPUNCTURE: CPT

## 2024-07-16 PROCEDURE — 2500000003 HC RX 250 WO HCPCS: Performed by: ORTHOPAEDIC SURGERY

## 2024-07-16 PROCEDURE — 6370000000 HC RX 637 (ALT 250 FOR IP): Performed by: ORTHOPAEDIC SURGERY

## 2024-07-16 PROCEDURE — 97165 OT EVAL LOW COMPLEX 30 MIN: CPT

## 2024-07-16 PROCEDURE — 85027 COMPLETE CBC AUTOMATED: CPT

## 2024-07-16 PROCEDURE — 2580000003 HC RX 258: Performed by: ORTHOPAEDIC SURGERY

## 2024-07-16 RX ORDER — OXYCODONE HYDROCHLORIDE 5 MG/1
5 TABLET ORAL EVERY 6 HOURS PRN
Qty: 28 TABLET | Refills: 0 | Status: SHIPPED | OUTPATIENT
Start: 2024-07-16 | End: 2024-07-23

## 2024-07-16 RX ADMIN — WATER 2000 MG: 1 INJECTION INTRAMUSCULAR; INTRAVENOUS; SUBCUTANEOUS at 05:38

## 2024-07-16 RX ADMIN — FAMOTIDINE 20 MG: 20 TABLET ORAL at 08:31

## 2024-07-16 RX ADMIN — ESCITALOPRAM OXALATE 20 MG: 10 TABLET ORAL at 08:31

## 2024-07-16 RX ADMIN — BISACODYL 5 MG: 5 TABLET, COATED ORAL at 08:30

## 2024-07-16 RX ADMIN — TRANEXAMIC ACID 1000 MG: 1 INJECTION, SOLUTION INTRAVENOUS at 09:42

## 2024-07-16 RX ADMIN — GABAPENTIN 300 MG: 300 CAPSULE ORAL at 08:32

## 2024-07-16 RX ADMIN — SODIUM CHLORIDE, PRESERVATIVE FREE 10 ML: 5 INJECTION INTRAVENOUS at 08:34

## 2024-07-16 RX ADMIN — ACETAMINOPHEN 325MG 650 MG: 325 TABLET ORAL at 10:24

## 2024-07-16 RX ADMIN — OXYCODONE HYDROCHLORIDE 10 MG: 10 TABLET ORAL at 05:39

## 2024-07-16 RX ADMIN — HYDROMORPHONE HYDROCHLORIDE 0.5 MG: 1 INJECTION, SOLUTION INTRAMUSCULAR; INTRAVENOUS; SUBCUTANEOUS at 01:58

## 2024-07-16 RX ADMIN — ACETAMINOPHEN 325MG 650 MG: 325 TABLET ORAL at 05:39

## 2024-07-16 RX ADMIN — DOCUSATE SODIUM 100 MG: 100 CAPSULE ORAL at 08:32

## 2024-07-16 RX ADMIN — FUROSEMIDE 20 MG: 20 TABLET ORAL at 08:31

## 2024-07-16 ASSESSMENT — PAIN SCALES - WONG BAKER
WONGBAKER_NUMERICALRESPONSE: NO HURT

## 2024-07-16 ASSESSMENT — PAIN SCALES - GENERAL
PAINLEVEL_OUTOF10: 0
PAINLEVEL_OUTOF10: 7
PAINLEVEL_OUTOF10: 0
PAINLEVEL_OUTOF10: 7
PAINLEVEL_OUTOF10: 0
PAINLEVEL_OUTOF10: 7
PAINLEVEL_OUTOF10: 7

## 2024-07-16 ASSESSMENT — PAIN DESCRIPTION - LOCATION
LOCATION: BACK

## 2024-07-16 ASSESSMENT — PAIN DESCRIPTION - ONSET: ONSET: ON-GOING

## 2024-07-16 ASSESSMENT — PAIN DESCRIPTION - PAIN TYPE: TYPE: SURGICAL PAIN

## 2024-07-16 ASSESSMENT — PAIN - FUNCTIONAL ASSESSMENT
PAIN_FUNCTIONAL_ASSESSMENT: ACTIVITIES ARE NOT PREVENTED

## 2024-07-16 ASSESSMENT — PAIN DESCRIPTION - ORIENTATION
ORIENTATION: MID
ORIENTATION: MID;POSTERIOR;LOWER

## 2024-07-16 ASSESSMENT — PAIN DESCRIPTION - DESCRIPTORS
DESCRIPTORS: ACHING
DESCRIPTORS: BURNING;ACHING
DESCRIPTORS: ACHING;BURNING
DESCRIPTORS: ACHING

## 2024-07-16 ASSESSMENT — PAIN DESCRIPTION - FREQUENCY: FREQUENCY: INTERMITTENT

## 2024-07-16 NOTE — NURSE NAVIGATOR
understanding of all information provided. She has no questions or concerns at this time. She may discharge at this time. Will follow patient postoperatively.

## 2024-07-16 NOTE — HOME CARE
Received home health referral for BS for the following disciplines  (PT per protocol).   Discharge orders noted for today.    Spoke with patient in room.   Explained home health care services and routines.  Demographics verified including insurance, phone and address confirmed.  Answered all questions to the best of this writer's scope of practice and provided [facility] contact card.  Patient has the following DME: given front wheeled rolling walker prior to discharge..  Caregivers available spouse as primary.   Orders noted and arranged and sent to central intake and scheduling.       ----    ROXANE Carrera [facility] Home Care Liaison    
39.4

## 2024-07-16 NOTE — PLAN OF CARE
Problem: Physical Therapy - Adult  Goal: By Discharge: Performs mobility at highest level of function for planned discharge setting.  See evaluation for individualized goals.  Description: Physical Therapy Goals:  Initiated 7/15/2024 to be met within 7-10 days.    1.  Patient will move from supine to sit and sit to supine  in bed with modified independence.    2.  Patient will transfer from bed to chair and chair to bed with modified independence using the least restrictive device.  3.  Patient will perform sit to stand with modified independence.  4.  Patient will ambulate with modified independence for 150 feet with the least restrictive device.   5.  Patient will ascend/descend  1 step with 0 handrail(s) with supervision/set-up.    PLOF: Mod I with functional mobility and ADLs. Lives in single story home with spouse.       Outcome: Progressing   PHYSICAL THERAPY TREATMENT/DISCHARGE    Patient: Bonnie Stewart (79 y.o. female)  Date: 7/16/2024  Diagnosis: HNP (herniated nucleus pulposus), lumbar [M51.26]  Spondylolisthesis of lumbar region [M43.16]  Spondylolisthesis at L4-L5 level [M43.16] Spondylolisthesis at L4-L5 level  Procedure(s) (LRB):  Lumbar two/three laminectomy discectomy, lumbar four/five laminectomy, lumbar two/three/four/five fusion, removal of hardware lumbar three/four (N/A) 1 Day Post-Op  Precautions: General Precautions,  ,  ,  ,  , Spinal Precautions: No Bending, No Lifting, No Twisting,  ,    ASSESSMENT:  Patient received in bed and agreed to PT. Educated on spinal precautions and log roll technique for bed mobility. Bed mobility SBA with minimal cues for hand placement. Pt denies N/T in BLE. Pt reports needing to use the bathroom. Sit<> RW SBA. Ambulates to bathroom with RW SBA. Good dynamic seated and standing balance for pericare. Pt ambulates 150ft in hallway SBA with no LOB and step through pattern with RW. Performs 4 stairs SBA and denies concerns. Returns to recliner at end of 
vital signs taken during this treatment.    After treatment:   [x]         Patient left in no apparent distress sitting up in chair  []         Patient left in no apparent distress in bed  [x]         Call bell left within reach  [x]         Nursing notified  []         Caregiver present  []         Bed alarm activated  []         SCDs applied    COMMUNICATION/EDUCATION:   Patient Education  Education Given To: Patient  Education Provided: Role of Therapy;Plan of Care;Fall Prevention Strategies  Education Method: Verbal;Teach Back  Barriers to Learning: None  Education Outcome: Verbalized understanding    Thank you for this referral.  Farheen Campuzano, PT  Minutes: 16      Eval Complexity: Decision Making: Low Complexity

## 2024-07-16 NOTE — DISCHARGE SUMMARY
Discharge/Transfer  Summary     Patient: Bonnie Stewart MRN: [id number]  SSN: [id number]    YOB: 1945  Age: 79 y.o.  Sex: female       Admit Date: 7/15/2024    Discharge Date: 7/16/2024      Admission Diagnoses: HNP (herniated nucleus pulposus), lumbar [M51.26]  Spondylolisthesis of lumbar region [M43.16]  Spondylolisthesis at L4-L5 level [M43.16]    Discharge Diagnoses:      Discharge Condition: Good      Surgery: Lumbar two/three laminectomy discectomy, lumbar four/five laminectomy, lumbar two/three/four/five fusion, removal of hardware lumbar three/four: 95461 (CPT®)     Procedure(s) (LRB):  Lumbar two/three laminectomy discectomy, lumbar four/five laminectomy, lumbar two/three/four/five fusion, removal of hardware lumbar three/four (N/A)       Hospital Course: benign      Disposition: home    Discharge Medications:   Current Discharge Medication List        START taking these medications    Details   oxyCODONE (ROXICODONE) 5 MG immediate release tablet Take 1 tablet by mouth every 6 hours as needed for Pain for up to 7 days. Intended supply: 7 days. Take lowest dose possible to manage pain Max Daily Amount: 20 mg  Qty: 28 tablet, Refills: 0    Comments: Reduce doses taken as pain becomes manageable  Associated Diagnoses: S/P laminectomy with spinal fusion           CONTINUE these medications which have NOT CHANGED    Details   apixaban (ELIQUIS) 5 MG TABS tablet Take 1 tablet by mouth 2 times daily      vitamin B-12 (CYANOCOBALAMIN) 1000 MCG tablet Take 1 tablet by mouth daily      docusate (COLACE, DULCOLAX) 100 MG CAPS Take 100 mg by mouth daily      FeAsp-FeFum -Suc-C-Thre-B12-FA (MULTIGEN PLUS) -1 MG TABS tablet       FEROSUL 325 (65 Fe) MG tablet Take 1 tablet by mouth daily      hydrocortisone sodium succinate PF (SOLU-CORTEF) 100 MG SOLR injection Infuse 2 mLs intravenously      iron dextran complex (INFED) 50 MG/ML injection Infuse 0.5 mLs intravenously      escitalopram (LEXAPRO)

## 2024-07-16 NOTE — PROGRESS NOTES
Discharge teaching completed at bedside with patient. Opportunity provided for clarifying questions All answered to patient satisfaction. IV(2) removed. ID removed and shredded. Patient to be discharged via wheelchair.

## 2024-07-16 NOTE — PROGRESS NOTES
0730-  Received care of patient resting in bed. Unlabored breathing  no distress noted.  Plan of care on going  call bell within reach.    0957- Neuro Checks  WNL no  distress patient c/o pain 7/10 will give pain  medication      1015- Tylenol  was given. PRASANTH drain removed  pressure dressing applied.  Patient  tolerated well.  Plan of care on going. Call bell with in reach.    1130- Patient discharge home.  Received discharge instructions Patient  transport to  main  entrance  via  wheelchair.   No other issues noted.

## 2024-07-16 NOTE — PROGRESS NOTES
Dominguez catheter removed without any difficulty patient had 300 ml in dominguez bag of clear yellow urine without odor. Patient tolerated procedure well. Patients DTV 0130. Will continue to monitor. Patient resting quietly in bed with eyes closed in semi-fowlers position

## 2024-07-16 NOTE — PROGRESS NOTES
Occupational Therapy    OCCUPATIONAL THERAPY EVALUATION/DISCHARGE    Patient: Bonnie Stewart (79 y.o. female)  Date: 7/16/2024  Primary Diagnosis: HNP (herniated nucleus pulposus), lumbar [M51.26]  Spondylolisthesis of lumbar region [M43.16]  Spondylolisthesis at L4-L5 level [M43.16]  Procedure(s) (LRB):  Lumbar two/three laminectomy discectomy, lumbar four/five laminectomy, lumbar two/three/four/five fusion, removal of hardware lumbar three/four (N/A) 1 Day Post-Op   Precautions: General Precautions, Spinal Precautions: No Bending, No Lifting, No Twisting,  ,    PLOF:  Pt lives w/ spouse in a one story home, walk in shower. Independent w/ ADLs and functional mobility.      ASSESSMENT AND RECOMMENDATIONS:  Pt seated in recliner upon Ot's entry, and agreeable to evaluation. Pt very pleasant and cooperative. Pt educated on spinal precautions and verbalizes understanding. Pt educated on LE dressing techniques in order to adhere to spinal precautions. Sit>stand close SPV. Pt ambulates to toilet in bathroom using RW, SPV. Toilet t/f SPV. Pt ambulates back to recliner using RW, SPV. No LOB noted. Pt tolerates session well, and has no further OT needs at this time. Pt left in recliner, all needs in reach, RN present.     Maximum therapeutic gains met at current level of care and patient will be discharged from occupational therapy at this time.    Further Equipment Recommendations for Discharge: rolling walker    [facility]: AM-PAC Inpatient Daily Activity Raw Score: 22    At this time and based on an AM-PAC score, no further OT is recommended upon discharge.  Recommend patient returns to prior setting with prior services.    This AMPA score should be considered in conjunction with interdisciplinary team recommendations to determine the most appropriate discharge setting. Patient's social support, diagnosis, medical stability, and prior level of function should also be taken into consideration.     SUBJECTIVE:   Patient

## 2024-07-16 NOTE — PROGRESS NOTES
4 Eyes Skin Assessment     NAME:  Bonnie Stewart  YOB: 1945  MEDICAL RECORD NUMBER:  [id number]    The patient is being assessed for  Shift Handoff    I agree that at least one RN has performed a thorough Head to Toe Skin Assessment on the patient. ALL assessment sites listed below have been assessed.      Areas assessed by both nurses:    Head, Face, Ears, Shoulders, Back, Chest, Arms, Elbows, Hands, Sacrum. Buttock, Coccyx, Ischium, Legs. Feet and Heels, and Under Medical Devices         Does the Patient have a Wound? Yes wound(s) were present on assessment. LDA wound assessment was Initiated and completed by RN     Incion to midline mid back, no drainage. Patient has jennifer drain draining copious amount of seroussangious fluid, dominguez catheter has been discontinued  Lazaro Prevention initiated by RN: Yes  Wound Care Orders initiated by RN: No    Pressure Injury (Stage 3,4, Unstageable, DTI, NWPT, and Complex wounds) if present, place Wound referral order by RN under : No    New Ostomies, if present place, Ostomy referral order under : No     Nurse 1 eSignature: Electronically signed by Sara Melton RN on 7/15/24 at 9:30 PM EDT    **SHARE this note so that the co-signing nurse can place an eSignature**    Nurse 2 eSignature: {Esignature:[id number]}

## 2024-07-16 NOTE — CARE COORDINATION
Patient has already discharged home. She Chooses [facility], has a supportive family, and a RW was provided to her to take home, prior to discharge.        07/16/24 1249   Service Assessment   Patient Orientation Alert and Oriented;Person;Place;Situation;Self  (Telephone call to patient at [phone], as she discharged prior to this CM assumed assignment. Patient HIPAA verified.)   Cognition Alert   History Provided By Patient   Primary Caregiver Self   Accompanied By/Relationship SELF   Support Systems Spouse/Significant Other;Family Members;/   Patient's Healthcare Decision Maker is: Legal Next of Kin   PCP Verified by CM Yes   Last Visit to PCP Within last 3 months   Prior Functional Level Independent in ADLs/IADLs   Current Functional Level Assistance with the following:;Mobility;Shopping;Other (see comment);Housework  (Transportation)   Can patient return to prior living arrangement Yes   Ability to make needs known: Good   Family able to assist with home care needs: Yes   Would you like for me to discuss the discharge plan with any other family members/significant others, and if so, who? No   Financial Resources Medicare   CM/SW Referral DME   Social/Functional History   Lives With Spouse   Type of Home House   Home Layout One level   Home Access Level entry   Bathroom Shower/Tub Walk-in shower   Bathroom Toilet Standard   Bathroom Equipment None   Bathroom Accessibility Accessible   Home Equipment None   Receives Help From Family;Home health   ADL Assistance Independent   Homemaking Assistance Needs assistance   Meal Prep Moderate   Laundry Total   Vacuuming Total   Cleaning Total   Gardening Total   Yard Work Total   Driving Total   Shopping Total   Homemaking Responsibilities Yes   Meal Prep Responsibility Primary   Laundry Responsibility Secondary   Cleaning Responsibility Secondary   Bill Paying/Finance Responsibility Secondary   Shopping Responsibility Secondary   Health Care

## 2024-07-17 ENCOUNTER — HOME CARE VISIT (OUTPATIENT)
Age: 79
End: 2024-07-17

## 2024-07-17 PROCEDURE — 0221000100 HH NO PAY CLAIM PROCEDURE

## 2024-07-17 PROCEDURE — G0151 HHCP-SERV OF PT,EA 15 MIN: HCPCS

## 2024-07-17 ASSESSMENT — ENCOUNTER SYMPTOMS
PAIN LOCATION - PAIN QUALITY: ACHE, DULL
CONSTIPATION: 1

## 2024-07-18 ENCOUNTER — HOME CARE VISIT (OUTPATIENT)
Age: 79
End: 2024-07-18
Payer: MEDICARE

## 2024-07-18 ENCOUNTER — TELEPHONE (OUTPATIENT)
Facility: HOSPITAL | Age: 79
End: 2024-07-18

## 2024-07-18 PROCEDURE — G0157 HHC PT ASSISTANT EA 15: HCPCS

## 2024-07-18 NOTE — TELEPHONE ENCOUNTER
Call placed to patient , ID verified x 2. Patient is s/p removal of nonsegmental instrumentation at L3-4; L2-3 left hemilaminectomy, subtotal facetectomy, foraminotomy, diskectomy; L4-5 bilateral hemilaminectomy, medial facetectomy, foraminotomy; L2, L3, L4, and L5 posterolateral fusion; segmental spinal instrumentation at L2, L3, L4, and L5 on the left; and segmental spinal instrumentation at L3, L4, and L5 on the right with Dr. Leonardo, dos 07/16/2024.  She denies chest pain, shortness of breath, nausea, vomiting, fever or chills. She denies any difficulty with bowel or bladder, she denies any weakness in her lower extremities. She continues to have numbness in her left lower extremity that was present prior to surgery. Reassurance provided that she is very early out and swelling is still present. She states that pain is controlled with medication that she is taking as prescribed and icing. She reports pressure in the area where the drain was removed. She states that home physical therapy was out yesterday and removed the dressing from that area. They stated that her dressing remains clean, dry and intact. She is icing and ambulating hourly to assist with pain and stiffness. She was confused over why she was instructed to stop taking her gabapentin and her lasix that she takes only when needed and her potassium that she takes when she takes her lasix. Coordinator had to review medication reconciliation with patient with patient and explain to her that because in the preoperative area she told the preoperative nurses that she did not take these medications Dr. Leonardo stopped them at discharge as this was his only option. If he was to continue them, it would force him to write new prescriptions so he was going on the premise that she was not taking them. Reassured patient that he did not intend her to stop taking her routine medications that were prescribed by other prescribers. Patient verbalized understanding of

## 2024-07-19 ENCOUNTER — HOME CARE VISIT (OUTPATIENT)
Age: 79
End: 2024-07-19
Payer: MEDICARE

## 2024-07-19 VITALS
OXYGEN SATURATION: 98 % | SYSTOLIC BLOOD PRESSURE: 118 MMHG | HEART RATE: 60 BPM | RESPIRATION RATE: 16 BRPM | DIASTOLIC BLOOD PRESSURE: 50 MMHG | TEMPERATURE: 98.5 F

## 2024-07-19 LAB
ABO + RH BLD: NORMAL
BLD PROD TYP BPU: NORMAL
BLOOD BANK DISPENSE STATUS: NORMAL
BLOOD GROUP ANTIBODIES SERPL: NORMAL
BPU ID: NORMAL
CALLED TO: NORMAL
CROSSMATCH RESULT: NORMAL
SPECIMEN EXP DATE BLD: NORMAL
UNIT DIVISION: 0

## 2024-07-19 PROCEDURE — G0157 HHC PT ASSISTANT EA 15: HCPCS

## 2024-07-19 ASSESSMENT — ENCOUNTER SYMPTOMS: DYSPNEA ACTIVITY LEVEL: AFTER AMBULATING 10 - 20 FT

## 2024-07-19 NOTE — HOME HEALTH
HPI:   \" HPI:  Bonnie Stewart is a 79 y.o. female with severe back and leg pain.  MRI demonstrates large junctional disc herniation at L2/3 junctional to her previous fusion L3/4. She rates her pain a 8/10.  This patient has failed the presurgical conservative treatments  including physical therapy and medications. Pain has impacted the patient's functional ability. She is being admitted for surgical intervention.\"    Procedure:  L2/3 lami discectomy, L4/5 lami, L2-5 fusion, removal hardware L3/4, on 07/15/2024, by Willie Leonardo MD at [facility].       PMHx:  List of Comorbidities:   Bladder cancer - 2015 (approx)   Cerebral aneurysm without rupture - repair 2019   Dementia    Foraminal stenosis of lumbar region   Hematuria   Hypercholesterolemia   Iron deficiency anemia   Low back pain   JORGE LUIS (obstructive sleep apnea) -cpap   Pulmonary HTN   Shoulder pain 10/1/2020     SUBJECTIVE:  Patient is a pleasant 79 y.o. female, she is sitting on front porch when PT arrives.  She expresses willingness to participate and follow medical post surgical instructions.  LIVING SITUATION:  Lives in a 2 story home with spouse, remains on 1st floor. Spouse is available to assist patient.  Bed is high and there is a 2 step stool to get on the bed.  Several area rugs on floor that catch walker legs, recommended to remove until off the walker.  CAREGIVER INVOLVEMENT/ASSISTANCE NEEDED FOR:  Spouse available 24/7 as needed.  PLOF:  Walked without AD, independent ADLs..   MEDICATIONS RECONCILED AND UPDATED AS FOLLOWS: no issues.  High risk medication teaching regarding anticoagulants, hyperglycemic agents or opioid narcotics performed for: Opioid, anti platelet, anticoagulant.  Instructed patient on See med interventions for details.  The following medication discrepancies/interactions were noted: n/a..   NEXT MD APPT:  7/30/24 with Dr. Leonardo/orthopedic surgery.  Patient/caregiver encouraged/instructed to keep appointment as lack of follow

## 2024-07-22 ENCOUNTER — HOME CARE VISIT (OUTPATIENT)
Age: 79
End: 2024-07-22
Payer: MEDICARE

## 2024-07-22 PROCEDURE — G0157 HHC PT ASSISTANT EA 15: HCPCS

## 2024-07-24 ENCOUNTER — HOME CARE VISIT (OUTPATIENT)
Age: 79
End: 2024-07-24
Payer: MEDICARE

## 2024-07-24 PROCEDURE — G0157 HHC PT ASSISTANT EA 15: HCPCS

## 2024-07-25 ENCOUNTER — HOME CARE VISIT (OUTPATIENT)
Age: 79
End: 2024-07-25
Payer: MEDICARE

## 2024-07-25 VITALS
HEART RATE: 69 BPM | DIASTOLIC BLOOD PRESSURE: 77 MMHG | DIASTOLIC BLOOD PRESSURE: 80 MMHG | RESPIRATION RATE: 18 BRPM | OXYGEN SATURATION: 99 % | OXYGEN SATURATION: 97 % | TEMPERATURE: 98.3 F | TEMPERATURE: 98 F | HEART RATE: 66 BPM | HEART RATE: 65 BPM | DIASTOLIC BLOOD PRESSURE: 62 MMHG | DIASTOLIC BLOOD PRESSURE: 63 MMHG | SYSTOLIC BLOOD PRESSURE: 126 MMHG | SYSTOLIC BLOOD PRESSURE: 128 MMHG | RESPIRATION RATE: 18 BRPM | TEMPERATURE: 98.2 F | HEART RATE: 66 BPM | OXYGEN SATURATION: 99 % | RESPIRATION RATE: 17 BRPM | OXYGEN SATURATION: 99 % | TEMPERATURE: 98 F | RESPIRATION RATE: 18 BRPM | SYSTOLIC BLOOD PRESSURE: 142 MMHG | SYSTOLIC BLOOD PRESSURE: 122 MMHG

## 2024-07-25 VITALS
RESPIRATION RATE: 18 BRPM | DIASTOLIC BLOOD PRESSURE: 60 MMHG | HEART RATE: 69 BPM | TEMPERATURE: 97.8 F | SYSTOLIC BLOOD PRESSURE: 119 MMHG | OXYGEN SATURATION: 99 %

## 2024-07-25 PROCEDURE — G0157 HHC PT ASSISTANT EA 15: HCPCS

## 2024-07-25 ASSESSMENT — ENCOUNTER SYMPTOMS
PAIN LOCATION - PAIN QUALITY: ACHING

## 2024-07-25 NOTE — HOME HEALTH
SUBJECTIVE: Patient reports back pain at 6/10. Patient denies any recent falls or medication changes.     CAREGIVER INVOLVEMENT/ASSISTANCE NEEDED FOR: Patient lives in a two story home with her  who is aiding her with ADLs and meal preparations as needed. She has two steps present to enter/exit home.     OBJECTIVE:  See interventions.    PATIENT RESPONSE TO TREATMENT:  Patient was agreeable to PT session and required sitting rest breaks for general fatigue.     PATIENT LEVEL OF UNDERSTANDING OF EDUCATION PROVIDED: Patient was instructed on importance of using AD at all times with standing to decrease risk for tripping/falls and to improve patient safety. Patient was educated on lumbar restrictions- no bending, lifting or twisting to decrease risk for further injury.     ASSESSMENT OF PROGRESS TOWARD GOALS: Patient was seen today for a PT follow up and was able to perform gait training in home with use of FWW- she requires education to increase heel strike and step length to aide with gait stability. Patient requires sequential cues to perform sit to stands properly and to decrease stress on back- she requires CGA for safety. Patient also performed sitting therex tasks and updated HEP as below- she reports understanding. Plan to perform dressing change in coming visit.     HOME EXERCISE PROGRAM:Patient was instructed on taking a walk at least once per hour to increase time spent in standing. Patient to perform in sitting to BLEs with AROM as follows: Ankle pumps, LAQ, knee flexion x10 reps each all 3x per day.    THE FOLLOWING DISCHARGE PLANNING WAS DISCUSSED WITH THE PATIENT/CAREGIVER: DC from  PT once goals are met.     PLAN NEXT VISIT: Plan in coming visit to increase therex tasks and gait as patient is able-outdoors.

## 2024-07-25 NOTE — HOME HEALTH
Upon arrival to patients home she is resting in bed and reports back pain at 6/10. Patient reports she is very fatigued today and is resting. Patient declined all PT tasks today secondary to this but is agreeable to a visit tomorrow. She denies any recent falls or medication changes. She was also agreeable to vitals check and education on positioning and use of ice pack as mentioned in objectives.

## 2024-07-29 ENCOUNTER — HOME CARE VISIT (OUTPATIENT)
Age: 79
End: 2024-07-29
Payer: MEDICARE

## 2024-07-29 PROCEDURE — G0157 HHC PT ASSISTANT EA 15: HCPCS

## 2024-07-30 VITALS
HEART RATE: 60 BPM | TEMPERATURE: 97.9 F | SYSTOLIC BLOOD PRESSURE: 110 MMHG | RESPIRATION RATE: 18 BRPM | DIASTOLIC BLOOD PRESSURE: 60 MMHG | OXYGEN SATURATION: 98 %

## 2024-07-30 NOTE — HOME HEALTH
SUBJECTIVE: Patient reports back pain at 4/10. Patient denies any recent falls or medication changes.     CAREGIVER INVOLVEMENT/ASSISTANCE NEEDED FOR: Patient lives in a two story home with her  who is aiding her with ADLs and meal preparations as needed. She has two steps present to enter/exit home.     OBJECTIVE:  See interventions.    PATIENT RESPONSE TO TREATMENT:  Patient was agreeable to PT session and required sitting rest breaks for general fatigue.     PATIENT LEVEL OF UNDERSTANDING OF EDUCATION PROVIDED: Patient was instructed on importance of using AD at all times with standing to decrease risk for tripping/falls and to improve patient safety. Patient was educated on lumbar restrictions- no bending, lifting or twisting to decrease risk for further injury.     ASSESSMENT OF PROGRESS TOWARD GOALS: Patient was seen today for a PT follow up and was able to perform an increase in gait distance indoors with use of SPC- patient requires education to increase B heel strike and step length to aide with overall stability and decrease risk for falls. She was also able to perform sit to stands from den chair with UE support and SBA for safety. Patient reports HEP compliance as below and has no change in pain rating with session. She reports she is still using cold pack internittently to aide with edema and pain control.     HOME EXERCISE PROGRAM:Patient was instructed on taking a walk at least once per hour to increase time spent in standing. Patient to perform in sitting to BLEs with AROM as follows: Ankle pumps, LAQ, knee flexion x10 reps each all 3x per day.    THE FOLLOWING DISCHARGE PLANNING WAS DISCUSSED WITH THE PATIENT/CAREGIVER: DC from  PT once goals are met.     PLAN NEXT VISIT: Plan in coming visit to perform outdoor gait training with use of SPC

## 2024-07-31 ENCOUNTER — HOME CARE VISIT (OUTPATIENT)
Age: 79
End: 2024-07-31
Payer: MEDICARE

## 2024-07-31 PROCEDURE — G0157 HHC PT ASSISTANT EA 15: HCPCS

## 2024-08-01 VITALS
OXYGEN SATURATION: 99 % | TEMPERATURE: 98.2 F | RESPIRATION RATE: 18 BRPM | SYSTOLIC BLOOD PRESSURE: 116 MMHG | DIASTOLIC BLOOD PRESSURE: 60 MMHG | HEART RATE: 65 BPM

## 2024-08-01 ASSESSMENT — ENCOUNTER SYMPTOMS: PAIN LOCATION - PAIN QUALITY: ACHING

## 2024-08-01 NOTE — HOME HEALTH
SUBJECTIVE: Patient reports \"I am starting to get more comfortable with the cane\". Patient denies any recent falls or medication changes.     CAREGIVER INVOLVEMENT/ASSISTANCE NEEDED FOR: Patient lives in a two story home with her  who is aiding her with ADLs and meal preparations as needed. She has two steps present to enter/exit home.      OBJECTIVE:  See interventions.    PATIENT RESPONSE TO TREATMENT:  Patient was agreeable to PT session and required sitting rest breaks for general fatigue.     PATIENT LEVEL OF UNDERSTANDING OF EDUCATION PROVIDED: Patient was instructed on importance of using AD at all times with standing to decrease risk for tripping/falls and to improve patient safety. Patient was educated on lumbar restrictions- no bending, lifting or twisting to decrease risk for further injury.     ASSESSMENT OF PROGRESS TOWARD GOALS: Patient was seen today for a PT follow up and was able to perform an increase in gait distance today x200 feet with use of SPC- she is educated to increase B heel strike and step width to improve stability. Patient also performed sit to stands from porch chair with UE support and SBA- she is 50% toward (I) with this task. Patient is progressing as well with LE strength as she was able to perform standing therex tasks with UE support- demonstrations are needed to carryover each properly.     HOME EXERCISE PROGRAM:Patient was instructed on taking a walk at least once per hour to increase time spent in standing. Patient to perform in sitting to BLEs with AROM as follows: Ankle pumps, LAQ, knee flexion x10 reps each all 3x per day.    THE FOLLOWING DISCHARGE PLANNING WAS DISCUSSED WITH THE PATIENT/CAREGIVER: DC from  PT once goals are met.     NEXT VISIT: Plan in coming visit to perform dressing change and increase outdoor gait tasks as able.

## 2024-08-02 ENCOUNTER — HOME CARE VISIT (OUTPATIENT)
Age: 79
End: 2024-08-02
Payer: MEDICARE

## 2024-08-02 PROCEDURE — G0151 HHCP-SERV OF PT,EA 15 MIN: HCPCS

## 2024-08-03 VITALS
HEART RATE: 64 BPM | DIASTOLIC BLOOD PRESSURE: 54 MMHG | SYSTOLIC BLOOD PRESSURE: 140 MMHG | TEMPERATURE: 98.5 F | RESPIRATION RATE: 16 BRPM | OXYGEN SATURATION: 98 %

## 2024-08-03 ASSESSMENT — ENCOUNTER SYMPTOMS: PAIN LOCATION - PAIN QUALITY: SORE

## 2024-08-03 NOTE — HOME HEALTH
Pt. clinically discharged and documentation finalized for completion of agency discharge.      Patient Status:  80 y/o/ female s/p  L2/3 lami discectomy, L4/5 lami, L2-5 fusion, removal hardware L3/4. by Dr. Leonardo, referred to [facility] for rehab.  She has met HH PT goals and is ready for d/c.  She also reports she wants to go to OP PT when Dr. Leonardo orders it after her 1 month follow up visit.  Wounds: Back, healing.  Caregiver involvement:  assists patient as needed..  Medications reconciled and all medications are available in the home this visit.   Medications  are effective  at this time.    Patient education provided:  Educated patient re: progress made.  Ed re: purpose for the FTSST and the benefit of being able to stand without use of hand support,  Ed re: reason for delay from HH PT to OP PT and that HH PT is to progress patient to a safe level of function at home and initiate walking program, whereas OP PT will have goals for strength and balance beyond HH PT, and Dr. Leonardo wants to make sure the patient has healed before ordering OP PT.  Also ed re: continue spinal precautions and even after healed, be cautious to prevent overuse injury to levels above and below the fusion.  Patient/caregiver response to education:  Patient verbalized understanding with repeat back.  Progress toward goals/ Patient response to treatment:    Gait:   Walks with and without a cane in the home, with a cane outside the home.  no significant gait deviation noted.  Transfers:  Uses hand support,  It was very difficulty for her to stand without using her hands; she was successful after coaching her through the activity and with effort, form a dining room chair.  Balance:    Per Tinetti:  low fall risk, 28/28, improved from initial 15/28 a high fall risk.  Strength:  PT used FTSST as a functional strength measure.  Initially she was unable to stand from dining room chair without hand support.  At d/c, she completed 2

## 2024-08-06 NOTE — HOME HEALTH
SUBJECTIVE: Patient reports back pain at 3/10. Patient denies any recent falls or medication changes.     CAREGIVER INVOLVEMENT/ASSISTANCE NEEDED FOR: Patient lives in a two story home with her  who is aiding her with ADLs and meal preparations as needed. She has two steps present to enter/exit home.      OBJECTIVE:  See interventions.    PATIENT RESPONSE TO TREATMENT:  Patient was agreeable to PT session and required sitting rest breaks for general fatigue.     PATIENT LEVEL OF UNDERSTANDING OF EDUCATION PROVIDED: Patient was instructed on importance of using AD at all times with standing to decrease risk for tripping/falls and to improve patient safety. Patient was educated on lumbar restrictions- no bending, lifting or twisting to decrease risk for further injury.     ASSESSMENT OF PROGRESS TOWARD GOALS: Patient was seen today for a PT follow up and was able to perform an increase in gait distance x400 feet with use of SPC- she does not not have a LOB present but does require education to increase B heel strike and step length. Patient also performed an increase in sitting therex tasks and also updated HEP as below. Patient is on track for DC and is agreeable in 2 visits.     HOME EXERCISE PROGRAM:Patient was instructed on taking a walk at least once per hour to increase time spent in standing. Patient to perform in sitting to BLEs with AROM as follows: Ankle pumps, LAQ, knee flexion x10-20, sit to stands x 5 reps all 3x per day.    THE FOLLOWING DISCHARGE PLANNING WAS DISCUSSED WITH THE PATIENT/CAREGIVER: DC from  PT once goals are met.     NEXT VISIT: Plan in coming visit to perform TINETTI.

## 2024-08-06 NOTE — HOME HEALTH
SUBJECTIVE: Patient reports back pain at 2/10. Patient denies any recent falls or medication changes.     CAREGIVER INVOLVEMENT/ASSISTANCE NEEDED FOR: Patient lives in a two story home with her  who is aiding her with ADLs and meal preparations as needed. She has two steps present to enter/exit home.      OBJECTIVE:  See interventions.    PATIENT RESPONSE TO TREATMENT:  Patient was agreeable to PT session and required sitting rest breaks for general fatigue.     PATIENT LEVEL OF UNDERSTANDING OF EDUCATION PROVIDED: Patient was instructed on importance of using AD at all times with standing to decrease risk for tripping/falls and to improve patient safety. Patient was educated on lumbar restrictions- no bending, lifting or twisting to decrease risk for further injury.     .Assessment and Summary of Care:  Patient's current functional status before discharge is as follows  Strength: Please assess at DC  ROM:  BLEs are WNLs  Bed Mobility: Patient is (I) with all aspects of bed mobility  Transfers: Patient is performing sit to stands with UE support and supervision  Gait/WC mobility: Patient is performing gait training indoors/outdoors x 400+ feet with education to increase B heel strike and step length to aide with gait stability  Stairs: Patient is performing going up and down steps in home with UE support and SBA approaching supervision wiht step to step pattern.   Special Tests: TINETTI is 24/28  Recommendations: Plan to DC patient from HH PT to HEP.     HOME EXERCISE PROGRAM:Patient was instructed on taking a walk at least once per hour to increase time spent in standing. Patient to perform in sitting to BLEs with AROM as follows: Ankle pumps, LAQ, knee flexion, in standing with UE support: HR, TR, hip marching, HS curls x10-20 reps, sit to stands x 5 reps all 3x per day.

## 2024-08-08 ENCOUNTER — HOSPITAL ENCOUNTER (OUTPATIENT)
Facility: HOSPITAL | Age: 79
Discharge: HOME | End: 2024-08-08
Payer: MEDICARE

## 2024-08-08 DIAGNOSIS — Z98.1 STATUS POST LUMBAR SPINAL FUSION: ICD-10-CM

## 2024-08-08 PROCEDURE — 72110 X-RAY EXAM L-2 SPINE 4/>VWS: CPT

## 2025-07-21 ENCOUNTER — OFFICE VISIT (OUTPATIENT)
Age: 80
End: 2025-07-21
Payer: MEDICARE

## 2025-07-21 VITALS — HEIGHT: 62 IN | WEIGHT: 117.6 LBS | BODY MASS INDEX: 21.64 KG/M2

## 2025-07-21 DIAGNOSIS — M53.3 SACROILIAC JOINT PAIN: ICD-10-CM

## 2025-07-21 DIAGNOSIS — M70.62 TROCHANTERIC BURSITIS, LEFT HIP: Primary | ICD-10-CM

## 2025-07-21 DIAGNOSIS — Z98.1 S/P LAMINECTOMY WITH SPINAL FUSION: ICD-10-CM

## 2025-07-21 PROCEDURE — G8420 CALC BMI NORM PARAMETERS: HCPCS | Performed by: ORTHOPAEDIC SURGERY

## 2025-07-21 PROCEDURE — 73502 X-RAY EXAM HIP UNI 2-3 VIEWS: CPT | Performed by: ORTHOPAEDIC SURGERY

## 2025-07-21 PROCEDURE — 1123F ACP DISCUSS/DSCN MKR DOCD: CPT | Performed by: ORTHOPAEDIC SURGERY

## 2025-07-21 PROCEDURE — 1125F AMNT PAIN NOTED PAIN PRSNT: CPT | Performed by: ORTHOPAEDIC SURGERY

## 2025-07-21 PROCEDURE — G8400 PT W/DXA NO RESULTS DOC: HCPCS | Performed by: ORTHOPAEDIC SURGERY

## 2025-07-21 PROCEDURE — G8428 CUR MEDS NOT DOCUMENT: HCPCS | Performed by: ORTHOPAEDIC SURGERY

## 2025-07-21 PROCEDURE — 1090F PRES/ABSN URINE INCON ASSESS: CPT | Performed by: ORTHOPAEDIC SURGERY

## 2025-07-21 PROCEDURE — 20610 DRAIN/INJ JOINT/BURSA W/O US: CPT | Performed by: ORTHOPAEDIC SURGERY

## 2025-07-21 PROCEDURE — 1036F TOBACCO NON-USER: CPT | Performed by: ORTHOPAEDIC SURGERY

## 2025-07-21 PROCEDURE — 99213 OFFICE O/P EST LOW 20 MIN: CPT | Performed by: ORTHOPAEDIC SURGERY

## 2025-07-21 RX ORDER — LIDOCAINE HYDROCHLORIDE 10 MG/ML
2 INJECTION, SOLUTION INFILTRATION; PERINEURAL ONCE
Status: COMPLETED | OUTPATIENT
Start: 2025-07-21 | End: 2025-07-21

## 2025-07-21 RX ORDER — TRIAMCINOLONE ACETONIDE 40 MG/ML
80 INJECTION, SUSPENSION INTRA-ARTICULAR; INTRAMUSCULAR ONCE
Status: COMPLETED | OUTPATIENT
Start: 2025-07-21 | End: 2025-07-21

## 2025-07-21 RX ADMIN — TRIAMCINOLONE ACETONIDE 80 MG: 40 INJECTION, SUSPENSION INTRA-ARTICULAR; INTRAMUSCULAR at 10:02

## 2025-07-21 RX ADMIN — LIDOCAINE HYDROCHLORIDE 2 ML: 10 INJECTION, SOLUTION INFILTRATION; PERINEURAL at 10:01

## 2025-07-21 NOTE — ASSESSMENT & PLAN NOTE
I explained the risks of injection/aspiration including but not limited to infection, pain, skin discoloration, and flushing. After obtaining written consent for left hip greater trochanteric bursa injection the patient was prepped in normal sterile fashion with freeze spray and alcohol.  80mg kenalog and 2mL 2% lidocaine was injected .  The needle was withdrawn, the area was cleansed and a sterile bandage was applied.  The patient tolerated the procedure well.

## 2025-07-21 NOTE — PROGRESS NOTES
Patient: Bonnie Stewart                MRN: [id number]       SSN: [id number]  YOB: 1945        AGE: 80 y.o.        SEX: female  BMI: Body mass index is 21.51 kg/m².    PCP: Jaspal Ferrari MD  07/21/25    Chief Complaint: Follow-up (Left hip follow up )      1. Trochanteric bursitis, left hip  Assessment & Plan:  I explained the risks of injection/aspiration including but not limited to infection, pain, skin discoloration, and flushing. After obtaining written consent for left hip greater trochanteric bursa injection the patient was prepped in normal sterile fashion with freeze spray and alcohol.  80mg kenalog and 2mL 2% lidocaine was injected .  The needle was withdrawn, the area was cleansed and a sterile bandage was applied.  The patient tolerated the procedure well.     Orders:  -     AMB POC X-RAY RADEX HIP UNI WITH PELVIS 2-3 VIEWS  -     DRAIN/INJECT LARGE JOINT/BURSA  -     triamcinolone acetonide (KENALOG-40) injection 80 mg; 80 mg, Intra-artICUlar, ONCE, 1 dose, On Mon 7/21/25 at 1015  -     lidocaine 1 % injection 2 mL; 2 mL, Intra-artICUlar, ONCE, 1 dose, On Mon 7/21/25 at 1015  2. Sacroiliac joint pain  3. S/P laminectomy with spinal fusion      HPI:  Bonnie Stewart is a 80 y.o. female Established patient with chief complaint of   Chief Complaint   Patient presents with    Follow-up     Left hip follow up      -7/21/2025: Left greater trochanteric bursa injection  -7/3/2023: Left greater trochanteric bursa injection, 2 months relief  - 1/23/2023: Left greater trochanteric bursa injection, 6 months relief    Lumbar pain with left trochanteric tenderness that has been present for many years.  She describes at the pain from her lumbar spine will occasionally shoot down to her feet.  She did have a back surgery by Dr. Leonardo on July 15, 2020 for which partially helped some of her pain.  She has subsequently been referred to pain management.  She does use lidocaine patches and

## (undated) DEVICE — (D)BNDG ADHESIVE FABRIC 3/4X3 -- DISC BY MFR USE ITEM 357960

## (undated) DEVICE — GLOVE SURG SZ 65 L12IN FNGR THK79MIL GRN LTX FREE

## (undated) DEVICE — SUTURE STRATAFIX SYMMETRIC PDS + ETHIGUARD SZ 1 L18IN ABSRB SXPP1A300

## (undated) DEVICE — AEGIS 1" DISK 4MM HOLE, PEEL OPEN: Brand: MEDLINE

## (undated) DEVICE — TRAY MYEL SFTY +

## (undated) DEVICE — SUTURE 2 0 STRATAFIX SYMMETRIC PDS + 60CM CT 1 SXPP1A439

## (undated) DEVICE — KIT ARMOR C DRP COLLAPSIBLE AND SELF EXP TOP CVR FOR FLUOROSCOPIC

## (undated) DEVICE — NDL SPNE MANAN 22GX6IN --

## (undated) DEVICE — MEDIA CONTRAST 10ML 200MG/ML 41%

## (undated) DEVICE — DRAIN SURG W7MMXL20CM SIL FULL PERF HUBLESS FLAT RADPQ STRP

## (undated) DEVICE — (D)SYR 10ML 1/5ML GRAD NSAF -- PKGING CHANGE USE ITEM 338027

## (undated) DEVICE — GLOVE SURG SZ 8 CRM LTX FREE POLYISOPRENE POLYMER BEAD ANTI

## (undated) DEVICE — 3M™ TEGADERM™ HP TRANSPARENT FILM DRESSING FRAME STYLE, 9534HP, 2-3/8 X 2-3/4 IN (6 CM X 7 CM), 100/CT 4CT/CASE: Brand: 3M™ TEGADERM™

## (undated) DEVICE — GAUZE,SPONGE,8"X4",12PLY,XRAY,STRL,LF: Brand: MEDLINE

## (undated) DEVICE — (D)NDL SPNE 22GX15CM -- DISC BY MFR W/NO SUB

## (undated) DEVICE — Device

## (undated) DEVICE — GLOVE SURG SZ 85 L12IN FNGR THK79MIL GRN LTX FREE

## (undated) DEVICE — JACKSON TABLE POSITIONER KIT: Brand: MEDLINE INDUSTRIES, INC.

## (undated) DEVICE — GLOVE SURG SZ 65 CRM LTX FREE POLYISOPRENE POLYMER BEAD ANTI

## (undated) DEVICE — BONE VAC

## (undated) DEVICE — KIT OR TURNOVER

## (undated) DEVICE — SYR 10ML LUER LOK 1/5ML GRAD --

## (undated) DEVICE — OPTIFOAM GENTLE SA, POSTOP, 4X8: Brand: MEDLINE

## (undated) DEVICE — ADHESIVE SKIN CLOSURE WND 8.661X1.5 IN 22 CM LIQUIBAND SECUR

## (undated) DEVICE — SUTURE MONOCRYL SZ 3-0 L27IN ABSRB UD L24MM PS-1 3/8 CIR PRIM Y936H

## (undated) DEVICE — TUBING SUCT 10FR MAL ALUM SHFT FN CAP VENT UNIV CONN W/ OBT

## (undated) DEVICE — TRAY,URINE METER,100% SILICONE,16FR10ML: Brand: MEDLINE

## (undated) DEVICE — 3.0MM PRECISION NEURO (MATCH HEAD)

## (undated) DEVICE — APPLICATOR MEDICATED 26 CC SOLUTION HI LT ORNG CHLORAPREP

## (undated) DEVICE — WAX SURG 2.5GM HEMSTAT BNE BEESWAX PARAFFIN ISO PALMITATE

## (undated) DEVICE — SOLUTION IRRIG 1000ML 0.9% SOD CHL USP POUR PLAS BTL

## (undated) DEVICE — ELECTRODE PT RET AD L9FT HI MOIST COND ADH HYDRGEL CORDED

## (undated) DEVICE — BANDAGE ADH W0.75XL3IN UNIV WVN FAB NAT GEN USE STRP N ADH